# Patient Record
Sex: MALE | Race: BLACK OR AFRICAN AMERICAN | Employment: OTHER | URBAN - METROPOLITAN AREA
[De-identification: names, ages, dates, MRNs, and addresses within clinical notes are randomized per-mention and may not be internally consistent; named-entity substitution may affect disease eponyms.]

---

## 2017-12-08 ENCOUNTER — MEDICAL CORRESPONDENCE (OUTPATIENT)
Dept: HEALTH INFORMATION MANAGEMENT | Facility: CLINIC | Age: 77
End: 2017-12-08

## 2018-02-09 ENCOUNTER — MEDICAL CORRESPONDENCE (OUTPATIENT)
Dept: HEALTH INFORMATION MANAGEMENT | Facility: CLINIC | Age: 78
End: 2018-02-09

## 2018-06-01 ENCOUNTER — MEDICAL CORRESPONDENCE (OUTPATIENT)
Dept: HEALTH INFORMATION MANAGEMENT | Facility: CLINIC | Age: 78
End: 2018-06-01

## 2018-07-30 ENCOUNTER — RADIANT APPOINTMENT (OUTPATIENT)
Dept: CT IMAGING | Facility: CLINIC | Age: 78
End: 2018-07-30
Attending: INTERNAL MEDICINE
Payer: COMMERCIAL

## 2018-07-30 DIAGNOSIS — R05.9 COUGH: ICD-10-CM

## 2018-07-30 DIAGNOSIS — M54.9 BACKACHE: ICD-10-CM

## 2018-07-30 LAB
CREAT BLD-MCNC: 1.2 MG/DL (ref 0.66–1.25)
GFR SERPL CREATININE-BSD FRML MDRD: 59 ML/MIN/1.7M2
RADIOLOGIST FLAGS: ABNORMAL

## 2018-07-30 RX ORDER — IOPAMIDOL 755 MG/ML
93 INJECTION, SOLUTION INTRAVASCULAR ONCE
Status: COMPLETED | OUTPATIENT
Start: 2018-07-30 | End: 2018-07-30

## 2018-07-30 RX ADMIN — IOPAMIDOL 93 ML: 755 INJECTION, SOLUTION INTRAVASCULAR at 13:56

## 2018-07-30 NOTE — DISCHARGE INSTRUCTIONS

## 2018-07-30 NOTE — DISCHARGE INSTRUCTIONS

## 2018-08-02 ENCOUNTER — PRE VISIT (OUTPATIENT)
Dept: UROLOGY | Facility: CLINIC | Age: 78
End: 2018-08-02

## 2018-08-02 NOTE — TELEPHONE ENCOUNTER
MEDICAL RECORDS REQUEST   [facility]  Urology Clinic  [address] [place] [address]  PHONE: [phone]  Fax: [phone]        FUTURE VISIT INFORMATION                                                   Job Kaiser : 1940 scheduled for future visit at [facility]    APPOINTMENT INFORMATION:    Date: 08/10/2018    Provider:  Sarahy Silveira    Reason for Visit/Diagnosis: Kidney stones    REFERRAL INFORMATION:    Referring provider:  DR Malik Hudson    Specialty: MD    Referring providers clinic:  [facility]    Clinic contact number:  [phone]    RECORDS REQUESTED FOR VISIT                                                     NOTES  STATUS/DETAILS   OFFICE NOTE from referring provider  yes   OFFICE NOTE from other specialist  no   DISCHARGE SUMMARY from hospital  no   DISCHARGE REPORT from the ER  no   OPERATIVE REPORT  no   MEDICATION LIST  yes   KIDNEY STONE     CT STONE  yes   IMAGING (IMAGES & REPORT)  no   KUB  no       PRE-VISIT CHECKLIST      Record collection complete Yes   Appointment appropriately scheduled           (right time/right provider) Yes   MyChart activation Yes   Questionnaire complete If no, please explain in process     Completed by: Eunice Sifuentes

## 2018-12-14 ENCOUNTER — MEDICAL CORRESPONDENCE (OUTPATIENT)
Dept: HEALTH INFORMATION MANAGEMENT | Facility: CLINIC | Age: 78
End: 2018-12-14

## 2019-01-16 ENCOUNTER — TELEPHONE (OUTPATIENT)
Dept: OPHTHALMOLOGY | Facility: CLINIC | Age: 79
End: 2019-01-16

## 2019-01-18 ENCOUNTER — TELEPHONE (OUTPATIENT)
Dept: OPHTHALMOLOGY | Facility: CLINIC | Age: 79
End: 2019-01-18

## 2019-01-22 ENCOUNTER — PRE VISIT (OUTPATIENT)
Dept: UROLOGY | Facility: CLINIC | Age: 79
End: 2019-01-22

## 2019-01-22 ENCOUNTER — TELEPHONE (OUTPATIENT)
Dept: UROLOGY | Facility: CLINIC | Age: 79
End: 2019-01-22

## 2019-01-22 DIAGNOSIS — N20.0 KIDNEY STONE: Primary | ICD-10-CM

## 2019-01-22 NOTE — TELEPHONE ENCOUNTER
Patient is coming in to see  for kidney stones, message sent for patient to get CT before appointment.

## 2019-01-22 NOTE — TELEPHONE ENCOUNTER
Tried to call pt about CT scan prior to Dr. Silveira appt but phone gave busy signal and couldn't leave a VM.

## 2019-01-28 ENCOUNTER — DOCUMENTATION ONLY (OUTPATIENT)
Dept: CARE COORDINATION | Facility: CLINIC | Age: 79
End: 2019-01-28

## 2019-09-11 ENCOUNTER — TELEPHONE (OUTPATIENT)
Dept: OPHTHALMOLOGY | Facility: CLINIC | Age: 79
End: 2019-09-11

## 2020-02-06 ENCOUNTER — HOSPITAL ENCOUNTER (OUTPATIENT)
Facility: CLINIC | Age: 80
Setting detail: OBSERVATION
Discharge: LEFT BEFORE DISCHARGE | End: 2020-02-07
Attending: FAMILY MEDICINE | Admitting: PEDIATRICS
Payer: COMMERCIAL

## 2020-02-06 DIAGNOSIS — J10.1 INFLUENZA A: ICD-10-CM

## 2020-02-06 DIAGNOSIS — J11.1 INFLUENZA: Primary | ICD-10-CM

## 2020-02-06 DIAGNOSIS — R06.02 SHORTNESS OF BREATH: ICD-10-CM

## 2020-02-06 LAB
BASOPHILS # BLD AUTO: 0 10E9/L (ref 0–0.2)
BASOPHILS NFR BLD AUTO: 0.4 %
DIFFERENTIAL METHOD BLD: ABNORMAL
EOSINOPHIL # BLD AUTO: 0.1 10E9/L (ref 0–0.7)
EOSINOPHIL NFR BLD AUTO: 1.7 %
ERYTHROCYTE [DISTWIDTH] IN BLOOD BY AUTOMATED COUNT: 12.9 % (ref 10–15)
GLUCOSE BLDC GLUCOMTR-MCNC: 88 MG/DL (ref 70–99)
HCT VFR BLD AUTO: 36.4 % (ref 40–53)
HGB BLD-MCNC: 12.3 G/DL (ref 13.3–17.7)
IMM GRANULOCYTES # BLD: 0 10E9/L (ref 0–0.4)
IMM GRANULOCYTES NFR BLD: 0.3 %
LACTATE BLD-SCNC: 0.9 MMOL/L (ref 0.7–2)
LYMPHOCYTES # BLD AUTO: 0.7 10E9/L (ref 0.8–5.3)
LYMPHOCYTES NFR BLD AUTO: 9.7 %
MCH RBC QN AUTO: 31.2 PG (ref 26.5–33)
MCHC RBC AUTO-ENTMCNC: 33.8 G/DL (ref 31.5–36.5)
MCV RBC AUTO: 92 FL (ref 78–100)
MONOCYTES # BLD AUTO: 0.3 10E9/L (ref 0–1.3)
MONOCYTES NFR BLD AUTO: 3.8 %
NEUTROPHILS # BLD AUTO: 6 10E9/L (ref 1.6–8.3)
NEUTROPHILS NFR BLD AUTO: 84.1 %
NRBC # BLD AUTO: 0 10*3/UL
NRBC BLD AUTO-RTO: 0 /100
PLATELET # BLD AUTO: 251 10E9/L (ref 150–450)
RBC # BLD AUTO: 3.94 10E12/L (ref 4.4–5.9)
WBC # BLD AUTO: 7.2 10E9/L (ref 4–11)

## 2020-02-06 PROCEDURE — 85025 COMPLETE CBC W/AUTO DIFF WBC: CPT | Performed by: FAMILY MEDICINE

## 2020-02-06 PROCEDURE — 25000132 ZZH RX MED GY IP 250 OP 250 PS 637: Performed by: FAMILY MEDICINE

## 2020-02-06 PROCEDURE — 99285 EMERGENCY DEPT VISIT HI MDM: CPT | Mod: 25 | Performed by: FAMILY MEDICINE

## 2020-02-06 PROCEDURE — 93010 ELECTROCARDIOGRAM REPORT: CPT | Mod: Z6 | Performed by: FAMILY MEDICINE

## 2020-02-06 PROCEDURE — 96361 HYDRATE IV INFUSION ADD-ON: CPT | Performed by: FAMILY MEDICINE

## 2020-02-06 PROCEDURE — 00000146 ZZHCL STATISTIC GLUCOSE BY METER IP

## 2020-02-06 PROCEDURE — 87040 BLOOD CULTURE FOR BACTERIA: CPT | Performed by: FAMILY MEDICINE

## 2020-02-06 PROCEDURE — 84145 PROCALCITONIN (PCT): CPT | Performed by: FAMILY MEDICINE

## 2020-02-06 PROCEDURE — 83605 ASSAY OF LACTIC ACID: CPT | Performed by: FAMILY MEDICINE

## 2020-02-06 PROCEDURE — 96360 HYDRATION IV INFUSION INIT: CPT | Performed by: FAMILY MEDICINE

## 2020-02-06 PROCEDURE — 80053 COMPREHEN METABOLIC PANEL: CPT | Performed by: FAMILY MEDICINE

## 2020-02-06 PROCEDURE — 93005 ELECTROCARDIOGRAM TRACING: CPT | Performed by: FAMILY MEDICINE

## 2020-02-06 PROCEDURE — 84484 ASSAY OF TROPONIN QUANT: CPT | Performed by: FAMILY MEDICINE

## 2020-02-06 PROCEDURE — 94640 AIRWAY INHALATION TREATMENT: CPT | Performed by: FAMILY MEDICINE

## 2020-02-06 RX ORDER — SODIUM CHLORIDE 9 MG/ML
1000 INJECTION, SOLUTION INTRAVENOUS CONTINUOUS
Status: DISCONTINUED | OUTPATIENT
Start: 2020-02-07 | End: 2020-02-07 | Stop reason: HOSPADM

## 2020-02-06 RX ORDER — IPRATROPIUM BROMIDE AND ALBUTEROL SULFATE 2.5; .5 MG/3ML; MG/3ML
3 SOLUTION RESPIRATORY (INHALATION) ONCE
Status: COMPLETED | OUTPATIENT
Start: 2020-02-06 | End: 2020-02-07

## 2020-02-06 RX ORDER — ACETAMINOPHEN 325 MG/1
975 TABLET ORAL ONCE
Status: COMPLETED | OUTPATIENT
Start: 2020-02-06 | End: 2020-02-06

## 2020-02-06 RX ADMIN — ACETAMINOPHEN 975 MG: 325 TABLET, FILM COATED ORAL at 20:53

## 2020-02-07 ENCOUNTER — APPOINTMENT (OUTPATIENT)
Dept: GENERAL RADIOLOGY | Facility: CLINIC | Age: 80
End: 2020-02-07
Attending: FAMILY MEDICINE
Payer: COMMERCIAL

## 2020-02-07 VITALS
SYSTOLIC BLOOD PRESSURE: 117 MMHG | BODY MASS INDEX: 28.74 KG/M2 | RESPIRATION RATE: 18 BRPM | TEMPERATURE: 100.8 F | DIASTOLIC BLOOD PRESSURE: 41 MMHG | HEART RATE: 77 BPM | OXYGEN SATURATION: 100 % | WEIGHT: 189 LBS

## 2020-02-07 PROBLEM — J11.1 INFLUENZA: Status: ACTIVE | Noted: 2020-02-07

## 2020-02-07 LAB
ALBUMIN SERPL-MCNC: 3.4 G/DL (ref 3.4–5)
ALBUMIN UR-MCNC: 10 MG/DL
ALP SERPL-CCNC: 62 U/L (ref 40–150)
ALT SERPL W P-5'-P-CCNC: 18 U/L (ref 0–70)
ANION GAP SERPL CALCULATED.3IONS-SCNC: 5 MMOL/L (ref 3–14)
APPEARANCE UR: CLEAR
AST SERPL W P-5'-P-CCNC: 24 U/L (ref 0–45)
BILIRUB SERPL-MCNC: 0.3 MG/DL (ref 0.2–1.3)
BILIRUB UR QL STRIP: NEGATIVE
BUN SERPL-MCNC: 14 MG/DL (ref 7–30)
CALCIUM SERPL-MCNC: 8.5 MG/DL (ref 8.5–10.1)
CHLORIDE SERPL-SCNC: 105 MMOL/L (ref 94–109)
CO2 SERPL-SCNC: 27 MMOL/L (ref 20–32)
COLOR UR AUTO: YELLOW
CREAT SERPL-MCNC: 0.97 MG/DL (ref 0.66–1.25)
FLUAV+FLUBV AG SPEC QL: NEGATIVE
FLUAV+FLUBV AG SPEC QL: POSITIVE
GFR SERPL CREATININE-BSD FRML MDRD: 74 ML/MIN/{1.73_M2}
GLUCOSE SERPL-MCNC: 95 MG/DL (ref 70–99)
GLUCOSE UR STRIP-MCNC: NEGATIVE MG/DL
HGB UR QL STRIP: NEGATIVE
KETONES UR STRIP-MCNC: NEGATIVE MG/DL
LEUKOCYTE ESTERASE UR QL STRIP: NEGATIVE
MUCOUS THREADS #/AREA URNS LPF: PRESENT /LPF
NITRATE UR QL: NEGATIVE
PH UR STRIP: 6.5 PH (ref 5–7)
POTASSIUM SERPL-SCNC: 4.2 MMOL/L (ref 3.4–5.3)
PROCALCITONIN SERPL-MCNC: <0.05 NG/ML
PROT SERPL-MCNC: 6.8 G/DL (ref 6.8–8.8)
RBC #/AREA URNS AUTO: 5 /HPF (ref 0–2)
SODIUM SERPL-SCNC: 137 MMOL/L (ref 133–144)
SOURCE: ABNORMAL
SP GR UR STRIP: 1.02 (ref 1–1.03)
SPECIMEN SOURCE: ABNORMAL
SQUAMOUS #/AREA URNS AUTO: <1 /HPF (ref 0–1)
TRANS CELLS #/AREA URNS HPF: <1 /HPF (ref 0–1)
TROPONIN I SERPL-MCNC: <0.015 UG/L (ref 0–0.04)
UROBILINOGEN UR STRIP-MCNC: NORMAL MG/DL (ref 0–2)
WBC #/AREA URNS AUTO: 6 /HPF (ref 0–5)

## 2020-02-07 PROCEDURE — 25000132 ZZH RX MED GY IP 250 OP 250 PS 637: Performed by: PEDIATRICS

## 2020-02-07 PROCEDURE — 25800030 ZZH RX IP 258 OP 636: Performed by: FAMILY MEDICINE

## 2020-02-07 PROCEDURE — 87040 BLOOD CULTURE FOR BACTERIA: CPT | Performed by: FAMILY MEDICINE

## 2020-02-07 PROCEDURE — 99220 ZZC INITIAL OBSERVATION CARE,LEVL III: CPT | Performed by: PEDIATRICS

## 2020-02-07 PROCEDURE — 25800030 ZZH RX IP 258 OP 636: Performed by: PEDIATRICS

## 2020-02-07 PROCEDURE — 84484 ASSAY OF TROPONIN QUANT: CPT | Performed by: FAMILY MEDICINE

## 2020-02-07 PROCEDURE — 87804 INFLUENZA ASSAY W/OPTIC: CPT | Mod: 59 | Performed by: FAMILY MEDICINE

## 2020-02-07 PROCEDURE — 25000132 ZZH RX MED GY IP 250 OP 250 PS 637: Performed by: FAMILY MEDICINE

## 2020-02-07 PROCEDURE — 81001 URINALYSIS AUTO W/SCOPE: CPT | Performed by: FAMILY MEDICINE

## 2020-02-07 PROCEDURE — 71046 X-RAY EXAM CHEST 2 VIEWS: CPT

## 2020-02-07 PROCEDURE — 25000125 ZZHC RX 250: Performed by: FAMILY MEDICINE

## 2020-02-07 RX ORDER — POLYETHYLENE GLYCOL 3350 17 G/17G
17 POWDER, FOR SOLUTION ORAL DAILY PRN
Status: DISCONTINUED | OUTPATIENT
Start: 2020-02-07 | End: 2020-02-07 | Stop reason: HOSPADM

## 2020-02-07 RX ORDER — ONDANSETRON 4 MG/1
4 TABLET, ORALLY DISINTEGRATING ORAL EVERY 6 HOURS PRN
Status: DISCONTINUED | OUTPATIENT
Start: 2020-02-07 | End: 2020-02-07 | Stop reason: HOSPADM

## 2020-02-07 RX ORDER — ACETAMINOPHEN 325 MG/1
650 TABLET ORAL EVERY 4 HOURS PRN
Status: DISCONTINUED | OUTPATIENT
Start: 2020-02-07 | End: 2020-02-07 | Stop reason: HOSPADM

## 2020-02-07 RX ORDER — ACETAMINOPHEN 325 MG/1
325-650 TABLET ORAL EVERY 4 HOURS
Status: DISCONTINUED | OUTPATIENT
Start: 2020-02-07 | End: 2020-02-07 | Stop reason: HOSPADM

## 2020-02-07 RX ORDER — ONDANSETRON 2 MG/ML
4 INJECTION INTRAMUSCULAR; INTRAVENOUS EVERY 6 HOURS PRN
Status: DISCONTINUED | OUTPATIENT
Start: 2020-02-07 | End: 2020-02-07 | Stop reason: HOSPADM

## 2020-02-07 RX ORDER — SODIUM CHLORIDE 9 MG/ML
INJECTION, SOLUTION INTRAVENOUS CONTINUOUS
Status: DISCONTINUED | OUTPATIENT
Start: 2020-02-07 | End: 2020-02-07 | Stop reason: HOSPADM

## 2020-02-07 RX ORDER — OSELTAMIVIR PHOSPHATE 75 MG/1
75 CAPSULE ORAL 2 TIMES DAILY
Qty: 10 CAPSULE | Refills: 0 | Status: ON HOLD | OUTPATIENT
Start: 2020-02-07 | End: 2022-01-01

## 2020-02-07 RX ORDER — OSELTAMIVIR PHOSPHATE 75 MG/1
75 CAPSULE ORAL ONCE
Status: COMPLETED | OUTPATIENT
Start: 2020-02-07 | End: 2020-02-07

## 2020-02-07 RX ORDER — ALBUTEROL SULFATE 0.83 MG/ML
2.5 SOLUTION RESPIRATORY (INHALATION) EVERY 4 HOURS PRN
Status: DISCONTINUED | OUTPATIENT
Start: 2020-02-07 | End: 2020-02-07 | Stop reason: HOSPADM

## 2020-02-07 RX ORDER — OSELTAMIVIR PHOSPHATE 75 MG/1
75 CAPSULE ORAL 2 TIMES DAILY
Status: DISCONTINUED | OUTPATIENT
Start: 2020-02-07 | End: 2020-02-07 | Stop reason: HOSPADM

## 2020-02-07 RX ORDER — GUAIFENESIN AND DEXTROMETHORPHAN HYDROBROMIDE 600; 30 MG/1; MG/1
1 TABLET, EXTENDED RELEASE ORAL 2 TIMES DAILY PRN
Status: DISCONTINUED | OUTPATIENT
Start: 2020-02-07 | End: 2020-02-07 | Stop reason: HOSPADM

## 2020-02-07 RX ORDER — BUDESONIDE 1 MG/2ML
1 INHALANT ORAL 2 TIMES DAILY
Status: DISCONTINUED | OUTPATIENT
Start: 2020-02-07 | End: 2020-02-07 | Stop reason: HOSPADM

## 2020-02-07 RX ORDER — BENZONATATE 100 MG/1
100 CAPSULE ORAL 3 TIMES DAILY PRN
Status: DISCONTINUED | OUTPATIENT
Start: 2020-02-07 | End: 2020-02-07 | Stop reason: HOSPADM

## 2020-02-07 RX ORDER — ACETAMINOPHEN 325 MG/1
325 TABLET ORAL EVERY 4 HOURS
Status: DISCONTINUED | OUTPATIENT
Start: 2020-02-07 | End: 2020-02-07

## 2020-02-07 RX ORDER — NALOXONE HYDROCHLORIDE 0.4 MG/ML
.1-.4 INJECTION, SOLUTION INTRAMUSCULAR; INTRAVENOUS; SUBCUTANEOUS
Status: DISCONTINUED | OUTPATIENT
Start: 2020-02-07 | End: 2020-02-07 | Stop reason: HOSPADM

## 2020-02-07 RX ADMIN — SODIUM CHLORIDE: 9 INJECTION, SOLUTION INTRAVENOUS at 04:39

## 2020-02-07 RX ADMIN — OSELTAMIVIR PHOSPHATE 75 MG: 75 CAPSULE ORAL at 01:39

## 2020-02-07 RX ADMIN — SODIUM CHLORIDE 1000 ML: 9 INJECTION, SOLUTION INTRAVENOUS at 01:47

## 2020-02-07 RX ADMIN — IPRATROPIUM BROMIDE AND ALBUTEROL SULFATE 3 ML: .5; 3 SOLUTION RESPIRATORY (INHALATION) at 00:37

## 2020-02-07 RX ADMIN — ACETAMINOPHEN 325 MG: 325 TABLET, FILM COATED ORAL at 04:36

## 2020-02-07 RX ADMIN — ACETAMINOPHEN 325 MG: 325 TABLET, FILM COATED ORAL at 09:27

## 2020-02-07 RX ADMIN — SODIUM CHLORIDE 1000 ML: 9 INJECTION, SOLUTION INTRAVENOUS at 00:27

## 2020-02-07 ASSESSMENT — ENCOUNTER SYMPTOMS
FEVER: 0
SHORTNESS OF BREATH: 0
ABDOMINAL PAIN: 0

## 2020-02-07 NOTE — ED NOTES
[facility], [place]   ED Nurse to Floor Handoff     Job Kaiser is a 80 year old male who speaks Hong Konger and lives with family members,  in a home  They arrived in the ED by car from home    ED Chief Complaint: Blood Sugar Problem (family worried as patient cannot walk today, did not check blood sugar prior to coming to ED) and Cough (cough, has lots of pain and cannot walk. Used to walk good)    ED Dx;   Final diagnoses:   Influenza A         Needed?: Yes [place]er, pt intermittently refused    Allergies: No Known Allergies.  Past Medical Hx:   Past Medical History:   Diagnosis Date     Bronchiectasis (H)      COPD (chronic obstructive pulmonary disease) (H)      Nephrolithiasis      Tuberculosis, treated 1996      Baseline Mental status: WDL  Current Mental Status changes: at basesline    Infection present or suspected this encounter: yes respiratory influenza A  Sepsis suspected: No  Isolation type: Droplet     Activity level - Baseline/Home:  Stand with Assist  Activity Level - Current:   Stand with Assist    Bariatric equipment needed?: No    In the ED these meds were given:   Medications   0.9% sodium chloride BOLUS (0 mLs Intravenous Stopped 2/7/20 0147)     Followed by   sodium chloride 0.9% infusion (1,000 mLs Intravenous New Bag 2/7/20 0147)   acetaminophen (TYLENOL) tablet 975 mg (975 mg Oral Given 2/6/20 2053)   ipratropium - albuterol 0.5 mg/2.5 mg/3 mL (DUONEB) neb solution 3 mL (3 mLs Nebulization Given 2/7/20 0037)   oseltamivir (TAMIFLU) capsule 75 mg (75 mg Oral Given 2/7/20 0139)       Drips running?  No    Home pump  No    Current LDAs  Peripheral IV 02/06/20 Right Upper forearm (Active)   Site Assessment WDL 2/6/2020 11:47 PM   Line Status Saline locked 2/6/2020 11:47 PM   Phlebitis Scale 0-->no symptoms 2/6/2020 11:47 PM   Infiltration Scale 0 2/6/2020 11:47 PM   Number of days: 1       Labs results:   Labs Ordered and Resulted from Time of ED Arrival Up to the  Time of Departure from the ED   CBC WITH PLATELETS DIFFERENTIAL - Abnormal; Notable for the following components:       Result Value    RBC Count 3.94 (*)     Hemoglobin 12.3 (*)     Hematocrit 36.4 (*)     Absolute Lymphocytes 0.7 (*)     All other components within normal limits   ROUTINE UA WITH MICROSCOPIC - Abnormal; Notable for the following components:    Protein Albumin Urine 10 (*)     WBC Urine 6 (*)     RBC Urine 5 (*)     Mucous Urine Present (*)     All other components within normal limits   INFLUENZA A/B ANTIGEN - Abnormal; Notable for the following components:    Influenza A Positive (*)     All other components within normal limits   GLUCOSE BY METER   COMPREHENSIVE METABOLIC PANEL   LACTIC ACID WHOLE BLOOD   PROCALCITONIN   TROPONIN I   BLOOD CULTURE   BLOOD CULTURE       Imaging Studies:   Recent Results (from the past 24 hour(s))   XR Chest 2 Views    Narrative    EXAM: XR CHEST 2 VW  LOCATION: [facility]  DATE/TIME: 2/7/2020 12:19 AM    INDICATION: Shortness of breath and fever  COMPARISON: 6/24/2015 and 7/30/2018      Impression    IMPRESSION: Bilateral fibrotic changes greater in the apices again seen. No pleural effusion. No new area of consolidation. Atherosclerotic aorta.       Recent vital signs:   /55   Pulse 85   Temp 101.5  F (38.6  C) (Oral)   Resp 18   Wt 85.7 kg (189 lb)   SpO2 95%   BMI 28.74 kg/m              Cardiac Rhythm: Normal Sinus  Pt needs tele? No  Skin/wound Issues: None    Code Status: Full Code    Pain control: fair    Nausea control: pt had none    Abnormal labs/tests/findings requiring intervention: influenza A+    Family present during ED course? Yes   Family Comments/Social Situation comments: family at bedside    Tasks needing completion: None    Gabo Mullins, RN  [phone] [facility]

## 2020-02-07 NOTE — ED PROVIDER NOTES
[facility] [place]  20    History     Chief Complaint   Patient presents with     Blood Sugar Problem     family worried as patient cannot walk today, did not check blood sugar prior to coming to ED     Cough     cough, has lots of pain and cannot walk. Used to walk good     History was provided by the patient, the patient's family members and medical records.      Job Kaiser is a 80 year old male with a history of DM type II and bronchiectasis who presents for evaluation of a cough.  Patient has had a productive cough for the past several days with an associated fever.  He also complains of abdominal pain.Today, the patient has been unable to ambulate.  Patient states that his symptoms have been primarily present for 2 days and that he has had increased weakness and is been unable to take care of himself.  They have not documented fever however patient was febrile on arrival here in the emergency room.    PAST MEDICAL HISTORY  Past Medical History:   Diagnosis Date     Bronchiectasis (H)      COPD (chronic obstructive pulmonary disease) (H)      Nephrolithiasis      Tuberculosis, treated      PAST SURGICAL HISTORY  Past Surgical History:   Procedure Laterality Date     NO HISTORY OF SURGERY       FAMILY HISTORY  Family History   Problem Relation Age of Onset     Glaucoma No family hx of      Macular Degeneration No family hx of      SOCIAL HISTORY  Social History     Tobacco Use     Smoking status: Former Smoker     Last attempt to quit: 1995     Years since quittin.1   Substance Use Topics     Alcohol use: No     MEDICATIONS  Current Facility-Administered Medications   Medication     sodium chloride 0.9% infusion     Current Outpatient Medications   Medication     albuterol (2.5 MG/3ML) 0.083% nebulizer solution     albuterol (PROAIR HFA, PROVENTIL HFA, VENTOLIN HFA) 108 (90 BASE) MCG/ACT inhaler     budesonide (PULMICORT) 1 MG/2ML SUSP nebulizer solution      ALLERGIES  No Known Allergies      I have reviewed the Medications, Allergies, Past Medical and Surgical History, and Social History in the Epic system.    Review of Systems   Constitutional: Negative for fever.   Respiratory: Negative for shortness of breath.    Cardiovascular: Negative for chest pain.   Gastrointestinal: Negative for abdominal pain.   All other systems reviewed and are negative.      Physical Exam   BP: 112/55  Pulse: 85  Temp: 101.5  F (38.6  C)  Resp: 18  Weight: 85.7 kg (189 lb)  SpO2: 95 %      Physical Exam  Constitutional:       General: He is not in acute distress.     Appearance: He is ill-appearing. He is not diaphoretic.   HENT:      Head: Atraumatic.      Right Ear: Tympanic membrane normal.      Left Ear: Tympanic membrane normal.      Nose: Congestion present.      Mouth/Throat:      Pharynx: Posterior oropharyngeal erythema present.   Eyes:      General: No scleral icterus.     Pupils: Pupils are equal, round, and reactive to light.   Cardiovascular:      Heart sounds: Normal heart sounds.   Pulmonary:      Effort: No respiratory distress.      Breath sounds: Wheezing present.   Abdominal:      General: Bowel sounds are normal.      Palpations: Abdomen is soft.      Tenderness: There is no abdominal tenderness.   Musculoskeletal:         General: No tenderness.   Skin:     General: Skin is warm.      Findings: No rash.   Neurological:      General: No focal deficit present.      Motor: No weakness.      Coordination: Coordination normal.         ED Course   11:03 PM  The patient was seen and examined by Dr. Gerald Berman in Room ED 03.     Procedures         EKG Interpretation:      Interpreted by Gerald Berman MD  Time reviewed: 2319  Symptoms at time of EKG: sob   Rhythm: normal sinus   Rate: normal  Axis: normal  Ectopy: none  Conduction: RBBB  ST Segments/ T Waves: No ST-T wave changes  Q Waves: none  Comparison to prior: Unchanged     Clinical Impression:  normal EKG                   Results for orders placed or performed during the hospital encounter of 02/06/20 (from the past 48 hour(s))   Glucose by meter   Result Value Ref Range    Glucose 88 70 - 99 mg/dL   CBC with platelets differential   Result Value Ref Range    WBC 7.2 4.0 - 11.0 10e9/L    RBC Count 3.94 (L) 4.4 - 5.9 10e12/L    Hemoglobin 12.3 (L) 13.3 - 17.7 g/dL    Hematocrit 36.4 (L) 40.0 - 53.0 %    MCV 92 78 - 100 fl    MCH 31.2 26.5 - 33.0 pg    MCHC 33.8 31.5 - 36.5 g/dL    RDW 12.9 10.0 - 15.0 %    Platelet Count 251 150 - 450 10e9/L    Diff Method Automated Method     % Neutrophils 84.1 %    % Lymphocytes 9.7 %    % Monocytes 3.8 %    % Eosinophils 1.7 %    % Basophils 0.4 %    % Immature Granulocytes 0.3 %    Nucleated RBCs 0 0 /100    Absolute Neutrophil 6.0 1.6 - 8.3 10e9/L    Absolute Lymphocytes 0.7 (L) 0.8 - 5.3 10e9/L    Absolute Monocytes 0.3 0.0 - 1.3 10e9/L    Absolute Eosinophils 0.1 0.0 - 0.7 10e9/L    Absolute Basophils 0.0 0.0 - 0.2 10e9/L    Abs Immature Granulocytes 0.0 0 - 0.4 10e9/L    Absolute Nucleated RBC 0.0    Comprehensive metabolic panel   Result Value Ref Range    Sodium 137 133 - 144 mmol/L    Potassium 4.2 3.4 - 5.3 mmol/L    Chloride 105 94 - 109 mmol/L    Carbon Dioxide 27 20 - 32 mmol/L    Anion Gap 5 3 - 14 mmol/L    Glucose 95 70 - 99 mg/dL    Urea Nitrogen 14 7 - 30 mg/dL    Creatinine 0.97 0.66 - 1.25 mg/dL    GFR Estimate 74 >60 mL/min/[1.73_m2]    GFR Estimate If Black 85 >60 mL/min/[1.73_m2]    Calcium 8.5 8.5 - 10.1 mg/dL    Bilirubin Total 0.3 0.2 - 1.3 mg/dL    Albumin 3.4 3.4 - 5.0 g/dL    Protein Total 6.8 6.8 - 8.8 g/dL    Alkaline Phosphatase 62 40 - 150 U/L    ALT 18 0 - 70 U/L    AST 24 0 - 45 U/L   Lactic acid whole blood   Result Value Ref Range    Lactic Acid 0.9 0.7 - 2.0 mmol/L   Blood culture   Result Value Ref Range    Specimen Description Blood Right Arm     Culture Micro PENDING    Procalcitonin   Result Value Ref Range    Procalcitonin <0.05  ng/ml   Troponin I   Result Value Ref Range    Troponin I ES <0.015 0.000 - 0.045 ug/L   XR Chest 2 Views    Narrative    EXAM: XR CHEST 2 VW  LOCATION: [facility]  DATE/TIME: 2/7/2020 12:19 AM    INDICATION: Shortness of breath and fever  COMPARISON: 6/24/2015 and 7/30/2018      Impression    IMPRESSION: Bilateral fibrotic changes greater in the apices again seen. No pleural effusion. No new area of consolidation. Atherosclerotic aorta.   UA with Microscopic   Result Value Ref Range    Color Urine Yellow     Appearance Urine Clear     Glucose Urine Negative NEG^Negative mg/dL    Bilirubin Urine Negative NEG^Negative    Ketones Urine Negative NEG^Negative mg/dL    Specific Gravity Urine 1.016 1.003 - 1.035    Blood Urine Negative NEG^Negative    pH Urine 6.5 5.0 - 7.0 pH    Protein Albumin Urine 10 (A) NEG^Negative mg/dL    Urobilinogen mg/dL Normal 0.0 - 2.0 mg/dL    Nitrite Urine Negative NEG^Negative    Leukocyte Esterase Urine Negative NEG^Negative    Source Midstream Urine     WBC Urine 6 (H) 0 - 5 /HPF    RBC Urine 5 (H) 0 - 2 /HPF    Squamous Epithelial /HPF Urine <1 0 - 1 /HPF    Transitional Epi <1 0 - 1 /HPF    Mucous Urine Present (A) NEG^Negative /LPF   Influenza A/B antigen   Result Value Ref Range    Influenza A/B Agn Specimen Nasopharyngeal     Influenza A Positive (A) NEG^Negative    Influenza B Negative NEG^Negative       Assessments & Plan (with Medical Decision Making)       I have reviewed the nursing notes.    I have reviewed the findings, diagnosis, plan and need for follow up with the patient.  Patient with influenza A moderate dehydration at this time responded to albuterol neb with improvement patient has weakness in general and has been unable to care for himself I believe he needs hydration observation will be started on Tamiflu and observed on [facility] bed on the [facility] with observation status.      Final diagnoses:   Influenza A     I, Byron Connolly, am serving as a  trained medical scribe to document services personally performed by Gerald Berman MD, based on the provider's statements to me.      I, Gerald Berman MD, was physically present and have reviewed and verified the accuracy of this note documented by Byron Connolly    2/6/2020   [facility], [place], EMERGENCY DEPARTMENT     Gerald Berman MD  02/07/20 0158

## 2020-02-07 NOTE — H&P
[facility], [place]    History and Physical - Hospitalist Service       Date of Admission:  2/6/2020    Assessment & Plan   Job Kaiser is a 80 year old male with a history of asthma who presented with subjective fevers body aches and chills and was found to have influenza A and mild dehydration.    Influenza A: 3 days of chills, body aches, subjective fevers.  Influenza A +.  CXR without acute disease.  WBC and Procal wnl  -tamiflu x 5 day (first received 2/7, 1am)  -maintain fluids  -will scheduled tylenol for now    Asthma: Patient with historyof stable asthma for many years.  Per med rec he is on a controller med though son reports he only has one inhaler.  It does not seem to have been helping him acutely.  PFTs in 2008 notably showed mixed obstructive/restrictive physiology with reduced DLCO.  Does not appear to have an acute asthma exacerbation  -Continue home meds  -Consider education by respiratory therapist in the morning as appears that patient has not been received seething or administering his prescribed medications.  However it is notable the patient appears to generally have been doing quite well in spite of this.    Mild dehydration: Presented with mild-modreate dehydration.  Received 2 L of fluid in the emergency department.  Appears euvolemic  -Maintenance IV fluids  -monitor closely for evidence of fluid overload (particularly given age)  - Encourage good p.o.     Diet: Regular  DVT Prophylaxis: Low Risk/Ambulatory with no VTE prophylaxis indicated  Vu Catheter: not present  Code Status: full    Disposition Plan   Expected discharge: 1 to 2 days once has improvement in symptoms and patient and son believe he can be managed at home  Entered: Bryon Bravo MD 02/07/2020, 3:28 AM     The patient's care was discussed with the pt, his son    Byron Bravo MD  [facility],  "[place]    ______________________________________________________________________    Chief Complaint   Cough, fever, muscle aches    History of Present Illness   Job Kaiser is a 80 year old male with a history of stable obstructive lung disease, a history of tuberculosis, who presents with 3 days of \"cold symptoms\" his son notes that he has been having subjective fevers and is generally appeared quite uncomfortable.  He has a cough which is productive of mucus without any blood.  He does not have a cough at baseline he is overall been experiencing malaise and has been finding it hard to manage his ADLs.  Has not been taking any medications for pain.  Aches are in his muscles.  No chest pain.  Feels short of breath even with mild exertion.  No sick contacts.  His son feels that he is probably fairly well hydrated as he has been encouraging him to drink smart water at home.  He has been taking his inhaler (son only believes he has one type of inhaler) as per his baseline without any apparent improvement.  Found in the ED to be positive for influenza A, had an unremarkable chest x-ray, and a fever of 101.5.  The patient and his son wish to stay for further treatment as they feel be hard for him to manage at home in his current state.    Review of Systems    10 pt ROS otherwise negativ    Past Medical History    I have reviewed this patient's medical history and updated it with pertinent information if needed.   Past Medical History:   Diagnosis Date     Bronchiectasis (H)      COPD (chronic obstructive pulmonary disease) (H)      Nephrolithiasis      Tuberculosis, treated 1996       Past Surgical History   I have reviewed this patient's surgical history and updated it with pertinent information if needed.  Past Surgical History:   Procedure Laterality Date     NO HISTORY OF SURGERY         Social History   I have reviewed this patient's social history and updated it with pertinent information if needed.    Patient " lives with the son who is here today    Social History     Tobacco Use     Smoking status: Former Smoker     Last attempt to quit: 1995     Years since quittin.1   Substance Use Topics     Alcohol use: No     Drug use: No       Family History   I have reviewed this patient's family history and updated it with pertinent information if needed.   Family History   Problem Relation Age of Onset     Glaucoma No family hx of      Macular Degeneration No family hx of    No additional pertinent history    Prior to Admission Medications   Prior to Admission Medications   Prescriptions Last Dose Informant Patient Reported? Taking?   albuterol (2.5 MG/3ML) 0.083% nebulizer solution   No No   Sig: Take 1 vial (2.5 mg) by nebulization every 4 hours as needed for shortness of breath / dyspnea or wheezing   albuterol (PROAIR HFA, PROVENTIL HFA, VENTOLIN HFA) 108 (90 BASE) MCG/ACT inhaler   Yes No   Sig: Inhale 2 puffs into the lungs every 4 hours as needed for shortness of breath / dyspnea or wheezing   budesonide (PULMICORT) 1 MG/2ML SUSP nebulizer solution   No No   Sig: Take 2 mLs (1 mg) by nebulization 2 times daily      Facility-Administered Medications: None     Allergies   No Known Allergies    Physical Exam   Vital Signs: Temp: 101.5  F (38.6  C) Temp src: Oral BP: 112/55 Pulse: 85   Resp: 18 SpO2: 95 % O2 Device: None (Room air)    Weight: 189 lbs 0 oz    Constitutional: sleepy, arousable.  NAD.  Lying flat in bed.  NAD  Eyes: Lids and lashes normal, pupils equal,sclera clear, conjunctiva normal  ENT: Normocephalic, without obvious abnormality, atraumatic,oral pharynx with moist mucous membranes, tonsils without erythema or exudates, gums normal and good dentition.  Hematologic / Lymphatic: no cervical lymphadenopathy  Respiratory: No increased work of breathing, good air exchange, bilateral rhonchi, particularly in upper lobes, no crackles (including at bases)  Cardiovascular: Normal apical impulse, regular rate  and rhythm, normal S1 and S2, no S3 or S4, and no murmur noted.  No peripheral edema.  GI: No scars, normal bowel sounds, soft, non-distended  Skin: no bruising or bleeding and normal skin color, texture, turgor  Neurologic: Sleepy but arousable. .  Cranial nerves II-XII are grossly intact.   Neuropsychiatric: General: normal, calm      Data   Data reviewed today: I reviewed all medications, new labs and imaging results over the last 24 hours. I personally reviewed     Recent Labs   Lab 02/06/20  2339   WBC 7.2   HGB 12.3*   MCV 92         POTASSIUM 4.2   CHLORIDE 105   CO2 27   BUN 14   CR 0.97   ANIONGAP 5   MATT 8.5   GLC 95   ALBUMIN 3.4   PROTTOTAL 6.8   BILITOTAL 0.3   ALKPHOS 62   ALT 18   AST 24   TROPI <0.015     Recent Results (from the past 24 hour(s))   XR Chest 2 Views    Narrative    EXAM: XR CHEST 2 VW  LOCATION: [facility]  DATE/TIME: 2/7/2020 12:19 AM    INDICATION: Shortness of breath and fever  COMPARISON: 6/24/2015 and 7/30/2018      Impression    IMPRESSION: Bilateral fibrotic changes greater in the apices again seen. No pleural effusion. No new area of consolidation. Atherosclerotic aorta.

## 2020-02-07 NOTE — PLAN OF CARE
VS: VSS   O2: 97% on RA. Frequent productive cough. Some difficulty breathing   Output: Voiding spontaneously. Incontinent once   Last BM: Pt unable to recall LBM   Activity: Up w assist of two d/t generalized weakness   Skin: Intact   Pain: Denies   CMS: Intact   Dressing: None   Diet: Regular   LDA: PIV infusing, no drains   Equipment: IV pump and pole, personal belongings   Plan: Continue POC   Additional Info: Irish-speaking, son in room able to help communicate needs

## 2020-02-07 NOTE — PLAN OF CARE
"  VS: VSS, pt A&O, sleeping most of this morning.    O2: Room air sat. Low 90's.    Output: Incontinent of urine large amount x one this morning.   Last BM:  Pt unable to tell recall.   Activity: Pt in bed all morning.    Skin: Intact pt decline full skin assessment.    Pain: Mild discomfort tylenol x one given.    CMS: CMS intact.    Dressing: None.    Diet: Regular.    LDA: Piv dcd by the son.    Equipment: IV pole, and personal belongings.    Plan: TBD.    Additional Info:  was scheduled for 12:45 pm, pt's Son decided to take pt home.PIV dcd by pt's Son and pt walked out of the room and left with his Son, MD notified, writer try to talk to the Son but he refused and left. MD was able to call pt at home and able to talk to pt's Son with help of  and was able to order medication to  His home pharmacy.Son refused to bring pt back stated his father told him to take him home.   Writer called pt's Son and advised to come back to ED have iv site checked Son responded \"okay\".       "

## 2020-02-07 NOTE — PROGRESS NOTES
80 M with ho Asthma admitted with Influenza A and Mild to Mod dehydration. Got tamiflue and 2 L of IVF. Still looks sick. Needs . Was admitted this morning. No new note needed. He was seen by me. He needs . Ct current care. Keep IVF, Tamiflu. Add Mucinex and Tesslaon. Increase tylenol to 650 mg q 4 hr prn. Get PT to see. Dispo depends on how he does. He looks sick. CXR was negative for infiltrates showed chornic fibrotic changes in Apices.

## 2020-02-09 NOTE — DISCHARGE SUMMARY
[facility]  Discharge Summary    Job Kaiser MRN# [id number]   YOB: 1940 Age: 80 year old     Date of Admission:  2/6/2020  Date of Discharge:  2/7/2020  1:40 PM  Admitting Physician:  Byron Bravo MD  Discharge Physician:  Srikanth German MD  Discharging Service:  Internal Medicine     Primary Provider: [facility]              Discharge Diagnosis:     Primary Discharge Diagnosis:  1. Influenza A  2. Sepsis from above  3. Dehydration  4. Left AMA    Past Medical History:   Diagnosis Date     Bronchiectasis (H)      COPD (chronic obstructive pulmonary disease) (H)      Nephrolithiasis      Tuberculosis, treated 1996                    Discharge Medications:     Discharge Medication List as of 2/7/2020  1:42 PM      START taking these medications    Details   oseltamivir (TAMIFLU) 75 MG capsule Take 1 capsule (75 mg) by mouth 2 times daily, Disp-10 capsule, R-0, E-Prescribe         CONTINUE these medications which have NOT CHANGED    Details   albuterol (2.5 MG/3ML) 0.083% nebulizer solution Take 1 vial (2.5 mg) by nebulization every 4 hours as needed for shortness of breath / dyspnea or wheezing, Disp-60 vial, R-0, Local Print      albuterol (PROAIR HFA, PROVENTIL HFA, VENTOLIN HFA) 108 (90 BASE) MCG/ACT inhaler Inhale 2 puffs into the lungs every 4 hours as needed for shortness of breath / dyspnea or wheezing, Historical      budesonide (PULMICORT) 1 MG/2ML SUSP nebulizer solution Take 2 mLs (1 mg) by nebulization 2 times daily, Disp-120 mL, R-11, E-Prescribe                  Hospital Course:   Job Kaiser is a 80 year old male with a history of asthma who presented with subjective fevers body aches and chills and was found to have influenza A and mild dehydration. CXR was negative for infiltrate.      Influenza A: 3 days of chills, body aches, subjective fevers.  Influenza A +.  CXR without acute disease.  WBC and Procal wnl. He was started on  tamiflu x 5 day (first received 2/7, 1am). His son was accompanying him. Both patient and his son were sleeping most of the morning. I went to see the patient in the morning. Realized that he needs . We scheduled one later in the day. Patients son woke and up and took his dad ie the patient home. Before they could be stopped he had left with the patient. We got the  urgently but by that time the patient was gone home. We will him home and through the  he was advised to come back and explained the risk of pneumonia. He did not want to come back. I prescribed him Tamiflu for 5 days. He was advised to drink plenty of fluids. He did not have asthma exacerbation. Patient left AMA.                Left AMA        Final Day of Progress before Discharge:       Physical Exam:  Blood pressure 117/41, pulse 77, temperature 100.8  F (38.2  C), temperature source Oral, resp. rate 18, weight 85.7 kg (189 lb), SpO2 100 %.  GENERAL: Alert and oriented x 3. NAD.   HEENT: Anicteric sclera. PERRL. Mucous membranes moist and without lesions.   CV: RRR. S1, S2. No murmurs appreciated.   RESPIRATORY: Effort normal. Lungs CTAB with no wheezing, rales, rhonchi.   GI: Abdomen soft and non distended with normoactive bowel sounds present in all quadrants. No tenderness, rebound, guarding.    Data:  All laboratory data reviewed             Significant Results:     Results for orders placed or performed during the hospital encounter of 02/06/20   XR Chest 2 Views     Status: None    Narrative    EXAM: XR CHEST 2 VW  LOCATION: [facility]  DATE/TIME: 2/7/2020 12:19 AM    INDICATION: Shortness of breath and fever  COMPARISON: 6/24/2015 and 7/30/2018      Impression    IMPRESSION: Bilateral fibrotic changes greater in the apices again seen. No pleural effusion. No new area of consolidation. Atherosclerotic aorta.   Glucose by meter     Status: None   Result Value Ref Range     Glucose 88 70 - 99 mg/dL   CBC with platelets differential     Status: Abnormal   Result Value Ref Range    WBC 7.2 4.0 - 11.0 10e9/L    RBC Count 3.94 (L) 4.4 - 5.9 10e12/L    Hemoglobin 12.3 (L) 13.3 - 17.7 g/dL    Hematocrit 36.4 (L) 40.0 - 53.0 %    MCV 92 78 - 100 fl    MCH 31.2 26.5 - 33.0 pg    MCHC 33.8 31.5 - 36.5 g/dL    RDW 12.9 10.0 - 15.0 %    Platelet Count 251 150 - 450 10e9/L    Diff Method Automated Method     % Neutrophils 84.1 %    % Lymphocytes 9.7 %    % Monocytes 3.8 %    % Eosinophils 1.7 %    % Basophils 0.4 %    % Immature Granulocytes 0.3 %    Nucleated RBCs 0 0 /100    Absolute Neutrophil 6.0 1.6 - 8.3 10e9/L    Absolute Lymphocytes 0.7 (L) 0.8 - 5.3 10e9/L    Absolute Monocytes 0.3 0.0 - 1.3 10e9/L    Absolute Eosinophils 0.1 0.0 - 0.7 10e9/L    Absolute Basophils 0.0 0.0 - 0.2 10e9/L    Abs Immature Granulocytes 0.0 0 - 0.4 10e9/L    Absolute Nucleated RBC 0.0    Comprehensive metabolic panel     Status: None   Result Value Ref Range    Sodium 137 133 - 144 mmol/L    Potassium 4.2 3.4 - 5.3 mmol/L    Chloride 105 94 - 109 mmol/L    Carbon Dioxide 27 20 - 32 mmol/L    Anion Gap 5 3 - 14 mmol/L    Glucose 95 70 - 99 mg/dL    Urea Nitrogen 14 7 - 30 mg/dL    Creatinine 0.97 0.66 - 1.25 mg/dL    GFR Estimate 74 >60 mL/min/[1.73_m2]    GFR Estimate If Black 85 >60 mL/min/[1.73_m2]    Calcium 8.5 8.5 - 10.1 mg/dL    Bilirubin Total 0.3 0.2 - 1.3 mg/dL    Albumin 3.4 3.4 - 5.0 g/dL    Protein Total 6.8 6.8 - 8.8 g/dL    Alkaline Phosphatase 62 40 - 150 U/L    ALT 18 0 - 70 U/L    AST 24 0 - 45 U/L   Lactic acid whole blood     Status: None   Result Value Ref Range    Lactic Acid 0.9 0.7 - 2.0 mmol/L   UA with Microscopic     Status: Abnormal   Result Value Ref Range    Color Urine Yellow     Appearance Urine Clear     Glucose Urine Negative NEG^Negative mg/dL    Bilirubin Urine Negative NEG^Negative    Ketones Urine Negative NEG^Negative mg/dL    Specific Gravity Urine 1.016 1.003 - 1.035     Blood Urine Negative NEG^Negative    pH Urine 6.5 5.0 - 7.0 pH    Protein Albumin Urine 10 (A) NEG^Negative mg/dL    Urobilinogen mg/dL Normal 0.0 - 2.0 mg/dL    Nitrite Urine Negative NEG^Negative    Leukocyte Esterase Urine Negative NEG^Negative    Source Midstream Urine     WBC Urine 6 (H) 0 - 5 /HPF    RBC Urine 5 (H) 0 - 2 /HPF    Squamous Epithelial /HPF Urine <1 0 - 1 /HPF    Transitional Epi <1 0 - 1 /HPF    Mucous Urine Present (A) NEG^Negative /LPF   Procalcitonin     Status: None   Result Value Ref Range    Procalcitonin <0.05 ng/ml   Troponin I     Status: None   Result Value Ref Range    Troponin I ES <0.015 0.000 - 0.045 ug/L   EKG 12-lead, tracing only     Status: None (Preliminary result)   Result Value Ref Range    Interpretation ECG Click View Image link to view waveform and result    Blood culture     Status: None (Preliminary result)   Result Value Ref Range    Specimen Description Blood Right Arm     Culture Micro No growth after 1 day    Blood culture     Status: None (Preliminary result)   Result Value Ref Range    Specimen Description Blood Right Arm     Culture Micro No growth after 1 day    Influenza A/B antigen     Status: Abnormal   Result Value Ref Range    Influenza A/B Agn Specimen Nasopharyngeal     Influenza A Positive (A) NEG^Negative    Influenza B Negative NEG^Negative      Recent Results (from the past 48 hour(s))   XR Chest 2 Views    Narrative    EXAM: XR CHEST 2 VW  LOCATION: [facility]  DATE/TIME: 2/7/2020 12:19 AM    INDICATION: Shortness of breath and fever  COMPARISON: 6/24/2015 and 7/30/2018      Impression    IMPRESSION: Bilateral fibrotic changes greater in the apices again seen. No pleural effusion. No new area of consolidation. Atherosclerotic aorta.                Pending Results:   Unresulted Labs Ordered in the Past 30 Days of this Admission     Date and Time Order Name Status Description    2/6/2020 2308 Blood culture Preliminary     2/6/2020 2308  Blood culture Preliminary                   Discharge Instructions and Follow-Up:   No discharge procedures on file.         Srikanth German MD  Internal Medicine Staff Hospitalist  [phone]  February 8, 2020        He left AMA

## 2020-02-10 ENCOUNTER — PATIENT OUTREACH (OUTPATIENT)
Dept: CARE COORDINATION | Facility: CLINIC | Age: 80
End: 2020-02-10

## 2020-02-11 LAB — INTERPRETATION ECG - MUSE: NORMAL

## 2020-02-13 LAB
BACTERIA SPEC CULT: NO GROWTH
BACTERIA SPEC CULT: NO GROWTH
SPECIMEN SOURCE: NORMAL
SPECIMEN SOURCE: NORMAL

## 2020-03-06 ENCOUNTER — TRANSFERRED RECORDS (OUTPATIENT)
Dept: HEALTH INFORMATION MANAGEMENT | Facility: CLINIC | Age: 80
End: 2020-03-06

## 2021-09-10 ENCOUNTER — LAB REQUISITION (OUTPATIENT)
Dept: LAB | Facility: CLINIC | Age: 81
End: 2021-09-10
Payer: COMMERCIAL

## 2021-09-10 DIAGNOSIS — R60.9 EDEMA, UNSPECIFIED: ICD-10-CM

## 2021-09-10 LAB
ALBUMIN SERPL-MCNC: 3.4 G/DL (ref 3.4–5)
ALP SERPL-CCNC: 65 U/L (ref 40–150)
ALT SERPL W P-5'-P-CCNC: 17 U/L (ref 0–70)
ANION GAP SERPL CALCULATED.3IONS-SCNC: 8 MMOL/L (ref 3–14)
AST SERPL W P-5'-P-CCNC: 22 U/L (ref 0–45)
BILIRUB SERPL-MCNC: 0.3 MG/DL (ref 0.2–1.3)
BUN SERPL-MCNC: 12 MG/DL (ref 7–30)
CALCIUM SERPL-MCNC: 9 MG/DL (ref 8.5–10.1)
CHLORIDE BLD-SCNC: 107 MMOL/L (ref 94–109)
CO2 SERPL-SCNC: 25 MMOL/L (ref 20–32)
CREAT SERPL-MCNC: 0.93 MG/DL (ref 0.66–1.25)
GFR SERPL CREATININE-BSD FRML MDRD: 77 ML/MIN/1.73M2
GLUCOSE BLD-MCNC: 85 MG/DL (ref 70–99)
POTASSIUM BLD-SCNC: 4.4 MMOL/L (ref 3.4–5.3)
PROT SERPL-MCNC: 7.3 G/DL (ref 6.8–8.8)
SODIUM SERPL-SCNC: 140 MMOL/L (ref 133–144)

## 2021-09-10 PROCEDURE — 80053 COMPREHEN METABOLIC PANEL: CPT | Mod: ORL | Performed by: INTERNAL MEDICINE

## 2021-09-29 ENCOUNTER — ANCILLARY PROCEDURE (OUTPATIENT)
Dept: CARDIOLOGY | Facility: CLINIC | Age: 81
End: 2021-09-29
Attending: INTERNAL MEDICINE
Payer: COMMERCIAL

## 2021-09-29 DIAGNOSIS — R60.9 SWELLING: ICD-10-CM

## 2021-09-29 LAB — LVEF ECHO: NORMAL

## 2021-09-29 PROCEDURE — 93306 TTE W/DOPPLER COMPLETE: CPT | Performed by: STUDENT IN AN ORGANIZED HEALTH CARE EDUCATION/TRAINING PROGRAM

## 2022-01-01 ENCOUNTER — APPOINTMENT (OUTPATIENT)
Dept: GENERAL RADIOLOGY | Facility: CLINIC | Age: 82
DRG: 193 | End: 2022-01-01
Attending: EMERGENCY MEDICINE
Payer: COMMERCIAL

## 2022-01-01 ENCOUNTER — PRE VISIT (OUTPATIENT)
Dept: PULMONOLOGY | Facility: CLINIC | Age: 82
End: 2022-01-01
Payer: COMMERCIAL

## 2022-01-01 ENCOUNTER — PRE VISIT (OUTPATIENT)
Dept: PULMONOLOGY | Facility: CLINIC | Age: 82
End: 2022-01-01

## 2022-01-01 ENCOUNTER — TELEPHONE (OUTPATIENT)
Dept: PULMONOLOGY | Facility: CLINIC | Age: 82
End: 2022-01-01
Payer: COMMERCIAL

## 2022-01-01 ENCOUNTER — TELEPHONE (OUTPATIENT)
Dept: PULMONOLOGY | Facility: CLINIC | Age: 82
End: 2022-01-01

## 2022-01-01 ENCOUNTER — TRANSCRIBE ORDERS (OUTPATIENT)
Dept: OTHER | Age: 82
End: 2022-01-01
Payer: COMMERCIAL

## 2022-01-01 ENCOUNTER — APPOINTMENT (OUTPATIENT)
Dept: INTERPRETER SERVICES | Facility: CLINIC | Age: 82
DRG: 193 | End: 2022-01-01
Payer: COMMERCIAL

## 2022-01-01 ENCOUNTER — APPOINTMENT (OUTPATIENT)
Dept: GENERAL RADIOLOGY | Facility: CLINIC | Age: 82
End: 2022-01-01
Attending: EMERGENCY MEDICINE
Payer: COMMERCIAL

## 2022-01-01 ENCOUNTER — HOME INFUSION (PRE-WILLOW HOME INFUSION) (OUTPATIENT)
Dept: PHARMACY | Facility: CLINIC | Age: 82
End: 2022-01-01

## 2022-01-01 ENCOUNTER — HOSPITAL ENCOUNTER (OUTPATIENT)
Dept: GENERAL RADIOLOGY | Facility: CLINIC | Age: 82
Discharge: HOME | End: 2022-04-20
Attending: INTERNAL MEDICINE | Admitting: INTERNAL MEDICINE
Payer: COMMERCIAL

## 2022-01-01 ENCOUNTER — APPOINTMENT (OUTPATIENT)
Dept: INTERPRETER SERVICES | Facility: CLINIC | Age: 82
End: 2022-01-01
Payer: COMMERCIAL

## 2022-01-01 ENCOUNTER — DOCUMENTATION ONLY (OUTPATIENT)
Dept: ONCOLOGY | Facility: CLINIC | Age: 82
End: 2022-01-01
Payer: COMMERCIAL

## 2022-01-01 ENCOUNTER — HOSPITAL ENCOUNTER (OUTPATIENT)
Facility: CLINIC | Age: 82
Setting detail: OBSERVATION
Discharge: LEFT BEFORE DISCHARGE | End: 2022-06-28
Attending: EMERGENCY MEDICINE | Admitting: NURSE PRACTITIONER
Payer: COMMERCIAL

## 2022-01-01 ENCOUNTER — APPOINTMENT (OUTPATIENT)
Dept: CT IMAGING | Facility: CLINIC | Age: 82
End: 2022-01-01
Attending: EMERGENCY MEDICINE
Payer: COMMERCIAL

## 2022-01-01 ENCOUNTER — PATIENT OUTREACH (OUTPATIENT)
Dept: CARE COORDINATION | Facility: CLINIC | Age: 82
End: 2022-01-01

## 2022-01-01 ENCOUNTER — TELEPHONE (OUTPATIENT)
Dept: ONCOLOGY | Facility: CLINIC | Age: 82
End: 2022-01-01
Payer: COMMERCIAL

## 2022-01-01 ENCOUNTER — OFFICE VISIT (OUTPATIENT)
Dept: PULMONOLOGY | Facility: CLINIC | Age: 82
End: 2022-01-01
Attending: STUDENT IN AN ORGANIZED HEALTH CARE EDUCATION/TRAINING PROGRAM
Payer: COMMERCIAL

## 2022-01-01 ENCOUNTER — HOSPITAL ENCOUNTER (INPATIENT)
Facility: CLINIC | Age: 82
LOS: 7 days | Discharge: HOME | DRG: 193 | End: 2022-11-27
Attending: EMERGENCY MEDICINE | Admitting: INTERNAL MEDICINE
Payer: COMMERCIAL

## 2022-01-01 VITALS
TEMPERATURE: 98.4 F | SYSTOLIC BLOOD PRESSURE: 154 MMHG | RESPIRATION RATE: 36 BRPM | OXYGEN SATURATION: 96 % | HEART RATE: 64 BPM | DIASTOLIC BLOOD PRESSURE: 66 MMHG

## 2022-01-01 VITALS — OXYGEN SATURATION: 96 % | DIASTOLIC BLOOD PRESSURE: 69 MMHG | HEART RATE: 73 BPM | SYSTOLIC BLOOD PRESSURE: 147 MMHG

## 2022-01-01 VITALS
HEART RATE: 61 BPM | HEIGHT: 72 IN | RESPIRATION RATE: 17 BRPM | DIASTOLIC BLOOD PRESSURE: 52 MMHG | SYSTOLIC BLOOD PRESSURE: 133 MMHG | WEIGHT: 149.69 LBS | OXYGEN SATURATION: 99 % | BODY MASS INDEX: 20.28 KG/M2 | TEMPERATURE: 97.6 F

## 2022-01-01 DIAGNOSIS — J47.9 ADULT BRONCHIECTASIS (H): Primary | ICD-10-CM

## 2022-01-01 DIAGNOSIS — D64.9 ANEMIA, UNSPECIFIED TYPE: ICD-10-CM

## 2022-01-01 DIAGNOSIS — R06.00 DYSPNEA, UNSPECIFIED TYPE: ICD-10-CM

## 2022-01-01 DIAGNOSIS — D84.821 IMMUNOSUPPRESSION DUE TO CHRONIC STEROID USE (H): ICD-10-CM

## 2022-01-01 DIAGNOSIS — J10.1 INFLUENZA A: ICD-10-CM

## 2022-01-01 DIAGNOSIS — R06.02 SHORTNESS OF BREATH: Primary | ICD-10-CM

## 2022-01-01 DIAGNOSIS — J47.9 BRONCHIECTASIS (H): Primary | ICD-10-CM

## 2022-01-01 DIAGNOSIS — Z86.11 HISTORY OF TUBERCULOSIS: ICD-10-CM

## 2022-01-01 DIAGNOSIS — Z11.52 ENCOUNTER FOR SCREENING LABORATORY TESTING FOR SEVERE ACUTE RESPIRATORY SYNDROME CORONAVIRUS 2 (SARS-COV-2): ICD-10-CM

## 2022-01-01 DIAGNOSIS — T38.0X5A IMMUNOSUPPRESSION DUE TO CHRONIC STEROID USE (H): ICD-10-CM

## 2022-01-01 DIAGNOSIS — R09.02 HYPOXIA: ICD-10-CM

## 2022-01-01 DIAGNOSIS — J47.9 ADULT BRONCHIECTASIS (H): ICD-10-CM

## 2022-01-01 DIAGNOSIS — R91.8 PULMONARY INFILTRATES: ICD-10-CM

## 2022-01-01 DIAGNOSIS — D50.9 IRON DEFICIENCY ANEMIA, UNSPECIFIED IRON DEFICIENCY ANEMIA TYPE: ICD-10-CM

## 2022-01-01 DIAGNOSIS — Z79.52 IMMUNOSUPPRESSION DUE TO CHRONIC STEROID USE (H): ICD-10-CM

## 2022-01-01 DIAGNOSIS — N20.0 KIDNEY STONE: ICD-10-CM

## 2022-01-01 DIAGNOSIS — J47.9 BRONCHIECTASIS (H): ICD-10-CM

## 2022-01-01 DIAGNOSIS — R05.9 COUGH: ICD-10-CM

## 2022-01-01 DIAGNOSIS — J44.9 CHRONIC OBSTRUCTIVE PULMONARY DISEASE, UNSPECIFIED COPD TYPE (H): ICD-10-CM

## 2022-01-01 DIAGNOSIS — J47.1 BRONCHIECTASIS WITH ACUTE EXACERBATION (H): Primary | ICD-10-CM

## 2022-01-01 DIAGNOSIS — D50.0 IRON DEFICIENCY ANEMIA DUE TO CHRONIC BLOOD LOSS: ICD-10-CM

## 2022-01-01 LAB
1,3 BETA GLUCAN SER-MCNC: <31 PG/ML
ABO/RH(D): NORMAL
ALBUMIN SERPL-MCNC: 3.2 G/DL (ref 3.4–5)
ALBUMIN UR-MCNC: 10 MG/DL
ALP SERPL-CCNC: 66 U/L (ref 40–150)
ALT SERPL W P-5'-P-CCNC: 14 U/L (ref 0–70)
ANION GAP SERPL CALCULATED.3IONS-SCNC: 10 MMOL/L (ref 3–14)
ANION GAP SERPL CALCULATED.3IONS-SCNC: 4 MMOL/L (ref 3–14)
ANION GAP SERPL CALCULATED.3IONS-SCNC: 5 MMOL/L (ref 3–14)
ANION GAP SERPL CALCULATED.3IONS-SCNC: 7 MMOL/L (ref 3–14)
ANTIBODY SCREEN: NEGATIVE
APPEARANCE UR: CLEAR
AST SERPL W P-5'-P-CCNC: 18 U/L (ref 0–45)
ATRIAL RATE - MUSE: 58 BPM
BACTERIA BLD CULT: NO GROWTH
BACTERIA BLD CULT: NO GROWTH
BACTERIA SPT CULT: ABNORMAL
BACTERIA SPT CULT: ABNORMAL
BACTERIA UR CULT: NO GROWTH
BASOPHILS # BLD AUTO: 0 10E3/UL (ref 0–0.2)
BASOPHILS # BLD AUTO: 0 10E3/UL (ref 0–0.2)
BASOPHILS # BLD AUTO: 0.1 10E3/UL (ref 0–0.2)
BASOPHILS NFR BLD AUTO: 0 %
BASOPHILS NFR BLD AUTO: 0 %
BASOPHILS NFR BLD AUTO: 2 %
BILIRUB SERPL-MCNC: 0.2 MG/DL (ref 0.2–1.3)
BILIRUB UR QL STRIP: NEGATIVE
BLD PROD TYP BPU: NORMAL
BLD PROD TYP BPU: NORMAL
BLOOD COMPONENT TYPE: NORMAL
BLOOD COMPONENT TYPE: NORMAL
BUN SERPL-MCNC: 14 MG/DL (ref 7–30)
BUN SERPL-MCNC: 15 MG/DL (ref 7–30)
BUN SERPL-MCNC: 17 MG/DL (ref 7–30)
BUN SERPL-MCNC: 29 MG/DL (ref 7–30)
CALCIUM SERPL-MCNC: 8.7 MG/DL (ref 8.5–10.1)
CALCIUM SERPL-MCNC: 8.8 MG/DL (ref 8.5–10.1)
CALCIUM SERPL-MCNC: 8.9 MG/DL (ref 8.5–10.1)
CALCIUM SERPL-MCNC: 9.4 MG/DL (ref 8.5–10.1)
CHLORIDE BLD-SCNC: 100 MMOL/L (ref 94–109)
CHLORIDE BLD-SCNC: 102 MMOL/L (ref 94–109)
CHLORIDE BLD-SCNC: 109 MMOL/L (ref 94–109)
CHLORIDE BLD-SCNC: 99 MMOL/L (ref 94–109)
CO2 SERPL-SCNC: 25 MMOL/L (ref 20–32)
CO2 SERPL-SCNC: 26 MMOL/L (ref 20–32)
CO2 SERPL-SCNC: 36 MMOL/L (ref 20–32)
CO2 SERPL-SCNC: 37 MMOL/L (ref 20–32)
CODING SYSTEM: NORMAL
CODING SYSTEM: NORMAL
COLOR UR AUTO: ABNORMAL
CREAT SERPL-MCNC: 0.78 MG/DL (ref 0.66–1.25)
CREAT SERPL-MCNC: 0.8 MG/DL (ref 0.66–1.25)
CREAT SERPL-MCNC: 0.81 MG/DL (ref 0.66–1.25)
CREAT SERPL-MCNC: 0.81 MG/DL (ref 0.66–1.25)
CREAT SERPL-MCNC: 0.87 MG/DL (ref 0.66–1.25)
CREAT SERPL-MCNC: 0.98 MG/DL (ref 0.66–1.25)
CROSSMATCH: NORMAL
CROSSMATCH: NORMAL
CRP SERPL-MCNC: 80 MG/L (ref 0–8)
DIASTOLIC BLOOD PRESSURE - MUSE: NORMAL MMHG
EOSINOPHIL # BLD AUTO: 0 10E3/UL (ref 0–0.7)
EOSINOPHIL # BLD AUTO: 0 10E3/UL (ref 0–0.7)
EOSINOPHIL # BLD AUTO: 0.7 10E3/UL (ref 0–0.7)
EOSINOPHIL NFR BLD AUTO: 0 %
EOSINOPHIL NFR BLD AUTO: 0 %
EOSINOPHIL NFR BLD AUTO: 13 %
ERYTHROCYTE [DISTWIDTH] IN BLOOD BY AUTOMATED COUNT: 19.4 % (ref 10–15)
ERYTHROCYTE [DISTWIDTH] IN BLOOD BY AUTOMATED COUNT: 23.3 % (ref 10–15)
ERYTHROCYTE [DISTWIDTH] IN BLOOD BY AUTOMATED COUNT: 23.5 % (ref 10–15)
ERYTHROCYTE [DISTWIDTH] IN BLOOD BY AUTOMATED COUNT: 23.5 % (ref 10–15)
ERYTHROCYTE [DISTWIDTH] IN BLOOD BY AUTOMATED COUNT: 23.7 % (ref 10–15)
ERYTHROCYTE [DISTWIDTH] IN BLOOD BY AUTOMATED COUNT: 23.9 % (ref 10–15)
EXPTIME-PRE: 2.73 SEC
FEF2575-%PRED-PRE: 48 %
FEF2575-PRE: 0.8 L/SEC
FEF2575-PRED: 1.67 L/SEC
FEFMAX-%PRED-PRE: 27 %
FEFMAX-PRE: 2.03 L/SEC
FEFMAX-PRED: 7.27 L/SEC
FERRITIN SERPL-MCNC: 20 NG/ML (ref 26–388)
FERRITIN SERPL-MCNC: 22 NG/ML (ref 26–388)
FEV1-%PRED-PRE: 43 %
FEV1-PRE: 1.02 L
FEV1FEV6-PRE: 68 %
FEV1FEV6-PRED: 78 %
FEV1FVC-PRE: 68 %
FEV1FVC-PRED: 75 %
FIFMAX-PRE: 2.66 L/SEC
FLUAV RNA SPEC QL NAA+PROBE: NEGATIVE
FLUBV RNA RESP QL NAA+PROBE: NEGATIVE
FVC-%PRED-PRE: 47 %
FVC-PRE: 1.49 L
FVC-PRED: 3.12 L
GFR SERPL CREATININE-BSD FRML MDRD: 77 ML/MIN/1.73M2
GFR SERPL CREATININE-BSD FRML MDRD: 86 ML/MIN/1.73M2
GFR SERPL CREATININE-BSD FRML MDRD: 88 ML/MIN/1.73M2
GFR SERPL CREATININE-BSD FRML MDRD: 89 ML/MIN/1.73M2
GLUCOSE BLD-MCNC: 118 MG/DL (ref 70–99)
GLUCOSE BLD-MCNC: 129 MG/DL (ref 70–99)
GLUCOSE BLD-MCNC: 136 MG/DL (ref 70–99)
GLUCOSE BLD-MCNC: 163 MG/DL (ref 70–99)
GLUCOSE UR STRIP-MCNC: NEGATIVE MG/DL
GRAM STAIN RESULT: ABNORMAL
HCT VFR BLD AUTO: 22.9 % (ref 40–53)
HCT VFR BLD AUTO: 27.3 % (ref 40–53)
HCT VFR BLD AUTO: 28.4 % (ref 40–53)
HCT VFR BLD AUTO: 29.2 % (ref 40–53)
HCT VFR BLD AUTO: 31.1 % (ref 40–53)
HCT VFR BLD AUTO: 33 % (ref 40–53)
HGB BLD-MCNC: 6.1 G/DL (ref 13.3–17.7)
HGB BLD-MCNC: 7.7 G/DL (ref 13.3–17.7)
HGB BLD-MCNC: 7.8 G/DL (ref 13.3–17.7)
HGB BLD-MCNC: 8.5 G/DL (ref 13.3–17.7)
HGB BLD-MCNC: 8.6 G/DL (ref 13.3–17.7)
HGB BLD-MCNC: 9.3 G/DL (ref 13.3–17.7)
HGB UR QL STRIP: ABNORMAL
HOLD SPECIMEN: NORMAL
HOLD SPECIMEN: NORMAL
IMM GRANULOCYTES # BLD: 0 10E3/UL
IMM GRANULOCYTES # BLD: 0 10E3/UL
IMM GRANULOCYTES # BLD: 0.3 10E3/UL
IMM GRANULOCYTES NFR BLD: 0 %
IMM GRANULOCYTES NFR BLD: 1 %
IMM GRANULOCYTES NFR BLD: 2 %
INTERPRETATION ECG - MUSE: NORMAL
IRON SATN MFR SERPL: 7 % (ref 15–46)
IRON SATN MFR SERPL: 8 % (ref 15–46)
IRON SERPL-MCNC: 22 UG/DL (ref 35–180)
IRON SERPL-MCNC: 23 UG/DL (ref 35–180)
ISSUE DATE AND TIME: NORMAL
KETONES UR STRIP-MCNC: ABNORMAL MG/DL
L PNEUMO1 AG UR QL IA: NEGATIVE
LACTATE SERPL-SCNC: 1.9 MMOL/L (ref 0.7–2)
LDH SERPL L TO P-CCNC: 241 U/L (ref 85–227)
LEUKOCYTE ESTERASE UR QL STRIP: NEGATIVE
LIPASE SERPL-CCNC: 474 U/L (ref 73–393)
LYMPHOCYTES # BLD AUTO: 0.3 10E3/UL (ref 0.8–5.3)
LYMPHOCYTES # BLD AUTO: 0.5 10E3/UL (ref 0.8–5.3)
LYMPHOCYTES # BLD AUTO: 1.2 10E3/UL (ref 0.8–5.3)
LYMPHOCYTES NFR BLD AUTO: 2 %
LYMPHOCYTES NFR BLD AUTO: 24 %
LYMPHOCYTES NFR BLD AUTO: 8 %
MAGNESIUM SERPL-MCNC: 2 MG/DL (ref 1.6–2.3)
MAGNESIUM SERPL-MCNC: 2.2 MG/DL (ref 1.6–2.3)
MCH RBC QN AUTO: 18.8 PG (ref 26.5–33)
MCH RBC QN AUTO: 19.7 PG (ref 26.5–33)
MCH RBC QN AUTO: 19.9 PG (ref 26.5–33)
MCH RBC QN AUTO: 20.2 PG (ref 26.5–33)
MCH RBC QN AUTO: 20.3 PG (ref 26.5–33)
MCH RBC QN AUTO: 22.5 PG (ref 26.5–33)
MCHC RBC AUTO-ENTMCNC: 26.6 G/DL (ref 31.5–36.5)
MCHC RBC AUTO-ENTMCNC: 27.1 G/DL (ref 31.5–36.5)
MCHC RBC AUTO-ENTMCNC: 27.3 G/DL (ref 31.5–36.5)
MCHC RBC AUTO-ENTMCNC: 28.2 G/DL (ref 31.5–36.5)
MCHC RBC AUTO-ENTMCNC: 28.6 G/DL (ref 31.5–36.5)
MCHC RBC AUTO-ENTMCNC: 29.5 G/DL (ref 31.5–36.5)
MCV RBC AUTO: 71 FL (ref 78–100)
MCV RBC AUTO: 71 FL (ref 78–100)
MCV RBC AUTO: 72 FL (ref 78–100)
MCV RBC AUTO: 72 FL (ref 78–100)
MCV RBC AUTO: 74 FL (ref 78–100)
MCV RBC AUTO: 76 FL (ref 78–100)
MONOCYTES # BLD AUTO: 0.2 10E3/UL (ref 0–1.3)
MONOCYTES # BLD AUTO: 0.3 10E3/UL (ref 0–1.3)
MONOCYTES # BLD AUTO: 0.6 10E3/UL (ref 0–1.3)
MONOCYTES NFR BLD AUTO: 11 %
MONOCYTES NFR BLD AUTO: 2 %
MONOCYTES NFR BLD AUTO: 3 %
MRSA DNA SPEC QL NAA+PROBE: NEGATIVE
MUCOUS THREADS #/AREA URNS LPF: PRESENT /LPF
NEUTROPHILS # BLD AUTO: 12.1 10E3/UL (ref 1.6–8.3)
NEUTROPHILS # BLD AUTO: 2.6 10E3/UL (ref 1.6–8.3)
NEUTROPHILS # BLD AUTO: 5.5 10E3/UL (ref 1.6–8.3)
NEUTROPHILS NFR BLD AUTO: 50 %
NEUTROPHILS NFR BLD AUTO: 88 %
NEUTROPHILS NFR BLD AUTO: 94 %
NITRATE UR QL: NEGATIVE
NRBC # BLD AUTO: 0 10E3/UL
NRBC BLD AUTO-RTO: 0 /100
OBSERVATION IMP: NEGATIVE
P AXIS - MUSE: 49 DEGREES
PH UR STRIP: 5 [PH] (ref 5–7)
PHOSPHATE SERPL-MCNC: 3.2 MG/DL (ref 2.5–4.5)
PLATELET # BLD AUTO: 308 10E3/UL (ref 150–450)
PLATELET # BLD AUTO: 309 10E3/UL (ref 150–450)
PLATELET # BLD AUTO: 320 10E3/UL (ref 150–450)
PLATELET # BLD AUTO: 343 10E3/UL (ref 150–450)
PLATELET # BLD AUTO: 361 10E3/UL (ref 150–450)
PLATELET # BLD AUTO: 442 10E3/UL (ref 150–450)
POTASSIUM BLD-SCNC: 3.6 MMOL/L (ref 3.4–5.3)
POTASSIUM BLD-SCNC: 4 MMOL/L (ref 3.4–5.3)
POTASSIUM BLD-SCNC: 4.1 MMOL/L (ref 3.4–5.3)
POTASSIUM BLD-SCNC: 4.5 MMOL/L (ref 3.4–5.3)
PR INTERVAL - MUSE: 168 MS
PROCALCITONIN SERPL-MCNC: 0.07 NG/ML
PROCALCITONIN SERPL-MCNC: <0.05 NG/ML
PROCALCITONIN SERPL-MCNC: NORMAL NG/ML
PROT SERPL-MCNC: 7.3 G/DL (ref 6.8–8.8)
QRS DURATION - MUSE: 112 MS
QT - MUSE: 422 MS
QTC - MUSE: 414 MS
R AXIS - MUSE: -31 DEGREES
RBC # BLD AUTO: 3.24 10E6/UL (ref 4.4–5.9)
RBC # BLD AUTO: 3.82 10E6/UL (ref 4.4–5.9)
RBC # BLD AUTO: 3.85 10E6/UL (ref 4.4–5.9)
RBC # BLD AUTO: 3.86 10E6/UL (ref 4.4–5.9)
RBC # BLD AUTO: 4.31 10E6/UL (ref 4.4–5.9)
RBC # BLD AUTO: 4.61 10E6/UL (ref 4.4–5.9)
RBC URINE: 10 /HPF
S PNEUM AG SPEC QL: NEGATIVE
SA TARGET DNA: NEGATIVE
SARS-COV-2 RNA RESP QL NAA+PROBE: NEGATIVE
SARS-COV-2 RNA RESP QL NAA+PROBE: NEGATIVE
SODIUM SERPL-SCNC: 136 MMOL/L (ref 133–144)
SODIUM SERPL-SCNC: 140 MMOL/L (ref 133–144)
SODIUM SERPL-SCNC: 141 MMOL/L (ref 133–144)
SODIUM SERPL-SCNC: 143 MMOL/L (ref 133–144)
SP GR UR STRIP: 1.01 (ref 1–1.03)
SPECIMEN EXPIRATION DATE: NORMAL
SQUAMOUS EPITHELIAL: <1 /HPF
SYSTOLIC BLOOD PRESSURE - MUSE: NORMAL MMHG
T AXIS - MUSE: 76 DEGREES
TIBC SERPL-MCNC: 284 UG/DL (ref 240–430)
TIBC SERPL-MCNC: 308 UG/DL (ref 240–430)
TROPONIN I SERPL HS-MCNC: 8 NG/L
UNIT ABO/RH: NORMAL
UNIT ABO/RH: NORMAL
UNIT NUMBER: NORMAL
UNIT NUMBER: NORMAL
UNIT STATUS: NORMAL
UNIT STATUS: NORMAL
UNIT TYPE ISBT: 5100
UNIT TYPE ISBT: 5100
UROBILINOGEN UR STRIP-MCNC: NORMAL MG/DL
VENTRICULAR RATE- MUSE: 58 BPM
WBC # BLD AUTO: 10.2 10E3/UL (ref 4–11)
WBC # BLD AUTO: 12.3 10E3/UL (ref 4–11)
WBC # BLD AUTO: 12.9 10E3/UL (ref 4–11)
WBC # BLD AUTO: 5.1 10E3/UL (ref 4–11)
WBC # BLD AUTO: 6.3 10E3/UL (ref 4–11)
WBC # BLD AUTO: 8.8 10E3/UL (ref 4–11)
WBC URINE: 0 /HPF

## 2022-01-01 PROCEDURE — 250N000013 HC RX MED GY IP 250 OP 250 PS 637: Performed by: INTERNAL MEDICINE

## 2022-01-01 PROCEDURE — 250N000011 HC RX IP 250 OP 636: Performed by: INTERNAL MEDICINE

## 2022-01-01 PROCEDURE — 83550 IRON BINDING TEST: CPT | Performed by: INTERNAL MEDICINE

## 2022-01-01 PROCEDURE — 71045 X-RAY EXAM CHEST 1 VIEW: CPT

## 2022-01-01 PROCEDURE — 87116 MYCOBACTERIA CULTURE: CPT | Performed by: EMERGENCY MEDICINE

## 2022-01-01 PROCEDURE — 71046 X-RAY EXAM CHEST 2 VIEWS: CPT | Mod: 26 | Performed by: RADIOLOGY

## 2022-01-01 PROCEDURE — 258N000003 HC RX IP 258 OP 636

## 2022-01-01 PROCEDURE — 74177 CT ABD & PELVIS W/CONTRAST: CPT

## 2022-01-01 PROCEDURE — 99291 CRITICAL CARE FIRST HOUR: CPT | Mod: CS | Performed by: EMERGENCY MEDICINE

## 2022-01-01 PROCEDURE — 99239 HOSP IP/OBS DSCHRG MGMT >30: CPT | Performed by: INTERNAL MEDICINE

## 2022-01-01 PROCEDURE — 93005 ELECTROCARDIOGRAM TRACING: CPT

## 2022-01-01 PROCEDURE — 94640 AIRWAY INHALATION TREATMENT: CPT

## 2022-01-01 PROCEDURE — 82310 ASSAY OF CALCIUM: CPT | Performed by: INTERNAL MEDICINE

## 2022-01-01 PROCEDURE — 36416 COLLJ CAPILLARY BLOOD SPEC: CPT | Performed by: INTERNAL MEDICINE

## 2022-01-01 PROCEDURE — 36415 COLL VENOUS BLD VENIPUNCTURE: CPT | Performed by: INTERNAL MEDICINE

## 2022-01-01 PROCEDURE — 85027 COMPLETE CBC AUTOMATED: CPT | Performed by: INTERNAL MEDICINE

## 2022-01-01 PROCEDURE — 99285 EMERGENCY DEPT VISIT HI MDM: CPT | Mod: CS,25

## 2022-01-01 PROCEDURE — 99223 1ST HOSP IP/OBS HIGH 75: CPT | Performed by: INTERNAL MEDICINE

## 2022-01-01 PROCEDURE — 85025 COMPLETE CBC W/AUTO DIFF WBC: CPT | Performed by: EMERGENCY MEDICINE

## 2022-01-01 PROCEDURE — 94640 AIRWAY INHALATION TREATMENT: CPT | Mod: 76

## 2022-01-01 PROCEDURE — 84484 ASSAY OF TROPONIN QUANT: CPT | Performed by: EMERGENCY MEDICINE

## 2022-01-01 PROCEDURE — 99233 SBSQ HOSP IP/OBS HIGH 50: CPT | Performed by: INTERNAL MEDICINE

## 2022-01-01 PROCEDURE — 120N000002 HC R&B MED SURG/OB UMMC

## 2022-01-01 PROCEDURE — 87899 AGENT NOS ASSAY W/OPTIC: CPT | Performed by: INTERNAL MEDICINE

## 2022-01-01 PROCEDURE — 87040 BLOOD CULTURE FOR BACTERIA: CPT | Performed by: EMERGENCY MEDICINE

## 2022-01-01 PROCEDURE — 87206 SMEAR FLUORESCENT/ACID STAI: CPT | Performed by: EMERGENCY MEDICINE

## 2022-01-01 PROCEDURE — 83615 LACTATE (LD) (LDH) ENZYME: CPT | Performed by: INTERNAL MEDICINE

## 2022-01-01 PROCEDURE — 82728 ASSAY OF FERRITIN: CPT | Performed by: INTERNAL MEDICINE

## 2022-01-01 PROCEDURE — 83735 ASSAY OF MAGNESIUM: CPT | Performed by: INTERNAL MEDICINE

## 2022-01-01 PROCEDURE — 96374 THER/PROPH/DIAG INJ IV PUSH: CPT | Mod: 59

## 2022-01-01 PROCEDURE — 87106 FUNGI IDENTIFICATION YEAST: CPT | Performed by: INTERNAL MEDICINE

## 2022-01-01 PROCEDURE — 86140 C-REACTIVE PROTEIN: CPT | Performed by: INTERNAL MEDICINE

## 2022-01-01 PROCEDURE — 99223 1ST HOSP IP/OBS HIGH 75: CPT | Mod: AI | Performed by: INTERNAL MEDICINE

## 2022-01-01 PROCEDURE — 99285 EMERGENCY DEPT VISIT HI MDM: CPT | Mod: CS | Performed by: EMERGENCY MEDICINE

## 2022-01-01 PROCEDURE — 84145 PROCALCITONIN (PCT): CPT | Performed by: INTERNAL MEDICINE

## 2022-01-01 PROCEDURE — U0003 INFECTIOUS AGENT DETECTION BY NUCLEIC ACID (DNA OR RNA); SEVERE ACUTE RESPIRATORY SYNDROME CORONAVIRUS 2 (SARS-COV-2) (CORONAVIRUS DISEASE [COVID-19]), AMPLIFIED PROBE TECHNIQUE, MAKING USE OF HIGH THROUGHPUT TECHNOLOGIES AS DESCRIBED BY CMS-2020-01-R: HCPCS | Performed by: EMERGENCY MEDICINE

## 2022-01-01 PROCEDURE — 250N000013 HC RX MED GY IP 250 OP 250 PS 637: Performed by: EMERGENCY MEDICINE

## 2022-01-01 PROCEDURE — 250N000011 HC RX IP 250 OP 636: Performed by: EMERGENCY MEDICINE

## 2022-01-01 PROCEDURE — 250N000012 HC RX MED GY IP 250 OP 636 PS 637: Performed by: INTERNAL MEDICINE

## 2022-01-01 PROCEDURE — 81001 URINALYSIS AUTO W/SCOPE: CPT | Performed by: EMERGENCY MEDICINE

## 2022-01-01 PROCEDURE — 85014 HEMATOCRIT: CPT | Performed by: INTERNAL MEDICINE

## 2022-01-01 PROCEDURE — 36415 COLL VENOUS BLD VENIPUNCTURE: CPT | Performed by: EMERGENCY MEDICINE

## 2022-01-01 PROCEDURE — 83605 ASSAY OF LACTIC ACID: CPT | Performed by: INTERNAL MEDICINE

## 2022-01-01 PROCEDURE — 80048 BASIC METABOLIC PNL TOTAL CA: CPT | Performed by: EMERGENCY MEDICINE

## 2022-01-01 PROCEDURE — 84100 ASSAY OF PHOSPHORUS: CPT | Performed by: INTERNAL MEDICINE

## 2022-01-01 PROCEDURE — C9803 HOPD COVID-19 SPEC COLLECT: HCPCS

## 2022-01-01 PROCEDURE — 250N000009 HC RX 250: Performed by: EMERGENCY MEDICINE

## 2022-01-01 PROCEDURE — G0378 HOSPITAL OBSERVATION PER HR: HCPCS

## 2022-01-01 PROCEDURE — 250N000009 HC RX 250: Performed by: INTERNAL MEDICINE

## 2022-01-01 PROCEDURE — 258N000003 HC RX IP 258 OP 636: Performed by: INTERNAL MEDICINE

## 2022-01-01 PROCEDURE — 999N000157 HC STATISTIC RCP TIME EA 10 MIN

## 2022-01-01 PROCEDURE — G0463 HOSPITAL OUTPT CLINIC VISIT: HCPCS | Mod: 25

## 2022-01-01 PROCEDURE — 258N000003 HC RX IP 258 OP 636: Performed by: EMERGENCY MEDICINE

## 2022-01-01 PROCEDURE — 80048 BASIC METABOLIC PNL TOTAL CA: CPT | Performed by: INTERNAL MEDICINE

## 2022-01-01 PROCEDURE — 87636 SARSCOV2 & INF A&B AMP PRB: CPT | Performed by: EMERGENCY MEDICINE

## 2022-01-01 PROCEDURE — 87086 URINE CULTURE/COLONY COUNT: CPT | Performed by: EMERGENCY MEDICINE

## 2022-01-01 PROCEDURE — P9016 RBC LEUKOCYTES REDUCED: HCPCS | Performed by: EMERGENCY MEDICINE

## 2022-01-01 PROCEDURE — 86923 COMPATIBILITY TEST ELECTRIC: CPT | Performed by: EMERGENCY MEDICINE

## 2022-01-01 PROCEDURE — 83690 ASSAY OF LIPASE: CPT | Performed by: EMERGENCY MEDICINE

## 2022-01-01 PROCEDURE — 80053 COMPREHEN METABOLIC PANEL: CPT | Performed by: EMERGENCY MEDICINE

## 2022-01-01 PROCEDURE — 71046 X-RAY EXAM CHEST 2 VIEWS: CPT

## 2022-01-01 PROCEDURE — 93010 ELECTROCARDIOGRAM REPORT: CPT | Performed by: EMERGENCY MEDICINE

## 2022-01-01 PROCEDURE — 82565 ASSAY OF CREATININE: CPT | Performed by: EMERGENCY MEDICINE

## 2022-01-01 PROCEDURE — 86901 BLOOD TYPING SEROLOGIC RH(D): CPT | Performed by: EMERGENCY MEDICINE

## 2022-01-01 PROCEDURE — 87641 MR-STAPH DNA AMP PROBE: CPT | Performed by: INTERNAL MEDICINE

## 2022-01-01 PROCEDURE — 87449 NOS EACH ORGANISM AG IA: CPT | Performed by: INTERNAL MEDICINE

## 2022-01-01 PROCEDURE — 94375 RESPIRATORY FLOW VOLUME LOOP: CPT | Performed by: INTERNAL MEDICINE

## 2022-01-01 PROCEDURE — 86850 RBC ANTIBODY SCREEN: CPT | Performed by: EMERGENCY MEDICINE

## 2022-01-01 PROCEDURE — 99205 OFFICE O/P NEW HI 60 MIN: CPT | Mod: 25 | Performed by: STUDENT IN AN ORGANIZED HEALTH CARE EDUCATION/TRAINING PROGRAM

## 2022-01-01 RX ORDER — BUDESONIDE 0.5 MG/2ML
0.5 INHALANT ORAL DAILY
Qty: 180 ML | Refills: 3 | Status: SHIPPED | OUTPATIENT
Start: 2022-01-01 | End: 2023-08-03

## 2022-01-01 RX ORDER — NALOXONE HYDROCHLORIDE 0.4 MG/ML
0.4 INJECTION, SOLUTION INTRAMUSCULAR; INTRAVENOUS; SUBCUTANEOUS
Status: DISCONTINUED | OUTPATIENT
Start: 2022-01-01 | End: 2022-01-01 | Stop reason: HOSPADM

## 2022-01-01 RX ORDER — NICOTINE 21 MG/24HR
1 PATCH, TRANSDERMAL 24 HOURS TRANSDERMAL ONCE
Status: COMPLETED | OUTPATIENT
Start: 2022-01-01 | End: 2022-01-01

## 2022-01-01 RX ORDER — FERROUS SULFATE 325(65) MG
325 TABLET ORAL
Qty: 30 TABLET | Refills: 0 | Status: SHIPPED | OUTPATIENT
Start: 2022-01-01

## 2022-01-01 RX ORDER — METHYLPREDNISOLONE SODIUM SUCCINATE 125 MG/2ML
125 INJECTION, POWDER, LYOPHILIZED, FOR SOLUTION INTRAMUSCULAR; INTRAVENOUS ONCE
Status: COMPLETED | OUTPATIENT
Start: 2022-01-01 | End: 2022-01-01

## 2022-01-01 RX ORDER — PREDNISONE 20 MG/1
20 TABLET ORAL DAILY
Qty: 30 TABLET | Refills: 0 | Status: SHIPPED | OUTPATIENT
Start: 2022-01-01

## 2022-01-01 RX ORDER — ALBUTEROL SULFATE 90 UG/1
2 AEROSOL, METERED RESPIRATORY (INHALATION) EVERY 4 HOURS PRN
Qty: 18 G | Refills: 0 | Status: ON HOLD | OUTPATIENT
Start: 2022-01-01 | End: 2022-01-01

## 2022-01-01 RX ORDER — SULFAMETHOXAZOLE/TRIMETHOPRIM 800-160 MG
1 TABLET ORAL
Qty: 60 TABLET | Refills: 0 | Status: ON HOLD | OUTPATIENT
Start: 2022-01-01 | End: 2023-01-01

## 2022-01-01 RX ORDER — PIPERACILLIN SODIUM, TAZOBACTAM SODIUM 4; .5 G/20ML; G/20ML
4.5 INJECTION, POWDER, LYOPHILIZED, FOR SOLUTION INTRAVENOUS EVERY 6 HOURS
Status: DISCONTINUED | OUTPATIENT
Start: 2022-01-01 | End: 2022-01-01

## 2022-01-01 RX ORDER — NALOXONE HYDROCHLORIDE 0.4 MG/ML
0.2 INJECTION, SOLUTION INTRAMUSCULAR; INTRAVENOUS; SUBCUTANEOUS
Status: DISCONTINUED | OUTPATIENT
Start: 2022-01-01 | End: 2022-01-01 | Stop reason: HOSPADM

## 2022-01-01 RX ORDER — OLANZAPINE 5 MG/1
5 TABLET, ORALLY DISINTEGRATING ORAL DAILY PRN
Status: DISCONTINUED | OUTPATIENT
Start: 2022-01-01 | End: 2022-01-01

## 2022-01-01 RX ORDER — FUROSEMIDE 20 MG
20 TABLET ORAL DAILY
Status: DISCONTINUED | OUTPATIENT
Start: 2022-01-01 | End: 2022-01-01 | Stop reason: HOSPADM

## 2022-01-01 RX ORDER — ALBUTEROL SULFATE 0.83 MG/ML
2.5 SOLUTION RESPIRATORY (INHALATION) EVERY 4 HOURS PRN
Status: DISCONTINUED | OUTPATIENT
Start: 2022-01-01 | End: 2022-01-01

## 2022-01-01 RX ORDER — PANTOPRAZOLE SODIUM 40 MG/1
40 TABLET, DELAYED RELEASE ORAL
Status: DISCONTINUED | OUTPATIENT
Start: 2022-01-01 | End: 2022-01-01 | Stop reason: HOSPADM

## 2022-01-01 RX ORDER — AZITHROMYCIN 250 MG/1
250 TABLET, FILM COATED ORAL DAILY
Status: DISCONTINUED | OUTPATIENT
Start: 2022-01-01 | End: 2022-01-01 | Stop reason: HOSPADM

## 2022-01-01 RX ORDER — PIPERACILLIN SODIUM, TAZOBACTAM SODIUM 3; .375 G/15ML; G/15ML
3.38 INJECTION, POWDER, LYOPHILIZED, FOR SOLUTION INTRAVENOUS ONCE
Status: COMPLETED | OUTPATIENT
Start: 2022-01-01 | End: 2022-01-01

## 2022-01-01 RX ORDER — FERROUS SULFATE 325(65) MG
325 TABLET ORAL
Status: DISCONTINUED | OUTPATIENT
Start: 2022-01-01 | End: 2022-01-01 | Stop reason: HOSPADM

## 2022-01-01 RX ORDER — AZITHROMYCIN 500 MG/1
500 TABLET, FILM COATED ORAL ONCE
Status: COMPLETED | OUTPATIENT
Start: 2022-01-01 | End: 2022-01-01

## 2022-01-01 RX ORDER — PREDNISONE 5 MG/1
5 TABLET ORAL DAILY
Status: CANCELLED | OUTPATIENT
Start: 2022-01-01 | End: 2022-01-01

## 2022-01-01 RX ORDER — LORAZEPAM 2 MG/ML
1 INJECTION INTRAMUSCULAR EVERY 4 HOURS PRN
Status: DISCONTINUED | OUTPATIENT
Start: 2022-01-01 | End: 2022-01-01

## 2022-01-01 RX ORDER — OSELTAMIVIR PHOSPHATE 30 MG/1
30 CAPSULE ORAL 2 TIMES DAILY
Status: DISCONTINUED | OUTPATIENT
Start: 2022-01-01 | End: 2022-01-01 | Stop reason: HOSPADM

## 2022-01-01 RX ORDER — PREDNISONE 20 MG/1
40 TABLET ORAL DAILY
Status: CANCELLED | OUTPATIENT
Start: 2022-01-01 | End: 2022-01-01

## 2022-01-01 RX ORDER — FUROSEMIDE 20 MG
20 TABLET ORAL DAILY
Qty: 30 TABLET | Refills: 0 | Status: SHIPPED | OUTPATIENT
Start: 2022-01-01

## 2022-01-01 RX ORDER — PREDNISONE 10 MG/1
20 TABLET ORAL DAILY
Status: DISCONTINUED | OUTPATIENT
Start: 2022-01-01 | End: 2022-01-01 | Stop reason: HOSPADM

## 2022-01-01 RX ORDER — ALBUTEROL SULFATE 90 UG/1
2 AEROSOL, METERED RESPIRATORY (INHALATION) EVERY 4 HOURS PRN
Qty: 18 G | Refills: 0 | Status: SHIPPED | OUTPATIENT
Start: 2022-01-01

## 2022-01-01 RX ORDER — HYDROMORPHONE HCL IN WATER/PF 6 MG/30 ML
0.2 PATIENT CONTROLLED ANALGESIA SYRINGE INTRAVENOUS
Status: DISCONTINUED | OUTPATIENT
Start: 2022-01-01 | End: 2022-01-01 | Stop reason: HOSPADM

## 2022-01-01 RX ORDER — ALBUTEROL SULFATE 90 UG/1
2 AEROSOL, METERED RESPIRATORY (INHALATION) EVERY 4 HOURS PRN
Status: DISCONTINUED | OUTPATIENT
Start: 2022-01-01 | End: 2022-01-01 | Stop reason: HOSPADM

## 2022-01-01 RX ORDER — BENZTROPINE MESYLATE 1 MG/1
1 TABLET ORAL 3 TIMES DAILY PRN
Status: DISCONTINUED | OUTPATIENT
Start: 2022-01-01 | End: 2022-01-01 | Stop reason: HOSPADM

## 2022-01-01 RX ORDER — PREDNISONE 10 MG/1
10 TABLET ORAL DAILY
Status: CANCELLED | OUTPATIENT
Start: 2022-01-01 | End: 2022-01-01

## 2022-01-01 RX ORDER — AZITHROMYCIN 250 MG/1
250 TABLET, FILM COATED ORAL DAILY
Qty: 1 TABLET | Refills: 0 | Status: SHIPPED | OUTPATIENT
Start: 2022-01-01 | End: 2022-01-01

## 2022-01-01 RX ORDER — PREDNISONE 20 MG/1
TABLET ORAL
Qty: 10 TABLET | Refills: 0 | Status: ON HOLD | OUTPATIENT
Start: 2022-01-01 | End: 2022-01-01

## 2022-01-01 RX ORDER — IPRATROPIUM BROMIDE AND ALBUTEROL SULFATE 2.5; .5 MG/3ML; MG/3ML
3 SOLUTION RESPIRATORY (INHALATION) ONCE
Status: COMPLETED | OUTPATIENT
Start: 2022-01-01 | End: 2022-01-01

## 2022-01-01 RX ORDER — PREDNISONE 10 MG/1
TABLET ORAL
Qty: 63 TABLET | Refills: 0 | Status: SHIPPED | OUTPATIENT
Start: 2022-01-01 | End: 2022-01-01

## 2022-01-01 RX ORDER — LORAZEPAM 2 MG/ML
0.5 INJECTION INTRAMUSCULAR EVERY 4 HOURS PRN
Status: DISCONTINUED | OUTPATIENT
Start: 2022-01-01 | End: 2022-01-01

## 2022-01-01 RX ORDER — ASPIRIN 81 MG/1
81 TABLET ORAL DAILY
COMMUNITY

## 2022-01-01 RX ORDER — FUROSEMIDE 10 MG/ML
20 INJECTION INTRAMUSCULAR; INTRAVENOUS EVERY MORNING
Status: DISCONTINUED | OUTPATIENT
Start: 2022-01-01 | End: 2022-01-01

## 2022-01-01 RX ORDER — PREDNISONE 20 MG/1
20 TABLET ORAL DAILY
Status: CANCELLED | OUTPATIENT
Start: 2022-01-01 | End: 2022-01-01

## 2022-01-01 RX ORDER — OLANZAPINE 10 MG/2ML
2.5 INJECTION, POWDER, FOR SOLUTION INTRAMUSCULAR EVERY 6 HOURS PRN
Status: DISCONTINUED | OUTPATIENT
Start: 2022-01-01 | End: 2022-01-01 | Stop reason: HOSPADM

## 2022-01-01 RX ORDER — LIDOCAINE 40 MG/G
CREAM TOPICAL
Status: DISCONTINUED | OUTPATIENT
Start: 2022-01-01 | End: 2022-01-01 | Stop reason: HOSPADM

## 2022-01-01 RX ORDER — BUDESONIDE 1 MG/2ML
1 INHALANT ORAL 2 TIMES DAILY
Status: DISCONTINUED | OUTPATIENT
Start: 2022-01-01 | End: 2022-01-01

## 2022-01-01 RX ORDER — SODIUM CHLORIDE 9 MG/ML
INJECTION, SOLUTION INTRAVENOUS
Status: COMPLETED
Start: 2022-01-01 | End: 2022-01-01

## 2022-01-01 RX ORDER — IOPAMIDOL 755 MG/ML
100 INJECTION, SOLUTION INTRAVASCULAR ONCE
Status: COMPLETED | OUTPATIENT
Start: 2022-01-01 | End: 2022-01-01

## 2022-01-01 RX ORDER — SULFAMETHOXAZOLE/TRIMETHOPRIM 800-160 MG
1 TABLET ORAL DAILY
Qty: 60 TABLET | Refills: 0 | Status: SHIPPED | OUTPATIENT
Start: 2022-01-01 | End: 2022-01-01

## 2022-01-01 RX ORDER — OLANZAPINE 5 MG/1
5 TABLET, ORALLY DISINTEGRATING ORAL EVERY 6 HOURS PRN
Status: DISCONTINUED | OUTPATIENT
Start: 2022-01-01 | End: 2022-01-01 | Stop reason: HOSPADM

## 2022-01-01 RX ORDER — BUDESONIDE 0.5 MG/2ML
0.5 INHALANT ORAL DAILY
Status: DISCONTINUED | OUTPATIENT
Start: 2022-01-01 | End: 2022-01-01 | Stop reason: HOSPADM

## 2022-01-01 RX ORDER — METHYLPREDNISOLONE SODIUM SUCCINATE 40 MG/ML
16 INJECTION, POWDER, LYOPHILIZED, FOR SOLUTION INTRAMUSCULAR; INTRAVENOUS ONCE
Status: COMPLETED | OUTPATIENT
Start: 2022-01-01 | End: 2022-01-01

## 2022-01-01 RX ORDER — LORAZEPAM 2 MG/ML
1 INJECTION INTRAMUSCULAR ONCE
Status: COMPLETED | OUTPATIENT
Start: 2022-01-01 | End: 2022-01-01

## 2022-01-01 RX ORDER — OLANZAPINE 10 MG/2ML
10 INJECTION, POWDER, FOR SOLUTION INTRAMUSCULAR DAILY PRN
Status: DISCONTINUED | OUTPATIENT
Start: 2022-01-01 | End: 2022-01-01

## 2022-01-01 RX ORDER — ALBUTEROL SULFATE 0.83 MG/ML
2.5 SOLUTION RESPIRATORY (INHALATION)
Status: DISCONTINUED | OUTPATIENT
Start: 2022-01-01 | End: 2022-01-01

## 2022-01-01 RX ADMIN — FERROUS SULFATE TAB 325 MG (65 MG ELEMENTAL FE) 325 MG: 325 (65 FE) TAB at 08:27

## 2022-01-01 RX ADMIN — PIPERACILLIN SODIUM AND TAZOBACTAM SODIUM 4.5 G: 4; .5 INJECTION, POWDER, LYOPHILIZED, FOR SOLUTION INTRAVENOUS at 13:58

## 2022-01-01 RX ADMIN — PIPERACILLIN SODIUM AND TAZOBACTAM SODIUM 4.5 G: 4; .5 INJECTION, POWDER, LYOPHILIZED, FOR SOLUTION INTRAVENOUS at 08:52

## 2022-01-01 RX ADMIN — SODIUM CHLORIDE 63 ML: 9 INJECTION, SOLUTION INTRAVENOUS at 21:51

## 2022-01-01 RX ADMIN — BUDESONIDE 0.5 MG: 0.5 INHALANT RESPIRATORY (INHALATION) at 07:45

## 2022-01-01 RX ADMIN — BUDESONIDE 1 MG: 1 INHALANT ORAL at 00:44

## 2022-01-01 RX ADMIN — PIPERACILLIN SODIUM AND TAZOBACTAM SODIUM 4.5 G: 4; .5 INJECTION, POWDER, LYOPHILIZED, FOR SOLUTION INTRAVENOUS at 14:43

## 2022-01-01 RX ADMIN — FUROSEMIDE 20 MG: 10 INJECTION, SOLUTION INTRAMUSCULAR; INTRAVENOUS at 08:27

## 2022-01-01 RX ADMIN — PANTOPRAZOLE SODIUM 40 MG: 40 TABLET, DELAYED RELEASE ORAL at 08:27

## 2022-01-01 RX ADMIN — PIPERACILLIN SODIUM AND TAZOBACTAM SODIUM 4.5 G: 4; .5 INJECTION, POWDER, LYOPHILIZED, FOR SOLUTION INTRAVENOUS at 03:45

## 2022-01-01 RX ADMIN — PIPERACILLIN SODIUM AND TAZOBACTAM SODIUM 4.5 G: 4; .5 INJECTION, POWDER, LYOPHILIZED, FOR SOLUTION INTRAVENOUS at 20:50

## 2022-01-01 RX ADMIN — OSELTAMIVIR PHOSPHATE 30 MG: 30 CAPSULE ORAL at 08:11

## 2022-01-01 RX ADMIN — PIPERACILLIN SODIUM AND TAZOBACTAM SODIUM 4.5 G: 4; .5 INJECTION, POWDER, LYOPHILIZED, FOR SOLUTION INTRAVENOUS at 08:09

## 2022-01-01 RX ADMIN — PIPERACILLIN SODIUM AND TAZOBACTAM SODIUM 4.5 G: 4; .5 INJECTION, POWDER, LYOPHILIZED, FOR SOLUTION INTRAVENOUS at 20:21

## 2022-01-01 RX ADMIN — AZITHROMYCIN MONOHYDRATE 250 MG: 250 TABLET ORAL at 20:57

## 2022-01-01 RX ADMIN — BUDESONIDE 0.5 MG: 0.5 INHALANT RESPIRATORY (INHALATION) at 12:51

## 2022-01-01 RX ADMIN — OSELTAMIVIR PHOSPHATE 30 MG: 30 CAPSULE ORAL at 20:57

## 2022-01-01 RX ADMIN — LORAZEPAM 1 MG: 2 INJECTION INTRAMUSCULAR at 22:54

## 2022-01-01 RX ADMIN — AZITHROMYCIN MONOHYDRATE 250 MG: 250 TABLET ORAL at 21:25

## 2022-01-01 RX ADMIN — PIPERACILLIN SODIUM AND TAZOBACTAM SODIUM 4.5 G: 4; .5 INJECTION, POWDER, LYOPHILIZED, FOR SOLUTION INTRAVENOUS at 03:00

## 2022-01-01 RX ADMIN — PREDNISONE 20 MG: 10 TABLET ORAL at 09:28

## 2022-01-01 RX ADMIN — OLANZAPINE 10 MG: 10 INJECTION, POWDER, FOR SOLUTION INTRAMUSCULAR at 21:35

## 2022-01-01 RX ADMIN — LORAZEPAM 1 MG: 2 INJECTION INTRAMUSCULAR at 06:06

## 2022-01-01 RX ADMIN — OSELTAMIVIR PHOSPHATE 30 MG: 30 CAPSULE ORAL at 09:28

## 2022-01-01 RX ADMIN — SODIUM CHLORIDE: 9 INJECTION, SOLUTION INTRAVENOUS at 23:58

## 2022-01-01 RX ADMIN — PREDNISONE 20 MG: 10 TABLET ORAL at 08:27

## 2022-01-01 RX ADMIN — AZITHROMYCIN MONOHYDRATE 250 MG: 250 TABLET ORAL at 20:53

## 2022-01-01 RX ADMIN — OSELTAMIVIR PHOSPHATE 30 MG: 30 CAPSULE ORAL at 20:31

## 2022-01-01 RX ADMIN — IOPAMIDOL 93 ML: 755 INJECTION, SOLUTION INTRAVENOUS at 22:11

## 2022-01-01 RX ADMIN — IPRATROPIUM BROMIDE AND ALBUTEROL SULFATE 3 ML: 2.5; .5 SOLUTION RESPIRATORY (INHALATION) at 20:20

## 2022-01-01 RX ADMIN — BUDESONIDE 0.5 MG: 0.5 INHALANT RESPIRATORY (INHALATION) at 08:42

## 2022-01-01 RX ADMIN — ALBUTEROL SULFATE 2 PUFF: 90 AEROSOL, METERED RESPIRATORY (INHALATION) at 11:04

## 2022-01-01 RX ADMIN — PANTOPRAZOLE SODIUM 40 MG: 40 TABLET, DELAYED RELEASE ORAL at 00:15

## 2022-01-01 RX ADMIN — Medication 25 MG: at 19:21

## 2022-01-01 RX ADMIN — PREDNISONE 20 MG: 10 TABLET ORAL at 08:11

## 2022-01-01 RX ADMIN — PANTOPRAZOLE SODIUM 40 MG: 40 TABLET, DELAYED RELEASE ORAL at 11:19

## 2022-01-01 RX ADMIN — PIPERACILLIN SODIUM AND TAZOBACTAM SODIUM 4.5 G: 4; .5 INJECTION, POWDER, LYOPHILIZED, FOR SOLUTION INTRAVENOUS at 02:02

## 2022-01-01 RX ADMIN — BUDESONIDE 0.5 MG: 0.5 INHALANT RESPIRATORY (INHALATION) at 09:38

## 2022-01-01 RX ADMIN — OSELTAMIVIR PHOSPHATE 30 MG: 30 CAPSULE ORAL at 08:26

## 2022-01-01 RX ADMIN — LORAZEPAM 1 MG: 2 INJECTION INTRAMUSCULAR at 02:22

## 2022-01-01 RX ADMIN — PIPERACILLIN SODIUM AND TAZOBACTAM SODIUM 4.5 G: 4; .5 INJECTION, POWDER, LYOPHILIZED, FOR SOLUTION INTRAVENOUS at 03:33

## 2022-01-01 RX ADMIN — PREDNISONE 20 MG: 10 TABLET ORAL at 08:50

## 2022-01-01 RX ADMIN — OLANZAPINE 5 MG: 5 TABLET, ORALLY DISINTEGRATING ORAL at 20:53

## 2022-01-01 RX ADMIN — PIPERACILLIN SODIUM AND TAZOBACTAM SODIUM 4.5 G: 4; .5 INJECTION, POWDER, LYOPHILIZED, FOR SOLUTION INTRAVENOUS at 02:15

## 2022-01-01 RX ADMIN — Medication 25 MG: at 01:04

## 2022-01-01 RX ADMIN — OLANZAPINE 5 MG: 5 TABLET, ORALLY DISINTEGRATING ORAL at 07:04

## 2022-01-01 RX ADMIN — LORAZEPAM 1 MG: 2 INJECTION INTRAMUSCULAR at 16:02

## 2022-01-01 RX ADMIN — BUDESONIDE 1 MG: 1 INHALANT ORAL at 07:45

## 2022-01-01 RX ADMIN — PIPERACILLIN AND TAZOBACTAM 3.38 G: 3; .375 INJECTION, POWDER, LYOPHILIZED, FOR SOLUTION INTRAVENOUS at 21:20

## 2022-01-01 RX ADMIN — ALBUTEROL SULFATE 2.5 MG: 2.5 SOLUTION RESPIRATORY (INHALATION) at 18:59

## 2022-01-01 RX ADMIN — PANTOPRAZOLE SODIUM 40 MG: 40 TABLET, DELAYED RELEASE ORAL at 08:51

## 2022-01-01 RX ADMIN — BUDESONIDE 0.5 MG: 0.5 INHALANT RESPIRATORY (INHALATION) at 09:33

## 2022-01-01 RX ADMIN — OSELTAMIVIR PHOSPHATE 30 MG: 30 CAPSULE ORAL at 20:53

## 2022-01-01 RX ADMIN — FUROSEMIDE 20 MG: 10 INJECTION, SOLUTION INTRAMUSCULAR; INTRAVENOUS at 08:11

## 2022-01-01 RX ADMIN — OLANZAPINE 5 MG: 5 TABLET, ORALLY DISINTEGRATING ORAL at 23:14

## 2022-01-01 RX ADMIN — PANTOPRAZOLE SODIUM 40 MG: 40 TABLET, DELAYED RELEASE ORAL at 08:11

## 2022-01-01 RX ADMIN — PIPERACILLIN SODIUM AND TAZOBACTAM SODIUM 4.5 G: 4; .5 INJECTION, POWDER, LYOPHILIZED, FOR SOLUTION INTRAVENOUS at 14:09

## 2022-01-01 RX ADMIN — FUROSEMIDE 20 MG: 20 TABLET ORAL at 11:19

## 2022-01-01 RX ADMIN — AZITHROMYCIN 500 MG: 500 TABLET, FILM COATED ORAL at 23:21

## 2022-01-01 RX ADMIN — NICOTINE 1 PATCH: 21 PATCH, EXTENDED RELEASE TRANSDERMAL at 21:19

## 2022-01-01 RX ADMIN — IRON SUCROSE 300 MG: 20 INJECTION, SOLUTION INTRAVENOUS at 18:14

## 2022-01-01 RX ADMIN — VANCOMYCIN HYDROCHLORIDE 1500 MG: 10 INJECTION, POWDER, LYOPHILIZED, FOR SOLUTION INTRAVENOUS at 23:51

## 2022-01-01 RX ADMIN — METHYLPREDNISOLONE SODIUM SUCCINATE 125 MG: 125 INJECTION, POWDER, FOR SOLUTION INTRAMUSCULAR; INTRAVENOUS at 20:29

## 2022-01-01 RX ADMIN — PIPERACILLIN SODIUM AND TAZOBACTAM SODIUM 4.5 G: 4; .5 INJECTION, POWDER, LYOPHILIZED, FOR SOLUTION INTRAVENOUS at 22:39

## 2022-01-01 RX ADMIN — OSELTAMIVIR PHOSPHATE 30 MG: 30 CAPSULE ORAL at 21:25

## 2022-01-01 RX ADMIN — PIPERACILLIN SODIUM AND TAZOBACTAM SODIUM 4.5 G: 4; .5 INJECTION, POWDER, LYOPHILIZED, FOR SOLUTION INTRAVENOUS at 08:28

## 2022-01-01 RX ADMIN — VANCOMYCIN HYDROCHLORIDE 1500 MG: 10 INJECTION, POWDER, LYOPHILIZED, FOR SOLUTION INTRAVENOUS at 20:21

## 2022-01-01 RX ADMIN — PIPERACILLIN SODIUM AND TAZOBACTAM SODIUM 4.5 G: 4; .5 INJECTION, POWDER, LYOPHILIZED, FOR SOLUTION INTRAVENOUS at 08:16

## 2022-01-01 RX ADMIN — FUROSEMIDE 20 MG: 10 INJECTION, SOLUTION INTRAMUSCULAR; INTRAVENOUS at 08:09

## 2022-01-01 RX ADMIN — METHYLPREDNISOLONE SODIUM SUCCINATE 16 MG: 40 INJECTION, POWDER, FOR SOLUTION INTRAMUSCULAR; INTRAVENOUS at 15:00

## 2022-01-01 RX ADMIN — BUDESONIDE 0.5 MG: 0.5 INHALANT RESPIRATORY (INHALATION) at 08:41

## 2022-01-01 RX ADMIN — PIPERACILLIN SODIUM AND TAZOBACTAM SODIUM 4.5 G: 4; .5 INJECTION, POWDER, LYOPHILIZED, FOR SOLUTION INTRAVENOUS at 08:35

## 2022-01-01 RX ADMIN — FUROSEMIDE 20 MG: 10 INJECTION, SOLUTION INTRAMUSCULAR; INTRAVENOUS at 14:39

## 2022-01-01 RX ADMIN — PANTOPRAZOLE SODIUM 40 MG: 40 TABLET, DELAYED RELEASE ORAL at 09:28

## 2022-01-01 RX ADMIN — LORAZEPAM 1 MG: 2 INJECTION INTRAMUSCULAR at 20:02

## 2022-01-01 RX ADMIN — BUDESONIDE 0.5 MG: 0.5 INHALANT RESPIRATORY (INHALATION) at 09:45

## 2022-01-01 RX ADMIN — PIPERACILLIN SODIUM AND TAZOBACTAM SODIUM 4.5 G: 4; .5 INJECTION, POWDER, LYOPHILIZED, FOR SOLUTION INTRAVENOUS at 20:31

## 2022-01-01 RX ADMIN — FUROSEMIDE 20 MG: 20 TABLET ORAL at 09:28

## 2022-01-01 RX ADMIN — PREDNISONE 20 MG: 10 TABLET ORAL at 11:19

## 2022-01-01 RX ADMIN — OSELTAMIVIR PHOSPHATE 30 MG: 30 CAPSULE ORAL at 11:19

## 2022-01-01 RX ADMIN — PREDNISONE 20 MG: 10 TABLET ORAL at 08:13

## 2022-01-01 RX ADMIN — FUROSEMIDE 20 MG: 10 INJECTION, SOLUTION INTRAMUSCULAR; INTRAVENOUS at 07:34

## 2022-01-01 RX ADMIN — PIPERACILLIN SODIUM AND TAZOBACTAM SODIUM 4.5 G: 4; .5 INJECTION, POWDER, LYOPHILIZED, FOR SOLUTION INTRAVENOUS at 14:33

## 2022-01-01 RX ADMIN — PANTOPRAZOLE SODIUM 40 MG: 40 TABLET, DELAYED RELEASE ORAL at 05:59

## 2022-01-01 ASSESSMENT — ENCOUNTER SYMPTOMS
EYE PAIN: 0
NAUSEA: 0
FLANK PAIN: 1
HEADACHES: 0
MYALGIAS: 0
BACK PAIN: 0
FEVER: 0
CONFUSION: 0
WEAKNESS: 0
DIARRHEA: 0
CHILLS: 0
COUGH: 1
DIZZINESS: 0
FATIGUE: 0
DYSURIA: 0
SHORTNESS OF BREATH: 1
NECK PAIN: 0
DIFFICULTY URINATING: 0
COUGH: 0
COLOR CHANGE: 0
CHEST TIGHTNESS: 0
ARTHRALGIAS: 0
WHEEZING: 1
SORE THROAT: 0
ABDOMINAL PAIN: 1
VOMITING: 0
ABDOMINAL DISTENTION: 0
PALPITATIONS: 0
SHORTNESS OF BREATH: 1
CONSTIPATION: 0
FREQUENCY: 0
FATIGUE: 1

## 2022-01-01 ASSESSMENT — ACTIVITIES OF DAILY LIVING (ADL)
ADLS_ACUITY_SCORE: 53
ADLS_ACUITY_SCORE: 50
ADLS_ACUITY_SCORE: 50
TRANSFERRING: 2-->COMPLETELY DEPENDENT
ADLS_ACUITY_SCORE: 53
CHANGE_IN_FUNCTIONAL_STATUS_SINCE_ONSET_OF_CURRENT_ILLNESS/INJURY: YES
ADLS_ACUITY_SCORE: 53
CONCENTRATING,_REMEMBERING_OR_MAKING_DECISIONS_DIFFICULTY: OTHER (SEE COMMENTS)
CONCENTRATING,_REMEMBERING_OR_MAKING_DECISIONS_DIFFICULTY: OTHER (SEE COMMENTS)
BATHING: 2-->COMPLETELY DEPENDENT (NOT DEVELOPMENTALLY APPROPRIATE)
ADLS_ACUITY_SCORE: 53
ADLS_ACUITY_SCORE: 50
ADLS_ACUITY_SCORE: 56
DIFFICULTY_EATING/SWALLOWING: NO
ADLS_ACUITY_SCORE: 35
WALKING_OR_CLIMBING_STAIRS_DIFFICULTY: OTHER (SEE COMMENTS)
ADLS_ACUITY_SCORE: 51
ADLS_ACUITY_SCORE: 56
TOILETING_ASSISTANCE: TOILETING DIFFICULTY, ASSISTANCE 1 PERSON
ADLS_ACUITY_SCORE: 50
ADLS_ACUITY_SCORE: 47
WALKING_OR_CLIMBING_STAIRS: OTHER (SEE COMMENTS)
ADLS_ACUITY_SCORE: 55
DRESS: 2-->COMPLETELY DEPENDENT (NOT DEVELOPMENTALLY APPROPRIATE)
ADLS_ACUITY_SCORE: 50
DRESSING/BATHING_DIFFICULTY: OTHER (SEE COMMENTS)
ADLS_ACUITY_SCORE: 56
ADLS_ACUITY_SCORE: 47
TRANSFERRING: 2-->COMPLETELY DEPENDENT (NOT DEVELOPMENTALLY APPROPRIATE)
TOILETING_ISSUES: YES;OTHER (SEE COMMENTS)
ADLS_ACUITY_SCORE: 50
ADLS_ACUITY_SCORE: 50
ADLS_ACUITY_SCORE: 35
ADLS_ACUITY_SCORE: 56
ADLS_ACUITY_SCORE: 47
FALL_HISTORY_WITHIN_LAST_SIX_MONTHS: NO
TOILETING: 2-->COMPLETELY DEPENDENT
ADLS_ACUITY_SCORE: 53
WALKING_OR_CLIMBING_STAIRS_DIFFICULTY: OTHER (SEE COMMENTS)
ADLS_ACUITY_SCORE: 53
ADLS_ACUITY_SCORE: 47
DRESSING/BATHING_DIFFICULTY: OTHER (SEE COMMENTS)
ADLS_ACUITY_SCORE: 56
TOILETING_ASSISTANCE: TOILETING DIFFICULTY, ASSISTANCE 1 PERSON
ADLS_ACUITY_SCORE: 50
DRESSING/BATHING_MANAGEMENT: UNABLE TO STATE
ADLS_ACUITY_SCORE: 56
ADLS_ACUITY_SCORE: 47
ADLS_ACUITY_SCORE: 53
ADLS_ACUITY_SCORE: 47
TOILETING_MANAGEMENT: UNABLE TO STATE
ADLS_ACUITY_SCORE: 47
WEAR_GLASSES_OR_BLIND: NO
ADLS_ACUITY_SCORE: 50
DRESSING/BATHING_MANAGEMENT: UNABLE TO STATE
ADLS_ACUITY_SCORE: 53
DRESS: 2-->COMPLETELY DEPENDENT
DIFFICULTY_EATING/SWALLOWING: NO
ADLS_ACUITY_SCORE: 53
ADLS_ACUITY_SCORE: 50
ADLS_ACUITY_SCORE: 35
ADLS_ACUITY_SCORE: 55
ADLS_ACUITY_SCORE: 35
TOILETING_ISSUES: YES;OTHER (SEE COMMENTS)
ADLS_ACUITY_SCORE: 50
ADLS_ACUITY_SCORE: 50
ADLS_ACUITY_SCORE: 53
ADLS_ACUITY_SCORE: 50
TRANSFERRING: 2-->COMPLETELY DEPENDENT
ADLS_ACUITY_SCORE: 50
ADLS_ACUITY_SCORE: 50
ADLS_ACUITY_SCORE: 47
ADLS_ACUITY_SCORE: 53
BATHING: 2-->COMPLETELY DEPENDENT (NOT DEVELOPMENTALLY APPROPRIATE)
ADLS_ACUITY_SCORE: 50
ADLS_ACUITY_SCORE: 56
ADLS_ACUITY_SCORE: 50
TRANSFERRING: 2-->COMPLETELY DEPENDENT (NOT DEVELOPMENTALLY APPROPRIATE)
ADLS_ACUITY_SCORE: 50
ADLS_ACUITY_SCORE: 53
TOILETING: 2-->COMPLETELY DEPENDENT (NOT DEVELOPMENTALLY APPROPRIATE)
TOILETING_MANAGEMENT: UNABLE TO STATE
TOILETING: 2-->COMPLETELY DEPENDENT
DRESS: 2-->COMPLETELY DEPENDENT
ADLS_ACUITY_SCORE: 56
ADLS_ACUITY_SCORE: 50
ADLS_ACUITY_SCORE: 50
ADLS_ACUITY_SCORE: 53
ADLS_ACUITY_SCORE: 50
ADLS_ACUITY_SCORE: 50
DOING_ERRANDS_INDEPENDENTLY_DIFFICULTY: OTHER (SEE COMMENTS)
ADLS_ACUITY_SCORE: 53
ADLS_ACUITY_SCORE: 53
ADLS_ACUITY_SCORE: 50
ADLS_ACUITY_SCORE: 50
DRESS: 2-->COMPLETELY DEPENDENT (NOT DEVELOPMENTALLY APPROPRIATE)
TOILETING: 2-->COMPLETELY DEPENDENT (NOT DEVELOPMENTALLY APPROPRIATE)
ADLS_ACUITY_SCORE: 47
ADLS_ACUITY_SCORE: 55
ADLS_ACUITY_SCORE: 50
ADLS_ACUITY_SCORE: 47
HEARING_DIFFICULTY_OR_DEAF: NO
ADLS_ACUITY_SCORE: 53
ADLS_ACUITY_SCORE: 56
ADLS_ACUITY_SCORE: 53
ADLS_ACUITY_SCORE: 50
CHANGE_IN_FUNCTIONAL_STATUS_SINCE_ONSET_OF_CURRENT_ILLNESS/INJURY: YES
ADLS_ACUITY_SCORE: 35
ADLS_ACUITY_SCORE: 35
ADLS_ACUITY_SCORE: 56
ADLS_ACUITY_SCORE: 50
ADLS_ACUITY_SCORE: 53
ADLS_ACUITY_SCORE: 53
ADLS_ACUITY_SCORE: 47
ADLS_ACUITY_SCORE: 53
WEAR_GLASSES_OR_BLIND: NO
DOING_ERRANDS_INDEPENDENTLY_DIFFICULTY: OTHER (SEE COMMENTS)
ADLS_ACUITY_SCORE: 55
DIFFICULTY_COMMUNICATING: NO
ADLS_ACUITY_SCORE: 47
ADLS_ACUITY_SCORE: 47

## 2022-04-25 NOTE — PROGRESS NOTES
Action April 25, 2022 12:30 PM ABT   Action Taken Records updated in CE     Action April 26, 2022 9:22 AM ABT   Action Taken [place] [facility]

## 2022-05-09 NOTE — TELEPHONE ENCOUNTER
5/9/22- Called Pt with  to schedule CT to accompany 6/2/22 referral/appt.  LVM at preferred #, other # had no VM.

## 2022-05-12 NOTE — TELEPHONE ENCOUNTER
5/12/22- Called Pt with . Both  and I LVM notifying Pt that CT needs to be scheduled prior to 6/2/22 or else we will need to cancel 6/2/22 appts.    M home  busy at [phone]   [facility] [phone]

## 2022-05-16 NOTE — TELEPHONE ENCOUNTER
5/16/22 Called Pt with , SIMON at both numbers. Reinforced that CT scan needs to be scheduled or the 6/2/22 appt will be cancelled.

## 2022-06-13 NOTE — TELEPHONE ENCOUNTER
Action 6.13.22 sv    Action Taken Image request sent to   [facility] [place] [facility]- 12.24.21 -- received image--       RECORDS RECEIVED FROM: internal /ce    DATE RECEIVED: 6.13.22    NOTES STATUS DETAILS   OFFICE NOTE from referring provider Scottie Perales    [facility]    OFFICE NOTE from other specialist     DISCHARGE SUMMARY from hospital     DISCHARGE REPORT from the ER     MEDICATION LIST internal     IMAGING  (NEED IMAGES AND REPORTS)     CT SCAN     CHEST XRAY (CXR) internal /ce  internal - 4.20.22  [facility] [place] [facility]- 12.24.21    TESTS     PULMONARY FUNCTION TESTING (PFT) internal  Scheduled 6.21.22

## 2022-06-24 NOTE — TELEPHONE ENCOUNTER
Action 6.13.22 sv    Action Taken [place]   [facility] -- received image--     6.24.22 sv- message sent to CC pool to help schedule CXR order        RECORDS RECEIVED FROM: internal /ce     DATE RECEIVED: 8.8.22     NOTES STATUS DETAILS   OFFICE NOTE from referring provider Scottie Perales    [facility]    OFFICE NOTE from other specialist       DISCHARGE SUMMARY from hospital       DISCHARGE REPORT from the ER       MEDICATION LIST internal      IMAGING  (NEED IMAGES AND REPORTS)       CT SCAN       CHEST XRAY (CXR) internal /ce  internal - 4.20.22  [facility]    TESTS       PULMONARY FUNCTION TESTING (PFT) internal  Scheduled 8.8.22

## 2022-06-28 PROBLEM — N20.0 KIDNEY STONE: Status: ACTIVE | Noted: 2022-01-01

## 2022-06-28 NOTE — ED PROVIDER NOTES
"ED Provider Note  [facility]      History     Chief Complaint   Patient presents with     Flank Pain     L flank pain for past 3 months, pain is worse today     Asthma Exacerbation     SOB, cough for \"many months\", using inhaler with no relief. Reports hx asthma.     HPI  Job Kaiser is a 82 year old male with history of COPD, previously treated tuberculosis, presents to the ED for evaluation of shortness of breath and left flank pain.  States he has had progressive worsening of shortness of breath for the past 3 months.  He has been using albuterol inhaler without relief.  He states he saw a doctor at some point to give him medications but did not fully evaluate him.  No associated symptoms.  Also complains of left-sided flank/abdominal pain that has been worsening for the past 2 months.  Does have a history of kidney stones status post stone removal and wonders if this has reoccurred.  Feels like a sharp pain.  Is constant.  No particular aggravating or alleviating factors.  Denies any associated symptoms.  Specifically, denies hematuria, dysuria, diarrhea, hematochezia, fever/chills, chest pain, nausea/vomiting        Past Medical History  Past Medical History:   Diagnosis Date     Bronchiectasis (H)      COPD (chronic obstructive pulmonary disease) (H)      Nephrolithiasis      Tuberculosis, treated 1996     Past Surgical History:   Procedure Laterality Date     NO HISTORY OF SURGERY       albuterol (PROAIR HFA/PROVENTIL HFA/VENTOLIN HFA) 108 (90 Base) MCG/ACT inhaler  predniSONE (DELTASONE) 20 MG tablet  albuterol (2.5 MG/3ML) 0.083% nebulizer solution  budesonide (PULMICORT) 1 MG/2ML SUSP nebulizer solution  oseltamivir (TAMIFLU) 75 MG capsule      No Known Allergies  Family History  Family History   Problem Relation Age of Onset     Glaucoma No family hx of      Macular Degeneration No family hx of      Social History   Social History     Tobacco Use     Smoking status: Former " Smoker     Quit date: 1995     Years since quittin.5   Substance Use Topics     Alcohol use: No     Drug use: No      Past medical history, past surgical history, medications, allergies, family history, and social history were reviewed with the patient. No additional pertinent items.       Review of Systems   Constitutional: Negative for chills, fatigue and fever.   HENT: Negative for congestion and sore throat.    Eyes: Negative for pain and visual disturbance.   Respiratory: Positive for shortness of breath and wheezing. Negative for cough and chest tightness.    Cardiovascular: Negative for chest pain and palpitations.   Gastrointestinal: Positive for abdominal pain. Negative for abdominal distention, constipation, diarrhea, nausea and vomiting.   Genitourinary: Positive for flank pain. Negative for difficulty urinating, dysuria, frequency and urgency.   Musculoskeletal: Negative for arthralgias, back pain, myalgias and neck pain.   Skin: Negative for color change and rash.   Neurological: Negative for dizziness, weakness and headaches.   Psychiatric/Behavioral: Negative for confusion.     A complete review of systems was performed with pertinent positives and negatives noted in the HPI, and all other systems negative.    Physical Exam   BP: (!) 154/66  Pulse: 64  Temp: 98.4  F (36.9  C)  Resp: (!) 36  SpO2: 96 %  Physical Exam  Vitals and nursing note reviewed.   Constitutional:       General: He is not in acute distress.     Appearance: Normal appearance. He is not ill-appearing or toxic-appearing.   HENT:      Head: Normocephalic and atraumatic.      Nose: Nose normal.      Mouth/Throat:      Mouth: Mucous membranes are moist.   Eyes:      Pupils: Pupils are equal, round, and reactive to light.   Cardiovascular:      Rate and Rhythm: Normal rate.      Pulses: Normal pulses.      Heart sounds: Normal heart sounds.   Pulmonary:      Effort: Pulmonary effort is normal. No respiratory distress.       Breath sounds: Wheezing present.      Comments: Diffuse end expiratory wheezing.  Tachypnea, mild accessory muscle use.  Consistent with mild respiratory distress.  Abdominal:      General: Abdomen is flat. There is no distension.       Musculoskeletal:         General: No swelling or deformity. Normal range of motion.      Cervical back: Normal range of motion. No rigidity.   Skin:     General: Skin is warm.      Capillary Refill: Capillary refill takes less than 2 seconds.   Neurological:      Mental Status: He is alert and oriented to person, place, and time.   Psychiatric:         Mood and Affect: Mood normal.         ED Course     ED Course as of 06/28/22 0043   Mon Jun 27, 2022 2050      EKG Interpretation:     Interpreted by Pranav Chino DO  Time reviewed:2050   Symptoms at time of EKG: dyspnea   Rhythm: sinus bradycardia  Rate: normal  Axis: LEFT  Ectopy: none  Conduction: incomplete RBBB  ST Segments/ T Waves: No acute ST-T wave changes  Q Waves: none  Comparison to prior: Unchanged    Clinical Impression: sinus slade, unchanged morphology, no acute ischemia, o/w normal EKG           Results for orders placed or performed during the hospital encounter of 06/27/22   CT Abdomen Pelvis w Contrast     Status: None    Narrative    EXAM: CT ABDOMEN PELVIS W CONTRAST  LOCATION: [facility]  DATE/TIME: 6/27/2022 9:44 PM    INDICATION: Left flank pain, diffuse tenderness with guarding on exam.  C F diverticulitis, stone, pancreatitis.  COMPARISON: 7/30/2018  TECHNIQUE: CT scan of the abdomen and pelvis was performed following injection of IV contrast. Multiplanar reformats were obtained. Dose reduction techniques were used.  CONTRAST: 93 mL isovue 370    FINDINGS:   LOWER CHEST: Fibrotic changes lung bases.    HEPATOBILIARY: Normal.    PANCREAS: Normal.    SPLEEN: Normal.    ADRENAL GLANDS: Normal.    KIDNEYS/BLADDER: 2.5 x 1.5 x 1.0 cm stone within the left renal  pelvis with mild left-sided hydronephrosis. Stone is increased in size since prior exam and the degree of intrarenal dilatation has progressed. There are a few scattered nonobstructing   stones in the right kidney increased in size with the largest stone measuring 4 mm, previously 2 mm.    BOWEL: Normal.    LYMPH NODES: Normal.    VASCULATURE: Unremarkable.    PELVIC ORGANS: Prostatic enlargement. Large fat-containing right inguinal hernia.    MUSCULOSKELETAL: Normal.      Impression    IMPRESSION:   1.  Large 2.5 x 1.5 x 1.1 cm stone left renal pelvis increased in size since prior examination with very mild dilatation left intrarenal collecting system also increased slightly.    2.  Prostatic enlargement.    3.  Several nonobstructing stones in the right kidney increased in size.    4.  Large fat-containing right inguinal hernia.   XR Chest 1 View     Status: None    Narrative    EXAM: XR CHEST 1 VIEW  LOCATION: [facility]  DATE/TIME: 6/27/2022 10:11 PM    INDICATION: Dyspnea.  COMPARISON: 4/20/2022    FINDINGS: The heart size is normal. There is been interval increase in bilateral interstitial and alveolar infiltrates. Pleural thickening and scarring at the lung apices. No pneumothorax.      Impression    IMPRESSION: Progression of bilateral interstitial and alveolar infiltrates.   Comprehensive metabolic panel     Status: Abnormal   Result Value Ref Range    Sodium 141 133 - 144 mmol/L    Potassium 4.0 3.4 - 5.3 mmol/L    Chloride 109 94 - 109 mmol/L    Carbon Dioxide (CO2) 25 20 - 32 mmol/L    Anion Gap 7 3 - 14 mmol/L    Urea Nitrogen 14 7 - 30 mg/dL    Creatinine 0.81 0.66 - 1.25 mg/dL    Calcium 8.7 8.5 - 10.1 mg/dL    Glucose 129 (H) 70 - 99 mg/dL    Alkaline Phosphatase 66 40 - 150 U/L    AST 18 0 - 45 U/L    ALT 14 0 - 70 U/L    Protein Total 7.3 6.8 - 8.8 g/dL    Albumin 3.2 (L) 3.4 - 5.0 g/dL    Bilirubin Total 0.2 0.2 - 1.3 mg/dL    GFR Estimate 88 >60  mL/min/1.73m2   Lipase     Status: Abnormal   Result Value Ref Range    Lipase 474 (H) 73 - 393 U/L   Troponin I     Status: Normal   Result Value Ref Range    Troponin I High Sensitivity 8 <79 ng/L   Symptomatic; Unknown Influenza A/B & SARS-CoV2 (COVID-19) Virus PCR Multiplex Nasopharyngeal     Status: Normal    Specimen: Nasopharyngeal; Swab   Result Value Ref Range    Influenza A PCR Negative Negative    Influenza B PCR Negative Negative    SARS CoV2 PCR Negative Negative    Narrative    Testing was performed using the camryn SARS-CoV-2 & Influenza A/B Assay on the camryn Nga System. This test should be ordered for the detection of SARS-CoV-2 and influenza viruses in individuals who meet clinical and/or epidemiological criteria. Test performance is unknown in asymptomatic patients. This test is for in vitro diagnostic use under the FDA EUA for laboratories certified under CLIA to perform moderate and/or high complexity testing. This test has not been FDA cleared or approved. A negative result does not rule out the presence of PCR inhibitors in the specimen or target RNA in concentration below the limit of detection for the assay. If only one viral target is positive but coinfection with multiple targets is suspected, the sample should be re-tested with another FDA cleared, approved or authorized test, if coinfection would change clinical management. [facility] are certified under the Clinical Laboratory Improvement Amendments of 1988 (CLIA-88) as  qualified to perform moderate and/or high complexity laboratory testing.   CBC with platelets and differential     Status: Abnormal   Result Value Ref Range    WBC Count 5.1 4.0 - 11.0 10e3/uL    RBC Count 3.82 (L) 4.40 - 5.90 10e6/uL    Hemoglobin 8.6 (L) 13.3 - 17.7 g/dL    Hematocrit 29.2 (L) 40.0 - 53.0 %    MCV 76 (L) 78 - 100 fL    MCH 22.5 (L) 26.5 - 33.0 pg    MCHC 29.5 (L) 31.5 - 36.5 g/dL    RDW 19.4 (H) 10.0 - 15.0 %    Platelet Count 442  150 - 450 10e3/uL    % Neutrophils 50 %    % Lymphocytes 24 %    % Monocytes 11 %    % Eosinophils 13 %    % Basophils 2 %    % Immature Granulocytes 0 %    NRBCs per 100 WBC 0 <1 /100    Absolute Neutrophils 2.6 1.6 - 8.3 10e3/uL    Absolute Lymphocytes 1.2 0.8 - 5.3 10e3/uL    Absolute Monocytes 0.6 0.0 - 1.3 10e3/uL    Absolute Eosinophils 0.7 0.0 - 0.7 10e3/uL    Absolute Basophils 0.1 0.0 - 0.2 10e3/uL    Absolute Immature Granulocytes 0.0 <=0.4 10e3/uL    Absolute NRBCs 0.0 10e3/uL   CBC with platelets differential     Status: Abnormal    Narrative    The following orders were created for panel order CBC with platelets differential.  Procedure                               Abnormality         Status                     ---------                               -----------         ------                     CBC with platelets and d...[[id number]]  Abnormal            Final result                 Please view results for these tests on the individual orders.     Medications   ipratropium - albuterol 0.5 mg/2.5 mg/3 mL (DUONEB) neb solution 3 mL (3 mLs Nebulization Given 6/27/22 2020)   methylPREDNISolone sodium succinate (solu-MEDROL) injection 125 mg (125 mg Intravenous Given 6/27/22 2029)   sodium chloride 100mL for CT scan flush use (63 mLs Intravenous Given 6/27/22 2151)   iopamidol (ISOVUE-370) solution 100 mL (93 mLs Intravenous Given 6/27/22 2211)        Assessments & Plan (with Medical Decision Making)   Labs notable for mild elevation of lipase.  However, does not meet the 3 times upper limit of normal criteria to suggest acute pancreatitis.  CT scan without evidence of acute pancreatitis.  No bilirubin elevation or transaminitis to suggest hepatobiliary pathology.  No leukocytosis.  No electrolyte abnormality.    EKG shows normal sinus rhythm without signs acute ischemia.  Troponin negative x1.    CT is notable for hemorrhage stone in the renal pelvis which is increased size from prior examination.  There  is mild dilatation of the collecting system.  No elevation in creatinine.  Urinalysis is pending.  Discussed with urology who recommends admission to observation.  If urinalysis is positive for infection, patient would likely need emergent removal.  Regardless, they would like patient n.p.o. at midnight and they will see patient in consult.    Chest x-ray shows bilateral infiltrates, significantly worsened from previous.  Low suspicion for acute pneumonia as patient symptoms have been worsening for the past 3 months.  Additionally, no productive cough, fever, leukocytosis.  On arrival, patient was tachypneic with end expiratory wheezing.  He feels that his symptoms have completely resolved after Solu-Medrol and DuoNeb inhaler.  His wheezing is significantly improved as well.  He is not requiring supplemental O2.  His respiratory rate has returned to baseline.  Plan for 5-day course of steroids, refill of albuterol inhaler, and follow-up with pulmonology.    Initially, patient refused to stay in hospital and plan to leave AMA.  Family ultimately convince him to stay.  Case discussed with the ED obs provider.  Plan for urology and pulmonology consult in the morning.      Update: Patient has now changed his mind about staying in the hospital.  He has gone back and forth about admission several times.  I have spent over an hour in the room explaining his findings and plan of care.  He is unwilling to stay to provide a urine sample even after I discussed how critical this would be, especially in setting of large obstructive stone.  We also discussed the risks of discharge.  Everything was explained via Ghanaian .  His family is trying to convince him to stay as well.  Ultimately, patient is refusing to stay in the ED.  He will be signed out AGAINST MEDICAL ADVICE.  I have set up emergent outpatient referrals for urology and pulmonology.  Patient be discharged on prednisone and refills of albuterol inhaler.   Strongly recommend return to the ED for admission or for reassessment should his symptoms worsen.      I have reviewed the nursing notes. I have reviewed the findings, diagnosis, plan and need for follow up with the patient.    New Prescriptions    PREDNISONE (DELTASONE) 20 MG TABLET    Take two tablets (= 40mg) each day for 5 (five) days       Final diagnoses:   Kidney stone   Pulmonary infiltrates       --  Pranav Chino DO  [facility] EMERGENCY DEPARTMENT  6/27/2022     Pranav Chino DO  06/28/22 1711

## 2022-06-28 NOTE — ED NOTES
Patient agreeable to staying but requesting to be able to eat food. Patient and family updated that he would be able to eat his one meal and then after that he would be NPO. Patient and family agreed to plan.

## 2022-06-28 NOTE — DISCHARGE INSTRUCTIONS
You have a large stone in your left kidney.  I recommend admission to the hospital.  We also needed a urine sample.  You are refusing to stay to have these things done.  As we discussed, this has the possibility of infection and could potentially be life-threatening.  We spoke with the urology department who recommended admission to the hospital.  You are refusing this care at this time.  I have placed an urgent referral to the urology department who should contact you in the next 1 to 2 days to schedule outpatient follow-up.    Additionally, you have what appears to be worsening inflammation in both of your lungs.  I strongly recommend admission for this as well and to discuss with the pulmonology (lung doctor) team.  I have started you on a course of steroids which should help with your shortness of breath but I strongly feel that your symptoms will worsen without admission to the hospital.    It is imperative that you follow-up with your primary care physician as soon as possible.  Have also placed a referral to the pulmonology department.  Return to the emergency department immediately if you change your mind about admission or with any new or worsening symptoms.    Please make an appointment to follow up with Urology Clinic (phone: [phone]) as soon as possible.    Please make an appointment to follow up with Pulmonology Clinic (phone: [phone]) as soon as possible.

## 2022-06-28 NOTE — ED TRIAGE NOTES
BIB son for worsening cough and SOB over the past few months not relieved by albuterol inhaler. Pt also reports a few months of L flank pain which necessitates him sleeping on his R side.     Triage Assessment     Row Name 06/27/22 1922       Triage Assessment (Adult)    Airway WDL WDL       Respiratory WDL    Respiratory WDL rhythm/pattern;cough    Rhythm/Pattern, Respiratory tachypneic    Cough Frequency frequent    Cough Type loose       Skin Circulation/Temperature WDL    Skin Circulation/Temperature WDL WDL       Cardiac WDL    Cardiac WDL WDL       Peripheral/Neurovascular WDL    Peripheral Neurovascular WDL WDL       Cognitive/Neuro/Behavioral WDL    Cognitive/Neuro/Behavioral WDL WDL

## 2022-06-28 NOTE — ED NOTES
Observation Brochure and Video    Patient and family informed of observation status based on provider's order.  Observation brochure was given and the video watched. Patient/Family stated understanding. Questions answered.  Yasmin Presley RN

## 2022-06-28 NOTE — ED NOTES
Patient requesting to leave AMA because patient was updated that he had to stay in the ER room until a room was available for admission. Patient reported that he cannot sleep in room 8 and was informed that there was no other beds available at this time. Patient reports that then he will be leaving and will come back in the morning for the things that need to be completed. MD notified

## 2022-08-08 NOTE — NURSING NOTE
Chief Complaint   Patient presents with     New Patient     bronchiectasis     Vitals were taken and medications were reconciled.     GLENNA Kasper

## 2022-08-08 NOTE — PATIENT INSTRUCTIONS
Prednisone Taper  40mg x7 days  20mg x7 days  15mg x7 days  10mg x7 days  5mg x7 days    If you have any questions regarding scheduling your appointment or if we discussed scheduling something during the visit and you have not been called within 1 week. Please call the main scheduling number ([phone])     For any other pulmonary related questions, please call our clinic at [phone]

## 2022-08-08 NOTE — PROGRESS NOTES
Pulmonary Clinic Note   2022     PCP: [place]    Reason for visit: wheezing    Pulmonary HPI:   Job Kaiser is a 82 year old male w/ h/o intermittent asthma/COPD?, restrictive lung disease, previously treated TB, who presents for evaluation of dyspnea .     Previously saw Dr. Cardoso back in  for bronchiolitis.    Patient was seen in the ER for flank pain but also was noted to have wheezing and interstitial markings on CXR and is here to follow up.    Patient only recently using nebulizer and says he thinks it helps.  He does use albuterol intermittently which does help as well.  While patient was on the 5 days of prednisone he did say that his breathing significantly improved.  Currently patient does have decreased ability to do his daily activities because of his breathing especially when days are bad.  We discussed that we can do a prolonged steroid taper at this point.    Patient reports no cough or sputum production.  Patient denies fever chills and night sweats.  Denies hemoptysis.    The patient is from [place] originally.  He moved to the [place] in .  He previously worked as a [employer].  He is a former smoker and quit in .  He is not currently working.      Patient's outside records were reviewed via Care Everywhere &/or available paper records (sent for scanning).     Review of systems: a complete 12-point ROS conducted, & found to be negative w/ exceptions as noted in the HPI.    Past medical history:  Past Medical History:   Diagnosis Date     Bronchiectasis (H)      COPD (chronic obstructive pulmonary disease) (H)      Nephrolithiasis      Tuberculosis, treated        Past Surgical History:   Procedure Laterality Date     NO HISTORY OF SURGERY         Social history:  Social History     Tobacco Use     Smoking status: Former Smoker     Quit date: 1995     Years since quittin.6   Substance Use Topics     Alcohol use: No     Drug use: No        Family history:  Family History   Problem Relation Age of Onset     Glaucoma No family hx of      Macular Degeneration No family hx of        Medications:  Current Outpatient Medications   Medication     budesonide (PULMICORT) 0.5 MG/2ML neb solution     predniSONE (DELTASONE) 10 MG tablet     albuterol (2.5 MG/3ML) 0.083% nebulizer solution     albuterol (PROAIR HFA/PROVENTIL HFA/VENTOLIN HFA) 108 (90 Base) MCG/ACT inhaler     budesonide (PULMICORT) 1 MG/2ML SUSP nebulizer solution     oseltamivir (TAMIFLU) 75 MG capsule     predniSONE (DELTASONE) 20 MG tablet     No current facility-administered medications for this visit.       Allergies:  No Known Allergies    Physical examination:  BP (!) 147/69   Pulse 73   SpO2 96%     General: NAD  Eyes: Anicteric sclera, EOMI  CV: RRR, no m/c/r;   Lungs: expiratory rhonchi in right lung base  Ext: WWP, 1+ BLE edema. no clubbing  Skin: No rashes, cyanosis, or jaundice  Psych: Euthymic, normal affect, good eye contact    Labs: reviewed in [employer] & personally interpreted.   Absolute eosinophils 0.7    Imaging: reviewed in [employer] & personally interpreted. Below are the interpretations from the formal Radiology review.  CXR:  IMPRESSION: Progression of bilateral interstitial and alveolar infiltrates.    PFT:  2014 2022              Impression & recommendations:     Bronchiectasis + acute exacerbation  Restrictive Lung Physiology  History of tuberculosis, previously treated at [facility] in 2014.  PFTs consistent with bronchiolitis.  We will see patient back in 3 months can consider chronic azithromycin if patient still not doing well.  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  If patient develops sputum production will need focus on better airway clearance.   He is marginally adherent to medications as prescribed and seems to come in looking for medication such as prednisone to help with symptomatic treatment.  Overall, I think that  without greater attention by him to his pulmonary issues, he will continue to have ongoing problems with acute exacerbations of bronchiectasis in the future.  I discussed his underlying diagnosis of bronchiectasis likely related to prior tuberculosis infection as the underlying etiology for his productive cough and shortness of breath.    -not currently formally working on airway clearance.   -prednisone taper 40x7 days then 20, 15, 10, 5mg   -pulmicort nebs BID   -follow up in 3 months (will consider azithro if not improving)   -1+ pitting edema, consider diuresis in the future      History of tuberculosis.    The patient has very recent negative sputum samples done at [facility] in 04/2014. Previous providers have also indicated prior treatment of tuberculosis, although the patient is not able to corroborate this.   He has repeatedly declined to give us additional sputum samples to either look for airway colonization or ongoing AFB infection.  Despite the look of his chest x-ray, it seems unlikely that he has active tuberculosis at this time.        Job was seen today for new patient.    Diagnoses and all orders for this visit:    Adult bronchiectasis (H)  -     budesonide (PULMICORT) 0.5 MG/2ML neb solution; Take 2 mLs (0.5 mg) by nebulization daily for 360 days  -     predniSONE (DELTASONE) 10 MG tablet; Take 4 tablets (40 mg) by mouth daily for 7 days, THEN 2 tablets (20 mg) daily for 7 days, THEN 1.5 tablets (15 mg) daily for 7 days, THEN 1 tablet (10 mg) daily for 7 days, THEN 0.5 tablets (5 mg) daily for 7 days.    Dyspnea, unspecified type    History of tuberculosis          Patient was seen with a Khoi      RT in 3 months    I personally spent a total of 60  minutes including chart review, patient care, and documentation on 08/08/2022    Oc Whiting MD  Pulmonary & Critical Care Instructor  Department of Medicine  Division of Pulmonary, Allergy, Critical Care and Sleep Medicine   [place]  [facility], [facility]  [phone] (Text Page)

## 2022-08-08 NOTE — LETTER
8/8/2022         RE: Job Kaiser  [address]  [facility] [address]        Dear Colleague,    Thank you for referring your patient, Job Kaiser, to the [facility] [place]. Please see a copy of my visit note below.    Pulmonary Clinic Note   8/8/2022     PCP: [place], [facility]    Reason for visit: wheezing    Pulmonary HPI:   Job Kaiser is a 82 year old male w/ h/o intermittent asthma/COPD?, restrictive lung disease, previously treated TB, who presents for evaluation of dyspnea .     Previously saw Dr. Cardoso back in 2015 for bronchiolitis.    Patient was seen in the ER for flank pain but also was noted to have wheezing and interstitial markings on CXR and is here to follow up.    Patient only recently using nebulizer and says he thinks it helps.  He does use albuterol intermittently which does help as well.  While patient was on the 5 days of prednisone he did say that his breathing significantly improved.  Currently patient does have decreased ability to do his daily activities because of his breathing especially when days are bad.  We discussed that we can do a prolonged steroid taper at this point.    Patient reports no cough or sputum production.  Patient denies fever chills and night sweats.  Denies hemoptysis.    The patient is from [place] originally.  He moved to the [place] in [place].  He previously worked as a merchant.  He is a former smoker and quit in 1995.  He is not currently working.      Patient's outside records were reviewed via Care Everywhere &/or available paper records (sent for scanning).     Review of systems: a complete 12-point ROS conducted, & found to be negative w/ exceptions as noted in the HPI.    Past medical history:  Past Medical History:   Diagnosis Date     Bronchiectasis (H)      COPD (chronic obstructive pulmonary disease) (H)      Nephrolithiasis      Tuberculosis, treated 1996        Past Surgical History:   Procedure Laterality Date     NO HISTORY OF SURGERY         Social history:  Social History     Tobacco Use     Smoking status: Former Smoker     Quit date: 1995     Years since quittin.6   Substance Use Topics     Alcohol use: No     Drug use: No       Family history:  Family History   Problem Relation Age of Onset     Glaucoma No family hx of      Macular Degeneration No family hx of        Medications:  Current Outpatient Medications   Medication     budesonide (PULMICORT) 0.5 MG/2ML neb solution     predniSONE (DELTASONE) 10 MG tablet     albuterol (2.5 MG/3ML) 0.083% nebulizer solution     albuterol (PROAIR HFA/PROVENTIL HFA/VENTOLIN HFA) 108 (90 Base) MCG/ACT inhaler     budesonide (PULMICORT) 1 MG/2ML SUSP nebulizer solution     oseltamivir (TAMIFLU) 75 MG capsule     predniSONE (DELTASONE) 20 MG tablet     No current facility-administered medications for this visit.       Allergies:  No Known Allergies    Physical examination:  BP (!) 147/69   Pulse 73   SpO2 96%     General: NAD  Eyes: Anicteric sclera, EOMI  CV: RRR, no m/c/r;   Lungs: expiratory rhonchi in right lung base  Ext: WWP, 1+ BLE edema. no clubbing  Skin: No rashes, cyanosis, or jaundice  Psych: Euthymic, normal affect, good eye contact    Labs: reviewed in [employer] & personally interpreted.   Absolute eosinophils 0.7    Imaging: reviewed in [employer] & personally interpreted. Below are the interpretations from the formal Radiology review.  CXR:  IMPRESSION: Progression of bilateral interstitial and alveolar infiltrates.    PFT:  2014              Impression & recommendations:     Bronchiectasis + acute exacerbation  Restrictive Lung Physiology  History of tuberculosis, previously treated at [facility] in .  PFTs consistent with bronchiolitis.  We will see patient back in 3 months can consider chronic azithromycin if patient still not doing well.  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  If patient develops sputum production will need focus on better airway clearance.   He is marginally adherent to medications as prescribed and seems to come in looking for medication such as prednisone to help with symptomatic treatment.  Overall, I think that without greater attention by him to his pulmonary issues, he will continue to have ongoing problems with acute exacerbations of bronchiectasis in the future.  I discussed his underlying diagnosis of bronchiectasis likely related to prior tuberculosis infection as the underlying etiology for his productive cough and shortness of breath.    -not currently formally working on airway clearance.   -prednisone taper 40x7 days then 20, 15, 10, 5mg   -pulmicort nebs BID   -follow up in 3 months (will consider azithro if not improving)   -1+ pitting edema, consider diuresis in the future      History of tuberculosis.    The patient has very recent negative sputum samples done at [facility] in 04/2014. Previous providers have also indicated prior treatment of tuberculosis, although the patient is not able to corroborate this.   He has repeatedly declined to give us additional sputum samples to either look for airway colonization or ongoing AFB infection.  Despite the look of his chest x-ray, it seems unlikely that he has active tuberculosis at this time.        Job was seen today for new patient.    Diagnoses and all orders for this visit:    Adult bronchiectasis (H)  -     budesonide (PULMICORT) 0.5 MG/2ML neb solution; Take 2 mLs (0.5 mg) by nebulization daily for 360 days  -     predniSONE (DELTASONE) 10 MG tablet; Take 4 tablets (40 mg) by mouth daily for 7 days, THEN 2 tablets (20 mg) daily for 7 days, THEN 1.5 tablets (15 mg) daily for 7 days, THEN 1 tablet (10 mg) daily for 7 days, THEN 0.5 tablets (5 mg) daily for 7 days.    Dyspnea, unspecified type    History of tuberculosis          Patient was seen with a Lebanese      RTC in 3  months    I personally spent a total of 60  minutes including chart review, patient care, and documentation on 08/08/2022    Oc Whiting MD  Pulmonary & Critical Care Instructor  Department of Medicine  Division of Pulmonary, Allergy, Critical Care and Sleep Medicine   [facility], [facility]  [phone] (Text Page)

## 2022-08-18 NOTE — TELEPHONE ENCOUNTER
called via  line, flaca barber lvm to contact call center to schedule  month follow up [phone]

## 2022-08-30 NOTE — TELEPHONE ENCOUNTER
called and tried to schedule with pt nephew pt was not ready to schedule,  shared call center phone number pt informed he will contact them and schedule when is ready. I confirmed that Dr. Whiting is only available on Mondays from 7-10:30 am  nephew confirmed he understands    No

## 2022-11-20 PROBLEM — J44.9 CHRONIC OBSTRUCTIVE PULMONARY DISEASE, UNSPECIFIED COPD TYPE (H): Status: ACTIVE | Noted: 2022-01-01

## 2022-11-20 PROBLEM — J10.1 INFLUENZA A: Status: ACTIVE | Noted: 2022-01-01

## 2022-11-20 PROBLEM — R09.02 HYPOXIA: Status: ACTIVE | Noted: 2022-01-01

## 2022-11-21 NOTE — PHARMACY-VANCOMYCIN DOSING SERVICE
"Pharmacy Vancomycin Initial Note  Date of Service 2022  Patient's  1940  82 year old, male    Indication: Community Acquired Pneumonia    Current estimated CrCl = ~67ml/min using wt 80.6kg/182.9cm    Creatinine for last 3 days  2022:  6:28 PM Creatinine 0.78 mg/dL    Recent Vancomycin Level(s) for last 3 days  No results found for requested labs within last 72 hours.      Vancomycin IV Administrations (past 72 hours)      No vancomycin orders with administrations in past 72 hours.                Nephrotoxins and other renal medications (From now, onward)    Start     Dose/Rate Route Frequency Ordered Stop    22  vancomycin (VANCOCIN) 1,500 mg in 0.9% NaCl 250 mL intermittent infusion         1,500 mg  over 90 Minutes Intravenous EVERY 24 HOURS 22  piperacillin-tazobactam (ZOSYN) 3.375 g vial to attach to  mL bag        Note to Pharmacy: For [facility], [facility] and [facility]: For Zosyn-naive patients, use the \"Zosyn initial dose + extended infusion\" order panel.    3.375 g  over 30 Minutes Intravenous ONCE 22            Contrast Orders - past 72 hours (72h ago, onward)    None          [employer] Prediction of Planned Initial Vancomycin Regimen  Loading dose: N/A  Regimen: 1500 mg IV every 24 hours.  Start time: 20:52 on 2022  Exposure target: AUC24 (range)400-600 mg/L.hr   AUC24,ss: 427 mg/L.hr  Probability of AUC24 > 400: 58 %  Ctrough,ss: 11.5 mg/L  Probability of Ctrough,ss > 20: 9 %  Probability of nephrotoxicity (Lodise LONNIE ): 7 %          Plan:  1. Start vancomycin  1500 mg IV q24h.   2. Vancomycin monitoring method: AUC  3. Vancomycin therapeutic monitoring goal: 400-600 mg*h/L  4. Pharmacy will check vancomycin levels as appropriate in 1-3 Days.    5. Serum creatinine levels will be ordered daily for the first week of therapy and at least twice weekly for subsequent weeks.      Gabriela Tilley [facility]  "

## 2022-11-21 NOTE — PROGRESS NOTES
"6MS ADMISSION    D: Patient admitted/transferred from ED brought by LINDA Demarco for influenza, COPD/asthma exacerbation.     I: Upon arrival to the unit patient and family member was oriented to room, unit, and call light through interpretive services and family ([place]ankan speaking). Patient s height, weight, and vital signs were obtained. Allergies reviewed and no known allergies per family member. Provider notified of patient s arrival on the unit. Adult AVS completed. Head to toe assessment completed. Education assessment completed. Care plan initiated.    A: Vital signs stable upon admission. Patient unable to rate pain due to incomprehensible speech but interpretive services and family attempted to assist in pain assessment. Reports \"kidney pain\" but points to right upper abdomen and later denied right upper abdomen pain per . Two RN skin assessment completed. Second RN was Anne Humphries. NO significant skin findings and no indication for C Nurse Consult. Bed Algorithm can be found in PCS flow sheets standard hospital bed (pillows for bony prominences and to offload heels).    P: Continue to monitor patient status and intervene as needed/indicated per care plan. Continue with plan of care. Notify provider with any concerns or changes in patient status. Notify provider with morning hemoglobin result.   " Discussed lid hygiene, warm compress and eyelid wash.

## 2022-11-21 NOTE — PLAN OF CARE
VS: BP (!) 132/92 (BP Location: Left leg)   Pulse 112   Temp 98.3  F (36.8  C) (Axillary)   Resp 22   Ht 1.829 m (6')   Wt 87.9 kg (193 lb 12.6 oz)   SpO2 99%   BMI 26.28 kg/m     O2: 99% nasal cannula 3 LPM, respirations 22 labored, even, regular   Output: Urinary incontinence brief on and incontinence care provided    Last BM: Unknown pt and family unable to recall/communicate (interpretive services for [place] utilized)   Activity: Not OOB. Restless and agitated. Bed alarm on, activity supervised with attendant at bedside   Up for meals? No meals this shift    Skin: Intact. No skin issues noted. Assessment completed on assessment x2 RN   Pain: Pt unable to communicate pain rating due to incomprehensible speech. Grimacing, withdrawing, groaning, and inconsolability noted upon admission but nonverbal indicators improved with repositioning    CMS: Appears confused but unable to assess due to incomprehensible speech. Judgment appears inappropriate to situation; continually attempts getting OOB, refusing cares, and removing nasal cannula. Difficult to redirect/distract. Mobility generalized weakness observed but pt unable to report. Strength in all extremities 5. Trouble with turning self for incontinence care. General purposeful movement    Dressing: No except 2 PIV dressing for right arm and right hand   Diet: Not determined. Requests and swallows water without difficulty with straw    LDA: 2 PIV right arm. SL    Plan: Continue POC   Additional Info:   Discussed 2nd of 2 RBC transfusion with provider and provider elected to obtain morning hgb before administration of 2nd bag    Explain cares to pt with  and through visuals (show IV bag for example) to avoid combative behavior and increased agitation. Explain length of time for cares ( 1 more minute, for example) to minimize refusal/agitation

## 2022-11-21 NOTE — PROGRESS NOTES
Care provided [id number]:     Pt refused nasal cannula at the beginning of the shift, removed and pulled on cord. Reattempted without success.  service utilized without success.  unable to understand patient due to garbled speech. Patient was vocalizing loudly in sync with oximetry alarm. Ativan given by LINDA Chin.Pt continued refusing o2 NC and suctioning for 30 minutes, slowly became calmer during this time. Allowed NC 45 min after Ativan given, allowed suctioning and repositioning. Sleeping in bed end of shift with son at bedside. >95% 3L O2 Observed bulging on R side of pubic area size of tennis ball when pt coughing. Bladder scan showed <10ml PVR, 2 wet diapers this shift Paged cross cover to notify at 1930

## 2022-11-21 NOTE — ED TRIAGE NOTES
Patient presents with a productive cough. Patient is coughing up brown sputum. Patient was discharged earlier today from [facility]. Patient was only discharged for a couple hours before presenting here. Patient reports trouble breathing, coughing and history of asthma.     Triage Assessment     Row Name 11/20/22 1822       Triage Assessment (Adult)    Airway WDL WDL       Respiratory WDL    Respiratory WDL X;cough;rhythm/pattern    Rhythm/Pattern, Respiratory shortness of breath    Cough Frequency frequent    Cough Type productive       Cognitive/Neuro/Behavioral WDL    Cognitive/Neuro/Behavioral WDL X    Level of Consciousness lethargic    Arousal Level opens eyes spontaneously    Orientation disoriented to;place;time    Speech hoarse    Mood/Behavior calm;cooperative       Neela Coma Scale    Best Eye Response 4-->(E4) spontaneous    Best Motor Response 6-->(M6) obeys commands    Best Verbal Response 5-->(V5) oriented    [place] Coma Scale Score 15

## 2022-11-21 NOTE — PHARMACY-ADMISSION MEDICATION HISTORY
Admission Medication History Completed by Pharmacy    See [facility] Admission Navigator for allergy information, preferred outpatient pharmacy, prior to admission medications and immunization status.     Patient not interviewed as part of this medication history. Please discuss any OTC/non-prescription medication use and last doses with the patient that may not be reflected in the list below.    Changes made to PTA medication list (reason):    Added: (per pharmacy dispense fill history) aspirin, diclofenac gel, Dulera inhaler, metformin    Deleted: Tamiflu (from 2020), albuterol nebs, budesonide 1mg nebs (duplicate)    Changed: None    Additional Information:    Levaquin and prednisone were both dispensed for a 7 day supply on 11/3/2022; unclear if patient completed course of therapy     Dulera inhaler was last dispensed on 10/17/2022 for a 30 day supply; metformin and ASA last dispensed on 8/19/22 for a 90 day supply     Prior to Admission medications    Medication Sig Last Dose Taking? Auth Provider Long Term End Date   diclofenac (VOLTAREN) 1 % topical gel Apply topically 4 times daily  Yes Unknown, Entered By History     metFORMIN (GLUCOPHAGE) 1000 MG tablet Take 1,000 mg by mouth 2 times daily (with meals)  Yes Unknown, Entered By History Yes    mometasone-formoterol (DULERA) 200-5 MCG/ACT inhaler Inhale 2 puffs into the lungs 2 times daily  Yes Unknown, Entered By History Yes    albuterol (PROAIR HFA/PROVENTIL HFA/VENTOLIN HFA) 108 (90 Base) MCG/ACT inhaler Inhale 2 puffs into the lungs every 4 hours as needed for shortness of breath / dyspnea or wheezing   Pranav Chino, DO Yes    aspirin 81 MG EC tablet Take 81 mg by mouth daily   Unknown, Entered By History     budesonide (PULMICORT) 0.5 MG/2ML neb solution Take 2 mLs (0.5 mg) by nebulization daily for 360 days   Oc Whiting MD Yes 8/3/23   predniSONE (DELTASONE) 20 MG tablet Take two tablets (= 40mg) each day for 5 (five) days   Pranav Chino  Albert, DO         Date completed: 11/21/22    Medication history completed by: LIA BERMUDEZ [facility]

## 2022-11-21 NOTE — PLAN OF CARE
Patient is in room with a sitter, Agitated and anxious at start of shift. Patient given Ativan for anxiety. Patient has a language barrier. Family translates when here.  services has a difficult time translating. Patient in bed all shift. Continue with the plan of care    BP (!) 147/73 (BP Location: Left arm)   Pulse 64   Temp 98.4  F (36.9  C) (Axillary)   Resp 24   Ht 1.829 m (6')   Wt 87.9 kg (193 lb 12.6 oz)   SpO2 99%   BMI 26.28 kg/m

## 2022-11-21 NOTE — ED PROVIDER NOTES
[facility] EMERGENCY DEPARTMENT ([facility])    11/20/22            History     Chief Complaint   Patient presents with     Cough     Patient presents with a productive cough. Patient is coughing up brown sputum. Patient was discharged earlier today from [facility]. Patient was only discharged for a couple hours before presenting here. Patient reports trouble breathing, coughing and history of asthma.     MARCUS Kaiser is a 82 year old male who has a past medical history of bronchiectasis, asthma, COPD, and chronic airway obstruction. Per chart review, patient was admitted to [facility] on 11/18 for treatment of influenza and hypoxia.  Patient left [facility] AM this afternoon after him and his family said that they no longer wanted to stay there.  I do have been with Ori and patient was being ruled out for TB infection.  Patient ended up having a CT chest on 11/19 that showed a honeycomb pattern on the upper aspects of the lungs.  Due to his history of having latent TB he was placed on TB isolation and his QuantiFERON TB was pending.  Patient remained hypoxic when he was at [place] in a restaurant was kept on 4 L of oxygen.  Patient was also found to be anemic with a hemoglobin in the 6 range.  Patient here says that he has had continued coughing and shortness of breath.  He is unable to tell me what happened in the 1 hour that he was gone from the hospital but does agree that he wants to be admitted to the hospital today.  He says that he does not want to be admitted to [facility] but will be admitted to the [facility].  Report was obtained via a  on iPad.  Patient's medical records from [facility] were reviewed in full detail.    Past Medical History  Past Medical History:   Diagnosis Date     Bronchiectasis (H)      COPD (chronic obstructive pulmonary disease) (H)      Nephrolithiasis      Tuberculosis, treated 1996     Past Surgical  History:   Procedure Laterality Date     NO HISTORY OF SURGERY       albuterol (2.5 MG/3ML) 0.083% nebulizer solution  albuterol (PROAIR HFA/PROVENTIL HFA/VENTOLIN HFA) 108 (90 Base) MCG/ACT inhaler  budesonide (PULMICORT) 0.5 MG/2ML neb solution  budesonide (PULMICORT) 1 MG/2ML SUSP nebulizer solution  oseltamivir (TAMIFLU) 75 MG capsule  predniSONE (DELTASONE) 20 MG tablet      No Known Allergies  Family History  Family History   Problem Relation Age of Onset     Glaucoma No family hx of      Macular Degeneration No family hx of      Social History   Social History     Tobacco Use     Smoking status: Former     Types: Cigarettes     Quit date: 1995     Years since quittin.9   Substance Use Topics     Alcohol use: No     Drug use: No      Past medical history, past surgical history, medications, allergies, family history, and social history were reviewed with the patient. No additional pertinent items.       Review of Systems   Unable to perform ROS: Acuity of condition   Constitutional: Positive for fatigue.   Respiratory: Positive for cough and shortness of breath.    All other systems reviewed and are negative.    A complete review of systems was performed with pertinent positives and negatives noted in the HPI, and all other systems negative.    Physical Exam   BP: 135/63  Pulse: 70  Temp: 98.6  F (37  C)  Resp: 22  SpO2: 98 %  Physical Exam  Constitutional:       General: He is not in acute distress.     Appearance: He is well-developed and well-nourished. He is ill-appearing. He is not toxic-appearing.   HENT:      Head: Normocephalic and atraumatic.   Cardiovascular:      Rate and Rhythm: Normal rate and regular rhythm.      Heart sounds: Normal heart sounds.   Pulmonary:      Effort: Pulmonary effort is normal. No respiratory distress.      Breath sounds: Wheezing (mild diffuse) present.      Comments: Room air sats are in the mid 70s.  Patient's oxygenation improved to the mid to high 90s on 4 L  nasal cannula.  Patient has a dry hacking cough.  Abdominal:      General: There is no distension.      Palpations: Abdomen is soft.      Tenderness: There is no abdominal tenderness. There is no rebound.   Musculoskeletal:      Cervical back: Normal range of motion and neck supple.      Right lower leg: No edema.      Left lower leg: No edema.   Skin:     General: Skin is warm.   Neurological:      General: No focal deficit present.      Mental Status: He is alert and oriented to person, place, and time. Mental status is at baseline.   Psychiatric:         Mood and Affect: Mood and affect and mood normal.         Behavior: Behavior normal.         Thought Content: Thought content normal.         ED Course      Procedures       Critical Care Addendum    My initial assessment, based on my review of prehospital provider report and focused history, established that Job Kaiser has respiratory insufficiency and severe agitation, which requires immediate intervention, and therefore he is critically ill.     After the initial assessment, the care team initiated multiple lab tests and consulted with infectious disease to provide stabilization care. Due to the critical nature of this patient, I reassessed nursing observations, vital signs and physical exam multiple times prior to his disposition.     Time also spent performing documentation, reviewing test results and discussion with consultants.     Critical care time (excluding teaching time and procedures): 30 minutes.   The medical record was reviewed and interpreted.  Current labs reviewed and interpreted.  Previous labs reviewed and interpreted.       CT Chest 11/19 -  IMPRESSION:  Peripheral honeycombing but with predominantly an upper lobe predominance. The findings can be seen in the setting of hypersensitivity pneumonitis, drug reaction or collagen vascular disease. TB could be a consideration although this pattern of disease would be somewhat atypical.         No  results found for any visits on 11/20/22.  Medications - No data to display     Assessments & Plan (with Medical Decision Making)   Patient is an 82-year-old male who presented to the ER after leaving AMA from [facility].  Patient here is hypoxic on room air but his sats improved on 4 L of oxygen.  Plan is to admit him to the hospital for treatment of influenza as well as COPD exacerbation and his honeycomb pattern that were seen on his CT lungs.  Case was discussed with infectious disease who agrees that patient should be on TB isolation.  Patient's TB interferon did come back but is indeterminant so he needs to have a repeat sputum culture done.  Patient also has worsening anemia with a hemoglobin of 6.1.  Patient is written to have 2 units of PRBC transfusion.  Patient came very agitated while in the room and took off his oxygen and tried to leave AGAINST MEDICAL ADVICE.  I was able to calm the patient down by using an  and telling him that he needed to put the oxygen back on.  We were able to get him calm down to relax.  Patient will be given 1 dose of IV Ativan as he can help with his agitation.  Plan will be to admit him to internal medicine for further care.    I have reviewed the nursing notes. I have reviewed the findings, diagnosis, plan and need for follow up with the patient.    New Prescriptions    No medications on file       Final diagnoses:   Influenza A   Hypoxia   Chronic obstructive pulmonary disease, unspecified COPD type (H)   Anemia, unspecified type       --  Josefina Sparks  [facility] EMERGENCY DEPARTMENT  11/20/2022     Josefina Sparks MD  11/20/22 1954

## 2022-11-21 NOTE — PROGRESS NOTES
MEDICINE CROSS COVER:   Contacted by bedside RN regarding blood transfusion orders. Per RN and chart review, patient had hgb of 6.1 on 11/20 at 1828. RN states she was told by ED RN that he received 1 unit pRBC in ED, but that 2nd unit would need to be transfused on the floor. There has been no hgb recheck after 1st unit of pRBC.     After discussion with RN, will obtain AM labs now to recheck hgb and will determine need for 2nd unit of pRBC.     Cindy Taveras MD  Internal Medicine/Pediatrics Hospitalist   of Internal Medicine and Pediatrics  [facility]  Pager: [phone]

## 2022-11-21 NOTE — H&P
[facility]    History and Physical - Hospitalist Service, GOLD TEAM        Date of Admission:  11/20/2022    Assessment & Plan      Job Kaiser is a 82 year old male admitted on 11/20/2022. He presented to the ED after he left [facility] this afternoon AGAINST MEDICAL ADVICE.  Patient reportedly was treated for influenza infection and hypoxia.  .  History is obtained the patient's family and chart.      Dyspnea and respiratory abnormalities in the setting of history of COPD, bronchiectasis and asthma.  Patient is now respiratory distress.  Saturating okay on 2 L of oxygen via nasal cannula.  He is afebrile, does have mild leukocytosis.  He is on prednisone.  Patient does not have any known history of bilateral upper lobe fibrosis noted on several imaging studies done in the past.  CT of the chest done on 11/19 showing honeycombing changes in the lung which is suspect is also chronic.  Review of the labs does not indicate that patient has chronic hypercapnia.  I will continue supplemental oxygen to keep oxygen saturations greater than 94%.  DuoNeb therapy every 6 and as needed.  He is not wheezing at this moment and therefore I would not give him IV steroids.  We will continue PTA prednisone 20 mg.  Abnormal CT findings.  I do not believe this is due to an active tuberculosis.  However given the indeterminate QuantiFERON gold, infectious disease was consulted by the ED provider and patient is kept in isolation.  Might need sputum culture.  Patient reportedly was treated for tuberculosis in the past.    Suspected healthcare associate pneumonia.  Received vancomycin and Zosyn.  Will obtain a procalcitonin.  If procalcitonin is nonelevated, I would de-escalate antibiotic therapy quickly.  Consult ID and follow-up recommendations.  Anemia, suspect acute on chronic.  Hemoglobin today 6.1.  Reportedly is not using ibuprofen, Advil or NSAIDs.  Per family no melena  or hematochezia.  We will obtain stool for Hemoccult.  ED provider ordered 2 units of packed red blood cells for transfusion.  Family is agreeable with the patient's been transfused.  I would obtain iron studies, I will start patient currently on PPI, pending fecal occult blood testing.  Severely anxious, agitated and restless.  Etiology unclear.  Differential diagnosis include infectious versus toxic.  Ativan as needed.  Trial of Zyprexa 10 mg.  History of left sided nephrolithiasis with mild hydronephrosis.  Serum creatinine level normal and UA does not show evidence of UTI.  History of bronchiectasis  History of asthma, stable without exacerbation  History of COPD  Influenza A respiratory infection.  Symptoms resolved for greater than 2 weeks.  We will continue supportive care.  I would not treat with Tamiflu.         Diet:  Regular  DVT Prophylaxis: Pneumatic Compression Devices  Vu Catheter: Not present  Central Lines: None  Cardiac Monitoring: ACTIVE order. Indication: QTc prolonging medication (48 hours)  Code Status: Full Code      Clinically Significant Risk Factors Present on Admission                              Disposition Plan      Expected Discharge Date: 11/22/2022                The patient's care was discussed with the Patient and Patient's Family.    Dena Hong MD  Hospitalist Service, [facility]  Securely message with the Vocera Web Console (learn more here)  Text page via [facility] Paging/Directory   Please see signed in provider for up to date coverage information      ______________________________________________________________________    Chief Complaint   Difficulty breathing    History is obtained from the patient , family and paper chart    History of Present Illness   Job MASOUD Kaiser is a 82 year old male who has past medical history of COPD, asthma, bronchiectasis, left nephrolithiasis, pulmonary tuberculosis treated years ago,  chronic anemia presents to the ED for evaluation of dyspnea respiratory complaints.  History is obtained from the patient's family members and through translation.  Patient is Burmese speaking.  Patient was very restless and unable to lie down or pay attention to the questions that I was asking.  He was able to answer few questions with yes or no.  Reportedly left AGAINST MEDICAL ADVICE from another hospital this afternoon.  He was admitted on 11/18/2022 with respiratory complaints.  He tested positive for influenza A.  Was hypoxemic and on 4 L of oxygen.  CT of the chest done on 11/19 showed fibrotic changes on the upper lobes.  Review of the patient's chart shows that patient has chronic fibrotic changes on the lungs.  He was tested for pulmonary tuberculosis.  Quantified on gold came back indeterminate.  The ED provider discussed with infectious disease consult was recommended to be placed in isolation.  He is afebrile at this moment.  Has a mild leukocytosis.  He has been on steroids.  Does use nebulizer treatment at home regularly.  Per family he does not have any melena or hematochezia.  Also there is no report of any use of NSAIDs.  He does not have a chronic anemia with hemoglobin recently 8s.  Hemoglobin today when checked in the ED 6.1.  2 units of packed red blood cell has been ordered for transfusion by ED provider.  He will also receive vancomycin 1.5 g and Zosyn for suspected healthcare associate pneumonia.  Patient's respiratory symptoms have been worse progressively over the last 2 weeks.  Of note, patient does not have a history of respiratory failure.  Echocardiogram done in 2021 showed an EF of 60 to 65% with no diastolic dysfunction  Review of Systems    The 10 point Review of Systems is negative other than noted in the HPI or here.  Denies any pain.  Difficulty breathing.  No chest pain.  No vomiting or diarrhea.  Complete review of systems cannot be obtained as patient is anxious and  restless.    Past Medical History    I have reviewed this patient's medical history and updated it with pertinent information if needed.   Past Medical History:   Diagnosis Date     Bronchiectasis (H)      COPD (chronic obstructive pulmonary disease) (H)      Nephrolithiasis      Tuberculosis, treated        Past Surgical History   I have reviewed this patient's surgical history and updated it with pertinent information if needed.  Past Surgical History:   Procedure Laterality Date     NO HISTORY OF SURGERY         Social History   I have reviewed this patient's social history and updated it with pertinent information if needed.  Social History     Tobacco Use     Smoking status: Former     Types: Cigarettes     Quit date: 1995     Years since quittin.9   Substance Use Topics     Alcohol use: No     Drug use: No       Family History     No relevant family history    Prior to Admission Medications   Prior to Admission Medications   Prescriptions Last Dose Informant Patient Reported? Taking?   albuterol (2.5 MG/3ML) 0.083% nebulizer solution   No No   Sig: Take 1 vial (2.5 mg) by nebulization every 4 hours as needed for shortness of breath / dyspnea or wheezing   albuterol (PROAIR HFA/PROVENTIL HFA/VENTOLIN HFA) 108 (90 Base) MCG/ACT inhaler   No No   Sig: Inhale 2 puffs into the lungs every 4 hours as needed for shortness of breath / dyspnea or wheezing   budesonide (PULMICORT) 0.5 MG/2ML neb solution   No No   Sig: Take 2 mLs (0.5 mg) by nebulization daily for 360 days   budesonide (PULMICORT) 1 MG/2ML SUSP nebulizer solution   No No   Sig: Take 2 mLs (1 mg) by nebulization 2 times daily   oseltamivir (TAMIFLU) 75 MG capsule   No No   Sig: Take 1 capsule (75 mg) by mouth 2 times daily   predniSONE (DELTASONE) 20 MG tablet   No No   Sig: Take two tablets (= 40mg) each day for 5 (five) days      Facility-Administered Medications: None     Allergies   No Known Allergies    Physical Exam   Vital Signs:  Temp: 98.6  F (37  C) Temp src: Oral BP: (!) 143/68 Pulse: 96   Resp: 22 SpO2: 91 % O2 Device: Nasal cannula Oxygen Delivery: 3 LPM  Weight: 0 lbs 0 oz    General Appearance: Restless, uncomfortable.  Awake and alert.  Eyes: No abnormalities detected  HEENT: Pale conjunctiva, anicteric sclera  Respiratory: Not using accessory muscles of respiration.  Forms of oxygen.  Bilateral coarse breath sounds.  No wheezes.   Cardiovascular: No JVD, regular rate and rhythm no murmur or gallop rhythm  GI: Nontender soft, bowel sounds are active  Lymph/Hematologic: No abnormalities detected  Genitourinary: Voids independently  Skin: No rashes or jaundice  Musculoskeletal: No swollen or erythematous joints  Neurologic: Awake alert, brief neuro exam is nonfocal.  Psychiatric: Anxious, restless    Data   Data reviewed today: I reviewed all medications, new labs and imaging results over the last 24 hours. I personally reviewed no images or EKG's today.    Recent Labs   Lab 11/20/22  1828   WBC 12.9*   HGB 6.1*   MCV 71*         POTASSIUM 4.5   CHLORIDE 100   CO2 26   BUN 15   CR 0.78   ANIONGAP 10   MATT 8.8   *     Most Recent 3 CBC's:Recent Labs   Lab Test 11/20/22 1828 06/27/22 2027 02/06/20  2339   WBC 12.9* 5.1 7.2   HGB 6.1* 8.6* 12.3*   MCV 71* 76* 92    442 251     Most Recent 3 BMP's:Recent Labs   Lab Test 11/20/22  1828 06/27/22  2027 09/10/21  1210    141 140   POTASSIUM 4.5 4.0 4.4   CHLORIDE 100 109 107   CO2 26 25 25   BUN 15 14 12   CR 0.78 0.81 0.93   ANIONGAP 10 7 8   MATT 8.8 8.7 9.0   * 129* 85     Most Recent 2 LFT's:Recent Labs   Lab Test 06/27/22  2027 09/10/21  1210   AST 18 22   ALT 14 17   ALKPHOS 66 65   BILITOTAL 0.2 0.3     Most Recent 3 INR's:No lab results found.  12.9 (H)    \    6.1 (LL)    /    343   N 94    L N/A    136    100    15 /   ------------------------------------ 136 (H)   ALT N/A   AST N/A   AP N/A   ALB N/A   Ca 8.8  4.5    26    0.78 \    %  RETIC N/A    LDH N/A  Troponin N/A    BNP N/A    CK N/A  INR N/A   PTT N/A    D-dimer N/A    Fibrinogen N/A    Antithrombin N/A  Ferritin N/A  CRP 80.0 (H)    IL-6 N/A  Recent Results (from the past 24 hour(s))   XR Chest Port 1 View    Narrative    EXAM: XR CHEST PORT 1 VIEW  LOCATION: [facility]  DATE/TIME: 11/20/2022 6:37 PM    INDICATION: Shortness of breath and cough.  COMPARISON: 06/27/2022      Impression    IMPRESSION:     Diffuse prominence of the pulmonary vascularity with diffuse patchy opacities, likely moderate pulmonary edema. Component of multifocal pneumonia is not excluded in the appropriate clinical context.    No pleural effusion or pneumothorax.    The cardiomediastinal silhouette is unremarkable.

## 2022-11-21 NOTE — PROGRESS NOTES
[facility]    Medicine Progress Note - Hospitalist Service, GOLD TEAM 16    Date of Admission:  11/20/2022    Assessment & Plan   Job Kaiser is an 81 yo [place] gentleman w/ h/o prior tuberculosis s/p treatment with 3 drug therapy for 6 weeks in [place], asthma COPD, bronchiectasis, ?  Pulmonary fibrosis and ?  T2DM.  He initially presented to [facility] ED on 11/19/2022 with complaints of SOB, SOTOMAYOR and cough productive of yellowish sputum.  He had chills and subjective fever.  CT chest with IV contrast revealed peripheral honeycombing but predominantly upper lobe predominance, findings can be seen in the setting of hypersensitivity pneumonitis, drug reaction, collagen vascular disease.  TB could be a consideration although this pattern of disease would be somewhat atypical, echo showed EF 69%.  Influenza A PCR positive on 11/19.  Patient was treated with IV ceftriaxone, IV azithromycin and Tamiflu.  He left [facility]W AMA which he apparently has done many times in the past.  He presented to [facility] ED for evaluation.  His vital signs were stable.  CXR discovered moderate pulm edema vs multifocal pneumonia.  Given history of TB infection, patient was placed and negative pressure room and  QuantiFERON gold TB test obtained.  Patient was subsequently admitted to    Acute hypoxic respiratory failure due to influenza A virus infection complicated by multifocal bacterial pneumonia   History of asthma/COPD  History of bronchiectasis  Questionable history of pulmonary fibrosis  History of active TB, treat  ---   Continue negative pressure isolation  ---   QuantiFERON gold TB test result  ---   ID service consult  ---   Patient is currently on 3 L NC O2 supplement, wean off O2 supplement   ---   Procalcitonin level 0.07 suggesting low risk for systemic bacterial infection  ---   Continue empiric Zosyn and IV Vanco for now pending ID consult  eval  ---   Start Tamiflu 75 mg po bid  ---   Continue PTA prednisone 20 mg a  ---   Continue PTA Pulmicort     HFpEF  ---   CXR with probable moderate pulmonary edema  ---   However BNP level within normal  ---   No lower extremity edema on exam  ---   TTE at [facility] on 11/19 revealed EF 69%  ---   Compensated  ---   Start Lasix 20 mg iv qam    Acute metabolic encephalopathy  ---   I see no history of dementia from reviewing his chart  ---   He is confused and disoriented  ---   Suspect encephalopathy due to influenza A infection  ---   Continue bedside sitter  ---   Zyprexa as needed for agitation    Severe symptomatic ACD  ---   Patient's baseline hemoglobin is 8-9 g  ---   His hemoglobin was 6.1 on arrival to the ED  ---   Status post 1 unit PRBC transfusion on 11/20  ---   Hemoglobin is up to 7.8 g today  ---   Iron studies, folate and thiamine level pending  ---   Stool guaiac result pending  ---   Will consult with GI service for endoscopy when his mental status clears    Leukocytosis  ---   Likely due to steroid +/- infection  ---   WBC was 12.9 ---> 12.3 this a.m.  ---   Check CBC in a.m.    GERD  ---   PTA Protonix    Universal COVID-19 screening  ---   Fully vaccinated and boosted x1  ---   SARS-CoV-2 PCR negative on 11/19 and 11/20          Diet: Regular Diet Adult    DVT Prophylaxis: Pneumatic Compression Devices  Vu Catheter: Not present  Central Lines: None  Cardiac Monitoring: None  Code Status: Full Code    Disposition Plan   TBD.  Anticipate 2-3 more days of hospitalization for treatment of acute hypoxic respiratory failure        Ally Orta MD  Hospitalist Service, GOLD TEAM [address] [place] [address]  Securely message with the Vocera Web Console (learn more here)  Text page via [employer] Paging/Directory   Please see signed in provider for up to date coverage  information    _____________________________________________________________    Interval History   Confused and disoriented  On 3 L NC O2 supplement  Does not follow command appropriately    Data reviewed today: I reviewed all medications, new labs and imaging results over the last 24 hours. I personally reviewed no images or EKG's today.    Physical Exam   Vital Signs: Temp: 99.3  F (37.4  C) Temp src: Axillary BP: (!) 165/61 Pulse: 82   Resp: 24 SpO2: 96 % O2 Device: Nasal cannula Oxygen Delivery: 3 LPM  Weight: 193 lbs 12.55 oz  General: Confused and disoriented, NAD.  HEENT:  NC/AT, neck supple  CVS:  NL s 1 and s2, no m/r/g.  Lungs:  Coarse BS anteriorly   Abd:  Soft, + bs, NT, no rebound or gaurding, no fluid shift.  Ext:  No c/c.  Lymph:  No edema.  Neuro:  Nonfocal.  Musculoskeletal: No calf tenderness to palpation.    Skin:  No rash.      Data   Recent Labs   Lab 11/21/22  1121 11/20/22  1828   WBC 12.3* 12.9*   HGB 7.8* 6.1*   MCV 71* 71*    343   NA  --  136   POTASSIUM  --  4.5   CHLORIDE  --  100   CO2  --  26   BUN  --  15   CR 0.81 0.80  0.78   ANIONGAP  --  10   MATT  --  8.8   GLC  --  136*     Recent Results (from the past 24 hour(s))   XR Chest Port 1 View    Narrative    EXAM: XR CHEST PORT 1 VIEW  LOCATION: [facility]  DATE/TIME: 11/20/2022 6:37 PM    INDICATION: Shortness of breath and cough.  COMPARISON: 06/27/2022      Impression    IMPRESSION:     Diffuse prominence of the pulmonary vascularity with diffuse patchy opacities, likely moderate pulmonary edema. Component of multifocal pneumonia is not excluded in the appropriate clinical context.    No pleural effusion or pneumothorax.    The cardiomediastinal silhouette is unremarkable.     Medications       budesonide  0.5 mg Nebulization Daily     budesonide  1 mg Nebulization BID     nicotine  1 patch Transdermal Once     pantoprazole  40 mg Oral QAM AC     piperacillin-tazobactam  4.5 g  Intravenous Q6H     predniSONE  20 mg Oral Daily     sodium chloride (PF)  3 mL Intracatheter Q8H     vancomycin  1,500 mg Intravenous Q24H

## 2022-11-22 NOTE — PROGRESS NOTES
[facility]    Medicine Progress Note - Hospitalist Service, GOLD TEAM 16    Date of Admission:  11/20/2022    Assessment & Plan   Job Kaiser is an 83 yo [place] gentleman w/ h/o prior tuberculosis s/p treatment with 3 drug therapy for 6 weeks in [place], asthma COPD, bronchiectasis, ?  Pulmonary fibrosis and ?  T2DM.  He initially presented to [facility] ED on 11/19/2022 with complaints of SOB, SOTOMAYOR and cough productive of yellowish sputum.  He had chills and subjective fever.  CT chest with IV contrast revealed peripheral honeycombing but predominantly upper lobe predominance, findings can be seen in the setting of hypersensitivity pneumonitis, drug reaction, collagen vascular disease.  TB could be a consideration although this pattern of disease would be somewhat atypical, echo showed EF 69%.  Influenza A PCR positive on 11/19.  Patient was treated with IV ceftriaxone, IV azithromycin and Tamiflu.  He left ANW AMA which he apparently has done many times in the past.  He presented to [facility] ED for evaluation.  His vital signs were stable.  CXR discovered moderate pulm edema vs multifocal pneumonia.  Given history of TB infection, patient was placed and negative pressure room and  QuantiFERON gold TB test obtained.  Patient was subsequently admitted to on medicine for ongoing management.    #Acute hypoxic respiratory failure due to influenza A virus infection complicated by multifocal bacterial pneumonia   #History of asthma/COPD  #History of bronchiectasis  #Questionable history of pulmonary fibrosis  #History of active TB, treated    Continue negative pressure isolation  -QuantiFERON gold TB test result    ID service consult    Follow-up AFB cultures sent.  -Patient is currently on 3 L NC O2 supplement, wean off O2 supplement   -Procalcitonin level 0.07 suggesting low risk for systemic bacterial infection    Attempts to obtain  sputum culture    Continue empiric Zosyn    Discontinue IV vancomycin given negative MRSA nares.      Continue PTA prednisone 20 mg    Solu-Medrol 60 mg IV x1 in place of p.o. prednisone given patient's mental status.    Continue PTA Pulmicort     Will consider getting pulm involved in patient's care    #History of HFpEF  -CXR with probable moderate pulmonary edema  -However BNP level within normal  -No lower extremity edema on exam  -TTE at [facility] on 11/19 revealed EF 69%  -Compensated    Agree with continuing Lasix 20 mg iv qam    #Acute metabolic encephalopathy  -No documented history of dementia from reviewing his chart  -Patient has been mostly confused and disoriented  -Suspect encephalopathy due to influenza A infection  -Continue bedside sitter    Zyprexa as needed for agitation    Discontinue as needed Ativan    #Severe symptomatic ACD  -Patient's baseline hemoglobin is 8-9 g  -His hemoglobin was 6.1 on arrival to the ED  -Status post 1 unit PRBC transfusion on 11/20  -Hemoglobin has remained stable.    Follow-up iron studies.    Continue to monitor, will consider consult with GI service for endoscopy when his mental status clears    Leukocytosis - resolved  -Likely due to steroid +/- infection  -Now resolved.    GERD    PTA Protonix    Universal COVID-19 screening    Fully vaccinated and boosted x1    SARS-CoV-2 PCR negative on 11/19 and 11/20          Diet: Regular Diet Adult    DVT Prophylaxis: Pneumatic Compression Devices  Vu Catheter: Not present  Central Lines: None  Cardiac Monitoring: None  Code Status: Full Code    Disposition Plan   TBD.  Anticipate 2-3 more days of hospitalization for treatment of acute hypoxic respiratory failure        MEI LEAHY MD  Hospitalist Service, GOLD TEAM [address] [place] [address]  Securely message with the Vocera Web Console (learn more here)  Text page via [employer] Paging/Directory   Please see signed in  provider for up to date coverage information    _____________________________________________________________    Interval History   Patient remains confused and disoriented  Patient also remains on 2 L NC O2 supplement  Does not follow command appropriately    Data reviewed today: I reviewed all medications, new labs and imaging results over the last 24 hours. I personally reviewed no images or EKG's today.    Physical Exam   Vital Signs: Temp: 98.4  F (36.9  C) Temp src: Axillary BP: (!) 159/85 Pulse: 62   Resp: 20 SpO2: 100 % O2 Device: Nasal cannula Oxygen Delivery: 2 LPM  Weight: 193 lbs 12.55 oz  General: Confused and disoriented, NAD.  HEENT:  NC/AT, neck supple  CVS:  NL s 1 and s2, no m/r/g.  Lungs:  Coarse BS anteriorly   Abd:  Soft, + bs, NT, no rebound or gaurding, no fluid shift.  Ext:  No c/c.  Lymph:  No edema.  Neuro:  Nonfocal.  Musculoskeletal: No calf tenderness to palpation.    Skin:  No rash.      Data   Recent Labs   Lab 11/22/22  0550 11/21/22  1121 11/20/22  1828   WBC 10.2 12.3* 12.9*   HGB 9.3* 7.8* 6.1*   MCV 72* 71* 71*    309 343     --  136   POTASSIUM 3.6  --  4.5   CHLORIDE 99  --  100   CO2  --   --  26   BUN  --   --  15   CR  --  0.81 0.80  0.78   ANIONGAP  --   --  10   MATT  --   --  8.8   GLC  --   --  136*     No results found for this or any previous visit (from the past 24 hour(s)).  Medications       budesonide  0.5 mg Nebulization Daily     furosemide  20 mg Intravenous QAM     pantoprazole  40 mg Oral QAM AC     piperacillin-tazobactam  4.5 g Intravenous Q6H     predniSONE  20 mg Oral Daily     sodium chloride (PF)  3 mL Intracatheter Q8H     vancomycin  1,500 mg Intravenous Q24H

## 2022-11-22 NOTE — PROGRESS NOTES
Therapy Covered Y/N: Y  Plan: [facility]    100% coverage for IV abx, however may have a copay per dispense on the drug through pharmacy plan.    In reference to admission on 11/20/22 to check coverage for iv abx    Please contact Intake with any questions, [phone] or In Basket pool, [facility] ([phone]).

## 2022-11-22 NOTE — PROGRESS NOTES
SPIRITUAL HEALTH SERVICES  SPIRITUAL ASSESSMENT Progress Note  [facility] ([facility] UR 6 MED SURG     REFERRAL SOURCE: admission request    I visited the pt in his room asleep. I prayed for the pt and left prayer mat and Islamic prayer booklet on his table.    PLAN: I will visit the pt , when he is available for visit. [facility] will continue supporting the pt.    Liza Cloud  Chaplain Resident  Phone: [phone]

## 2022-11-22 NOTE — PLAN OF CARE
VS: BP (!) 157/67 (BP Location: Left arm)   Pulse 78   Temp 98.6  F (37  C) (Oral)   Resp 18   Ht 1.829 m (6')   Wt 87.9 kg (193 lb 12.6 oz)   SpO2 100%   BMI 26.28 kg/m     O2: 100% nasal cannula 3 LPM, respirations 22 shallow, even, regular.    Suctioning performed highly productive cough   Output: Urinary incontinence brief on and incontinence care provided    Last BM: Unknown pt and family unable to recall/communicate (interpretive services for Trinidadian utilized)   Activity: Not OOB. Restless and anxious. Bed alarm on, activity supervised with attendant at bedside   Up for meals? In bed   Skin: Intact   Pain: Pt unable to communicate pain rating due to incomprehensible speech. Nonverbal indicators decreased with repositioning and use of pillows    CMS: Confused and restless. Redirectable with comforting touch and speech. Mobility generalized weakness. Trouble with turning self for incontinence care   Dressing: No except 2 PIV dressing for right arm and right hand   Diet: Regular adult ordered    Pt not tolerating meals since admission (provider made aware, potential need for adjustment in plan for nutrition communicated by RN). Ate 2 apple sauce cups this shift with encouragement. Refused offered meal   LDA: 2 PIV right arm. Patent. Upper arm right infusing    Plan: Continue POC   Additional Info: Agitated and combative r/t confusion @ 0600. Attempting to exit bed. Managed with PRN lorazepam, repositioning. Pt appears asleep on reassessment

## 2022-11-22 NOTE — PROGRESS NOTES
"Pt's family (son) expressed concern that pt is not eating and reports pt has not eaten anything during his stay. Family asked that nurse contact provider about this concern. Provider Elicia Taveras MD stated she will pass along to day team to determine any need for change in nutritional plan.     \"FYI pt family expressing concern about pt not eating. Currently on regular diet and has reportedly not eaten during his stay. Do you want to adjust his plan for nutrition? Mary Jane Ramos unit #[id number]\"       UPDATE: pt ate 2 apple sauce cups and drinking ice water with encouragement   "

## 2022-11-22 NOTE — PLAN OF CARE
VS: /73   O2: 99% on 3L O2   Output: Incontinent, use of brief   Last BM: Unable to obtain answer from pt even with use of  services.   Activity: Adjusted per tolerance.   Skin: Intact   Pain: C/o flank pain, leg pain.   CMS: Unable to obtain answer from pt even with use of  services.   Dressing: N/A   Diet: Reg   LDA: R PIV x2   Equipment: IV pump, pole   Plan: Administer abx per order   Additional Info: Pt continuously tries to get out of bed, due to confused LOC and pt's incomprehensible  unable to effectively communicate with pt even through use of  services to educate on need to use call light for assistance.

## 2022-11-22 NOTE — PLAN OF CARE
Goal Outcome Evaluation:      Plan of Care Reviewed With: other (see comments)    Overall Patient Progress: no changeOverall Patient Progress: no change    Outcome Evaluation: RD assessed pt per screen policy, called and spoke with bedside RN as pt remains confused, will start supplements and send non-select meal trays for now to enocurage po intakes

## 2022-11-22 NOTE — PROGRESS NOTES
"CLINICAL NUTRITION SERVICES - ASSESSMENT NOTE     Nutrition Prescription    RECOMMENDATIONS FOR MDs/PROVIDERS TO ORDER:  None today     Malnutrition Status:    Unable to determine due to no NFPA completed today     Recommendations already ordered by Registered Dietitian (RD):  Room service - not appropriate -  to send non-select trays for now until pt's confusion improved    Vanilla Ensure TID at all meals (auto send)    Future/Additional Recommendations:  Monitor meal intakes and supplement acceptance  Monitor weight/lab trends  Monitor for improved LOC and re-assess need for non-select trays      REASON FOR ASSESSMENT  Job MASOUD Job is a/an 82 year old male assessed by the dietitian for Positive Admission Nutrition Risk Screen (unknown weight loss, decreased appetite)     NUTRITION/MEDICAL HISTORY  Per chart review: Pt admits to hospital in the setting of acute hypoxic respiratory failure due to influenza A virus infection complicated by multifocal bacterial pneumonia, history of asthma/COPD, history of bronchiectasis, questionable history of pulmonary fibrosis, history of active TB, treat, HFpEF, Acute metabolic encephalopathy, severe symptomatic ACD, and leukocytosis, and GERD. Other past medical history noted for bronchiectasis, asthma, COPD, and chronic airway obstruction.    Pt is confused and agitated per notes, RD called unit and spoke with bedside RN, reviewed options for interventions as above, nursing agreeing with above and sending non-select meal trays for now due to pt's confusion.     CURRENT NUTRITION ORDERS  Diet: Regular  Intake/Tolerance: 0% per flow sheets     LABS  Labs reviewed    MEDICATIONS  Prednisone, Lasix, IV Vanco, IV Zosyn, Protonix    ANTHROPOMETRICS  Height: 182.9 cm (6' 0\")  Most Recent Weight: 87.9 kg (193 lb 12.6 oz)    IBW: 80.9 kg  BMI: Overweight BMI 25-29.9  Weight History:   80.6 kg (177 lb 12.8 oz) 11/18/2022 - per CE     72.1 kg (159 lb) 10/11/2022 - per CE "     02/06/20 85.7 kg (189 lb)     Weight assessment: Significantly varying weights noted above, so difficult to fully assess trends at this time.     Dosing Weight: 87.9 kg    ASSESSED NUTRITION NEEDS  Estimated Energy Needs: 3373-9637 kcals/day (20 - 25 kcals/kg)  Justification: Overweight  Estimated Protein Needs: 70-90 grams protein/day (0.8 - 1 grams of pro/kg)  Justification: Maintenance  Estimated Fluid Needs: 1 mL/kcal vs other   Justification: Maintenance    MALNUTRITION  % Intake: Decreased intake does not meet criteria  % Weight Loss: Unable to fully assess  Subcutaneous Fat Loss: Unable to assess today   Muscle Loss: Unable to assess today   Fluid Accumulation/Edema: None noted  Malnutrition Diagnosis: Unable to determine due to no NFPA completed today     NUTRITION DIAGNOSIS  Inadequate oral intake related to acute illness with confusion/agitation/altered LOC as evidenced by nursing documentation, pt taking 0% of meals thus far    INTERVENTIONS  Implementation  Medical food supplement therapy - ordered as above     Goals  Patient to consume % of nutritionally adequate meal trays TID, or the equivalent with supplements/snacks.     Monitoring/Evaluation  Progress toward goals will be monitored and evaluated per protocol.    Radha Samuels RD, CNSC, LD  [facility] [address] pager: [phone]

## 2022-11-22 NOTE — PLAN OF CARE
Goal Outcome Evaluation: ongoing      Plan of Care Reviewed With: patient    Overall Patient Progress: no changeOverall Patient Progress: no change  08:00 Sleeping this morning. Likely oversedated from ativan.  Not fed due to aspiration risk. Will help him eat when he wakes up. He is currently afebrile, % on 2 liters nasal cannula.     09:00 Attempted to wean of oxygen this morning. Dipped down into the high 80's after 3 minutes. Placed back on oxygen. Bradycardic at times.  10:30 MRSA swab sent.  11:00 Transferred into chair with max assist of 2. Frequent coughing after transferring. Still has not taking his morning medications due to sleepiness. Will ask MD if he would like to change them to iv for now.   12:00 Sleeping in chair vitals are stable. Sajan's down into the high 40's at times. Occasional extra beat noted.   12:30 Dr. Laury Perez made rounds. He plans to decrease the amount of ativan Job is getting to lessen his sedation. May add SL zypexa to help with agitation. Feels that the bradycardia is likely due to sedation.   13:00 Sleeping all day. Will try saying a few words and then falls back to sleep. Liko lift back to bed. Son is here.   14:00 Incontinent of urine. Turned and repositioned.   15:00 Sleeping. Appears comfortable. Solumedrol given.

## 2022-11-23 NOTE — PLAN OF CARE
VS: /60 (BP Location: Left arm)   Pulse 62   Temp 98.6  F (37  C) (Axillary)   Resp 20   Ht 1.829 m (6')   Wt 87.9 kg (193 lb 12.6 oz)   SpO2 100%   BMI 26.28 kg/m     O2: 100% on 2 LPM   Output: Incontinent of urine and bowel    Last BM:    Activity: Assist of 2 with ceiling lift   Skin: WDL   Pain: PAINAD scale of 0   CMS: Sleeping most of shift   Dressing: NA   Diet: Did not eat this shift   LDA: Right upper-arm PIV is patent and saline-locked. Right hand PIV is infusing with 15 mL normal saline to keep open.    Equipment: IV pole, sequential compression device on.    Plan: TBD   Additional Info: Patient was sleeping most of shift. Unable to collect sputum or stool sample.        Problem: Plan of Care - These are the overarching goals to be used throughout the patient stay.    Goal: Optimal Comfort and Wellbeing  Outcome: Progressing  Goal: Readiness for Transition of Care  Outcome: Progressing   Goal Outcome Evaluation:

## 2022-11-23 NOTE — PROGRESS NOTES
[facility]    Medicine Progress Note - Hospitalist Service, GOLD TEAM 16    Date of Admission:  11/20/2022    Assessment & Plan   Job Kaiser is an 83 yo [place] gentleman w/ h/o prior tuberculosis s/p treatment with 3 drug therapy for 6 weeks in [place], asthma COPD, bronchiectasis, ?  Pulmonary fibrosis and ?  T2DM.  He initially presented to [facility] ED on 11/19/2022 with complaints of SOB, SOTOMAYOR and cough productive of yellowish sputum.  He had chills and subjective fever.  CT chest with IV contrast revealed peripheral honeycombing but predominantly upper lobe predominance, findings can be seen in the setting of hypersensitivity pneumonitis, drug reaction, collagen vascular disease.  TB could be a consideration although this pattern of disease would be somewhat atypical, echo showed EF 69%.  Influenza A PCR positive on 11/19.  Patient was treated with IV ceftriaxone, IV azithromycin and Tamiflu.  He left ANW AMA which he apparently has done many times in the past.  He presented to [facility] ED for evaluation.  His vital signs were stable.  CXR discovered moderate pulm edema vs multifocal pneumonia.  Given history of TB infection, patient was placed and negative pressure room and  QuantiFERON gold TB test obtained.  Patient was subsequently admitted to on medicine for ongoing management.    #Acute hypoxic respiratory failure due to influenza A virus infection complicated by multifocal bacterial pneumonia   #History of asthma/COPD  #History of bronchiectasis  #Questionable history of pulmonary fibrosis  #History of active TB, treated    Continue negative pressure isolation  -QuantiFERON gold TB test result    ID service consult    Follow-up AFB cultures sent.  -Patient is currently on 3 L NC O2 supplement, wean off O2 supplement   -Procalcitonin level 0.07 suggesting low risk for systemic bacterial infection    Attempts to obtain  sputum culture    Continue empiric Zosyn    Discontinue IV vancomycin given negative MRSA nares.      Continue PTA prednisone 20 mg    Solu-Medrol 60 mg IV x1 in place of p.o. prednisone given patient's mental status.    Continue PTA Pulmicort     Will consider getting pulm involved in patient's care    F/up Legionella, sputum cx, AFB cx, Strep antigen    Tamiflu x5 days    #History of HFpEF  -CXR with probable moderate pulmonary edema  -However BNP level within normal  -No lower extremity edema on exam  -TTE at [facility] on 11/19 revealed EF 69%  -Compensated    Agree with continuing Lasix 20 mg iv qam for the time-being     #Acute metabolic encephalopathy  -No documented history of dementia from reviewing his chart  -Patient has been mostly confused and disoriented  -Suspect encephalopathy due to influenza A infection  -Continue bedside sitter    Zyprexa as needed for agitation    Discontinue as needed Ativan    #Severe symptomatic ACD  -Patient's baseline hemoglobin is 8-9 g  -His hemoglobin was 6.1 on arrival to the ED  -Status post 1 unit PRBC transfusion on 11/20  -Hemoglobin has remained stable.    Follow-up iron studies.    Continue to monitor, will consider consult with GI service for endoscopy when his mental status clears    Leukocytosis - resolved  -Likely due to steroid +/- infection  -Now resolved.    GERD    PTA Protonix    Universal COVID-19 screening    Fully vaccinated and boosted x1    SARS-CoV-2 PCR negative on 11/19 and 11/20          Diet: Regular Diet Adult  Snacks/Supplements Adult: Ensure Enlive; With Meals  Room Service    DVT Prophylaxis: Pneumatic Compression Devices  Vu Catheter: Not present  Central Lines: None  Cardiac Monitoring: None  Code Status: Full Code    Disposition Plan   TBD.  Anticipate 2-3 more days of hospitalization for treatment of acute hypoxic respiratory failure        MEI LEAHY MD  Hospitalist Service, GOLD TEAM [address] [place] [address]  [facility]  Securely message with the [employer] Web Console (learn more here)  Text page via [facility] Paging/Directory   Please see signed in provider for up to date coverage information    _____________________________________________________________    Interval History   Still requiring supplemental O2 at ~2L NC    Data reviewed today: I reviewed all medications, new labs and imaging results over the last 24 hours. I personally reviewed no images or EKG's today.    Physical Exam   Vital Signs: Temp: 97.4  F (36.3  C) Temp src: Axillary BP: 134/56 Pulse: 77   Resp: 18 SpO2: 99 % O2 Device: Nasal cannula Oxygen Delivery: 2 LPM  Weight: 149 lbs 11.08 oz  General: Confused and disoriented, NAD.  HEENT:  NC/AT, neck supple  CVS:  NL s 1 and s2, no m/r/g.  Lungs:  Coarse BS anteriorly   Abd:  Soft, + bs, NT, no rebound or gaurding, no fluid shift.  Ext:  No c/c.  Lymph:  No edema.  Neuro:  Nonfocal.  Musculoskeletal: No calf tenderness to palpation.    Skin:  No rash.      Data   Recent Labs   Lab 11/22/22  0550 11/21/22  1121 11/20/22  1828   WBC 10.2 12.3* 12.9*   HGB 9.3* 7.8* 6.1*   MCV 72* 71* 71*    309 343     --  136   POTASSIUM 3.6  --  4.5   CHLORIDE 99  --  100   CO2 36*  --  26   BUN 17  --  15   CR 0.87 0.81 0.80  0.78   ANIONGAP 5  --  10   MATT 9.4  --  8.8   *  --  136*     No results found for this or any previous visit (from the past 24 hour(s)).  Medications       budesonide  0.5 mg Nebulization Daily     furosemide  20 mg Intravenous QAM     pantoprazole  40 mg Oral QAM AC     piperacillin-tazobactam  4.5 g Intravenous Q6H     predniSONE  20 mg Oral Daily     sodium chloride (PF)  3 mL Intracatheter Q8H

## 2022-11-23 NOTE — PLAN OF CARE
Patient is Northern Irish speaking, confused, unable to make needs and wants known,  Mumbles, incoherent, grunts occasionally, family at bedside and can help with translation assist    No s/s of pain, does not withdraw with touch     Patient has PIV to RAC and R wrist both saline locked    Patient is a feeder, family helps with feeding, 50% consumed for breakfast and none at lunch, continue to encourage hydration, able to eat some dinner    Skin is dry however intact    Patient remains on 1:1 due to confusion    On oxygen continuous currently at 3 LPM, titrated to keep oxygenation at 92%.    Lung sounds to R and L lobes with rhonchi - coughs weakly. No shortness of breath at rest however he desaturates with movements    On Delirium Prevention and Treatment - encouraged family to engage with patient to keep him up. Patient rouses easily but is unable to stay awake long enough. Will resume sleep after a while      1737 - Patient noted to be more awake, family was assisting in spoon feeding dinner and encouraging liquids     Obtained sputum and urine per lab order    Report given to incoming shift    No further issues during shift

## 2022-11-23 NOTE — PLAN OF CARE
JACLYN orientation d/t language and communication barrier.  Pt understands Welsh but speech is incoherent. Pt seems to respond well to calming intonation and repeating of his name.    Pt woke at 23:00, restless and wanting to get out of bed.  Writer and aide walked with pt to bathroom and back to bed.  Pt voided in brief.  Pt was combative and refusing nasal cannula despite SpO2 in mid  70s.  Writer administered Zyprexa per Mar.  After several minutes, pt was calm enough for a change of brief, and then settled and rested comfortably overnight.    Writer paged provider to establish plan in case pt became agitated again.  Provider initiated delirium order set.    Aide attempted bladder scan at 03:00 but pt rolled side-lying and was uncooperative.    R upper PIV infusing NS 15mL/hr to maintain IV patency for abx going forward.    Pt family member arrived on unit approx 05:45 and to feed pt.  Writer educated visitor that when the oxygen dips below 88%, the alarm will sound, and pt needs to deep breathe.    No other acute events overnight.  Continue POC and increased communication via  with family.

## 2022-11-23 NOTE — PROGRESS NOTES
Cross Cover    11/23/22  12:00 AM    Notified by LINDA Singh that patient was recently agitated but did respond to oral olanzapine 5 mg. Given that his oxygen dropped so fast when he wasn't cooperating she was wondering if additional back up meds need to be available. He does have IM olanzapine PRN ordered as well.    Metabolic encephalopathy   Persistent delirum  -Delirium orderset ordered-- needs good day/wake cycles as able  -increased PRN frequency of ODT olanzapine to q6h PRN   -could consider scheduled seroquel in future as well  -would avoid benzos as they can dis-inhibit at this age/can make things worse  -decreased dose of IM olanzapine given his age- but not sure if he may need higher dose in future. Has responded to 5 mg doses    RN is comfortable with plan.    Syeda Omalley MD  Internal Medicine/Pediatrics Hospitalist

## 2022-11-23 NOTE — CONSULTS
GENERAL INFECTIOUS DISEASES CONSULTATION - [facility]      Patient:  Job Kaiser   Date of birth 1940, Medical record number [id number]  Date of Visit:  11/23/2022  Date of Admission: 11/20/2022  Consult Requested by:Dena Hong MD  Reason for Consult:  Rule Out TB          Assessment and Recommendations:   ASSESSMENT:  1. Influenza A ( Dx 11/19/22)   2. Asthma/ COPD   3. Bronchiectasis   4. History of TB ( s/p treatment - unclear if he had active TB vs latent Tb at that time)   5. Productive cough   6. Limited activity due to dyspnea on exertion for at least 1 year   7. SARsCoV2 neg 11/19 and 11/20/22    RECOMMENDATION:  - sputum culture, Urine legionella and Strep pneumoniae antigen testing , AFB smear and culture x 3 , fungal culture   - start Tamiflu 75 mg po BID x 5 days ( to be dosed per kidney function)   - aspiration precaution   - continue Zosyn for now and empiric Azithromycin 500 mg 1x then 250 mg po daily x 4   - Discharge is not advisable at this time, still requiring oxygen supplement.     Plan discussed with Hospitalist and patient's son Ally.     ID will continue to follow. Dr Duncan will be on call on  Thursday-Sunday and Dr Chen ,will be on call next Monday     Thank you for allowing us to participate in the care of this patient    Long Mae MD, M.Med.Sc  Staff, Infectious Diseases   Pager : [phone]         History of Present Illness:     Job Kaiser is a 82 year old male with history of COPD, bronchiectasis and asthma/ COPD, former smoker, nephrolithiasis , restrictive lung disease, previously treated TB in [place] ( but unclear if this was active TB vs latent TB) about 40 years ago ( per son)  , DM2,  chronic pulmonary fibrosis,  bronchiectasis, limited activities in the past 1 year, was feeling ill with worsening cough, shortness of breath, poor appetite, unsteadiness over the past 3 weeks, admitted to [facility] on 11/18/22 . He was found to  be  restless and confused. He was diagnosed with Influenza A but left against medical advice on 11/20/22 and was later brought to [facility] ED and subsequently admitted. He has not been treated for influenza. He is currently on Zosyn.     History is obtained from chart and from his son with [facility] 's help.     SonAlly says his father lives with another son. He might have lost some weight over the past 1 year but he is not sure. increased coughing with sputum production. He has been using his inhalers 3/day and nebulizer PRN.  He has poor appetite recently. He says his father is looking better today. patient denies headaches, back pain, nausea, vomiting diarhea. Per RN, he ate 50% of his meal earlier today.     His father wants to leave the hospital now, but still depends on oxygen supplement. He was not on oxygen supplement at home. He sees his  PCP  and saw [place]  pulmonology in Aug 2022     Imaging:  CXR 11/20/22  IMPRESSION:   Diffuse prominence of the pulmonary vascularity with diffuse patchy opacities, likely moderate pulmonary edema. Component of multifocal pneumonia is not excluded in the appropriate clinical context. No pleural effusion or pneumothorax. The cardiomediastinal silhouette is unremarkable.    CT chest wo contrast 11/19/22  Peripheral honeycombing but with predominantly an upper lobe predominance. The findings can be seen in the setting of hypersensitivity pneumonitis, drug reaction or collagen vascular disease. TB could be a consideration although this pattern of disease would be somewhat atypical.       CXR 6/27/22  INDICATION: Dyspnea.  COMPARISON: 4/20/2022  FINDINGS: The heart size is normal. There is been interval increase in bilateral interstitial and alveolar infiltrates. Pleural thickening and scarring at the lung apices. No pneumothorax.                                                               IMPRESSION: Progression of bilateral interstitial and alveolar  infiltrates.    TTE 11/19/22  Final Impressions:  1. Normal left ventricular size, normal wall thickness, normal global systolic function, calculated EF of 69 %.  2. Right ventricular cavity size is mildly enlarged, global systolic function is normal.  3. The aortic valve has nodular sclerosis and is trileaflet, no stenosis and no regurgitation.  4. The ascending aorta is dilated with a maximal diameter of 3.7 cm.    TTE 9/29/21  Interpretation Summary  Global and regional left ventricular function is normal with an EF of 60-65%.   The right ventricle is normal size. Global right ventricular function is normal.  No significant valvular abnormalities were noted.  IVC diameter <2.1 cm collapsing >50% with sniff suggests a normal RA pressure of 3 mmHg.    CT chest, abdomen, pevis 12/20/13  Impression:   1. Bullae, bronchiectasis, and fibrotic changes in the upper lobes and superior segments of the lower lobes are consistent with the patient's history of tuberculosis. Areas of new consolidation within these regions, left greater than right, are concerning for superimposed infection/reactivation.   2. Slight increase in size of mediastinal lymph nodes which could be reactive.   3. Scattered nonenlarged lymph nodes throughout the abdomen; a single node adjacent to the right schulyer of the hemidiaphragm has slightly increased in size while the remainder do not appear significantly changed.     Recent culture results include:  Culture Micro   Date Value Ref Range Status   02/07/2020 No growth  Final   02/06/2020 No growth  Final   03/01/2015 Normal charles  Final   03/01/2015   Final    Culture negative for acid fast bacilli  Assayed at [facility] [address]     03/01/2015 No growth  Final   02/12/2013 Normal charles  Final   02/12/2013   Final    Culture negative for acid fast bacilli  Assayed at [facility] [address]   02/12/2013 No growth  Final   02/12/2013 No growth  Final    2013   Final    >10 Squamous epithelial cells/low power field indicates oral contamination.   Please recollect.  Charge credited  Called to Brad on 13 @ 1220, WILVER          Review of Systems:   CONSTITUTIONAL:  No fevers or chills  EYES: negative for icterus  ENT:  negative for hearing loss, tinnitus and sore throat  RESPIRATORY:  see HPI   CARDIOVASCULAR:  negative for chest pain, dyspnea  GASTROINTESTINAL:  negative for nausea, vomiting, diarrhea and constipation  GENITOURINARY:  negative for dysuria  HEME:  No easy bruising  INTEGUMENT:  negative for rash and pruritus  NEURO:  see HPI          Past Medical History:     Past Medical History:   Diagnosis Date     Bronchiectasis (H)      COPD (chronic obstructive pulmonary disease) (H)      Nephrolithiasis      Tuberculosis, treated           Past Surgical History:     Past Surgical History:   Procedure Laterality Date     NO HISTORY OF SURGERY            Family History:     reviewed. not significant          Social History:     Social History     Tobacco Use     Smoking status: Former     Types: Cigarettes     Quit date: 1995     Years since quittin.9     Smokeless tobacco: Not on file   Substance Use Topics     Alcohol use: No     History   Sexual Activity     Sexual activity: Not Currently            Current Medications (antimicrobials listed in bold):       budesonide  0.5 mg Nebulization Daily     furosemide  20 mg Intravenous QAM     pantoprazole  40 mg Oral QAM AC     piperacillin-tazobactam  4.5 g Intravenous Q6H     predniSONE  20 mg Oral Daily     sodium chloride (PF)  3 mL Intracatheter Q8H          Allergies:   No Known Allergies         Physical Exam:   Vitals were reviewed  Patient Vitals for the past 24 hrs:   BP Temp Temp src Pulse Resp SpO2 Weight   22 1442 -- 96.8  F (36  C) Axillary -- -- 97 % --   22 0650 134/56 97.4  F (36.3  C) Axillary 77 18 99 % --   22 0500 -- -- -- 58 18 100 % --   22 0042 --  -- -- 63 -- 100 % 67.9 kg (149 lb 11.1 oz)   11/22/22 1756 130/60 98.6  F (37  C) Axillary 62 20 100 % --   11/22/22 1748 -- -- -- -- -- 100 % --     Ranges for his vital signs:  Temp:  [96.8  F (36  C)-98.6  F (37  C)] 96.8  F (36  C)  Pulse:  [58-77] 77  Resp:  [18-20] 18  BP: (130-134)/(56-60) 134/56  SpO2:  [97 %-100 %] 97 %    Physical Examination:  GENERAL:  awake, calm, cooperative, sitting up in bed in no acute distress.NC+    HEENT:  Head is normocephalic, atraumatic   EYES:  Eyes have anicteric sclerae without conjunctival injection    ENT:  Oropharynx is moist without exudates or ulcers. Tongue is midline  NECK:  Supple. No  Cervical lymphadenopathy  LUNGS:  Clear , few crackles in the bases  CARDIOVASCULAR:  Regular rate and rhythm with no murmurs, gallops or rubs.  ABDOMEN:  Normal bowel sounds, soft, nontender. No appreciable hepatosplenomegaly  SKIN:  No acute rashes.    extremities : no edema  Line(s) are in place without any surrounding erythema or exudate. No stigmata of endocarditis.  NEUROLOGIC:  Grossly nonfocal. Active x4 extremities         Laboratory Data:     Inflammatory Markers    Recent Labs   Lab Test 11/20/22  1828 03/01/15  0349   CRP 80.0* 20.3*     Hematology Studies    Recent Labs   Lab Test 11/22/22  0550 11/21/22  1121 11/20/22 1828 06/27/22 2027 02/06/20  2339 03/01/15  0349   WBC 10.2 12.3* 12.9* 5.1 7.2 7.7   ANEU  --   --   --   --  6.0 5.9   AEOS  --   --   --   --  0.1 0.4   HGB 9.3* 7.8* 6.1* 8.6* 12.3* 13.8   MCV 72* 71* 71* 76* 92 95    309 343 442 251 238     Metabolic Studies     Recent Labs   Lab Test 11/22/22  0550 11/21/22  1121 11/20/22  1828 06/27/22  2027 09/10/21  1210 02/06/20  2339     --  136 141 140 137   POTASSIUM 3.6  --  4.5 4.0 4.4 4.2   CHLORIDE 99  --  100 109 107 105   CO2 36*  --  26 25 25 27   BUN 17  --  15 14 12 14   CR 0.87 0.81 0.80  0.78 0.81 0.93 0.97   GFRESTIMATED 86 88 88  89 88 77 74     Hepatic Studies    Recent Labs   Lab  Test 06/27/22  2027 09/10/21  1210 02/06/20  2339   BILITOTAL 0.2 0.3 0.3   ALKPHOS 66 65 62   ALBUMIN 3.2* 3.4 3.4   AST 18 22 24   ALT 14 17 18     Microbiology:  Culture Micro   Date Value Ref Range Status   02/07/2020 No growth  Final   02/06/2020 No growth  Final   03/01/2015 Normal charles  Final   03/01/2015   Final    Culture negative for acid fast bacilli  Assayed at [facility] [address]     03/01/2015 No growth  Final   02/12/2013 Normal charles  Final   02/12/2013   Final    Culture negative for acid fast bacilli  Assayed at [facility] [address]   02/12/2013 No growth  Final   02/12/2013 No growth  Final   02/11/2013   Final    >10 Squamous epithelial cells/low power field indicates oral contamination.   Please recollect.  Charge credited  Called to Brad on 2/12/13 @ 1220, WILVER   02/11/2013 No growth  Final   01/18/2013 No growth  Final   01/18/2013 No growth  Final   07/08/2008 Culture negative for acid fast bacilli  Final     Comment:     Assayed at [facility] [address]   07/08/2008 No growth  Final   07/13/2007 No growth  Final     Urine Studies    Recent Labs   Lab Test 11/20/22 2117 02/07/20  0025   LEUKEST Negative Negative   WBCU 0 6*

## 2022-11-24 NOTE — PROGRESS NOTES
General ID Service [facility]: Follow Up Note      Patient:  Job Kaiser, Date of birth 1940, Medical record number [id number]  Date of Visit:  November 24, 2022         Assessment and Recommendations:   ID Problem List:    1. Influenza A ( Dx 11/19/22)   2. Asthma/ COPD   3. Bronchiectasis   4. History of TB ( s/p treatment - unclear if he had active TB vs latent Tb at that time)   5. Productive cough   6. Limited activity due to dyspnea on exertion for at least 1 year   7. SARsCoV2 neg 11/19 and 11/20/22    Recommendations:    AFB smear and culture x 3, 1 is collected and negative. 2 could be on the same day  Tamiflu 75 mg po BID x 5 days ( to be dosed per kidney function)   - aspiration precaution   Can stop antibiotics given procalcitonin is negative  Discharge is not advisable at this time, still requiring oxygen supplement.    Discussion:    Patient with dypnea who presented after leaving [facility]. Apparently he had Influenza and hypoxia there. He looks well. His CXR is abnormal but hx of bronchiectasis and influenza could account for it. I would complete workup for TB with AFB x3. Continue tamiflu.    However with negative procalcitonin I would stop antibiotics at this time.     If he is able to get off O2 I am ok with him discharging.     We will follow along.. Don't hesitate to call with questions.     Attestation:  I have reviewed today's vital signs, medications, labs and imaging.  Ritu Duncan MD  Pager [phone]          Interval History:       Patient is improved. Feels well. No fevers or chills.          Review of Systems:   Full 9 pt ROS obtained, pertinent positives and negatives as above.          Current Antimicrobials   Current:    Azithromycin, zosyn         Physical Exam:   Ranges for vital signs:  Temp:  [98.1  F (36.7  C)-99.3  F (37.4  C)] 98.9  F (37.2  C)  Pulse:  [58-72] 61  Resp:  [17-18] 18  BP: (129-165)/(56-73) 148/57  SpO2:  [99 %-100 %] 100 %    Intake/Output Summary  (Last 24 hours) at 11/24/2022 1639  Last data filed at 11/24/2022 1100  Gross per 24 hour   Intake 1300 ml   Output 925 ml   Net 375 ml     Exam:  GENERAL:  well-developed, well-nourished, sitting in bed in no acute distress. Thin but in no acute distress  ENT:  Head is normocephalic, atraumatic. Oropharynx is moist without exudates or ulcers.  EYES:  Eyes have anicteric sclerae.    NECK:  Supple.  LUNGS:  Clear to auscultation.  CARDIOVASCULAR:  Regular rate and rhythm with no murmurs, gallops or rubs.  ABDOMEN:  Normal bowel sounds, soft, nontender.  EXT: Extremities warm and without edema.  SKIN:  No acute rashes.  Line is in place without any surrounding erythema.  NEUROLOGIC:  Grossly nonfocal.         Laboratory Data:   Reviewed.  Pertinent for:  Procalcitonin <0.05  WBC 8.8    Culture data:    11/23/2022 1847 11/23/2022 2109 Legionella pneumophila antigen urine [[id number]]   Urine, NOS    Final result Component Value   Legionella pneumophila serogroup 1 urinary antigen Negative   Suggests no recent or current infection. Infection due to Legionella cannot be ruled out, since other serogroups and species may cause disease, antigen may not be present in urine in early infection, and the level of antigen present in the urine may be below detectable limits of the test.          11/23/2022 1847 11/23/2022 2109 Streptococcus pneumoniae antigen [[id number]]   Urine, NOS    Final result Component Value   Streptococcus pneumoniae antigen Negative   A negative Streptococcus pneumoniae antigen result does not rule out infection with Streptococcus pneumoniae.          11/23/2022 1845 11/24/2022 1417 Respiratory Aerobic Bacterial Culture with Gram Stain [[id number]]   Sputum from Expectorate    Preliminary result Component Value   Culture No growth, less than 1 day P   Gram Stain Result <10 Squamous epithelial cells/low power field P    >25 PMNs/low power field P    No organisms seen P             11/22/2022 1354  11/22/2022 1444 Acid-Fast Bacilli Culture and Stain [[id number]]    Sputum from Other    In process Component Value   No component results          11/22/2022 1354 11/24/2022 1146 Acid-Fast Bacilli Culture and Stain [[id number]]    Sputum from Other    Preliminary result Component Value   Acid Fast Stain No acid fast bacilli seen P   Performed by [email or web address] [place] [address] [phone].[facility], Gilberto Amador MD, PHD, Lab. Director   Acid Fast Stain No acid fast bacilli seen P   Performed by [email or web address] [place] [address] [phone].[facility], Gilberto Amador MD, PHD, Lab. Director   This is an appended report. These results have been appended to a previously preliminary verified report.          11/22/2022 1026 11/22/2022 1432 MRSA MSSA PCR, Nasal Swab [[id number]]    Swab from Nares, Bilateral    Final result Component Value   MRSA Target DNA Negative   SA Target DNA Negative          11/20/2022 2117 11/22/2022 0609 Urine Culture Aerobic Bacterial [[id number]]   Urine, Midstream    Final result Component Value   Culture No Growth             11/20/2022 1850 11/20/2022 1935 Asymptomatic COVID-19 Virus (Coronavirus) by PCR Nose [[id number]]    Swab from Nose    Final result Component Value   SARS CoV2 PCR Negative   NEGATIVE: SARS-CoV-2 (COVID-19) RNA not detected, presumed negative.          11/20/2022 1828 11/23/2022 2131 Blood Culture Peripheral Blood [[id number]]   Peripheral Blood    Preliminary result Component Value   Culture No growth after 3 days P             11/20/2022 1828 11/23/2022 2131 Blood Culture Peripheral Blood [[id number]]   Peripheral Blood    Preliminary result Component Value   Culture No growth after 3 days P             11/19/2022 0019 11/20/2022 1743 COVID-19 VIRUS (CORONAVIRUS) BY PCR (EXTERNAL RESULT) [[id number]]    Specimen   Specimen type and source: Oth...    Final result Component Value   COVID-19 Virus by  PCR (External Result) Negative                     Imaging:     EXAM: XR CHEST PORT 1 VIEW   LOCATION: [facility]   DATE/TIME: 11/20/2022 6:37 PM     INDICATION: Shortness of breath and cough.   COMPARISON: 06/27/2022    Impression:     IMPRESSION:     Diffuse prominence of the pulmonary vascularity with diffuse patchy opacities, likely moderate pulmonary edema. Component of multifocal pneumonia is not excluded in the appropriate clinical context.     No pleural effusion or pneumothorax.     The cardiomediastinal silhouette is unremarkable.

## 2022-11-24 NOTE — PROGRESS NOTES
[facility]    Medicine Progress Note - Hospitalist Service, GOLD TEAM 16    Date of Admission:  11/20/2022    Assessment & Plan   Job Kaiser is an 83 yo [place] gentleman w/ h/o prior tuberculosis s/p treatment with 3 drug therapy for 6 weeks in [place], asthma COPD, bronchiectasis, ?  Pulmonary fibrosis and ?  T2DM.  He initially presented to [facility] ED on 11/19/2022 with complaints of SOB, SOTOMAYOR and cough productive of yellowish sputum.  He had chills and subjective fever.  CT chest with IV contrast revealed peripheral honeycombing but predominantly upper lobe predominance, findings can be seen in the setting of hypersensitivity pneumonitis, drug reaction, collagen vascular disease.  TB could be a consideration although this pattern of disease would be somewhat atypical, echo showed EF 69%.  Influenza A PCR positive on 11/19.  Patient was treated with IV ceftriaxone, IV azithromycin and Tamiflu.  He left [facility] AMA which he apparently has done many times in the past.  He presented to [facility] ED for evaluation.  His vital signs were stable.  CXR discovered moderate pulm edema vs multifocal pneumonia.  Given history of TB infection, patient was placed and negative pressure room and  QuantiFERON gold TB test obtained.  Patient was subsequently admitted to on medicine for ongoing management.    #Acute hypoxic respiratory failure due to influenza A virus infection complicated by multifocal bacterial pneumonia   #History of asthma/COPD  #History of bronchiectasis  #Questionable history of pulmonary fibrosis  #History of active TB, treated    Continue negative pressure isolation  -QuantiFERON gold TB test result    ID service consult    Follow-up AFB cultures sent.  -Patient is currently on 3 L NC O2 supplement, wean off O2 supplement   -Procalcitonin level 0.07 suggesting low risk for systemic bacterial infection    Attempts to obtain  sputum culture    Continue empiric Zosyn    Discontinue IV vancomycin given negative MRSA nares.      Continue PTA prednisone 20 mg    Continue PTA Pulmicort     Will consider getting pulm involved in patient's care  -Legionella, sputum cx, AFB cx, Strep antigen for remain negative to date.     Tamiflu x5 days    #History of HFpEF  -CXR with probable moderate pulmonary edema  -However BNP level within normal  -No lower extremity edema on exam  -TTE at [facility] on 11/19 revealed EF 69%  -Compensated    Agree with continuing Lasix 20 mg iv qam for the time-being     #Acute metabolic encephalopathy  -No documented history of dementia from reviewing his chart  -Patient has been mostly confused and disoriented  -Suspect encephalopathy due to influenza A infection  -Continue bedside sitter    Zyprexa as needed for agitation    Discontinue as needed Ativan    #Severe symptomatic ACD  -Patient's baseline hemoglobin is 8-9 g  -His hemoglobin was 6.1 on arrival to the ED  -Status post 1 unit PRBC transfusion on 11/20  -Hemoglobin has remained stable.  -Iron studies significant for iron deficiency.     Give iron infusion    Start ferrous sulfate supplement      Leukocytosis - resolved  -Likely due to steroid +/- infection  -Now resolved.    GERD    PTA Protonix    Universal COVID-19 screening    Fully vaccinated and boosted x1    SARS-CoV-2 PCR negative on 11/19 and 11/20          Diet: Regular Diet Adult  Snacks/Supplements Adult: Ensure Enlive; With Meals  Room Service    DVT Prophylaxis: Pneumatic Compression Devices  Vu Catheter: Not present  Central Lines: None  Cardiac Monitoring: None  Code Status: Full Code    Disposition Plan   TBD.         MEI LEAHY MD  Hospitalist Service, GOLD TEAM [address] [place] [address]  Securely message with the Vocera Web Console (learn more here)  Text page via [facility] Paging/Directory   Please see signed in provider for up to  date coverage information    _____________________________________________________________    Interval History   -Patient's O2 requirements continue to decrease, was able to be weaned to 2 L supplemental cannula today.  -Patient was interviewed with a Wallisian , reports feeling generally better today.    Data reviewed today: I reviewed all medications, new labs and imaging results over the last 24 hours. I personally reviewed no images or EKG's today.    Physical Exam   Vital Signs: Temp: 98.9  F (37.2  C) Temp src: Oral BP: (!) 148/57 Pulse: 61   Resp: 18 SpO2: 100 % O2 Device: Nasal cannula Oxygen Delivery: 3 LPM  Weight: 149 lbs 11.08 oz  General: Confused and disoriented, NAD.  HEENT:  NC/AT, neck supple  CVS:  NL s 1 and s2, no m/r/g.  Lungs:  Coarse BS on auscultation.   Abd:  Soft, + bs, NT, no rebound or gaurding, no fluid shift.  Ext:  No c/c.  Lymph:  No edema.  Neuro:  Nonfocal.  Musculoskeletal: No calf tenderness to palpation.    Skin:  No rash.      Data   Recent Labs   Lab 11/24/22  0806 11/23/22  1513 11/22/22  0550 11/21/22  1121 11/20/22  1828   WBC 8.8 6.3 10.2 12.3* 12.9*   HGB 7.7* 8.5* 9.3* 7.8* 6.1*   MCV 74* 72* 72* 71* 71*    320 361 309 343   NA  --  143 140  --  136   POTASSIUM  --  4.1 3.6  --  4.5   CHLORIDE  --  102 99  --  100   CO2  --  37* 36*  --  26   BUN  --  29 17  --  15   CR  --  0.98 0.87 0.81 0.80  0.78   ANIONGAP  --  4 5  --  10   MATT  --  8.9 9.4  --  8.8   GLC  --  163* 118*  --  136*     No results found for this or any previous visit (from the past 24 hour(s)).  Medications       azithromycin  250 mg Oral Daily     budesonide  0.5 mg Nebulization Daily     furosemide  20 mg Intravenous QAM     oseltamivir  30 mg Oral BID     pantoprazole  40 mg Oral QAM AC     piperacillin-tazobactam  4.5 g Intravenous Q6H     predniSONE  20 mg Oral Daily     sodium chloride (PF)  3 mL Intracatheter Q8H

## 2022-11-24 NOTE — PLAN OF CARE
Alert to self. A2 lift for transfers. Bedside attendant present for confusion. Family also present in room.  Pt denied pain at night.  Intermittently incontinent of bladder. LBM before admission.  3L O2 via NC.  Quiet night, continue with POC.

## 2022-11-24 NOTE — PROGRESS NOTES
[id number]    Plan of Care Reviewed With: Patient    Overall Patient Progress: No Change    Outcome Evaluation: Pt is confused - A & O x1  (not to place, time & situation) on 2L of O2 via NC. Denies pain, nausea, chest pain & SOB. Pt has 2 R PIVs - 1 is SL & the other is intermittently infusing antibiotics otherwise running TKO. Pt is Cambodian speaking & has garbled speech - family members present in room to interpret. Pt is continent of bladder utilizing urinal. Pt had large BM during shift - utilizing bedside commode, assist x2 & pivoting. Pt is assist x2 w/ lift, voids spontaneously & uses call light appropriately. Droplet precautions & bedside attendant maintained throughout shift.     Vitals: BP (!) 148/57   Pulse 61   Temp 98.9  F (37.2  C) (Oral)   Resp 18   Ht 1.829 m (6')   Wt 67.9 kg (149 lb 11.1 oz)   SpO2 100%   BMI 20.30 kg/m      Plan: TBD, anticipate 2-3 more days for treatment of rr failure. Continue with plan of care.

## 2022-11-25 NOTE — PLAN OF CARE
VS: BP (!) 152/65 (BP Location: Left arm, Patient Position: Chair, Cuff Size: Adult Regular)   Pulse 60   Temp 97.8  F (36.6  C) (Oral)   Resp 18   Ht 1.829 m (6')   Wt 67.9 kg (149 lb 11.1 oz)   SpO2 100%   BMI 20.30 kg/m     O2: 100% nasal cannula 2 LPM, frequent productive cough, dyspnea on exertion    Output: Uses urinal, urinary urgency   Last BM: 11/24/22   Activity: Ambulated between chair and bed   Up for meals? Up in bed for meal   Skin: Intact   Pain: Denies pain   CMS: AOx4, calm and cooperative, generalized weakness observed, sensation intact    Dressing: PIV right AC dressing CDI   Diet: Regular adult. Ate rice and meat meal brought by family and drank ensure supplement   LDA: Right hand PIV no longer in place. Right AC IV SL and patent   Plan: Discharge home with son   Additional Info: Combative agitated when he first wakes up ANG

## 2022-11-25 NOTE — PROGRESS NOTES
Job Kaiser is a 82 year old male patient.  1. Influenza A    2. Hypoxia    3. Chronic obstructive pulmonary disease, unspecified COPD type (H)    4. Anemia, unspecified type    5. Encounter for screening laboratory testing for severe acute respiratory syndrome coronavirus 2 (SARS-CoV-2)      Past Medical History:   Diagnosis Date     Bronchiectasis (H)      COPD (chronic obstructive pulmonary disease) (H)      Nephrolithiasis      Tuberculosis, treated 1996     No current outpatient medications on file.     No Known Allergies  Principal Problem:    Chronic obstructive pulmonary disease, unspecified COPD type (H)  Active Problems:    Influenza A    Hypoxia    Blood pressure (!) 154/63, pulse 56, temperature 98.3  F (36.8  C), temperature source Oral, resp. rate 17, height 1.829 m (6'), weight 67.9 kg (149 lb 11.1 oz), SpO2 100 %.    Alert and oriented to self  Denies pain  Persistent and productive cough, bilateral rhonchi            Roger Fritz RN  11/24/2022

## 2022-11-25 NOTE — PLAN OF CARE
"Goal Outcome Evaluation:         VS: BP (!) 146/54 (BP Location: Left arm)   Pulse 56   Temp 98.7  F (37.1  C) (Oral)   Resp 16   Ht 1.829 m (6')   Wt 67.9 kg (149 lb 11.1 oz)   SpO2 98%   BMI 20.30 kg/m       O2: 2L NC    Output: Voids with bedside urinal    Last BM: Unknown . Pt unable to recall    Activity: Assist x1 . Sitter at bedside    Up for meals? Yes   Skin: Refuses skin assessment    Pain: Denies pain   CMS: Unable to assess. Pt was agitated and stated \"Why did I wake him up ?\" per    Dressing: N/a   Diet: Regular   LDA: R. AC SL   Equipment: IV pole , personal items , call light with in reach    Plan: Continue POC    Additional Info:                       "

## 2022-11-25 NOTE — PROGRESS NOTES
[facility]    Medicine Progress Note - Hospitalist Service, GOLD TEAM 16    Date of Admission:  11/20/2022    Assessment & Plan   Job Kaiser is an 83 yo [place] gentleman w/ h/o prior tuberculosis s/p treatment with 3 drug therapy for 6 weeks in [place], asthma COPD, bronchiectasis, ?  Pulmonary fibrosis and ?  T2DM.  He initially presented to [facility] ED on 11/19/2022 with complaints of SOB, SOTOMAYOR and cough productive of yellowish sputum.  He had chills and subjective fever.  CT chest with IV contrast revealed peripheral honeycombing but predominantly upper lobe predominance, findings can be seen in the setting of hypersensitivity pneumonitis, drug reaction, collagen vascular disease.  TB could be a consideration although this pattern of disease would be somewhat atypical, echo showed EF 69%.  Influenza A PCR positive on 11/19.  Patient was treated with IV ceftriaxone, IV azithromycin and Tamiflu.  He left ANW AMA which he apparently has done many times in the past.  He presented to [facility] ED for evaluation.  His vital signs were stable.  CXR discovered moderate pulm edema vs multifocal pneumonia.  Given history of TB infection, patient was placed and negative pressure room and  QuantiFERON gold TB test obtained.  Patient was subsequently admitted to on medicine for ongoing management.    #Acute on chronic hypoxic respiratory failure  #History of asthma/COPD  #History of bronchiectasis  #Questionable history of pulmonary fibrosis  #History of active TB, treated  Patient presenting in from OSH with hypoxia likely multifactorial given baseline ?history of pulmonary fibrosis.  Possibility of superimposed bacterial process though, influenza A positive as well.  QuantiFERON gold obtained indeterminate.    Continue negative pressure isolation  -QuantiFERON gold TB test result    ID service consult    Follow-up AFB cultures  sent.  -Patient is currently on 3 L NC O2 supplement, wean off O2 supplement for goal SpO2 < 92%  -Procalcitonin level 0.07 suggesting low risk for systemic bacterial infection    Attempts to obtain sputum culture    Abx    Discontinue IV vancomycin given negative MRSA nares.  Now discontinued Zosyn on 11/24 given negative Pro-Bruno.    Continue PTA prednisone 20 mg    Continue PTA Pulmicort     Complete course of Azithromycin for antiinflammatory effect for presumed ?COPD    Will consider getting pulm involved in patient's care  -Legionella, sputum cx, AFB cx, Strep antigen for remain negative to date.     Tamiflu x5 days    Pulm consulted, appreciate recs.    #Chronic immunosuppression  Further discussion with pharmacy, patient has been getting refills for prednisone 20 mg daily since December 2021.  Per chart review and review of dispense report, patient has not been receiving PJP prophylaxis    Follow-up LDH, Fungi-tell    Follow-up PJP PCR      #History of HFpEF - not in exacerbation.  -CXR with probable moderate pulmonary edema  -However BNP level within normal  -No lower extremity edema on exam  -TTE at [facility] on 11/19 revealed EF 69%  -Compensated    Has received x5 days of IV furosemide 20 mg daily    Start furosemide 20 mg p.o. daily tomorrow      #Acute metabolic encephalopathy -resolved  -No documented history of dementia from reviewing his chart  -Patient has been mostly confused and disoriented  -Suspect encephalopathy due to influenza A infection  -Continue bedside sitter    Zyprexa as needed for agitation    Discontinue as needed Ativan    #Severe symptomatic ACD  -Patient's baseline hemoglobin is 8-9 g  -His hemoglobin was 6.1 on arrival to the ED  -Status post 1 unit PRBC transfusion on 11/20  -Hemoglobin has remained stable.  -Iron studies significant for iron deficiency.     Give iron infusion    Start ferrous sulfate supplement      Leukocytosis - resolved  -Likely due to  steroid +/- infection  -Now resolved.    GERD    PTA Protonix    Universal COVID-19 screening    Fully vaccinated and boosted x1    SARS-CoV-2 PCR negative on 11/19 and 11/20          Diet: Regular Diet Adult  Snacks/Supplements Adult: Ensure Enlive; With Meals  Room Service    DVT Prophylaxis: Pneumatic Compression Devices  Vu Catheter: Not present  Central Lines: None  Cardiac Monitoring: None  Code Status: Full Code    Disposition Plan   TBD.         MEI LEAHY MD  Hospitalist Service, GOLD TEAM 16  [facility]  Securely message with the Vocera Web Console (learn more here)  Text page via [facility] Paging/Directory   Please see signed in provider for up to date coverage information    _____________________________________________________________    Interval History   - No acute events overnight  - Patient very frustrated about being in the hospital, asking to be discharged.  Patient's son and brother were in conversation earlier today recommending patient should remain in the hospital to complete therapy.  -Patient reluctantly accepting to stay in the hospital until tomorrow.  - Overall, patient's O2 requirement continues to decrease.    Data reviewed today: I reviewed all medications, new labs and imaging results over the last 24 hours. I personally reviewed no images or EKG's today.    Physical Exam   Vital Signs: Temp: 98.7  F (37.1  C) Temp src: Oral BP: (!) 146/54 Pulse: 56   Resp: 16 SpO2: 98 % O2 Device: Nasal cannula Oxygen Delivery: 2 LPM  Weight: 149 lbs 11.08 oz  General: Confused and disoriented, NAD.  HEENT:  NC/AT, neck supple  CVS:  NL s 1 and s2, no m/r/g.  Lungs: Bilateral fine inspiratory and expiratory crackles throughout lung fields.   Abd:  Soft, + bs, NT, no rebound or gaurding, no fluid shift.  Ext:  No c/c.  Lymph:  No edema.  Neuro:  Nonfocal.  Musculoskeletal: No calf tenderness to palpation.    Skin:  No rash.      Data   Recent Labs    Lab 11/24/22  0806 11/23/22  1513 11/22/22  0550 11/21/22  1121 11/20/22  1828   WBC 8.8 6.3 10.2 12.3* 12.9*   HGB 7.7* 8.5* 9.3* 7.8* 6.1*   MCV 74* 72* 72* 71* 71*    320 361 309 343   NA  --  143 140  --  136   POTASSIUM  --  4.1 3.6  --  4.5   CHLORIDE  --  102 99  --  100   CO2  --  37* 36*  --  26   BUN  --  29 17  --  15   CR  --  0.98 0.87 0.81 0.80  0.78   ANIONGAP  --  4 5  --  10   MATT  --  8.9 9.4  --  8.8   GLC  --  163* 118*  --  136*     No results found for this or any previous visit (from the past 24 hour(s)).  Medications       azithromycin  250 mg Oral Daily     budesonide  0.5 mg Nebulization Daily     ferrous sulfate  325 mg Oral Q Mon Wed Fri AM     furosemide  20 mg Intravenous QAM     oseltamivir  30 mg Oral BID     pantoprazole  40 mg Oral QAM AC     predniSONE  20 mg Oral Daily     sodium chloride (PF)  3 mL Intracatheter Q8H

## 2022-11-26 NOTE — PLAN OF CARE
Goal Outcome Evaluation:      VS: BP (!) 151/53   Pulse 71   Temp 98  F (36.7  C) (Axillary)   Resp 18   Ht 1.829 m (6')   Wt 67.9 kg (149 lb 11.1 oz)   SpO2 100%   BMI 20.30 kg/m       O2: 2L via nasal cannula . Has SOB when ambulating    Output: Ambulating  to restroom and uses bedside urinal . Noted urgency when having to urinate   Last BM: Unknown   Activity: Assist x2 with walker and gait belt    Up for meals? Yes    Skin: Intact   Pain: Denies pain     CMS: Intermittently confused, garbled speech    Dressing: N/a   Diet: Regular    LDA: R. PIV SL    Equipment: Walker , personal items , IV pole , Call light with reach    Plan: Awaiting oxygen therapy results, continue POC    Additional Info: 1:1 sitter at bedside

## 2022-11-26 NOTE — PROGRESS NOTES
Oxygen Assessment:  Patient's oxygen saturation while on room air was as low as 86%.    RN to complete walking O2 assessment.       MEI LEAHY MD  Hospitalist Service  [facility]

## 2022-11-26 NOTE — CONSULTS
PULMONOLOGY       Job Kaiser   MRN# [id number]   Age: 82 year old YOB: 1940     Date of Admission:  11/20/2022  Reason for Consultation:     Pneumonia   Requesting Physician:         Dr Dimitry Perez        Assessment and Plan:    1. Dyspnea in setting of PNA/HCAP and influenza in a patient with latent TB, fibrosis/bronchiactasis and asthma/COPD overlap. He appears to be improving with abx, tamiflu bronchodilators and supportive care. He is on 2L/m with SpO2 100%; therefore, would encourage more aggressive O2 weaning today.   - Cont tamiflu course   - Azithromycin for 5d total   - Cont pulmicort and duonebs both inpatient and on discharge   - Cont prednisone 20mg/d until he is seen in the pulmonary clinic   - Please arrange close pulmonary follow-up      The patient was seen and examined by myself with/without a resident/fellow physician.  We have discussed the patient in detail and I agree with the findings, assessment, and plan as documented when this note was cosigned on November 26, 2022. I have evaluated all laboratory values and imaging studies for the past 24 hours. I have reviewed all the consults that have been ordered and are active for this patient.      Billing: The patient was seen and examined by me and the findings, assessment, and plan as documented was explained to the patient/family who expressed understand.     Jean-Claude Cazares MD  Associate Professor of Medicine  Section of Interventional Pulmonology   Division of Pulmonary, Allergy, Critical Care and Sleep Medicine   [facility]  Pager: [phone]   Office: [phone]  Email: [email or web address]    Raisa PIÑA RN  Interventional Pulmonary Care Coordinator  Office: [phone]  Email: [email or web address]    Kristine Butcher  Interventional Pulmonology Surgery Scheduler  Office: [phone]  Email: [email or web address]                    HPI/Interval history     Job Kaiser is a 82 year old male  with sig h/o for TB, bronchiectasis, asthma/COPD who we are consulted for pneumonia.    -Challenging patient to understand with son, nurse and  at bedside; therefore, history is very limited.   - Admitted on  for dyspnea after leaving AMA from [facility] the day before. CT demonstrated bilateral ggo in setting of emphysema and fibrosis. The presumed diagnosis was asthma/COPD exacerbation +/- HCAP with positive influenza. He was treated with bronchodilators, steroids and broad spectrum abx.   - He has been on prednisone chronically per pharmacy notes and we are consulted to help with steroid management in addition to respiratory issues in this current admission  - Based on his son's report, he has been short of breath for some time now ~6mo. He has seen pulmonary (Dr Whiting) in August where they discussed a slow prednisone taper. Apparently, he has been on prednisone since then.  - He currently denies fever, chills, chest pain, but does have minor productive cough. He does feel better since admission.     Other active medical problems include:   - Has asthma/COPD overlap. Had 28pkyr smoking history and quit . CT chest with evidence of bronchiectasis, bronchiolitis and fibrosis. Bronchodilator regimen includes pulmicort nebs BID and albuterol. Has been on 40mg prednisone prior to August. Previous PFT's have not met ATS criteria.   - Has TB. Previously treated.           Past Medical History:      Past Medical History:   Diagnosis Date     Bronchiectasis (H)      COPD (chronic obstructive pulmonary disease) (H)      Nephrolithiasis      Tuberculosis, treated            Past Surgical History:      Past Surgical History:   Procedure Laterality Date     NO HISTORY OF SURGERY            Social History:     Social History     Tobacco Use     Smoking status: Former     Types: Cigarettes     Quit date: 1995     Years since quittin.9     Smokeless tobacco: Not on file   Substance Use Topics      Alcohol use: No          Family History:     Family History   Problem Relation Age of Onset     Glaucoma No family hx of      Macular Degeneration No family hx of            Allergies:    No Known Allergies       Medications:     Current Facility-Administered Medications   Medication     albuterol (PROVENTIL HFA/VENTOLIN HFA) inhaler     azithromycin (ZITHROMAX) tablet 250 mg     benztropine (COGENTIN) tablet 1 mg     budesonide (PULMICORT) neb solution 0.5 mg     ferrous sulfate (FEROSUL) tablet 325 mg     furosemide (LASIX) tablet 20 mg     HYDROmorphone (DILAUDID) injection 0.2 mg     lidocaine (LMX4) cream     lidocaine 1 % 0.1-1 mL     melatonin tablet 1 mg     naloxone (NARCAN) injection 0.2 mg    Or     naloxone (NARCAN) injection 0.4 mg    Or     naloxone (NARCAN) injection 0.2 mg    Or     naloxone (NARCAN) injection 0.4 mg     OLANZapine (zyPREXA) injection 2.5 mg     OLANZapine zydis (zyPREXA) ODT tab 5 mg     oseltamivir (TAMIFLU) capsule 30 mg     pantoprazole (PROTONIX) EC tablet 40 mg     predniSONE (DELTASONE) tablet 20 mg     sodium chloride (PF) 0.9% PF flush 3 mL     sodium chloride (PF) 0.9% PF flush 3 mL     traMADol (ULTRAM) half-tab 25 mg          Review of Systems:     CONSTITUTIONAL: negative for fever, chills, change in weight  INTEGUMENTARY/SKIN: no rash or obvious new lesions  ENT/MOUTH: no sore throat, new sinus pain or nasal drainage  RESP: see interval history  CV: negative for chest pain, palpitations or peripheral edema  GI: no nausea, vomiting, change in stools  : no dysuria  MUSCULOSKELETAL: no myalgias, arthralgias  ENDOCRINE: blood sugars with adequate control  PSYCHIATRIC: mood stable  LYMPHATIC: no new lymphadenopathy  HEME: no bleeding or easy bruisability  NEURO: no numbness, weakness, headaches         Physical Exam:     Temp:  [97.5  F (36.4  C)-99.3  F (37.4  C)] 97.5  F (36.4  C)  Pulse:  [59-69] 69  Resp:  [18-20] 18  BP: (138-141)/(59-76) 141/76  SpO2:  [99 %-100 %]  100 %  Wt Readings from Last 4 Encounters:   11/23/22 67.9 kg (149 lb 11.1 oz)   02/06/20 85.7 kg (189 lb)   03/01/15 85.9 kg (189 lb 6.4 oz)   02/02/15 87.3 kg (192 lb 6.4 oz)     Constitutional:   Awake, alert and in no apparent distress     Eyes:   Nonicteric, TK     ENT:    Trachea is midline. No gross neck abnormalities      Neck:   Supple without supraclavicular or cervical lymphadenopathy     Lungs:   Good air flow.  No crackles. No rhonchi.  No wheezes.     Cardiovascular:   Normal S1 and S2.  RRR.  No murmur, gallop or rub.  Radial, DP and PT pulses normal and symmetric     Abdomen:   NABS, soft, nontender, nondistended.  No HSM.     Musculoskeletal:   No edema.      Neurologic:   Alert and conversant. Cranial nerves  intact.       Skin:   Warm, dry.  No rash on limited exam.           Current Laboratory Data:   All laboratory and imaging data reviewed.    Results for orders placed or performed during the hospital encounter of 11/20/22 (from the past 24 hour(s))   Lactate Dehydrogenase   Result Value Ref Range    Lactate Dehydrogenase 241 (H) 85 - 227 U/L            Previous Pulmonary Function Testing     FVC-Pred   Date Value Ref Range Status   08/08/2022 3.12 L    10/27/2014 3.32 L      FVC-Pre   Date Value Ref Range Status   08/08/2022 1.49 L    10/27/2014 1.87 L      FVC-%Pred-Pre   Date Value Ref Range Status   08/08/2022 47 %    10/27/2014 56 %      FEV1-Pre   Date Value Ref Range Status   08/08/2022 1.02 L    10/27/2014 1.37 L      FEV1-%Pred-Pre   Date Value Ref Range Status   08/08/2022 43 %    10/27/2014 54 %      FEV1FVC-Pred   Date Value Ref Range Status   08/08/2022 75 %    10/27/2014 76 %      FEV1FVC-Pre   Date Value Ref Range Status   08/08/2022 68 %    10/27/2014 73 %      RDE8UOU-%Pred-Pre   Date Value Ref Range Status   10/27/2014 96 %      FEFMax-Pred   Date Value Ref Range Status   08/08/2022 7.27 L/sec    10/27/2014 7.57 L/sec      FEFMax-Pre   Date Value Ref Range Status    08/08/2022 2.03 L/sec    10/27/2014 3.94 L/sec      FEFMax-%Pred-Pre   Date Value Ref Range Status   08/08/2022 27 %    10/27/2014 52 %      ExpTime-Pre   Date Value Ref Range Status   08/08/2022 2.73 sec    10/27/2014 2.58 sec      FIFMax-Pre   Date Value Ref Range Status   08/08/2022 2.66 L/sec    10/27/2014 2.99 L/sec      FEV1FEV6-Pred   Date Value Ref Range Status   08/08/2022 78 %    10/27/2014 79 %      FEV1FEV6-Pre   Date Value Ref Range Status   08/08/2022 68 %    10/27/2014 72 %      SDL3LFB2-%Pred-Pre   Date Value Ref Range Status   10/27/2014 90 %             Previous Chest Imaging                  [unfilled]         Previous Cardiology Imaging   [unfilled]

## 2022-11-26 NOTE — PROGRESS NOTES
[facility]    Medicine Progress Note - Hospitalist Service, GOLD TEAM 16    Date of Admission:  11/20/2022    Assessment & Plan   Job Kaiser is an 83 yo [place] gentleman w/ h/o prior tuberculosis s/p treatment with 3 drug therapy for 6 weeks in [place], asthma COPD, bronchiectasis, ?Pulmonary fibrosis and ?T2DM.    Patient initially presented to [facility] ED on 11/19/2022 with complaints of SOB, SOTOMAYOR and cough productive of yellowish sputum where he was managed for CAP in the setting of influenza A infection. Patient left ANW AMA and he presented to [facility] ED for evaluation.  CXR discovered moderate pulm edema vs multifocal pneumonia. Given history of TB infection, patient was placed and negative pressure room and  QuantiFERON gold TB test obtained. Patient was subsequently admitted to on medicine for ongoing management.    #Acute on chronic hypoxic respiratory failure  #History of asthma/COPD  #History of bronchiectasis  #Questionable history of pulmonary fibrosis  #History of active TB, treated  CT chest with IV contrast revealed peripheral honeycombing but predominantly upper lobe predominance, findings can be seen in the setting of hypersensitivity pneumonitis, drug reaction, collagen vascular disease.    TB could be a consideration although this pattern of disease would be somewhat atypical, echo showed EF 69%.    Influenza A PCR positive on 11/19.    Patient was treated with IV ceftriaxone, IV azithromycin and Tamiflu.    He left ANW AMA which he apparently has done many times in the past.  Patient presenting in from OSH with hypoxia likely multifactorial given baseline ?history of pulmonary fibrosis.  Possibility of superimposed bacterial process though, influenza A positive as well.  QuantiFERON gold obtained indeterminate.    Continue negative pressure isolation  -QuantiFERON gold TB test result    ID service  consult    Follow-up AFB cultures sent.  -Patient is currently on 3 L NC O2 supplement, wean off O2 supplement for goal SpO2 < 92%  -Procalcitonin level 0.07 suggesting low risk for systemic bacterial infection    Attempts to obtain sputum culture    Abx    Discontinue IV vancomycin given negative MRSA nares.  Now discontinued Zosyn on 11/24 given negative Pro-Bruno.    Continue PTA prednisone 20 mg    Continue PTA Pulmicort     Complete course of Azithromycin for antiinflammatory effect for presumed ?COPD    Will consider getting pulm involved in patient's care  -Legionella, sputum cx, AFB cx, Strep antigen for remain negative to date.     Tamiflu x5 days    Pulm consulted, appreciate recs.    Home O2 assessment today    Patient was 86% today on room air.     Will require home O2.     #Chronic immunosuppression  Further discussion with pharmacy, patient has been getting refills for prednisone 20 mg daily since December 2021.  Per chart review and review of dispense report, patient has not been receiving PJP prophylaxis    Follow-up LDH, Fungi-tell    Follow-up PJP PCR      #History of HFpEF - not in exacerbation.  -CXR with probable moderate pulmonary edema  -However BNP level within normal  -No lower extremity edema on exam  -TTE at [facility] on 11/19 revealed EF 69%  -Compensated    Has received x5 days of IV furosemide 20 mg daily    Start furosemide 20 mg p.o. daily tomorrow      #Acute metabolic encephalopathy -resolved  #Agitation   -No documented history of dementia from reviewing his chart  -Patient has been mostly confused and disoriented  -Suspect encephalopathy due to influenza A infection  -Continue bedside sitter    Zyprexa as needed for agitation    Discontinue as needed Ativan    #Severe symptomatic ACD  -Patient's baseline hemoglobin is 8-9 g  -His hemoglobin was 6.1 on arrival to the ED  -Status post 1 unit PRBC transfusion on 11/20  -Hemoglobin has remained stable.  -Iron studies  significant for iron deficiency.     Give iron infusion    Start ferrous sulfate supplement      Leukocytosis - resolved  -Likely due to steroid +/- infection  -Now resolved.    GERD    PTA Protonix    Universal COVID-19 screening    Fully vaccinated and boosted x1    SARS-CoV-2 PCR negative on 11/19 and 11/20          Diet: Regular Diet Adult  Snacks/Supplements Adult: Ensure Enlive; With Meals  Room Service    DVT Prophylaxis: Pneumatic Compression Devices  Vu Catheter: Not present  Central Lines: None  Cardiac Monitoring: None  Code Status: Full Code    Disposition Plan   TBD.         MEI LEAHY MD  Hospitalist Service, GOLD TEAM 16  [facility]  Securely message with the Vocera Web Console (learn more here)  Text page via [facility] Paging/Directory   Please see signed in provider for up to date coverage information    _____________________________________________________________    Interval History   - CODE 21 called this morning given patient's agitation and received zyprexa  - Discussed with patient today with Khoi  and we re-iterated the fact that he's requiring oxygen and care team currently working getting home O2.       Data reviewed today: I reviewed all medications, new labs and imaging results over the last 24 hours. I personally reviewed no images or EKG's today.    Physical Exam   Vital Signs: Temp: 97.5  F (36.4  C) Temp src: Oral BP: (!) 141/76 Pulse: 69   Resp: 18 SpO2: 99 % O2 Device: Nasal cannula Oxygen Delivery: 2 LPM  Weight: 149 lbs 11.08 oz  General: Confused and disoriented, NAD.  HEENT:  NC/AT, neck supple  CVS:  NL s 1 and s2, no m/r/g.  Lungs: Bilateral fine inspiratory and expiratory crackles throughout lung fields.   Abd:  Soft, + bs, NT, no rebound or gaurding, no fluid shift.  Ext:  No c/c.  Lymph:  No edema.  Neuro:  Nonfocal.  Musculoskeletal: No calf tenderness to palpation.    Skin:  No rash.      Data   Recent Labs    Lab 11/24/22  0806 11/23/22  1513 11/22/22  0550 11/21/22  1121 11/20/22  1828   WBC 8.8 6.3 10.2 12.3* 12.9*   HGB 7.7* 8.5* 9.3* 7.8* 6.1*   MCV 74* 72* 72* 71* 71*    320 361 309 343   NA  --  143 140  --  136   POTASSIUM  --  4.1 3.6  --  4.5   CHLORIDE  --  102 99  --  100   CO2  --  37* 36*  --  26   BUN  --  29 17  --  15   CR  --  0.98 0.87 0.81 0.80  0.78   ANIONGAP  --  4 5  --  10   MATT  --  8.9 9.4  --  8.8   GLC  --  163* 118*  --  136*     No results found for this or any previous visit (from the past 24 hour(s)).  Medications       azithromycin  250 mg Oral Daily     budesonide  0.5 mg Nebulization Daily     ferrous sulfate  325 mg Oral Q Mon Wed Fri AM     furosemide  20 mg Oral Daily     oseltamivir  30 mg Oral BID     pantoprazole  40 mg Oral QAM AC     predniSONE  20 mg Oral Daily     sodium chloride (PF)  3 mL Intracatheter Q8H
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
## 2022-11-27 NOTE — DISCHARGE SUMMARY
[facility]  Hospitalist Discharge Summary      Date of Admission:  11/20/2022  Date of Discharge:  11/27/2022  Discharging Provider: MEI LEAHY MD  Discharge Service: Hospitalist Service, GOLD TEAM 16    Discharge Diagnoses   #Influenza A infection  #Acute hypoxic respiratory failure  #History of bronchiectasis  # Asthma/ COPD   #History of TB ( s/p treatment - unclear if he had active TB vs latent Tb at that time)  #Chronic immunosuppression  #Leukocytosis-resolved  #Microcytic anemia, with iron deficiency      Follow-ups Needed After Discharge   Follow-up Appointments     Adult [facility] Follow-up and recommended labs and tests      Follow up with primary care provider, [facility],   within 7 days for hospital follow- up.  No follow up labs or test are   needed.    Referral sent for Pulmonology appt; family and patient advised for   follow-up.       Appointments on [place] and/or [facility] (with [facility] or [facility]   provider or service). Call [phone] if you haven't heard regarding   these appointments within 7 days of discharge.             Unresulted Labs Ordered in the Past 30 Days of this Admission     Date and Time Order Name Status Description    11/25/2022  2:35 PM 1,3 Beta D glucan fungitell In process     11/20/2022  7:26 PM Acid-Fast Bacilli Culture and Stain In process       These results will be followed up by care team in hospital    Discharge Disposition   Discharged to home  Condition at discharge: Fair      Hospital Course   Job Kaiser is an 83 yo [place] gentleman w/ h/o prior tuberculosis s/p treatment with 3 drug therapy for 6 weeks in [facility], asthma COPD, bronchiectasis, ?Pulmonary fibrosis and ?T2DM.    Patient initially presented to [facility] ED on 11/19/2022 with complaints of SOB, SOTOMAYOR and cough productive of yellowish sputum where he was managed for CAP in the setting of influenza A infection.  Patient left ANW AMA and he presented to [facility] ED for evaluation.  CXR discovered moderate pulm edema vs multifocal pneumonia. Given history of TB infection, patient was placed and negative pressure room and  QuantiFERON gold TB test obtained. Patient was subsequently admitted to on medicine for ongoing management.     #Acute on chronic hypoxic respiratory failure  #History of asthma/COPD  #History of bronchiectasis  #Questionable history of pulmonary fibrosis  #History of active TB, treated  CT chest with IV contrast revealed peripheral honeycombing but predominantly upper lobe predominance, findings can be seen in the setting of hypersensitivity pneumonitis, drug reaction, collagen vascular disease.    TB was also a consideration, but patient already completed treatment for TB.     Influenza A PCR positive on 11/19.    Patient was treated with IV ceftriaxone, IV azithromycin and Tamiflu.    He left ANW AMA which he apparently has done many times in the past.  Patient presenting in from OSH with hypoxia likely multifactorial given baseline ?history of pulmonary fibrosis.    Possibility of superimposed bacterial process though, influenza A positive as well.  QuantiFERON gold obtained indeterminate.  AFB culture obtained on admission were negative prior to discharge.    -Patient was persistently hypoxic and had to be discharged on home O2.  -Patient was seen by pulmonology as well as infectious disease.    Treatment completed included     IV vancomycin 11/20 - 11/21    IV Zosyn 11/20- 11/25    Completed 5-day course of azithromycin    Completed 5-day course of Tamiflu    Patient will be discharged home on supplemental oxygen     Recommending follow-up with pulmonology.  Referral already sent.      Continue PTA prednisone 20 mg    Started on Bactrim double strength 3 times weekly.    Continue PTA Pulmicort     PTA albuterol as needed     #Chronic immunosuppression  Further discussion with  pharmacy, patient has been getting refills for prednisone 20 mg daily since December 2021.  Per chart review and review of dispense report, patient has not been receiving PJP prophylaxis    Follow-up Fungi-tell        #History of HFpEF - not in exacerbation.  -CXR with probable moderate pulmonary edema  -However BNP level within normal  -No lower extremity edema on exam  -TTE at [facility] on 11/19 revealed EF 69%  -Compensated    Has received x5 days of IV furosemide 20 mg daily    Discharged home on Lasix 20 mg p.o. daily.     #Acute metabolic encephalopathy -resolved  #Agitation   -Encephalopathy likely secondary to acute infection.  -Required Zyprexa for agitation.    Zyprexa as needed for agitation     #Severe symptomatic ACD +/- iron deficiency anemia  -Patient's baseline hemoglobin is 8-9 g  -His hemoglobin was 6.1 on arrival to the ED  -Status post 1 unit PRBC transfusion on 11/20  -Hemoglobin has remained stable.  -Iron studies significant for iron deficiency.     Given iron infusion    Start ferrous sulfate supplement in the outpatient setting    Consultations This Hospital Stay   PHARMACY TO DOSE VANCO  CARE MANAGEMENT / SOCIAL WORK IP CONSULT  PHARMACY TO DOSE VANCO  INFECTIOUS DISEASE [facility] ADULT IP CONSULT  PULMONARY GENERAL ADULT IP CONSULT    Code Status   Full Code    Time Spent on this Encounter   I, MEI LEAHY MD, personally saw the patient today and spent greater than 30 minutes discharging this patient.       MEI LEAHY MD  [facility]  [address] [place] [id number]  Phone: [phone]  Fax: [phone]  ______________________________________________________________________    Physical Exam   Vital Signs: Temp: 97.6  F (36.4  C) Temp src: Oral BP: 133/52 Pulse: 61   Resp: 17 SpO2: 99 % O2 Device: Nasal cannula Oxygen Delivery: 1 LPM  Weight: 149 lbs 11.08 oz      General appearance: Alert, in no acute distress and Ox3.   Cachectic  HEENT: Normocephalic, atraumatic; Pupils are equal, round and react to light; Extraocular movements intact; Conjuctivae are Normal; Mucous membranes moist and without lesions   Pulm: Bilateral rhonchi   CVS: RRR, no m/r/g   GI: Abdomen soft, non-tender throughout, normoactive bowel sounds and No rebound or guarding   Extremities: No cyanosis, clubbing or edema   Neurologic:   - Mental Status: Alert, relaxed, and cooperative. Thought process coherent. Oriented to person, place, and situation.          Primary Care Physician   [facility]    Discharge Orders      Adult Pulmonary Medicine Referral      Reason for your hospital stay    Acute hypoxic respiratory failure  Influenza A infection  Iron deficiency anemia     Activity    Your activity upon discharge: activity as tolerated     Adult [facility] Follow-up and recommended labs and tests    Follow up with primary care provider, [facility], within 7 days for hospital follow- up.  No follow up labs or test are needed.    Referral sent for Pulmonology appt; family and patient advised for follow-up.       Appointments on [place] and/or [facility] (with [facility] or [facility] provider or service). Call [phone] if you haven't heard regarding these appointments within 7 days of discharge.     Home Oxygen Order for DME - ONLY FOR DME    I, the undersigned, certify that the above prescribed supplies are medically necessary for this patient and is both reasonable and necessary in reference to accepted standards of medical and necessary in reference to accepted standards of medical practice in the treatment of this patient's condition and is not prescribed as a convenience.      Diet    Follow this diet upon discharge: Orders Placed This Encounter      Snacks/Supplements Adult: Ensure Enlive; With Meals      Room Service      Regular Diet Adult       Significant Results and Procedures   Most Recent 3 CBC's:  Recent Labs   Lab  Test 11/24/22  0806 11/23/22  1513 11/22/22  0550   WBC 8.8 6.3 10.2   HGB 7.7* 8.5* 9.3*   MCV 74* 72* 72*    320 361     Most Recent 3 BMP's:  Recent Labs   Lab Test 11/23/22  1513 11/22/22  0550 11/21/22  1121 11/20/22  1828    140  --  136   POTASSIUM 4.1 3.6  --  4.5   CHLORIDE 102 99  --  100   CO2 37* 36*  --  26   BUN 29 17  --  15   CR 0.98 0.87 0.81 0.80  0.78   ANIONGAP 4 5  --  10   MATT 8.9 9.4  --  8.8   * 118*  --  136*     Most Recent 2 LFT's:  Recent Labs   Lab Test 06/27/22  2027 09/10/21  1210   AST 18 22   ALT 14 17   ALKPHOS 66 65   BILITOTAL 0.2 0.3     Most Recent 3 INR's:No lab results found.,   Results for orders placed or performed during the hospital encounter of 11/20/22   XR Chest Port 1 View    Narrative    EXAM: XR CHEST PORT 1 VIEW  LOCATION: [facility]  DATE/TIME: 11/20/2022 6:37 PM    INDICATION: Shortness of breath and cough.  COMPARISON: 06/27/2022      Impression    IMPRESSION:     Diffuse prominence of the pulmonary vascularity with diffuse patchy opacities, likely moderate pulmonary edema. Component of multifocal pneumonia is not excluded in the appropriate clinical context.    No pleural effusion or pneumothorax.    The cardiomediastinal silhouette is unremarkable.       Discharge Medications   Current Discharge Medication List      START taking these medications    Details   azithromycin (ZITHROMAX) 250 MG tablet Take 1 tablet (250 mg) by mouth daily for 1 day  Qty: 1 tablet, Refills: 0    Associated Diagnoses: Bronchiectasis with acute exacerbation (H); Pulmonary infiltrates      ferrous sulfate (FEROSUL) 325 (65 Fe) MG tablet Take 1 tablet (325 mg) by mouth Every Mon, Wed, Fri Morning  Qty: 30 tablet, Refills: 0    Associated Diagnoses: Iron deficiency anemia, unspecified iron deficiency anemia type      furosemide (LASIX) 20 MG tablet Take 1 tablet (20 mg) by mouth daily  Qty: 30 tablet, Refills: 0     Associated Diagnoses: Bronchiectasis with acute exacerbation (H); Pulmonary infiltrates      sulfamethoxazole-trimethoprim (BACTRIM DS) 800-160 MG tablet Take 1 tablet by mouth Every Mon, Wed, Fri Morning  Qty: 60 tablet, Refills: 0    Associated Diagnoses: Immunosuppression due to chronic steroid use (H)         CONTINUE these medications which have CHANGED    Details   albuterol (PROAIR HFA/PROVENTIL HFA/VENTOLIN HFA) 108 (90 Base) MCG/ACT inhaler Inhale 2 puffs into the lungs every 4 hours as needed for shortness of breath / dyspnea or wheezing  Qty: 18 g, Refills: 0    Comments: Pharmacy may dispense brand covered by insurance (Proair, or proventil or ventolin or generic albuterol inhaler)  Associated Diagnoses: Chronic obstructive pulmonary disease, unspecified COPD type (H); Bronchiectasis with acute exacerbation (H)      mometasone-formoterol (DULERA) 200-5 MCG/ACT inhaler Inhale 2 puffs into the lungs 2 times daily  Qty: 13 g, Refills: 0    Associated Diagnoses: Chronic obstructive pulmonary disease, unspecified COPD type (H); Bronchiectasis with acute exacerbation (H)      predniSONE (DELTASONE) 20 MG tablet Take 1 tablet (20 mg) by mouth daily  Qty: 30 tablet, Refills: 0    Associated Diagnoses: Bronchiectasis with acute exacerbation (H); Pulmonary infiltrates         CONTINUE these medications which have NOT CHANGED    Details   diclofenac (VOLTAREN) 1 % topical gel Apply topically 4 times daily      metFORMIN (GLUCOPHAGE) 1000 MG tablet Take 1,000 mg by mouth 2 times daily (with meals)      aspirin 81 MG EC tablet Take 81 mg by mouth daily      budesonide (PULMICORT) 0.5 MG/2ML neb solution Take 2 mLs (0.5 mg) by nebulization daily for 360 days  Qty: 180 mL, Refills: 3    Associated Diagnoses: Adult bronchiectasis (H)           Allergies   No Known Allergies

## 2022-11-27 NOTE — PROGRESS NOTES
Home O2 Face to Face       Home Oxygen Order for DME - ONLY FOR DME  As directed         DME Provider: [place]         Start Date: 11/26/2022         Type: New/Recertification         Oxygen Consult Reqt: Call [facility] before patient leaves at [phone] (to ensure SATS testing is completed).         Did the patient have SpO2 (sat) testing (only needed for new oxgyen or liter flow changes)?: Yes         Length of Need: 12 Months         Frequency of Use:  With Activity   Continuous            Mode of Delivery - Continuous: Nasal Cannula         Liter Flow - Continuous (LPM): 1         Mode of Delivery - With Activity: Nasal Cannula         Liter Flow - With Activity (LPM): 2         Need for Portability: Yes         Evaluate for Conserving Device: Yes         Maintain Sats >=: 90%         The face to face evaluation was performed on: 11/26/2022         I, the undersigned, certify that the above prescribed supplies are medically necessary for this patient and is both reasonable and necessary in reference to accepted standards of medical and necessary in reference to accepted standards of medical practice in the treatment of this patient's condition and is not prescribed as a convenience.          Pulmonary infiltrates

## 2022-11-27 NOTE — PROGRESS NOTES
[facility] Follow Up Note      Patient:  Job Kaiser, Date of birth 1940, Medical record number [id number]  Date of Visit:  November 24, 2022         Assessment and Recommendations:   ID Problem List:    1. Influenza A ( Dx 11/19/22)   2. Asthma/ COPD   3. Bronchiectasis   4. History of TB ( s/p treatment - unclear if he had active TB vs latent Tb at that time)   5. Productive cough   6. Limited activity due to dyspnea on exertion for at least 1 year   7. SARsCoV2 neg 11/19 and 11/20/22    Recommendations:    -complete tamiflu today.  -no follow up with us needed.      Discussion:    Patient with dypnea who presented after leaving [facility]. Apparently he had Influenza and hypoxia there. He looks well. His CXR is abnormal but hx of bronchiectasis and influenza could account for it.He has a workup for TB here although only 2 smears sent. He has a hx of being treated with medication in the past although no clear documentation.     There was a discussion of PJP since he has been on 20 mg of prednisone for a long time. It is non-specific so hard to know what might be influenza versus other. However, his improvement without anti PJP therapy suggests that is not the cause.     PJP prophylaxis is recommended for patients receiving a glucocorticoid dose equivalent to ?20 mg of prednisone daily for one month or longer who also have another cause of immunocompromise (eg, certain hematologic malignancies or a second immunosuppressive drug). I don't see that he is on another immunosuppressant but perhaps it is held. If rheumatology or other has him on something I would start bactrim 1 ds tablet daily.    His Beta D glucan is pending. Please contact me if that is positive.     I am fine with him discharging on O2. He has completed Tamiflu and got 5 days of azithromycin as well. He does not need anything at discharge.     I will sign off. Don't hesitate to call with questions.     Attestation:  I have  reviewed today's vital signs, medications, labs and imaging.  Ritu Duncan MD  Pager [phone]          Interval History:       Patient is improved. Feels well. No fevers or chills. Eager to go home.         Review of Systems:   Full 9 pt ROS obtained, pertinent positives and negatives as above.          Current Antimicrobials   Current:    Azithromycin, Tamiflu         Physical Exam:   Ranges for vital signs:  Temp:  [97.6  F (36.4  C)-98  F (36.7  C)] 97.6  F (36.4  C)  Pulse:  [61-72] 61  Resp:  [17-18] 17  BP: (133-160)/(52-97) 133/52  SpO2:  [89 %-100 %] 99 %    Intake/Output Summary (Last 24 hours) at 11/24/2022 1639  Last data filed at 11/24/2022 1100  Gross per 24 hour   Intake 1300 ml   Output 925 ml   Net 375 ml     Exam:  GENERAL:  well-developed, well-nourished, sitting in bed in no acute distress. Thin but in no acute distress  ENT:  Head is normocephalic, atraumatic. Oropharynx is moist without exudates or ulcers.  EYES:  Eyes have anicteric sclerae.    NECK:  Supple.  LUNGS:  Clear to auscultation.  CARDIOVASCULAR:  Regular rate and rhythm with no murmurs, gallops or rubs.  ABDOMEN:  Normal bowel sounds, soft, nontender.  EXT: Extremities warm and without edema.  SKIN:  No acute rashes.  Line is in place without any surrounding erythema.  NEUROLOGIC:  Grossly nonfocal.         Laboratory Data:   Reviewed.  Pertinent for:  Procalcitonin <0.05  WBC 8.8    Culture data:    11/23/2022 1847 11/23/2022 2109 Legionella pneumophila antigen urine [[id number]]   Urine, NOS    Final result Component Value   Legionella pneumophila serogroup 1 urinary antigen Negative   Suggests no recent or current infection. Infection due to Legionella cannot be ruled out, since other serogroups and species may cause disease, antigen may not be present in urine in early infection, and the level of antigen present in the urine may be below detectable limits of the test.          11/23/2022 1847 11/23/2022 2109  Streptococcus pneumoniae antigen [[id number]]   Urine, NOS    Final result Component Value   Streptococcus pneumoniae antigen Negative   A negative Streptococcus pneumoniae antigen result does not rule out infection with Streptococcus pneumoniae.          11/23/2022 1845 11/24/2022 1417 Respiratory Aerobic Bacterial Culture with Gram Stain [[id number]]   Sputum from Expectorate    Preliminary result Component Value   Culture No growth, less than 1 day P   Gram Stain Result <10 Squamous epithelial cells/low power field P    >25 PMNs/low power field P    No organisms seen P             11/22/2022 1354 11/22/2022 1444 Acid-Fast Bacilli Culture and Stain [[id number]]    Sputum from Other    In process Component Value   No component results          11/22/2022 1354 11/24/2022 1146 Acid-Fast Bacilli Culture and Stain [[id number]]    Sputum from Other    Preliminary result Component Value   Acid Fast Stain No acid fast bacilli seen P   Performed by [employer] [facility] [place] [address] [phone], Gilberto Amador MD, PHD, Lab. Director   Acid Fast Stain No acid fast bacilli seen P   Performed by [employer] [facility] [place] [address] [phone], Gilberto Amador MD, PHD, Lab. Director   This is an appended report. These results have been appended to a previously preliminary verified report.          11/22/2022 1026 11/22/2022 1432 MRSA MSSA PCR, Nasal Swab [[id number]]    Swab from Nares, Bilateral    Final result Component Value   MRSA Target DNA Negative   SA Target DNA Negative          11/20/2022 2117 11/22/2022 0609 Urine Culture Aerobic Bacterial [[id number]]   Urine, Midstream    Final result Component Value   Culture No Growth             11/20/2022 1850 11/20/2022 1935 Asymptomatic COVID-19 Virus (Coronavirus) by PCR Nose [[id number]]    Swab from Nose    Final result Component Value   SARS CoV2 PCR Negative   NEGATIVE: SARS-CoV-2 (COVID-19)  RNA not detected, presumed negative.          11/20/2022 1828 11/23/2022 2131 Blood Culture Peripheral Blood [[id number]]   Peripheral Blood    Preliminary result Component Value   Culture No growth after 3 days P             11/20/2022 1828 11/23/2022 2131 Blood Culture Peripheral Blood [[id number]]   Peripheral Blood    Preliminary result Component Value   Culture No growth after 3 days P             11/19/2022 0019 11/20/2022 1743 COVID-19 VIRUS (CORONAVIRUS) BY PCR (EXTERNAL RESULT) [[id number]]    Specimen   Specimen type and source: Oth...    Final result Component Value   COVID-19 Virus by PCR (External Result) Negative                     Imaging:     EXAM: XR CHEST PORT 1 VIEW   LOCATION: [facility]   DATE/TIME: 11/20/2022 6:37 PM     INDICATION: Shortness of breath and cough.   COMPARISON: 06/27/2022    Impression:     IMPRESSION:     Diffuse prominence of the pulmonary vascularity with diffuse patchy opacities, likely moderate pulmonary edema. Component of multifocal pneumonia is not excluded in the appropriate clinical context.     No pleural effusion or pneumothorax.     The cardiomediastinal silhouette is unremarkable.

## 2022-11-27 NOTE — PLAN OF CARE
Goal Outcome Evaluation:         VS: /52 (BP Location: Left arm)   Pulse 61   Temp 97.6  F (36.4  C) (Oral)   Resp 17   Ht 1.829 m (6')   Wt 67.9 kg (149 lb 11.1 oz)   SpO2 99%   BMI 20.30 kg/m       O2: 1L via nasal canula    Output: Voids spontanously and adquetely    Last BM: Unknown - Pt unable to verbalize   Activity: Assist x1 with walker and gait belt    Up for meals? Yes   Skin: Intact   Pain: Denies pain    CMS: Unable to assess. Mildly confused , but more oriented to staff during shift   Dressing: N/a   Diet: Regular   LDA: R. PIV SL .Removed at discharge    Equipment: IV Pole , call light within reach   Plan: Discharged home with oxygen    Additional Info:            6MS DISCHARGE    D: Patient discharged to home at 2:45 pm. Patient accompanied by son and nephew.    I: Discharge prescriptions given to patient. All discharge medications and instructions reviewed with son and nephew. Patient instructed to seek care if experiencing worsening symptoms, such as decreased oxygen . Other phone numbers to call with questions or concerns after discharge reviewed. R. PIV removed. Education completed.    A: Son and nephew verbalized understanding of discharge medications and instructions. Prescribed home medications given to patient.  Belongings returned to patient.

## 2022-11-27 NOTE — PROGRESS NOTES
VS: BP (!) 141/97   Pulse 72   Temp 98  F (36.7  C) (Axillary)   Resp 18   Ht 1.829 m (6')   Wt 67.9 kg (149 lb 11.1 oz)   SpO2 90%   BMI 20.30 kg/m       O2: Sating >90% on RA, Lung sounds clear, Denies chest pain/SOB   Output: Voids spontaneously in urinal   Last BM: Unknown   Activity: Up with assistant of one, sitter in room   Up for meals? N/A   Skin: Visible skin intact, patient refuses full skin assessment    Pain: Patient denies pain   CMS: AO x4, Denies N/T   Dressing: N/A   Diet: Regular diet   LDA: R PIV SL   Equipment: Walker, GB, IV pole, personal items, call light w/in reach   Plan: Continue POC   Additional Info: 1:1 sitter at bedside     Deborah Huynh RN on 11/27/2022 at 7:55 AM

## 2022-11-27 NOTE — DISCHARGE INSTRUCTIONS
Referral sent for outpatient follow-up with pulmonology, please follow-up accordingly  Please follow-up with your primary care doctor as soon as possible  You have been started on an antibiotic called Bactrim which you should take daily to prevent infection.  You should take this antibiotic on Mondays, Wednesdays and Fridays.

## 2022-11-27 NOTE — PROGRESS NOTES
Pt. Awoke at 415 am. Patient needed to urinate and had a bowl movement. Staff assisted with mobility. Patient was yelling a bit, seemed irritated trying to communicate with language barrier. Staff assisted patient back to bed. Patient drank an ensure, put a dip of copenhagen in his mouth and drank some juice. He is currently resting back in bed listening to his audio on his phone. Patient needs a [place] sitter to help with language barrier

## 2022-11-29 NOTE — PROGRESS NOTES
[facility] Contact  [facility]/Voicemail     Clinical Data: Transitional Care Management Outreach     Outreach attempted x 2.  Left message on patient's voicemail, providing [facility]'s 24/7 scheduling and nurse triage phone number [phone] ([phone]) for questions/concerns and/or to schedule an appt with an [facility] provider, if they do not have a PCP.      Plan:  [facility] will do no further outreaches at this time.       Rabia Frazier  Community Health Worker  [facility], [facility]  [phone]      *Connected Care Resource Team does NOT follow patient ongoing. Referrals are identified based on internal discharge reports and the outreach is to ensure patient has an understanding of their discharge instructions.

## 2022-12-29 NOTE — TELEPHONE ENCOUNTER
RECORDS RECEIVED FROM: internal/ce    DATE RECEIVED: 2.24.23   NOTES STATUS DETAILS   OFFICE NOTE from referring provider internal  Dimitry Perez MD   OFFICE NOTE from other specialist  AZUCENAIN- 5.6.22 Binu  4.20.22 Sushila   12.24.21 Aroldo  More in epic    DISCHARGE SUMMARY from hospital     DISCHARGE REPORT from the ER internal  internal -11.20.22 Gely Beattyina- 11.18.22 Roland   10.10.22- Jean-Claude   9.19.22-Ronald   More in [facility]    MEDICATION LIST internal     IMAGING  (NEED IMAGES AND REPORTS)     CT SCAN internal /ce internal - 6.27.22  [facility] -11.19.22,    CHEST XRAY (CXR) internal /ce internal 11.20.22, 6.27.22, 4.20.22    [facility]- 11.18.22, 10.10.22, 9.19.22, 12.24.21   TESTS     PULMONARY FUNCTION TESTING (PFT) internal  8/8/22   Scheduled 2.24.23           Action 12.29.22 sv    Action Taken Image request sent to [facility] for   CXR- 11.18.22, 10.10.22, 9.19.22, 12.24.21  CT- 11.19.22

## 2023-01-01 ENCOUNTER — APPOINTMENT (OUTPATIENT)
Dept: GENERAL RADIOLOGY | Facility: CLINIC | Age: 83
DRG: 177 | End: 2023-01-01
Payer: COMMERCIAL

## 2023-01-01 ENCOUNTER — APPOINTMENT (OUTPATIENT)
Dept: SPEECH THERAPY | Facility: CLINIC | Age: 83
DRG: 177 | End: 2023-01-01
Payer: COMMERCIAL

## 2023-01-01 ENCOUNTER — APPOINTMENT (OUTPATIENT)
Dept: CT IMAGING | Facility: CLINIC | Age: 83
DRG: 177 | End: 2023-01-01
Attending: STUDENT IN AN ORGANIZED HEALTH CARE EDUCATION/TRAINING PROGRAM
Payer: COMMERCIAL

## 2023-01-01 ENCOUNTER — APPOINTMENT (OUTPATIENT)
Dept: CT IMAGING | Facility: CLINIC | Age: 83
DRG: 177 | End: 2023-01-01
Payer: COMMERCIAL

## 2023-01-01 ENCOUNTER — TELEPHONE (OUTPATIENT)
Dept: PULMONOLOGY | Facility: CLINIC | Age: 83
End: 2023-01-01
Payer: COMMERCIAL

## 2023-01-01 ENCOUNTER — APPOINTMENT (OUTPATIENT)
Dept: INTERVENTIONAL RADIOLOGY/VASCULAR | Facility: CLINIC | Age: 83
DRG: 177 | End: 2023-01-01
Attending: PHYSICIAN ASSISTANT
Payer: COMMERCIAL

## 2023-01-01 ENCOUNTER — APPOINTMENT (OUTPATIENT)
Dept: INTERPRETER SERVICES | Facility: CLINIC | Age: 83
DRG: 177 | End: 2023-01-01
Payer: COMMERCIAL

## 2023-01-01 ENCOUNTER — APPOINTMENT (OUTPATIENT)
Dept: GENERAL RADIOLOGY | Facility: CLINIC | Age: 83
DRG: 177 | End: 2023-01-01
Attending: SURGERY
Payer: COMMERCIAL

## 2023-01-01 ENCOUNTER — HOSPITAL ENCOUNTER (INPATIENT)
Facility: CLINIC | Age: 83
LOS: 16 days | DRG: 177 | End: 2023-03-25
Attending: EMERGENCY MEDICINE | Admitting: FAMILY MEDICINE
Payer: COMMERCIAL

## 2023-01-01 ENCOUNTER — APPOINTMENT (OUTPATIENT)
Dept: GENERAL RADIOLOGY | Facility: CLINIC | Age: 83
DRG: 177 | End: 2023-01-01
Attending: NURSE PRACTITIONER
Payer: COMMERCIAL

## 2023-01-01 ENCOUNTER — APPOINTMENT (OUTPATIENT)
Dept: GENERAL RADIOLOGY | Facility: CLINIC | Age: 83
DRG: 177 | End: 2023-01-01
Attending: INTERNAL MEDICINE
Payer: COMMERCIAL

## 2023-01-01 ENCOUNTER — APPOINTMENT (OUTPATIENT)
Dept: ULTRASOUND IMAGING | Facility: CLINIC | Age: 83
DRG: 177 | End: 2023-01-01
Attending: NURSE PRACTITIONER
Payer: COMMERCIAL

## 2023-01-01 ENCOUNTER — APPOINTMENT (OUTPATIENT)
Dept: ULTRASOUND IMAGING | Facility: CLINIC | Age: 83
DRG: 177 | End: 2023-01-01
Payer: COMMERCIAL

## 2023-01-01 ENCOUNTER — APPOINTMENT (OUTPATIENT)
Dept: CT IMAGING | Facility: CLINIC | Age: 83
DRG: 177 | End: 2023-01-01
Attending: NURSE PRACTITIONER
Payer: COMMERCIAL

## 2023-01-01 ENCOUNTER — APPOINTMENT (OUTPATIENT)
Dept: GENERAL RADIOLOGY | Facility: CLINIC | Age: 83
DRG: 177 | End: 2023-01-01
Attending: EMERGENCY MEDICINE
Payer: COMMERCIAL

## 2023-01-01 ENCOUNTER — DOCUMENTATION ONLY (OUTPATIENT)
Dept: OTHER | Facility: CLINIC | Age: 83
End: 2023-01-01

## 2023-01-01 ENCOUNTER — PRE VISIT (OUTPATIENT)
Dept: PULMONOLOGY | Facility: CLINIC | Age: 83
End: 2023-01-01

## 2023-01-01 VITALS
SYSTOLIC BLOOD PRESSURE: 97 MMHG | WEIGHT: 139.33 LBS | TEMPERATURE: 99.9 F | DIASTOLIC BLOOD PRESSURE: 53 MMHG | BODY MASS INDEX: 18.9 KG/M2

## 2023-01-01 DIAGNOSIS — R59.1 LYMPHADENOPATHY: ICD-10-CM

## 2023-01-01 DIAGNOSIS — J47.9 BRONCHIECTASIS WITHOUT COMPLICATION (H): ICD-10-CM

## 2023-01-01 DIAGNOSIS — J90 PLEURAL EFFUSION: ICD-10-CM

## 2023-01-01 DIAGNOSIS — D84.821 IMMUNOSUPPRESSION DUE TO CHRONIC STEROID USE (H): ICD-10-CM

## 2023-01-01 DIAGNOSIS — B37.0 THRUSH: ICD-10-CM

## 2023-01-01 DIAGNOSIS — Z11.52 ENCOUNTER FOR SCREENING LABORATORY TESTING FOR SEVERE ACUTE RESPIRATORY SYNDROME CORONAVIRUS 2 (SARS-COV-2): ICD-10-CM

## 2023-01-01 DIAGNOSIS — Z86.11 HISTORY OF TUBERCULOSIS: ICD-10-CM

## 2023-01-01 DIAGNOSIS — K21.00 GASTROESOPHAGEAL REFLUX DISEASE WITH ESOPHAGITIS, UNSPECIFIED WHETHER HEMORRHAGE: ICD-10-CM

## 2023-01-01 DIAGNOSIS — J47.9 ADULT BRONCHIECTASIS (H): Primary | ICD-10-CM

## 2023-01-01 DIAGNOSIS — J45.20 INTERMITTENT ASTHMA WITHOUT COMPLICATION, UNSPECIFIED ASTHMA SEVERITY: ICD-10-CM

## 2023-01-01 DIAGNOSIS — J18.9 COMMUNITY ACQUIRED PNEUMONIA, UNSPECIFIED LATERALITY: Primary | ICD-10-CM

## 2023-01-01 DIAGNOSIS — T38.0X5A IMMUNOSUPPRESSION DUE TO CHRONIC STEROID USE (H): ICD-10-CM

## 2023-01-01 DIAGNOSIS — R91.8 PULMONARY INFILTRATES: ICD-10-CM

## 2023-01-01 DIAGNOSIS — Z79.52 IMMUNOSUPPRESSION DUE TO CHRONIC STEROID USE (H): ICD-10-CM

## 2023-01-01 LAB
% LINING CELLS, BODY FLUID: 3 %
1,3 BETA GLUCAN SER-MCNC: <31 PG/ML
1,3 BETA GLUCAN SER-MCNC: <31 PG/ML
ABO/RH(D): NORMAL
ABO/RH(D): NORMAL
ACID FAST STAIN (ARUP): NORMAL
ADENOSINE DEAMINASE PLR-CCNC: 30 U/L
ALBUMIN SERPL BCG-MCNC: 2.5 G/DL (ref 3.5–5.2)
ALBUMIN SERPL BCG-MCNC: 2.7 G/DL (ref 3.5–5.2)
ALBUMIN SERPL BCG-MCNC: 2.8 G/DL (ref 3.5–5.2)
ALBUMIN SERPL BCG-MCNC: 2.9 G/DL (ref 3.5–5.2)
ALBUMIN SERPL BCG-MCNC: 3.1 G/DL (ref 3.5–5.2)
ALBUMIN UR-MCNC: 20 MG/DL
ALBUMIN UR-MCNC: 30 MG/DL
ALBUMIN UR-MCNC: 30 MG/DL
ALBUMIN UR-MCNC: 50 MG/DL
ALLEN'S TEST: ABNORMAL
ALLEN'S TEST: YES
ALP SERPL-CCNC: 39 U/L (ref 40–129)
ALP SERPL-CCNC: 44 U/L (ref 40–129)
ALP SERPL-CCNC: 49 U/L (ref 40–129)
ALP SERPL-CCNC: 51 U/L (ref 40–129)
ALP SERPL-CCNC: 58 U/L (ref 40–129)
ALP SERPL-CCNC: 66 U/L (ref 40–129)
ALP SERPL-CCNC: 85 U/L (ref 40–129)
ALP SERPL-CCNC: 97 U/L (ref 40–129)
ALT SERPL W P-5'-P-CCNC: 12 U/L (ref 10–50)
ALT SERPL W P-5'-P-CCNC: 13 U/L (ref 10–50)
ALT SERPL W P-5'-P-CCNC: 151 U/L (ref 10–50)
ALT SERPL W P-5'-P-CCNC: 155 U/L (ref 10–50)
ALT SERPL W P-5'-P-CCNC: 160 U/L (ref 10–50)
ALT SERPL W P-5'-P-CCNC: 23 U/L (ref 10–50)
ALT SERPL W P-5'-P-CCNC: 48 U/L (ref 10–50)
ALT SERPL W P-5'-P-CCNC: 92 U/L (ref 10–50)
AMMONIA PLAS-SCNC: 62 UMOL/L (ref 16–60)
ANION GAP SERPL CALCULATED.3IONS-SCNC: 10 MMOL/L (ref 7–15)
ANION GAP SERPL CALCULATED.3IONS-SCNC: 11 MMOL/L (ref 7–15)
ANION GAP SERPL CALCULATED.3IONS-SCNC: 12 MMOL/L (ref 7–15)
ANION GAP SERPL CALCULATED.3IONS-SCNC: 13 MMOL/L (ref 7–15)
ANION GAP SERPL CALCULATED.3IONS-SCNC: 14 MMOL/L (ref 7–15)
ANION GAP SERPL CALCULATED.3IONS-SCNC: 15 MMOL/L (ref 7–15)
ANION GAP SERPL CALCULATED.3IONS-SCNC: 16 MMOL/L (ref 7–15)
ANION GAP SERPL CALCULATED.3IONS-SCNC: 18 MMOL/L (ref 7–15)
ANION GAP SERPL CALCULATED.3IONS-SCNC: 19 MMOL/L (ref 7–15)
ANION GAP SERPL CALCULATED.3IONS-SCNC: 19 MMOL/L (ref 7–15)
ANION GAP SERPL CALCULATED.3IONS-SCNC: 20 MMOL/L (ref 7–15)
ANION GAP SERPL CALCULATED.3IONS-SCNC: 6 MMOL/L (ref 7–15)
ANION GAP SERPL CALCULATED.3IONS-SCNC: 9 MMOL/L (ref 7–15)
ANNOTATION COMMENT IMP: NOT DETECTED
ANTIBODY SCREEN: NEGATIVE
ANTIBODY SCREEN: NEGATIVE
APPEARANCE FLD: ABNORMAL
APPEARANCE UR: CLEAR
AST SERPL W P-5'-P-CCNC: 124 U/L (ref 10–50)
AST SERPL W P-5'-P-CCNC: 141 U/L (ref 10–50)
AST SERPL W P-5'-P-CCNC: 154 U/L (ref 10–50)
AST SERPL W P-5'-P-CCNC: 175 U/L (ref 10–50)
AST SERPL W P-5'-P-CCNC: 177 U/L (ref 10–50)
AST SERPL W P-5'-P-CCNC: 53 U/L (ref 10–50)
AST SERPL W P-5'-P-CCNC: 54 U/L (ref 10–50)
AST SERPL W P-5'-P-CCNC: 80 U/L (ref 10–50)
ATRIAL RATE - MUSE: 126 BPM
ATRIAL RATE - MUSE: 267 BPM
ATRIAL RATE - MUSE: 73 BPM
ATRIAL RATE - MUSE: 87 BPM
BACTERIA BLD CULT: NO GROWTH
BACTERIA PLR CULT: NO GROWTH
BACTERIA PLR CULT: NORMAL
BACTERIA SPT CULT: ABNORMAL
BACTERIA SPT CULT: ABNORMAL
BACTERIA SPT CULT: NORMAL
BACTERIA SPT CULT: NORMAL
BACTERIA UR CULT: NORMAL
BASE EXCESS BLDA CALC-SCNC: -0.8 MMOL/L (ref -9–1.8)
BASE EXCESS BLDA CALC-SCNC: 14.8 MMOL/L (ref -9–1.8)
BASE EXCESS BLDA CALC-SCNC: 15.7 MMOL/L (ref -9–1.8)
BASE EXCESS BLDA CALC-SCNC: 16.2 MMOL/L (ref -9–1.8)
BASE EXCESS BLDA CALC-SCNC: 20.3 MMOL/L (ref -9–1.8)
BASE EXCESS BLDA CALC-SCNC: 7.5 MMOL/L (ref -9–1.8)
BASE EXCESS BLDA CALC-SCNC: ABNORMAL MMOL/L
BASE EXCESS BLDV CALC-SCNC: -0.5 MMOL/L (ref -7.7–1.9)
BASE EXCESS BLDV CALC-SCNC: -1.9 MMOL/L (ref -7.7–1.9)
BASE EXCESS BLDV CALC-SCNC: 0.5 MMOL/L (ref -7.7–1.9)
BASE EXCESS BLDV CALC-SCNC: 10.9 MMOL/L (ref -7.7–1.9)
BASE EXCESS BLDV CALC-SCNC: 11.6 MMOL/L (ref -7.7–1.9)
BASE EXCESS BLDV CALC-SCNC: 13.6 MMOL/L (ref -7.7–1.9)
BASE EXCESS BLDV CALC-SCNC: 15.5 MMOL/L (ref -7.7–1.9)
BASE EXCESS BLDV CALC-SCNC: 17.2 MMOL/L (ref -7.7–1.9)
BASE EXCESS BLDV CALC-SCNC: 18.6 MMOL/L (ref -7.7–1.9)
BASE EXCESS BLDV CALC-SCNC: 18.7 MMOL/L (ref -7.7–1.9)
BASE EXCESS BLDV CALC-SCNC: 18.7 MMOL/L (ref -7.7–1.9)
BASE EXCESS BLDV CALC-SCNC: 2.1 MMOL/L (ref -7.7–1.9)
BASE EXCESS BLDV CALC-SCNC: 20.8 MMOL/L (ref -7.7–1.9)
BASE EXCESS BLDV CALC-SCNC: 21 MMOL/L (ref -7.7–1.9)
BASE EXCESS BLDV CALC-SCNC: 3.1 MMOL/L (ref -7.7–1.9)
BASE EXCESS BLDV CALC-SCNC: 4.8 MMOL/L (ref -7.7–1.9)
BASE EXCESS BLDV CALC-SCNC: 4.9 MMOL/L (ref -7.7–1.9)
BASE EXCESS BLDV CALC-SCNC: 7.6 MMOL/L (ref -7.7–1.9)
BASE EXCESS BLDV CALC-SCNC: 9.5 MMOL/L (ref -7.7–1.9)
BASOPHILS # BLD AUTO: 0 10E3/UL (ref 0–0.2)
BASOPHILS # BLD MANUAL: 0 10E3/UL (ref 0–0.2)
BASOPHILS # BLD MANUAL: 0.1 10E3/UL (ref 0–0.2)
BASOPHILS # BLD MANUAL: 0.1 10E3/UL (ref 0–0.2)
BASOPHILS NFR BLD AUTO: 0 %
BASOPHILS NFR BLD MANUAL: 0 %
BASOPHILS NFR BLD MANUAL: 1 %
BASOPHILS NFR BLD MANUAL: 1 %
BILIRUB DIRECT SERPL-MCNC: <0.2 MG/DL (ref 0–0.3)
BILIRUB SERPL-MCNC: 0.4 MG/DL
BILIRUB SERPL-MCNC: 0.5 MG/DL
BILIRUB SERPL-MCNC: 0.5 MG/DL
BILIRUB SERPL-MCNC: <0.2 MG/DL
BILIRUB SERPL-MCNC: <0.2 MG/DL
BILIRUB UR QL STRIP: NEGATIVE
BLD PROD TYP BPU: NORMAL
BLD PROD TYP BPU: NORMAL
BLOOD COMPONENT TYPE: NORMAL
BLOOD COMPONENT TYPE: NORMAL
BUN SERPL-MCNC: 18.9 MG/DL (ref 8–23)
BUN SERPL-MCNC: 20.1 MG/DL (ref 8–23)
BUN SERPL-MCNC: 20.3 MG/DL (ref 8–23)
BUN SERPL-MCNC: 21.4 MG/DL (ref 8–23)
BUN SERPL-MCNC: 23.4 MG/DL (ref 8–23)
BUN SERPL-MCNC: 23.8 MG/DL (ref 8–23)
BUN SERPL-MCNC: 24.1 MG/DL (ref 8–23)
BUN SERPL-MCNC: 24.1 MG/DL (ref 8–23)
BUN SERPL-MCNC: 24.2 MG/DL (ref 8–23)
BUN SERPL-MCNC: 24.7 MG/DL (ref 8–23)
BUN SERPL-MCNC: 25.1 MG/DL (ref 8–23)
BUN SERPL-MCNC: 25.3 MG/DL (ref 8–23)
BUN SERPL-MCNC: 25.4 MG/DL (ref 8–23)
BUN SERPL-MCNC: 28.7 MG/DL (ref 8–23)
BUN SERPL-MCNC: 32.5 MG/DL (ref 8–23)
BUN SERPL-MCNC: 32.7 MG/DL (ref 8–23)
BUN SERPL-MCNC: 36.6 MG/DL (ref 8–23)
BUN SERPL-MCNC: 41.7 MG/DL (ref 8–23)
BUN SERPL-MCNC: 45.3 MG/DL (ref 8–23)
BUN SERPL-MCNC: 50.5 MG/DL (ref 8–23)
BUN SERPL-MCNC: 55.1 MG/DL (ref 8–23)
BUN SERPL-MCNC: 55.4 MG/DL (ref 8–23)
BUN SERPL-MCNC: 59.6 MG/DL (ref 8–23)
BUN SERPL-MCNC: 60.8 MG/DL (ref 8–23)
BUN SERPL-MCNC: 81.3 MG/DL (ref 8–23)
BUN SERPL-MCNC: 83 MG/DL (ref 8–23)
BUN SERPL-MCNC: 88.4 MG/DL (ref 8–23)
C PNEUM DNA SPEC QL NAA+PROBE: NOT DETECTED
CA-I BLD-MCNC: 5.2 MG/DL (ref 4.4–5.2)
CALCIUM SERPL-MCNC: 10.2 MG/DL (ref 8.8–10.2)
CALCIUM SERPL-MCNC: 10.6 MG/DL (ref 8.8–10.2)
CALCIUM SERPL-MCNC: 7.6 MG/DL (ref 8.8–10.2)
CALCIUM SERPL-MCNC: 8.1 MG/DL (ref 8.8–10.2)
CALCIUM SERPL-MCNC: 8.4 MG/DL (ref 8.8–10.2)
CALCIUM SERPL-MCNC: 8.6 MG/DL (ref 8.8–10.2)
CALCIUM SERPL-MCNC: 8.6 MG/DL (ref 8.8–10.2)
CALCIUM SERPL-MCNC: 8.7 MG/DL (ref 8.8–10.2)
CALCIUM SERPL-MCNC: 8.8 MG/DL (ref 8.8–10.2)
CALCIUM SERPL-MCNC: 8.9 MG/DL (ref 8.8–10.2)
CALCIUM SERPL-MCNC: 9 MG/DL (ref 8.8–10.2)
CALCIUM SERPL-MCNC: 9 MG/DL (ref 8.8–10.2)
CALCIUM SERPL-MCNC: 9.2 MG/DL (ref 8.8–10.2)
CALCIUM SERPL-MCNC: 9.2 MG/DL (ref 8.8–10.2)
CALCIUM SERPL-MCNC: 9.4 MG/DL (ref 8.8–10.2)
CALCIUM SERPL-MCNC: 9.6 MG/DL (ref 8.8–10.2)
CALCIUM SERPL-MCNC: 9.7 MG/DL (ref 8.8–10.2)
CELL COUNT BODY FLUID SOURCE: ABNORMAL
CHLORIDE SERPL-SCNC: 100 MMOL/L (ref 98–107)
CHLORIDE SERPL-SCNC: 101 MMOL/L (ref 98–107)
CHLORIDE SERPL-SCNC: 101 MMOL/L (ref 98–107)
CHLORIDE SERPL-SCNC: 102 MMOL/L (ref 98–107)
CHLORIDE SERPL-SCNC: 104 MMOL/L (ref 98–107)
CHLORIDE SERPL-SCNC: 88 MMOL/L (ref 98–107)
CHLORIDE SERPL-SCNC: 89 MMOL/L (ref 98–107)
CHLORIDE SERPL-SCNC: 90 MMOL/L (ref 98–107)
CHLORIDE SERPL-SCNC: 91 MMOL/L (ref 98–107)
CHLORIDE SERPL-SCNC: 92 MMOL/L (ref 98–107)
CHLORIDE SERPL-SCNC: 93 MMOL/L (ref 98–107)
CHLORIDE SERPL-SCNC: 93 MMOL/L (ref 98–107)
CHLORIDE SERPL-SCNC: 95 MMOL/L (ref 98–107)
CHLORIDE SERPL-SCNC: 95 MMOL/L (ref 98–107)
CHLORIDE SERPL-SCNC: 97 MMOL/L (ref 98–107)
CHLORIDE SERPL-SCNC: 98 MMOL/L (ref 98–107)
CHLORIDE SERPL-SCNC: 98 MMOL/L (ref 98–107)
CHLORIDE SERPL-SCNC: 99 MMOL/L (ref 98–107)
CHLORIDE SERPL-SCNC: 99 MMOL/L (ref 98–107)
CO2 SERPL-SCNC: 51 MMOL/L (ref 20–32)
CODING SYSTEM: NORMAL
CODING SYSTEM: NORMAL
COLOR FLD: ABNORMAL
COLOR UR AUTO: ABNORMAL
COLOR UR AUTO: YELLOW
CPB POCT: YES
CREAT SERPL-MCNC: 0.5 MG/DL (ref 0.67–1.17)
CREAT SERPL-MCNC: 0.52 MG/DL (ref 0.67–1.17)
CREAT SERPL-MCNC: 0.53 MG/DL (ref 0.67–1.17)
CREAT SERPL-MCNC: 0.54 MG/DL (ref 0.67–1.17)
CREAT SERPL-MCNC: 0.56 MG/DL (ref 0.67–1.17)
CREAT SERPL-MCNC: 0.58 MG/DL (ref 0.67–1.17)
CREAT SERPL-MCNC: 0.59 MG/DL (ref 0.67–1.17)
CREAT SERPL-MCNC: 0.67 MG/DL (ref 0.67–1.17)
CREAT SERPL-MCNC: 0.71 MG/DL (ref 0.67–1.17)
CREAT SERPL-MCNC: 0.71 MG/DL (ref 0.67–1.17)
CREAT SERPL-MCNC: 0.79 MG/DL (ref 0.67–1.17)
CREAT SERPL-MCNC: 0.84 MG/DL (ref 0.67–1.17)
CREAT SERPL-MCNC: 0.91 MG/DL (ref 0.67–1.17)
CREAT SERPL-MCNC: 0.91 MG/DL (ref 0.67–1.17)
CREAT SERPL-MCNC: 0.93 MG/DL (ref 0.67–1.17)
CREAT SERPL-MCNC: 0.95 MG/DL (ref 0.67–1.17)
CREAT SERPL-MCNC: 0.95 MG/DL (ref 0.67–1.17)
CREAT SERPL-MCNC: 0.96 MG/DL (ref 0.67–1.17)
CREAT SERPL-MCNC: 0.97 MG/DL (ref 0.67–1.17)
CREAT SERPL-MCNC: 1.06 MG/DL (ref 0.67–1.17)
CREAT SERPL-MCNC: 1.08 MG/DL (ref 0.67–1.17)
CREAT SERPL-MCNC: 1.09 MG/DL (ref 0.67–1.17)
CREAT SERPL-MCNC: 1.13 MG/DL (ref 0.67–1.17)
CREAT SERPL-MCNC: 1.24 MG/DL (ref 0.67–1.17)
CREAT SERPL-MCNC: 1.76 MG/DL (ref 0.67–1.17)
CREAT SERPL-MCNC: 1.79 MG/DL (ref 0.67–1.17)
CROSSMATCH: NORMAL
CROSSMATCH: NORMAL
CRP SERPL-MCNC: 11.15 MG/L
CRP SERPL-MCNC: 27.76 MG/L
CRP SERPL-MCNC: 4.48 MG/L
CRP SERPL-MCNC: 50.14 MG/L
CRYPTOC AG SPEC QL: NEGATIVE
CYSTATIN C (ROCHE): 2.3 MG/L (ref 0.6–1)
CYSTATIN C (ROCHE): 2.4 MG/L (ref 0.6–1)
CYSTATIN C (ROCHE): 2.4 MG/L (ref 0.6–1)
CYSTATIN C (ROCHE): 4.1 MG/L (ref 0.6–1)
D DIMER PPP FEU-MCNC: 0.43 UG/ML FEU (ref 0–0.5)
D DIMER PPP FEU-MCNC: 0.64 UG/ML FEU (ref 0–0.5)
DEPRECATED HCO3 PLAS-SCNC: 24 MMOL/L (ref 22–29)
DEPRECATED HCO3 PLAS-SCNC: 27 MMOL/L (ref 22–29)
DEPRECATED HCO3 PLAS-SCNC: 27 MMOL/L (ref 22–29)
DEPRECATED HCO3 PLAS-SCNC: 28 MMOL/L (ref 22–29)
DEPRECATED HCO3 PLAS-SCNC: 31 MMOL/L (ref 22–29)
DEPRECATED HCO3 PLAS-SCNC: 31 MMOL/L (ref 22–29)
DEPRECATED HCO3 PLAS-SCNC: 32 MMOL/L (ref 22–29)
DEPRECATED HCO3 PLAS-SCNC: 35 MMOL/L (ref 22–29)
DEPRECATED HCO3 PLAS-SCNC: 36 MMOL/L (ref 22–29)
DEPRECATED HCO3 PLAS-SCNC: 37 MMOL/L (ref 22–29)
DEPRECATED HCO3 PLAS-SCNC: 37 MMOL/L (ref 22–29)
DEPRECATED HCO3 PLAS-SCNC: 39 MMOL/L (ref 22–29)
DEPRECATED HCO3 PLAS-SCNC: 40 MMOL/L (ref 22–29)
DEPRECATED HCO3 PLAS-SCNC: 41 MMOL/L (ref 22–29)
DEPRECATED HCO3 PLAS-SCNC: 43 MMOL/L (ref 22–29)
DEPRECATED HCO3 PLAS-SCNC: 43 MMOL/L (ref 22–29)
DEPRECATED HCO3 PLAS-SCNC: 44 MMOL/L (ref 22–29)
DEPRECATED HCO3 PLAS-SCNC: 45 MMOL/L (ref 22–29)
DEPRECATED HCO3 PLAS-SCNC: 46 MMOL/L (ref 22–29)
DEPRECATED HCO3 PLAS-SCNC: 47 MMOL/L (ref 22–29)
DEPRECATED M TB RPOB XXX QL NAA+PROBE: NORMAL
DIASTOLIC BLOOD PRESSURE - MUSE: NORMAL MMHG
EOSINOPHIL # BLD AUTO: 0 10E3/UL (ref 0–0.7)
EOSINOPHIL # BLD MANUAL: 0 10E3/UL (ref 0–0.7)
EOSINOPHIL NFR BLD AUTO: 0 %
EOSINOPHIL NFR BLD MANUAL: 0 %
ERYTHROCYTE [DISTWIDTH] IN BLOOD BY AUTOMATED COUNT: 17.3 % (ref 10–15)
ERYTHROCYTE [DISTWIDTH] IN BLOOD BY AUTOMATED COUNT: 17.8 % (ref 10–15)
ERYTHROCYTE [DISTWIDTH] IN BLOOD BY AUTOMATED COUNT: 17.9 % (ref 10–15)
ERYTHROCYTE [DISTWIDTH] IN BLOOD BY AUTOMATED COUNT: 18.5 % (ref 10–15)
ERYTHROCYTE [DISTWIDTH] IN BLOOD BY AUTOMATED COUNT: 18.6 % (ref 10–15)
ERYTHROCYTE [DISTWIDTH] IN BLOOD BY AUTOMATED COUNT: 19.3 % (ref 10–15)
ERYTHROCYTE [DISTWIDTH] IN BLOOD BY AUTOMATED COUNT: 19.3 % (ref 10–15)
ERYTHROCYTE [DISTWIDTH] IN BLOOD BY AUTOMATED COUNT: 19.5 % (ref 10–15)
ERYTHROCYTE [DISTWIDTH] IN BLOOD BY AUTOMATED COUNT: 19.6 % (ref 10–15)
ERYTHROCYTE [DISTWIDTH] IN BLOOD BY AUTOMATED COUNT: 19.7 % (ref 10–15)
ERYTHROCYTE [DISTWIDTH] IN BLOOD BY AUTOMATED COUNT: 19.8 % (ref 10–15)
ERYTHROCYTE [DISTWIDTH] IN BLOOD BY AUTOMATED COUNT: 19.9 % (ref 10–15)
ERYTHROCYTE [DISTWIDTH] IN BLOOD BY AUTOMATED COUNT: 20.5 % (ref 10–15)
ERYTHROCYTE [DISTWIDTH] IN BLOOD BY AUTOMATED COUNT: 21.2 % (ref 10–15)
ERYTHROCYTE [DISTWIDTH] IN BLOOD BY AUTOMATED COUNT: 21.9 % (ref 10–15)
ERYTHROCYTE [DISTWIDTH] IN BLOOD BY AUTOMATED COUNT: 22.1 % (ref 10–15)
ERYTHROCYTE [DISTWIDTH] IN BLOOD BY AUTOMATED COUNT: 22.1 % (ref 10–15)
ERYTHROCYTE [DISTWIDTH] IN BLOOD BY AUTOMATED COUNT: 22.2 % (ref 10–15)
ERYTHROCYTE [DISTWIDTH] IN BLOOD BY AUTOMATED COUNT: 22.5 % (ref 10–15)
ETHANOL UR QL SCN: NORMAL
FLUAV H1 2009 PAND RNA SPEC QL NAA+PROBE: NOT DETECTED
FLUAV H1 RNA SPEC QL NAA+PROBE: NOT DETECTED
FLUAV H3 RNA SPEC QL NAA+PROBE: NOT DETECTED
FLUAV RNA SPEC QL NAA+PROBE: NEGATIVE
FLUAV RNA SPEC QL NAA+PROBE: NOT DETECTED
FLUBV RNA RESP QL NAA+PROBE: NEGATIVE
FLUBV RNA SPEC QL NAA+PROBE: NOT DETECTED
GALACTOMANNAN AG SERPL QL IA: NEGATIVE
GALACTOMANNAN AG SERPL QL IA: NEGATIVE
GALACTOMANNAN AG SPEC IA-ACNC: 0.06
GALACTOMANNAN AG SPEC IA-ACNC: 0.08
GFR SERPL CREATININE-BSD FRML MDRD: 11 ML/MIN/1.73M2
GFR SERPL CREATININE-BSD FRML MDRD: 22 ML/MIN/1.73M2
GFR SERPL CREATININE-BSD FRML MDRD: 22 ML/MIN/1.73M2
GFR SERPL CREATININE-BSD FRML MDRD: 23 ML/MIN/1.73M2
GFR SERPL CREATININE-BSD FRML MDRD: 37 ML/MIN/1.73M2
GFR SERPL CREATININE-BSD FRML MDRD: 38 ML/MIN/1.73M2
GFR SERPL CREATININE-BSD FRML MDRD: 58 ML/MIN/1.73M2
GFR SERPL CREATININE-BSD FRML MDRD: 64 ML/MIN/1.73M2
GFR SERPL CREATININE-BSD FRML MDRD: 67 ML/MIN/1.73M2
GFR SERPL CREATININE-BSD FRML MDRD: 68 ML/MIN/1.73M2
GFR SERPL CREATININE-BSD FRML MDRD: 70 ML/MIN/1.73M2
GFR SERPL CREATININE-BSD FRML MDRD: 77 ML/MIN/1.73M2
GFR SERPL CREATININE-BSD FRML MDRD: 78 ML/MIN/1.73M2
GFR SERPL CREATININE-BSD FRML MDRD: 79 ML/MIN/1.73M2
GFR SERPL CREATININE-BSD FRML MDRD: 79 ML/MIN/1.73M2
GFR SERPL CREATININE-BSD FRML MDRD: 81 ML/MIN/1.73M2
GFR SERPL CREATININE-BSD FRML MDRD: 84 ML/MIN/1.73M2
GFR SERPL CREATININE-BSD FRML MDRD: 84 ML/MIN/1.73M2
GFR SERPL CREATININE-BSD FRML MDRD: 87 ML/MIN/1.73M2
GFR SERPL CREATININE-BSD FRML MDRD: 88 ML/MIN/1.73M2
GFR SERPL CREATININE-BSD FRML MDRD: >90 ML/MIN/1.73M2
GLUCOSE BLD-MCNC: 119 MG/DL (ref 70–99)
GLUCOSE BLDC GLUCOMTR-MCNC: 101 MG/DL (ref 70–99)
GLUCOSE BLDC GLUCOMTR-MCNC: 101 MG/DL (ref 70–99)
GLUCOSE BLDC GLUCOMTR-MCNC: 102 MG/DL (ref 70–99)
GLUCOSE BLDC GLUCOMTR-MCNC: 102 MG/DL (ref 70–99)
GLUCOSE BLDC GLUCOMTR-MCNC: 103 MG/DL (ref 70–99)
GLUCOSE BLDC GLUCOMTR-MCNC: 105 MG/DL (ref 70–99)
GLUCOSE BLDC GLUCOMTR-MCNC: 106 MG/DL (ref 70–99)
GLUCOSE BLDC GLUCOMTR-MCNC: 108 MG/DL (ref 70–99)
GLUCOSE BLDC GLUCOMTR-MCNC: 108 MG/DL (ref 70–99)
GLUCOSE BLDC GLUCOMTR-MCNC: 110 MG/DL (ref 70–99)
GLUCOSE BLDC GLUCOMTR-MCNC: 111 MG/DL (ref 70–99)
GLUCOSE BLDC GLUCOMTR-MCNC: 112 MG/DL (ref 70–99)
GLUCOSE BLDC GLUCOMTR-MCNC: 113 MG/DL (ref 70–99)
GLUCOSE BLDC GLUCOMTR-MCNC: 115 MG/DL (ref 70–99)
GLUCOSE BLDC GLUCOMTR-MCNC: 116 MG/DL (ref 70–99)
GLUCOSE BLDC GLUCOMTR-MCNC: 116 MG/DL (ref 70–99)
GLUCOSE BLDC GLUCOMTR-MCNC: 117 MG/DL (ref 70–99)
GLUCOSE BLDC GLUCOMTR-MCNC: 117 MG/DL (ref 70–99)
GLUCOSE BLDC GLUCOMTR-MCNC: 118 MG/DL (ref 70–99)
GLUCOSE BLDC GLUCOMTR-MCNC: 119 MG/DL (ref 70–99)
GLUCOSE BLDC GLUCOMTR-MCNC: 120 MG/DL (ref 70–99)
GLUCOSE BLDC GLUCOMTR-MCNC: 121 MG/DL (ref 70–99)
GLUCOSE BLDC GLUCOMTR-MCNC: 121 MG/DL (ref 70–99)
GLUCOSE BLDC GLUCOMTR-MCNC: 122 MG/DL (ref 70–99)
GLUCOSE BLDC GLUCOMTR-MCNC: 123 MG/DL (ref 70–99)
GLUCOSE BLDC GLUCOMTR-MCNC: 124 MG/DL (ref 70–99)
GLUCOSE BLDC GLUCOMTR-MCNC: 124 MG/DL (ref 70–99)
GLUCOSE BLDC GLUCOMTR-MCNC: 125 MG/DL (ref 70–99)
GLUCOSE BLDC GLUCOMTR-MCNC: 126 MG/DL (ref 70–99)
GLUCOSE BLDC GLUCOMTR-MCNC: 129 MG/DL (ref 70–99)
GLUCOSE BLDC GLUCOMTR-MCNC: 129 MG/DL (ref 70–99)
GLUCOSE BLDC GLUCOMTR-MCNC: 130 MG/DL (ref 70–99)
GLUCOSE BLDC GLUCOMTR-MCNC: 130 MG/DL (ref 70–99)
GLUCOSE BLDC GLUCOMTR-MCNC: 131 MG/DL (ref 70–99)
GLUCOSE BLDC GLUCOMTR-MCNC: 132 MG/DL (ref 70–99)
GLUCOSE BLDC GLUCOMTR-MCNC: 133 MG/DL (ref 70–99)
GLUCOSE BLDC GLUCOMTR-MCNC: 134 MG/DL (ref 70–99)
GLUCOSE BLDC GLUCOMTR-MCNC: 135 MG/DL (ref 70–99)
GLUCOSE BLDC GLUCOMTR-MCNC: 136 MG/DL (ref 70–99)
GLUCOSE BLDC GLUCOMTR-MCNC: 137 MG/DL (ref 70–99)
GLUCOSE BLDC GLUCOMTR-MCNC: 138 MG/DL (ref 70–99)
GLUCOSE BLDC GLUCOMTR-MCNC: 139 MG/DL (ref 70–99)
GLUCOSE BLDC GLUCOMTR-MCNC: 140 MG/DL (ref 70–99)
GLUCOSE BLDC GLUCOMTR-MCNC: 141 MG/DL (ref 70–99)
GLUCOSE BLDC GLUCOMTR-MCNC: 142 MG/DL (ref 70–99)
GLUCOSE BLDC GLUCOMTR-MCNC: 143 MG/DL (ref 70–99)
GLUCOSE BLDC GLUCOMTR-MCNC: 144 MG/DL (ref 70–99)
GLUCOSE BLDC GLUCOMTR-MCNC: 145 MG/DL (ref 70–99)
GLUCOSE BLDC GLUCOMTR-MCNC: 146 MG/DL (ref 70–99)
GLUCOSE BLDC GLUCOMTR-MCNC: 146 MG/DL (ref 70–99)
GLUCOSE BLDC GLUCOMTR-MCNC: 149 MG/DL (ref 70–99)
GLUCOSE BLDC GLUCOMTR-MCNC: 149 MG/DL (ref 70–99)
GLUCOSE BLDC GLUCOMTR-MCNC: 150 MG/DL (ref 70–99)
GLUCOSE BLDC GLUCOMTR-MCNC: 151 MG/DL (ref 70–99)
GLUCOSE BLDC GLUCOMTR-MCNC: 152 MG/DL (ref 70–99)
GLUCOSE BLDC GLUCOMTR-MCNC: 153 MG/DL (ref 70–99)
GLUCOSE BLDC GLUCOMTR-MCNC: 156 MG/DL (ref 70–99)
GLUCOSE BLDC GLUCOMTR-MCNC: 158 MG/DL (ref 70–99)
GLUCOSE BLDC GLUCOMTR-MCNC: 160 MG/DL (ref 70–99)
GLUCOSE BLDC GLUCOMTR-MCNC: 161 MG/DL (ref 70–99)
GLUCOSE BLDC GLUCOMTR-MCNC: 161 MG/DL (ref 70–99)
GLUCOSE BLDC GLUCOMTR-MCNC: 162 MG/DL (ref 70–99)
GLUCOSE BLDC GLUCOMTR-MCNC: 163 MG/DL (ref 70–99)
GLUCOSE BLDC GLUCOMTR-MCNC: 164 MG/DL (ref 70–99)
GLUCOSE BLDC GLUCOMTR-MCNC: 165 MG/DL (ref 70–99)
GLUCOSE BLDC GLUCOMTR-MCNC: 166 MG/DL (ref 70–99)
GLUCOSE BLDC GLUCOMTR-MCNC: 167 MG/DL (ref 70–99)
GLUCOSE BLDC GLUCOMTR-MCNC: 168 MG/DL (ref 70–99)
GLUCOSE BLDC GLUCOMTR-MCNC: 168 MG/DL (ref 70–99)
GLUCOSE BLDC GLUCOMTR-MCNC: 169 MG/DL (ref 70–99)
GLUCOSE BLDC GLUCOMTR-MCNC: 171 MG/DL (ref 70–99)
GLUCOSE BLDC GLUCOMTR-MCNC: 171 MG/DL (ref 70–99)
GLUCOSE BLDC GLUCOMTR-MCNC: 172 MG/DL (ref 70–99)
GLUCOSE BLDC GLUCOMTR-MCNC: 173 MG/DL (ref 70–99)
GLUCOSE BLDC GLUCOMTR-MCNC: 174 MG/DL (ref 70–99)
GLUCOSE BLDC GLUCOMTR-MCNC: 175 MG/DL (ref 70–99)
GLUCOSE BLDC GLUCOMTR-MCNC: 177 MG/DL (ref 70–99)
GLUCOSE BLDC GLUCOMTR-MCNC: 177 MG/DL (ref 70–99)
GLUCOSE BLDC GLUCOMTR-MCNC: 179 MG/DL (ref 70–99)
GLUCOSE BLDC GLUCOMTR-MCNC: 180 MG/DL (ref 70–99)
GLUCOSE BLDC GLUCOMTR-MCNC: 180 MG/DL (ref 70–99)
GLUCOSE BLDC GLUCOMTR-MCNC: 182 MG/DL (ref 70–99)
GLUCOSE BLDC GLUCOMTR-MCNC: 184 MG/DL (ref 70–99)
GLUCOSE BLDC GLUCOMTR-MCNC: 186 MG/DL (ref 70–99)
GLUCOSE BLDC GLUCOMTR-MCNC: 189 MG/DL (ref 70–99)
GLUCOSE BLDC GLUCOMTR-MCNC: 189 MG/DL (ref 70–99)
GLUCOSE BLDC GLUCOMTR-MCNC: 190 MG/DL (ref 70–99)
GLUCOSE BLDC GLUCOMTR-MCNC: 191 MG/DL (ref 70–99)
GLUCOSE BLDC GLUCOMTR-MCNC: 192 MG/DL (ref 70–99)
GLUCOSE BLDC GLUCOMTR-MCNC: 194 MG/DL (ref 70–99)
GLUCOSE BLDC GLUCOMTR-MCNC: 194 MG/DL (ref 70–99)
GLUCOSE BLDC GLUCOMTR-MCNC: 195 MG/DL (ref 70–99)
GLUCOSE BLDC GLUCOMTR-MCNC: 197 MG/DL (ref 70–99)
GLUCOSE BLDC GLUCOMTR-MCNC: 198 MG/DL (ref 70–99)
GLUCOSE BLDC GLUCOMTR-MCNC: 199 MG/DL (ref 70–99)
GLUCOSE BLDC GLUCOMTR-MCNC: 200 MG/DL (ref 70–99)
GLUCOSE BLDC GLUCOMTR-MCNC: 201 MG/DL (ref 70–99)
GLUCOSE BLDC GLUCOMTR-MCNC: 202 MG/DL (ref 70–99)
GLUCOSE BLDC GLUCOMTR-MCNC: 203 MG/DL (ref 70–99)
GLUCOSE BLDC GLUCOMTR-MCNC: 203 MG/DL (ref 70–99)
GLUCOSE BLDC GLUCOMTR-MCNC: 204 MG/DL (ref 70–99)
GLUCOSE BLDC GLUCOMTR-MCNC: 204 MG/DL (ref 70–99)
GLUCOSE BLDC GLUCOMTR-MCNC: 209 MG/DL (ref 70–99)
GLUCOSE BLDC GLUCOMTR-MCNC: 209 MG/DL (ref 70–99)
GLUCOSE BLDC GLUCOMTR-MCNC: 210 MG/DL (ref 70–99)
GLUCOSE BLDC GLUCOMTR-MCNC: 210 MG/DL (ref 70–99)
GLUCOSE BLDC GLUCOMTR-MCNC: 211 MG/DL (ref 70–99)
GLUCOSE BLDC GLUCOMTR-MCNC: 212 MG/DL (ref 70–99)
GLUCOSE BLDC GLUCOMTR-MCNC: 213 MG/DL (ref 70–99)
GLUCOSE BLDC GLUCOMTR-MCNC: 215 MG/DL (ref 70–99)
GLUCOSE BLDC GLUCOMTR-MCNC: 215 MG/DL (ref 70–99)
GLUCOSE BLDC GLUCOMTR-MCNC: 216 MG/DL (ref 70–99)
GLUCOSE BLDC GLUCOMTR-MCNC: 218 MG/DL (ref 70–99)
GLUCOSE BLDC GLUCOMTR-MCNC: 220 MG/DL (ref 70–99)
GLUCOSE BLDC GLUCOMTR-MCNC: 224 MG/DL (ref 70–99)
GLUCOSE BLDC GLUCOMTR-MCNC: 225 MG/DL (ref 70–99)
GLUCOSE BLDC GLUCOMTR-MCNC: 230 MG/DL (ref 70–99)
GLUCOSE BLDC GLUCOMTR-MCNC: 231 MG/DL (ref 70–99)
GLUCOSE BLDC GLUCOMTR-MCNC: 234 MG/DL (ref 70–99)
GLUCOSE BLDC GLUCOMTR-MCNC: 235 MG/DL (ref 70–99)
GLUCOSE BLDC GLUCOMTR-MCNC: 235 MG/DL (ref 70–99)
GLUCOSE BLDC GLUCOMTR-MCNC: 241 MG/DL (ref 70–99)
GLUCOSE BLDC GLUCOMTR-MCNC: 242 MG/DL (ref 70–99)
GLUCOSE BLDC GLUCOMTR-MCNC: 242 MG/DL (ref 70–99)
GLUCOSE BLDC GLUCOMTR-MCNC: 243 MG/DL (ref 70–99)
GLUCOSE BLDC GLUCOMTR-MCNC: 245 MG/DL (ref 70–99)
GLUCOSE BLDC GLUCOMTR-MCNC: 247 MG/DL (ref 70–99)
GLUCOSE BLDC GLUCOMTR-MCNC: 248 MG/DL (ref 70–99)
GLUCOSE BLDC GLUCOMTR-MCNC: 251 MG/DL (ref 70–99)
GLUCOSE BLDC GLUCOMTR-MCNC: 252 MG/DL (ref 70–99)
GLUCOSE BLDC GLUCOMTR-MCNC: 255 MG/DL (ref 70–99)
GLUCOSE BLDC GLUCOMTR-MCNC: 259 MG/DL (ref 70–99)
GLUCOSE BLDC GLUCOMTR-MCNC: 259 MG/DL (ref 70–99)
GLUCOSE BLDC GLUCOMTR-MCNC: 260 MG/DL (ref 70–99)
GLUCOSE BLDC GLUCOMTR-MCNC: 260 MG/DL (ref 70–99)
GLUCOSE BLDC GLUCOMTR-MCNC: 266 MG/DL (ref 70–99)
GLUCOSE BLDC GLUCOMTR-MCNC: 284 MG/DL (ref 70–99)
GLUCOSE BLDC GLUCOMTR-MCNC: 291 MG/DL (ref 70–99)
GLUCOSE BLDC GLUCOMTR-MCNC: 298 MG/DL (ref 70–99)
GLUCOSE BLDC GLUCOMTR-MCNC: 300 MG/DL (ref 70–99)
GLUCOSE BLDC GLUCOMTR-MCNC: 303 MG/DL (ref 70–99)
GLUCOSE BLDC GLUCOMTR-MCNC: 309 MG/DL (ref 70–99)
GLUCOSE BLDC GLUCOMTR-MCNC: 323 MG/DL (ref 70–99)
GLUCOSE BLDC GLUCOMTR-MCNC: 327 MG/DL (ref 70–99)
GLUCOSE BLDC GLUCOMTR-MCNC: 331 MG/DL (ref 70–99)
GLUCOSE BLDC GLUCOMTR-MCNC: 343 MG/DL (ref 70–99)
GLUCOSE BLDC GLUCOMTR-MCNC: 344 MG/DL (ref 70–99)
GLUCOSE BLDC GLUCOMTR-MCNC: 349 MG/DL (ref 70–99)
GLUCOSE BLDC GLUCOMTR-MCNC: 368 MG/DL (ref 70–99)
GLUCOSE BLDC GLUCOMTR-MCNC: 406 MG/DL (ref 70–99)
GLUCOSE BLDC GLUCOMTR-MCNC: 42 MG/DL (ref 70–99)
GLUCOSE BLDC GLUCOMTR-MCNC: 425 MG/DL (ref 70–99)
GLUCOSE BLDC GLUCOMTR-MCNC: 45 MG/DL (ref 70–99)
GLUCOSE BLDC GLUCOMTR-MCNC: 49 MG/DL (ref 70–99)
GLUCOSE BLDC GLUCOMTR-MCNC: 54 MG/DL (ref 70–99)
GLUCOSE BLDC GLUCOMTR-MCNC: 70 MG/DL (ref 70–99)
GLUCOSE BLDC GLUCOMTR-MCNC: 76 MG/DL (ref 70–99)
GLUCOSE BLDC GLUCOMTR-MCNC: 84 MG/DL (ref 70–99)
GLUCOSE BLDC GLUCOMTR-MCNC: 89 MG/DL (ref 70–99)
GLUCOSE BLDC GLUCOMTR-MCNC: 89 MG/DL (ref 70–99)
GLUCOSE BLDC GLUCOMTR-MCNC: 91 MG/DL (ref 70–99)
GLUCOSE BLDC GLUCOMTR-MCNC: 91 MG/DL (ref 70–99)
GLUCOSE BLDC GLUCOMTR-MCNC: 93 MG/DL (ref 70–99)
GLUCOSE BLDC GLUCOMTR-MCNC: 93 MG/DL (ref 70–99)
GLUCOSE BLDC GLUCOMTR-MCNC: 96 MG/DL (ref 70–99)
GLUCOSE BLDC GLUCOMTR-MCNC: 99 MG/DL (ref 70–99)
GLUCOSE BODY FLUID SOURCE: NORMAL
GLUCOSE FLD-MCNC: 234 MG/DL
GLUCOSE SERPL-MCNC: 107 MG/DL (ref 70–99)
GLUCOSE SERPL-MCNC: 112 MG/DL (ref 70–99)
GLUCOSE SERPL-MCNC: 118 MG/DL (ref 70–99)
GLUCOSE SERPL-MCNC: 120 MG/DL (ref 70–99)
GLUCOSE SERPL-MCNC: 121 MG/DL (ref 70–99)
GLUCOSE SERPL-MCNC: 124 MG/DL (ref 70–99)
GLUCOSE SERPL-MCNC: 136 MG/DL (ref 70–99)
GLUCOSE SERPL-MCNC: 140 MG/DL (ref 70–99)
GLUCOSE SERPL-MCNC: 148 MG/DL (ref 70–99)
GLUCOSE SERPL-MCNC: 156 MG/DL (ref 70–99)
GLUCOSE SERPL-MCNC: 171 MG/DL (ref 70–99)
GLUCOSE SERPL-MCNC: 176 MG/DL (ref 70–99)
GLUCOSE SERPL-MCNC: 181 MG/DL (ref 70–99)
GLUCOSE SERPL-MCNC: 183 MG/DL (ref 70–99)
GLUCOSE SERPL-MCNC: 187 MG/DL (ref 70–99)
GLUCOSE SERPL-MCNC: 191 MG/DL (ref 70–99)
GLUCOSE SERPL-MCNC: 193 MG/DL (ref 70–99)
GLUCOSE SERPL-MCNC: 199 MG/DL (ref 70–99)
GLUCOSE SERPL-MCNC: 203 MG/DL (ref 70–99)
GLUCOSE SERPL-MCNC: 219 MG/DL (ref 70–99)
GLUCOSE SERPL-MCNC: 221 MG/DL (ref 70–99)
GLUCOSE SERPL-MCNC: 239 MG/DL (ref 70–99)
GLUCOSE SERPL-MCNC: 241 MG/DL (ref 70–99)
GLUCOSE SERPL-MCNC: 281 MG/DL (ref 70–99)
GLUCOSE SERPL-MCNC: 288 MG/DL (ref 70–99)
GLUCOSE SERPL-MCNC: 324 MG/DL (ref 70–99)
GLUCOSE SERPL-MCNC: 364 MG/DL (ref 70–99)
GLUCOSE UR STRIP-MCNC: NEGATIVE MG/DL
GRAM STAIN RESULT: ABNORMAL
GRAM STAIN RESULT: ABNORMAL
GRAM STAIN RESULT: NORMAL
H CAPSUL AG UR QL IA: NOT DETECTED
H CAPSUL AG UR-MCNC: NOT DETECTED NG/ML
HADV DNA SPEC QL NAA+PROBE: NOT DETECTED
HBA1C MFR BLD: 6.6 %
HCO3 BLD-SCNC: 27 MMOL/L (ref 21–28)
HCO3 BLD-SCNC: 35 MMOL/L (ref 21–28)
HCO3 BLD-SCNC: 41 MMOL/L (ref 21–28)
HCO3 BLD-SCNC: 47 MMOL/L (ref 21–28)
HCO3 BLD-SCNC: ABNORMAL MMOL/L
HCO3 BLDV-SCNC: 28 MMOL/L (ref 21–28)
HCO3 BLDV-SCNC: 28 MMOL/L (ref 21–28)
HCO3 BLDV-SCNC: 29 MMOL/L (ref 21–28)
HCO3 BLDV-SCNC: 31 MMOL/L (ref 21–28)
HCO3 BLDV-SCNC: 32 MMOL/L (ref 21–28)
HCO3 BLDV-SCNC: 33 MMOL/L (ref 21–28)
HCO3 BLDV-SCNC: 36 MMOL/L (ref 21–28)
HCO3 BLDV-SCNC: 37 MMOL/L (ref 21–28)
HCO3 BLDV-SCNC: 39 MMOL/L (ref 21–28)
HCO3 BLDV-SCNC: 39 MMOL/L (ref 21–28)
HCO3 BLDV-SCNC: 41 MMOL/L (ref 21–28)
HCO3 BLDV-SCNC: 43 MMOL/L (ref 21–28)
HCO3 BLDV-SCNC: 45 MMOL/L (ref 21–28)
HCO3 BLDV-SCNC: 46 MMOL/L (ref 21–28)
HCO3 BLDV-SCNC: 46 MMOL/L (ref 21–28)
HCO3 BLDV-SCNC: 47 MMOL/L (ref 21–28)
HCO3 BLDV-SCNC: 47 MMOL/L (ref 21–28)
HCO3 BLDV-SCNC: 49 MMOL/L (ref 21–28)
HCOV PNL SPEC NAA+PROBE: NOT DETECTED
HCT VFR BLD AUTO: 22.4 % (ref 40–53)
HCT VFR BLD AUTO: 23.2 % (ref 40–53)
HCT VFR BLD AUTO: 23.6 % (ref 40–53)
HCT VFR BLD AUTO: 24.5 % (ref 40–53)
HCT VFR BLD AUTO: 24.5 % (ref 40–53)
HCT VFR BLD AUTO: 25.7 % (ref 40–53)
HCT VFR BLD AUTO: 25.7 % (ref 40–53)
HCT VFR BLD AUTO: 26 % (ref 40–53)
HCT VFR BLD AUTO: 26.9 % (ref 40–53)
HCT VFR BLD AUTO: 27.1 % (ref 40–53)
HCT VFR BLD AUTO: 27.3 % (ref 40–53)
HCT VFR BLD AUTO: 27.5 % (ref 40–53)
HCT VFR BLD AUTO: 28.5 % (ref 40–53)
HCT VFR BLD AUTO: 29.8 % (ref 40–53)
HCT VFR BLD AUTO: 31 % (ref 40–53)
HCT VFR BLD AUTO: 32.7 % (ref 40–53)
HCT VFR BLD AUTO: 33.1 % (ref 40–53)
HCT VFR BLD AUTO: 34.1 % (ref 40–53)
HCT VFR BLD AUTO: 36.3 % (ref 40–53)
HCT VFR BLD CALC: 37 % (ref 40–53)
HGB BLD-MCNC: 10 G/DL (ref 13.3–17.7)
HGB BLD-MCNC: 10.4 G/DL (ref 13.3–17.7)
HGB BLD-MCNC: 11 G/DL (ref 13.3–17.7)
HGB BLD-MCNC: 12.6 G/DL (ref 13.3–17.7)
HGB BLD-MCNC: 5.8 G/DL (ref 13.3–17.7)
HGB BLD-MCNC: 6.8 G/DL (ref 13.3–17.7)
HGB BLD-MCNC: 7.1 G/DL (ref 13.3–17.7)
HGB BLD-MCNC: 7.2 G/DL (ref 13.3–17.7)
HGB BLD-MCNC: 7.3 G/DL (ref 13.3–17.7)
HGB BLD-MCNC: 7.6 G/DL (ref 13.3–17.7)
HGB BLD-MCNC: 7.8 G/DL (ref 13.3–17.7)
HGB BLD-MCNC: 8 G/DL (ref 13.3–17.7)
HGB BLD-MCNC: 8.1 G/DL (ref 13.3–17.7)
HGB BLD-MCNC: 8.2 G/DL (ref 13.3–17.7)
HGB BLD-MCNC: 8.3 G/DL (ref 13.3–17.7)
HGB BLD-MCNC: 8.8 G/DL (ref 13.3–17.7)
HGB BLD-MCNC: 8.8 G/DL (ref 13.3–17.7)
HGB BLD-MCNC: 9.2 G/DL (ref 13.3–17.7)
HGB BLD-MCNC: 9.2 G/DL (ref 13.3–17.7)
HGB BLD-MCNC: 9.9 G/DL (ref 13.3–17.7)
HGB UR QL STRIP: ABNORMAL
HIV 1+2 AB+HIV1 P24 AG SERPL QL IA: NONREACTIVE
HMPV RNA SPEC QL NAA+PROBE: NOT DETECTED
HOLD SPECIMEN: NORMAL
HPIV1 RNA SPEC QL NAA+PROBE: NOT DETECTED
HPIV2 RNA SPEC QL NAA+PROBE: NOT DETECTED
HPIV3 RNA SPEC QL NAA+PROBE: NOT DETECTED
HPIV4 RNA SPEC QL NAA+PROBE: NOT DETECTED
HYALINE CASTS: 3 /LPF
HYALINE CASTS: 4 /LPF
HYALINE CASTS: 6 /LPF
IMM GRANULOCYTES # BLD: 0.1 10E3/UL
IMM GRANULOCYTES NFR BLD: 1 %
INR PPP: 1.38 (ref 0.85–1.15)
INTERPRETATION ECG - MUSE: NORMAL
ISSUE DATE AND TIME: NORMAL
ISSUE DATE AND TIME: NORMAL
KETONES UR STRIP-MCNC: ABNORMAL MG/DL
KETONES UR STRIP-MCNC: ABNORMAL MG/DL
KETONES UR STRIP-MCNC: NEGATIVE MG/DL
KETONES UR STRIP-MCNC: NEGATIVE MG/DL
L PNEUMO1 AG UR QL IA: NEGATIVE
LACTATE SERPL-SCNC: 0.5 MMOL/L (ref 0.7–2)
LACTATE SERPL-SCNC: 0.9 MMOL/L (ref 0.7–2)
LACTATE SERPL-SCNC: 1.1 MMOL/L (ref 0.7–2)
LACTATE SERPL-SCNC: 1.2 MMOL/L (ref 0.7–2)
LACTATE SERPL-SCNC: 1.2 MMOL/L (ref 0.7–2)
LACTATE SERPL-SCNC: 1.6 MMOL/L (ref 0.7–2)
LACTATE SERPL-SCNC: 1.7 MMOL/L (ref 0.7–2)
LACTATE SERPL-SCNC: 2.2 MMOL/L (ref 0.7–2)
LACTATE SERPL-SCNC: 2.4 MMOL/L (ref 0.7–2)
LACTATE SERPL-SCNC: 2.6 MMOL/L (ref 0.7–2)
LACTATE SERPL-SCNC: 2.9 MMOL/L (ref 0.7–2)
LD BODY BODY FLUID SOURCE: NORMAL
LDH FLD L TO P-CCNC: 205 U/L
LDH SERPL L TO P-CCNC: 401 U/L (ref 0–250)
LDH SERPL L TO P-CCNC: 427 U/L (ref 0–250)
LEUKOCYTE ESTERASE UR QL STRIP: ABNORMAL
LEUKOCYTE ESTERASE UR QL STRIP: NEGATIVE
LIPASE SERPL-CCNC: 509 U/L (ref 13–60)
LYMPHOCYTES # BLD AUTO: 0.4 10E3/UL (ref 0.8–5.3)
LYMPHOCYTES # BLD MANUAL: 0.2 10E3/UL (ref 0.8–5.3)
LYMPHOCYTES # BLD MANUAL: 0.6 10E3/UL (ref 0.8–5.3)
LYMPHOCYTES # BLD MANUAL: 1.1 10E3/UL (ref 0.8–5.3)
LYMPHOCYTES NFR BLD AUTO: 5 %
LYMPHOCYTES NFR BLD MANUAL: 1 %
LYMPHOCYTES NFR BLD MANUAL: 6 %
LYMPHOCYTES NFR BLD MANUAL: 9 %
LYMPHOCYTES NFR FLD MANUAL: 30 %
M PNEUMO DNA SPEC QL NAA+PROBE: NOT DETECTED
M TB DNA SPEC QL NAA+PROBE: NOT DETECTED
MAGNESIUM SERPL-MCNC: 1.8 MG/DL (ref 1.7–2.3)
MAGNESIUM SERPL-MCNC: 1.9 MG/DL (ref 1.7–2.3)
MAGNESIUM SERPL-MCNC: 2.1 MG/DL (ref 1.7–2.3)
MAGNESIUM SERPL-MCNC: 2.1 MG/DL (ref 1.7–2.3)
MAGNESIUM SERPL-MCNC: 2.2 MG/DL (ref 1.7–2.3)
MAGNESIUM SERPL-MCNC: 2.3 MG/DL (ref 1.7–2.3)
MAGNESIUM SERPL-MCNC: 2.3 MG/DL (ref 1.7–2.3)
MAGNESIUM SERPL-MCNC: 2.5 MG/DL (ref 1.7–2.3)
MAGNESIUM SERPL-MCNC: 2.5 MG/DL (ref 1.7–2.3)
MAGNESIUM SERPL-MCNC: 2.6 MG/DL (ref 1.7–2.3)
MAGNESIUM SERPL-MCNC: 2.7 MG/DL (ref 1.7–2.3)
MAGNESIUM SERPL-MCNC: 2.8 MG/DL (ref 1.7–2.3)
MAGNESIUM SERPL-MCNC: 2.9 MG/DL (ref 1.7–2.3)
MAGNESIUM SERPL-MCNC: 3.9 MG/DL (ref 1.7–2.3)
MCH RBC QN AUTO: 26.3 PG (ref 26.5–33)
MCH RBC QN AUTO: 26.6 PG (ref 26.5–33)
MCH RBC QN AUTO: 26.7 PG (ref 26.5–33)
MCH RBC QN AUTO: 27 PG (ref 26.5–33)
MCH RBC QN AUTO: 27.2 PG (ref 26.5–33)
MCH RBC QN AUTO: 27.2 PG (ref 26.5–33)
MCH RBC QN AUTO: 27.4 PG (ref 26.5–33)
MCH RBC QN AUTO: 27.4 PG (ref 26.5–33)
MCH RBC QN AUTO: 27.5 PG (ref 26.5–33)
MCH RBC QN AUTO: 27.6 PG (ref 26.5–33)
MCH RBC QN AUTO: 28 PG (ref 26.5–33)
MCH RBC QN AUTO: 28 PG (ref 26.5–33)
MCH RBC QN AUTO: 28.1 PG (ref 26.5–33)
MCH RBC QN AUTO: 28.2 PG (ref 26.5–33)
MCH RBC QN AUTO: 28.2 PG (ref 26.5–33)
MCH RBC QN AUTO: 28.6 PG (ref 26.5–33)
MCH RBC QN AUTO: 28.7 PG (ref 26.5–33)
MCH RBC QN AUTO: 28.9 PG (ref 26.5–33)
MCH RBC QN AUTO: 29.5 PG (ref 26.5–33)
MCHC RBC AUTO-ENTMCNC: 29.5 G/DL (ref 31.5–36.5)
MCHC RBC AUTO-ENTMCNC: 29.7 G/DL (ref 31.5–36.5)
MCHC RBC AUTO-ENTMCNC: 29.7 G/DL (ref 31.5–36.5)
MCHC RBC AUTO-ENTMCNC: 29.8 G/DL (ref 31.5–36.5)
MCHC RBC AUTO-ENTMCNC: 29.9 G/DL (ref 31.5–36.5)
MCHC RBC AUTO-ENTMCNC: 30 G/DL (ref 31.5–36.5)
MCHC RBC AUTO-ENTMCNC: 30.1 G/DL (ref 31.5–36.5)
MCHC RBC AUTO-ENTMCNC: 30.2 G/DL (ref 31.5–36.5)
MCHC RBC AUTO-ENTMCNC: 30.3 G/DL (ref 31.5–36.5)
MCHC RBC AUTO-ENTMCNC: 30.3 G/DL (ref 31.5–36.5)
MCHC RBC AUTO-ENTMCNC: 30.4 G/DL (ref 31.5–36.5)
MCHC RBC AUTO-ENTMCNC: 30.5 G/DL (ref 31.5–36.5)
MCHC RBC AUTO-ENTMCNC: 30.9 G/DL (ref 31.5–36.5)
MCHC RBC AUTO-ENTMCNC: 30.9 G/DL (ref 31.5–36.5)
MCHC RBC AUTO-ENTMCNC: 31 G/DL (ref 31.5–36.5)
MCHC RBC AUTO-ENTMCNC: 31 G/DL (ref 31.5–36.5)
MCHC RBC AUTO-ENTMCNC: 31.5 G/DL (ref 31.5–36.5)
MCHC RBC AUTO-ENTMCNC: 32.2 G/DL (ref 31.5–36.5)
MCHC RBC AUTO-ENTMCNC: 32.3 G/DL (ref 31.5–36.5)
MCV RBC AUTO: 87 FL (ref 78–100)
MCV RBC AUTO: 87 FL (ref 78–100)
MCV RBC AUTO: 89 FL (ref 78–100)
MCV RBC AUTO: 90 FL (ref 78–100)
MCV RBC AUTO: 91 FL (ref 78–100)
MCV RBC AUTO: 92 FL (ref 78–100)
MCV RBC AUTO: 92 FL (ref 78–100)
MCV RBC AUTO: 93 FL (ref 78–100)
MCV RBC AUTO: 93 FL (ref 78–100)
MCV RBC AUTO: 94 FL (ref 78–100)
MCV RBC AUTO: 95 FL (ref 78–100)
MCV RBC AUTO: 95 FL (ref 78–100)
MONOCYTES # BLD AUTO: 1 10E3/UL (ref 0–1.3)
MONOCYTES # BLD MANUAL: 0.4 10E3/UL (ref 0–1.3)
MONOCYTES # BLD MANUAL: 0.6 10E3/UL (ref 0–1.3)
MONOCYTES # BLD MANUAL: 0.9 10E3/UL (ref 0–1.3)
MONOCYTES NFR BLD AUTO: 12 %
MONOCYTES NFR BLD MANUAL: 2 %
MONOCYTES NFR BLD MANUAL: 6 %
MONOCYTES NFR BLD MANUAL: 7 %
MONOS+MACROS NFR FLD MANUAL: 32 %
MRSA DNA SPEC QL NAA+PROBE: NEGATIVE
MUCOUS THREADS #/AREA URNS LPF: PRESENT /LPF
NEUTROPHILS # BLD AUTO: 6.5 10E3/UL (ref 1.6–8.3)
NEUTROPHILS # BLD MANUAL: 19.6 10E3/UL (ref 1.6–8.3)
NEUTROPHILS # BLD MANUAL: 7.5 10E3/UL (ref 1.6–8.3)
NEUTROPHILS # BLD MANUAL: 9.9 10E3/UL (ref 1.6–8.3)
NEUTROPHILS NFR BLD AUTO: 82 %
NEUTROPHILS NFR BLD MANUAL: 77 %
NEUTROPHILS NFR BLD MANUAL: 80 %
NEUTROPHILS NFR BLD MANUAL: 97 %
NEUTS BAND NFR FLD MANUAL: 4 %
NITRATE UR QL: NEGATIVE
NRBC # BLD AUTO: 0 10E3/UL
NRBC BLD AUTO-RTO: 0 /100
NT-PROBNP SERPL-MCNC: 220 PG/ML (ref 0–1800)
O2/TOTAL GAS SETTING VFR VENT: 100 %
O2/TOTAL GAS SETTING VFR VENT: 25 %
O2/TOTAL GAS SETTING VFR VENT: 30 %
O2/TOTAL GAS SETTING VFR VENT: 35 %
O2/TOTAL GAS SETTING VFR VENT: 35 %
O2/TOTAL GAS SETTING VFR VENT: 40 %
O2/TOTAL GAS SETTING VFR VENT: 45 %
O2/TOTAL GAS SETTING VFR VENT: 45 %
O2/TOTAL GAS SETTING VFR VENT: 50 %
O2/TOTAL GAS SETTING VFR VENT: 60 %
O2/TOTAL GAS SETTING VFR VENT: 96 %
OBSERVATION IMP: NEGATIVE
OBSERVATION IMP: NEGATIVE
OTHER CELLS FLD MANUAL: 31 %
OXYHGB MFR BLD: 95 % (ref 92–100)
OXYHGB MFR BLDV: 40 % (ref 70–75)
OXYHGB MFR BLDV: 45 % (ref 70–75)
OXYHGB MFR BLDV: 50 % (ref 70–75)
OXYHGB MFR BLDV: 50 % (ref 70–75)
OXYHGB MFR BLDV: 54 % (ref 70–75)
OXYHGB MFR BLDV: 61 % (ref 70–75)
OXYHGB MFR BLDV: 73 % (ref 70–75)
OXYHGB MFR BLDV: 83 % (ref 70–75)
P AXIS - MUSE: 34 DEGREES
P AXIS - MUSE: 55 DEGREES
P AXIS - MUSE: 59 DEGREES
P AXIS - MUSE: NORMAL DEGREES
P JIROVECII AG SPEC QL IF: NEGATIVE
P JIROVECII DNA SPEC QL NAA+PROBE: NOT DETECTED
PATH REPORT.COMMENTS IMP SPEC: NORMAL
PATH REPORT.COMMENTS IMP SPEC: NORMAL
PATH REPORT.FINAL DX SPEC: NORMAL
PATH REPORT.GROSS SPEC: NORMAL
PATH REPORT.MICROSCOPIC SPEC OTHER STN: NORMAL
PATH REPORT.RELEVANT HX SPEC: NORMAL
PCO2 BLD: 55 MM HG (ref 35–45)
PCO2 BLD: 55 MM HG (ref 35–45)
PCO2 BLD: 58 MM HG (ref 35–45)
PCO2 BLD: 63 MM HG (ref 35–45)
PCO2 BLD: 63 MM HG (ref 35–45)
PCO2 BLD: 71 MM HG (ref 35–45)
PCO2 BLD: >130 MM HG (ref 35–45)
PCO2 BLDV: 59 MM HG (ref 40–50)
PCO2 BLDV: 60 MM HG (ref 40–50)
PCO2 BLDV: 64 MM HG (ref 40–50)
PCO2 BLDV: 66 MM HG (ref 40–50)
PCO2 BLDV: 67 MM HG (ref 40–50)
PCO2 BLDV: 68 MM HG (ref 40–50)
PCO2 BLDV: 68 MM HG (ref 40–50)
PCO2 BLDV: 69 MM HG (ref 40–50)
PCO2 BLDV: 70 MM HG (ref 40–50)
PCO2 BLDV: 72 MM HG (ref 40–50)
PCO2 BLDV: 72 MM HG (ref 40–50)
PCO2 BLDV: 73 MM HG (ref 40–50)
PCO2 BLDV: 74 MM HG (ref 40–50)
PCO2 BLDV: 77 MM HG (ref 40–50)
PCO2 BLDV: 78 MM HG (ref 40–50)
PCO2 BLDV: 80 MM HG (ref 40–50)
PCO2 BLDV: 85 MM HG (ref 40–50)
PCO2 BLDV: 88 MM HG (ref 40–50)
PF4 HEPARIN CMPLX AB SER QL: NEGATIVE
PH BLD: 7.02 [PH] (ref 7.35–7.45)
PH BLD: 7.29 [PH] (ref 7.35–7.45)
PH BLD: 7.35 [PH] (ref 7.35–7.45)
PH BLD: 7.43 [PH] (ref 7.35–7.45)
PH BLD: 7.43 [PH] (ref 7.35–7.45)
PH BLD: 7.46 [PH] (ref 7.35–7.45)
PH BLD: 7.49 [PH] (ref 7.35–7.45)
PH BLDV: 7.2 [PH] (ref 7.32–7.43)
PH BLDV: 7.23 [PH] (ref 7.32–7.43)
PH BLDV: 7.24 [PH] (ref 7.32–7.43)
PH BLDV: 7.25 [PH] (ref 7.32–7.43)
PH BLDV: 7.25 [PH] (ref 7.32–7.43)
PH BLDV: 7.27 [PH] (ref 7.32–7.43)
PH BLDV: 7.3 [PH] (ref 7.32–7.43)
PH BLDV: 7.33 [PH] (ref 7.32–7.43)
PH BLDV: 7.35 [PH] (ref 7.32–7.43)
PH BLDV: 7.37 [PH] (ref 7.32–7.43)
PH BLDV: 7.37 [PH] (ref 7.32–7.43)
PH BLDV: 7.38 [PH] (ref 7.32–7.43)
PH BLDV: 7.41 [PH] (ref 7.32–7.43)
PH BLDV: 7.42 [PH] (ref 7.32–7.43)
PH BLDV: 7.44 [PH] (ref 7.32–7.43)
PH UR STRIP: 5.5 [PH] (ref 5–7)
PH UR STRIP: 5.5 [PH] (ref 5–7)
PH UR STRIP: 6.5 [PH] (ref 5–7)
PH UR STRIP: 8.5 [PH] (ref 5–7)
PHOSPHATE SERPL-MCNC: 1.2 MG/DL (ref 2.5–4.5)
PHOSPHATE SERPL-MCNC: 1.4 MG/DL (ref 2.5–4.5)
PHOSPHATE SERPL-MCNC: 1.4 MG/DL (ref 2.5–4.5)
PHOSPHATE SERPL-MCNC: 1.5 MG/DL (ref 2.5–4.5)
PHOSPHATE SERPL-MCNC: 1.7 MG/DL (ref 2.5–4.5)
PHOSPHATE SERPL-MCNC: 2 MG/DL (ref 2.5–4.5)
PHOSPHATE SERPL-MCNC: 2.3 MG/DL (ref 2.5–4.5)
PHOSPHATE SERPL-MCNC: 2.5 MG/DL (ref 2.5–4.5)
PHOSPHATE SERPL-MCNC: 2.6 MG/DL (ref 2.5–4.5)
PHOSPHATE SERPL-MCNC: 2.8 MG/DL (ref 2.5–4.5)
PHOSPHATE SERPL-MCNC: 3.1 MG/DL (ref 2.5–4.5)
PHOSPHATE SERPL-MCNC: 3.1 MG/DL (ref 2.5–4.5)
PHOSPHATE SERPL-MCNC: 3.3 MG/DL (ref 2.5–4.5)
PHOSPHATE SERPL-MCNC: 3.9 MG/DL (ref 2.5–4.5)
PHOSPHATE SERPL-MCNC: 4.1 MG/DL (ref 2.5–4.5)
PHOSPHATE SERPL-MCNC: 4.4 MG/DL (ref 2.5–4.5)
PHOSPHATE SERPL-MCNC: 4.9 MG/DL (ref 2.5–4.5)
PLAT MORPH BLD: ABNORMAL
PLAT MORPH BLD: NORMAL
PLATELET # BLD AUTO: 110 10E3/UL (ref 150–450)
PLATELET # BLD AUTO: 110 10E3/UL (ref 150–450)
PLATELET # BLD AUTO: 112 10E3/UL (ref 150–450)
PLATELET # BLD AUTO: 113 10E3/UL (ref 150–450)
PLATELET # BLD AUTO: 124 10E3/UL (ref 150–450)
PLATELET # BLD AUTO: 135 10E3/UL (ref 150–450)
PLATELET # BLD AUTO: 140 10E3/UL (ref 150–450)
PLATELET # BLD AUTO: 144 10E3/UL (ref 150–450)
PLATELET # BLD AUTO: 164 10E3/UL (ref 150–450)
PLATELET # BLD AUTO: 176 10E3/UL (ref 150–450)
PLATELET # BLD AUTO: 179 10E3/UL (ref 150–450)
PLATELET # BLD AUTO: 180 10E3/UL (ref 150–450)
PLATELET # BLD AUTO: 182 10E3/UL (ref 150–450)
PLATELET # BLD AUTO: 206 10E3/UL (ref 150–450)
PLATELET # BLD AUTO: 232 10E3/UL (ref 150–450)
PLATELET # BLD AUTO: 236 10E3/UL (ref 150–450)
PLATELET # BLD AUTO: 263 10E3/UL (ref 150–450)
PLATELET # BLD AUTO: 93 10E3/UL (ref 150–450)
PLATELET # BLD AUTO: 96 10E3/UL (ref 150–450)
PO2 BLD: 280 MM HG (ref 80–105)
PO2 BLD: 52 MM HG (ref 80–105)
PO2 BLD: 70 MM HG (ref 80–105)
PO2 BLD: 72 MM HG (ref 80–105)
PO2 BLD: 75 MM HG (ref 80–105)
PO2 BLD: 86 MM HG (ref 80–105)
PO2 BLD: 98 MM HG (ref 80–105)
PO2 BLDV: 22 MM HG (ref 25–47)
PO2 BLDV: 26 MM HG (ref 25–47)
PO2 BLDV: 28 MM HG (ref 25–47)
PO2 BLDV: 30 MM HG (ref 25–47)
PO2 BLDV: 30 MM HG (ref 25–47)
PO2 BLDV: 31 MM HG (ref 25–47)
PO2 BLDV: 32 MM HG (ref 25–47)
PO2 BLDV: 33 MM HG (ref 25–47)
PO2 BLDV: 33 MM HG (ref 25–47)
PO2 BLDV: 34 MM HG (ref 25–47)
PO2 BLDV: 35 MM HG (ref 25–47)
PO2 BLDV: 37 MM HG (ref 25–47)
PO2 BLDV: 37 MM HG (ref 25–47)
PO2 BLDV: 39 MM HG (ref 25–47)
PO2 BLDV: 44 MM HG (ref 25–47)
PO2 BLDV: 48 MM HG (ref 25–47)
PO2 BLDV: 50 MM HG (ref 25–47)
PO2 BLDV: 51 MM HG (ref 25–47)
PO2 BLDV: 54 MM HG (ref 25–47)
PO2 BLDV: 69 MM HG (ref 25–47)
POTASSIUM BLD-SCNC: 4.7 MMOL/L (ref 3.4–5.3)
POTASSIUM SERPL-SCNC: 3.1 MMOL/L (ref 3.4–5.3)
POTASSIUM SERPL-SCNC: 3.1 MMOL/L (ref 3.4–5.3)
POTASSIUM SERPL-SCNC: 3.2 MMOL/L (ref 3.4–5.3)
POTASSIUM SERPL-SCNC: 3.4 MMOL/L (ref 3.4–5.3)
POTASSIUM SERPL-SCNC: 3.5 MMOL/L (ref 3.4–5.3)
POTASSIUM SERPL-SCNC: 3.7 MMOL/L (ref 3.4–5.3)
POTASSIUM SERPL-SCNC: 3.7 MMOL/L (ref 3.4–5.3)
POTASSIUM SERPL-SCNC: 3.8 MMOL/L (ref 3.4–5.3)
POTASSIUM SERPL-SCNC: 3.9 MMOL/L (ref 3.4–5.3)
POTASSIUM SERPL-SCNC: 4 MMOL/L (ref 3.4–5.3)
POTASSIUM SERPL-SCNC: 4.1 MMOL/L (ref 3.4–5.3)
POTASSIUM SERPL-SCNC: 4.2 MMOL/L (ref 3.4–5.3)
POTASSIUM SERPL-SCNC: 4.3 MMOL/L (ref 3.4–5.3)
POTASSIUM SERPL-SCNC: 4.3 MMOL/L (ref 3.4–5.3)
POTASSIUM SERPL-SCNC: 4.4 MMOL/L (ref 3.4–5.3)
POTASSIUM SERPL-SCNC: 4.5 MMOL/L (ref 3.4–5.3)
POTASSIUM SERPL-SCNC: 4.6 MMOL/L (ref 3.4–5.3)
POTASSIUM SERPL-SCNC: 4.6 MMOL/L (ref 3.4–5.3)
POTASSIUM SERPL-SCNC: 4.7 MMOL/L (ref 3.4–5.3)
POTASSIUM SERPL-SCNC: 4.8 MMOL/L (ref 3.4–5.3)
POTASSIUM SERPL-SCNC: 4.9 MMOL/L (ref 3.4–5.3)
POTASSIUM SERPL-SCNC: 5.1 MMOL/L (ref 3.4–5.3)
PR INTERVAL - MUSE: 138 MS
PR INTERVAL - MUSE: 142 MS
PR INTERVAL - MUSE: 142 MS
PR INTERVAL - MUSE: NORMAL MS
PROCALCITONIN SERPL IA-MCNC: 0.32 NG/ML
PROCALCITONIN SERPL IA-MCNC: 2.31 NG/ML
PROCALCITONIN SERPL IA-MCNC: 7.82 NG/ML
PROT FLD-MCNC: 4 G/DL
PROT SERPL-MCNC: 5 G/DL (ref 6.4–8.3)
PROT SERPL-MCNC: 5.5 G/DL (ref 6.4–8.3)
PROT SERPL-MCNC: 5.5 G/DL (ref 6.4–8.3)
PROT SERPL-MCNC: 5.7 G/DL (ref 6.4–8.3)
PROT SERPL-MCNC: 6.1 G/DL (ref 6.4–8.3)
PROT SERPL-MCNC: 6.2 G/DL (ref 6.4–8.3)
PROT SERPL-MCNC: 6.8 G/DL (ref 6.4–8.3)
PROT SERPL-MCNC: 7 G/DL (ref 6.4–8.3)
PROT SERPL-MCNC: 7.9 G/DL (ref 6.4–8.3)
PROTEIN BODY FLUID SOURCE: NORMAL
QRS DURATION - MUSE: 106 MS
QRS DURATION - MUSE: 126 MS
QRS DURATION - MUSE: 96 MS
QRS DURATION - MUSE: 96 MS
QT - MUSE: 308 MS
QT - MUSE: 314 MS
QT - MUSE: 362 MS
QT - MUSE: 428 MS
QTC - MUSE: 435 MS
QTC - MUSE: 454 MS
QTC - MUSE: 471 MS
QTC - MUSE: 478 MS
R AXIS - MUSE: 114 DEGREES
R AXIS - MUSE: 73 DEGREES
R AXIS - MUSE: 90 DEGREES
R AXIS - MUSE: 92 DEGREES
RBC # BLD AUTO: 2.44 10E6/UL (ref 4.4–5.9)
RBC # BLD AUTO: 2.48 10E6/UL (ref 4.4–5.9)
RBC # BLD AUTO: 2.6 10E6/UL (ref 4.4–5.9)
RBC # BLD AUTO: 2.61 10E6/UL (ref 4.4–5.9)
RBC # BLD AUTO: 2.76 10E6/UL (ref 4.4–5.9)
RBC # BLD AUTO: 2.83 10E6/UL (ref 4.4–5.9)
RBC # BLD AUTO: 2.84 10E6/UL (ref 4.4–5.9)
RBC # BLD AUTO: 2.85 10E6/UL (ref 4.4–5.9)
RBC # BLD AUTO: 2.89 10E6/UL (ref 4.4–5.9)
RBC # BLD AUTO: 2.93 10E6/UL (ref 4.4–5.9)
RBC # BLD AUTO: 3 10E6/UL (ref 4.4–5.9)
RBC # BLD AUTO: 3.05 10E6/UL (ref 4.4–5.9)
RBC # BLD AUTO: 3.14 10E6/UL (ref 4.4–5.9)
RBC # BLD AUTO: 3.26 10E6/UL (ref 4.4–5.9)
RBC # BLD AUTO: 3.38 10E6/UL (ref 4.4–5.9)
RBC # BLD AUTO: 3.53 10E6/UL (ref 4.4–5.9)
RBC # BLD AUTO: 3.74 10E6/UL (ref 4.4–5.9)
RBC # BLD AUTO: 3.91 10E6/UL (ref 4.4–5.9)
RBC # BLD AUTO: 4.18 10E6/UL (ref 4.4–5.9)
RBC MORPH BLD: ABNORMAL
RBC MORPH BLD: NORMAL
RBC URINE: 10 /HPF
RBC URINE: 124 /HPF
RBC URINE: 46 /HPF
RBC URINE: 9 /HPF
RSV RNA SPEC NAA+PROBE: NEGATIVE
RSV RNA SPEC QL NAA+PROBE: NOT DETECTED
RSV RNA SPEC QL NAA+PROBE: NOT DETECTED
RV+EV RNA SPEC QL NAA+PROBE: NOT DETECTED
S PNEUM AG SPEC QL: NEGATIVE
SA TARGET DNA: NEGATIVE
SAO2 % BLDV: 33 % (ref 94–100)
SARS-COV-2 RNA RESP QL NAA+PROBE: NEGATIVE
SARS-COV-2 RNA RESP QL NAA+PROBE: NEGATIVE
SCANNED LAB RESULT: NORMAL
SODIUM BLD-SCNC: 140 MMOL/L (ref 133–144)
SODIUM SERPL-SCNC: 140 MMOL/L (ref 136–145)
SODIUM SERPL-SCNC: 141 MMOL/L (ref 136–145)
SODIUM SERPL-SCNC: 142 MMOL/L (ref 136–145)
SODIUM SERPL-SCNC: 143 MMOL/L (ref 136–145)
SODIUM SERPL-SCNC: 144 MMOL/L (ref 136–145)
SODIUM SERPL-SCNC: 145 MMOL/L (ref 136–145)
SODIUM SERPL-SCNC: 146 MMOL/L (ref 136–145)
SODIUM SERPL-SCNC: 146 MMOL/L (ref 136–145)
SODIUM SERPL-SCNC: 147 MMOL/L (ref 136–145)
SODIUM SERPL-SCNC: 148 MMOL/L (ref 136–145)
SODIUM SERPL-SCNC: 149 MMOL/L (ref 136–145)
SODIUM SERPL-SCNC: 150 MMOL/L (ref 136–145)
SODIUM SERPL-SCNC: 151 MMOL/L (ref 136–145)
SODIUM SERPL-SCNC: 151 MMOL/L (ref 136–145)
SODIUM SERPL-SCNC: 153 MMOL/L (ref 136–145)
SP GR UR STRIP: 1.01 (ref 1–1.03)
SP GR UR STRIP: 1.02 (ref 1–1.03)
SP GR UR STRIP: 1.02 (ref 1–1.03)
SP GR UR STRIP: 1.03 (ref 1–1.03)
SPECIMEN EXPIRATION DATE: NORMAL
SPECIMEN EXPIRATION DATE: NORMAL
SQUAMOUS EPITHELIAL: <1 /HPF
STRONGYLOIDES IGG SER IA-ACNC: 0 IV
SYSTOLIC BLOOD PRESSURE - MUSE: NORMAL MMHG
T AXIS - MUSE: -5 DEGREES
T AXIS - MUSE: 30 DEGREES
T AXIS - MUSE: 67 DEGREES
T AXIS - MUSE: 9 DEGREES
TRANSITIONAL EPI: <1 /HPF
TROPONIN T SERPL HS-MCNC: 115 NG/L
TROPONIN T SERPL HS-MCNC: 29 NG/L
TROPONIN T SERPL HS-MCNC: 47 NG/L
TROPONIN T SERPL HS-MCNC: 82 NG/L
TROPONIN T SERPL HS-MCNC: 88 NG/L
UNIT ABO/RH: NORMAL
UNIT ABO/RH: NORMAL
UNIT NUMBER: NORMAL
UNIT NUMBER: NORMAL
UNIT STATUS: NORMAL
UNIT STATUS: NORMAL
UNIT TYPE ISBT: 7300
UNIT TYPE ISBT: 7300
UROBILINOGEN UR STRIP-MCNC: NORMAL MG/DL
UUN UR-MCNC: 141 MG/DL (ref 801–1666)
UUN UR-MCNC: 897 MG/DL (ref 801–1666)
VENTRICULAR RATE- MUSE: 126 BPM
VENTRICULAR RATE- MUSE: 145 BPM
VENTRICULAR RATE- MUSE: 73 BPM
VENTRICULAR RATE- MUSE: 87 BPM
WBC # BLD AUTO: 10.9 10E3/UL (ref 4–11)
WBC # BLD AUTO: 11.1 10E3/UL (ref 4–11)
WBC # BLD AUTO: 11.1 10E3/UL (ref 4–11)
WBC # BLD AUTO: 11.7 10E3/UL (ref 4–11)
WBC # BLD AUTO: 11.9 10E3/UL (ref 4–11)
WBC # BLD AUTO: 12.3 10E3/UL (ref 4–11)
WBC # BLD AUTO: 12.4 10E3/UL (ref 4–11)
WBC # BLD AUTO: 12.9 10E3/UL (ref 4–11)
WBC # BLD AUTO: 13.3 10E3/UL (ref 4–11)
WBC # BLD AUTO: 13.5 10E3/UL (ref 4–11)
WBC # BLD AUTO: 14.9 10E3/UL (ref 4–11)
WBC # BLD AUTO: 20.2 10E3/UL (ref 4–11)
WBC # BLD AUTO: 7 10E3/UL (ref 4–11)
WBC # BLD AUTO: 8 10E3/UL (ref 4–11)
WBC # BLD AUTO: 8 10E3/UL (ref 4–11)
WBC # BLD AUTO: 8.6 10E3/UL (ref 4–11)
WBC # BLD AUTO: 8.9 10E3/UL (ref 4–11)
WBC # BLD AUTO: 9.3 10E3/UL (ref 4–11)
WBC # BLD AUTO: 9.4 10E3/UL (ref 4–11)
WBC # FLD AUTO: 1466 /UL
WBC URINE: 1 /HPF
WBC URINE: 4 /HPF
WBC URINE: 8 /HPF
WBC URINE: 8 /HPF

## 2023-01-01 PROCEDURE — 84295 ASSAY OF SERUM SODIUM: CPT | Performed by: NURSE PRACTITIONER

## 2023-01-01 PROCEDURE — 87556 M.TUBERCULO DNA AMP PROBE: CPT

## 2023-01-01 PROCEDURE — 999N000040 HC STATISTIC CONSULT NO CHARGE VASC ACCESS

## 2023-01-01 PROCEDURE — 71045 X-RAY EXAM CHEST 1 VIEW: CPT | Mod: 26 | Performed by: RADIOLOGY

## 2023-01-01 PROCEDURE — 200N000002 HC R&B ICU UMMC

## 2023-01-01 PROCEDURE — 85610 PROTHROMBIN TIME: CPT | Performed by: EMERGENCY MEDICINE

## 2023-01-01 PROCEDURE — 80048 BASIC METABOLIC PNL TOTAL CA: CPT | Performed by: NURSE PRACTITIONER

## 2023-01-01 PROCEDURE — 999N000157 HC STATISTIC RCP TIME EA 10 MIN

## 2023-01-01 PROCEDURE — 250N000013 HC RX MED GY IP 250 OP 250 PS 637

## 2023-01-01 PROCEDURE — 83735 ASSAY OF MAGNESIUM: CPT

## 2023-01-01 PROCEDURE — 83735 ASSAY OF MAGNESIUM: CPT | Performed by: NURSE PRACTITIONER

## 2023-01-01 PROCEDURE — 82805 BLOOD GASES W/O2 SATURATION: CPT

## 2023-01-01 PROCEDURE — 96361 HYDRATE IV INFUSION ADD-ON: CPT

## 2023-01-01 PROCEDURE — 82610 CYSTATIN C: CPT | Performed by: NURSE PRACTITIONER

## 2023-01-01 PROCEDURE — 94668 MNPJ CHEST WALL SBSQ: CPT

## 2023-01-01 PROCEDURE — 84484 ASSAY OF TROPONIN QUANT: CPT

## 2023-01-01 PROCEDURE — 94660 CPAP INITIATION&MGMT: CPT

## 2023-01-01 PROCEDURE — 250N000009 HC RX 250: Performed by: PHYSICIAN ASSISTANT

## 2023-01-01 PROCEDURE — 258N000003 HC RX IP 258 OP 636

## 2023-01-01 PROCEDURE — 94640 AIRWAY INHALATION TREATMENT: CPT | Mod: 76

## 2023-01-01 PROCEDURE — 86901 BLOOD TYPING SEROLOGIC RH(D): CPT | Performed by: ANESTHESIOLOGY

## 2023-01-01 PROCEDURE — 250N000012 HC RX MED GY IP 250 OP 636 PS 637: Performed by: NURSE PRACTITIONER

## 2023-01-01 PROCEDURE — 87556 M.TUBERCULO DNA AMP PROBE: CPT | Performed by: INTERNAL MEDICINE

## 2023-01-01 PROCEDURE — 84484 ASSAY OF TROPONIN QUANT: CPT | Performed by: NURSE PRACTITIONER

## 2023-01-01 PROCEDURE — 250N000009 HC RX 250: Performed by: NURSE PRACTITIONER

## 2023-01-01 PROCEDURE — 250N000011 HC RX IP 250 OP 636: Performed by: INTERNAL MEDICINE

## 2023-01-01 PROCEDURE — 99207 PR NO BILLABLE SERVICE THIS VISIT: CPT

## 2023-01-01 PROCEDURE — 250N000011 HC RX IP 250 OP 636: Performed by: NURSE PRACTITIONER

## 2023-01-01 PROCEDURE — 250N000009 HC RX 250

## 2023-01-01 PROCEDURE — 85027 COMPLETE CBC AUTOMATED: CPT | Performed by: NURSE PRACTITIONER

## 2023-01-01 PROCEDURE — 70450 CT HEAD/BRAIN W/O DYE: CPT | Mod: 26 | Performed by: RADIOLOGY

## 2023-01-01 PROCEDURE — 999N000185 HC STATISTIC TRANSPORT TIME EA 15 MIN

## 2023-01-01 PROCEDURE — 87205 SMEAR GRAM STAIN: CPT | Performed by: NURSE PRACTITIONER

## 2023-01-01 PROCEDURE — 82945 GLUCOSE OTHER FLUID: CPT

## 2023-01-01 PROCEDURE — 74176 CT ABD & PELVIS W/O CONTRAST: CPT

## 2023-01-01 PROCEDURE — 250N000011 HC RX IP 250 OP 636

## 2023-01-01 PROCEDURE — 250N000013 HC RX MED GY IP 250 OP 250 PS 637: Performed by: STUDENT IN AN ORGANIZED HEALTH CARE EDUCATION/TRAINING PROGRAM

## 2023-01-01 PROCEDURE — 99291 CRITICAL CARE FIRST HOUR: CPT

## 2023-01-01 PROCEDURE — 250N000013 HC RX MED GY IP 250 OP 250 PS 637: Performed by: INTERNAL MEDICINE

## 2023-01-01 PROCEDURE — 258N000003 HC RX IP 258 OP 636: Performed by: INTERNAL MEDICINE

## 2023-01-01 PROCEDURE — 84100 ASSAY OF PHOSPHORUS: CPT | Performed by: NURSE PRACTITIONER

## 2023-01-01 PROCEDURE — 36415 COLL VENOUS BLD VENIPUNCTURE: CPT | Performed by: NURSE PRACTITIONER

## 2023-01-01 PROCEDURE — 99233 SBSQ HOSP IP/OBS HIGH 50: CPT | Performed by: REGISTERED NURSE

## 2023-01-01 PROCEDURE — 87389 HIV-1 AG W/HIV-1&-2 AB AG IA: CPT

## 2023-01-01 PROCEDURE — 71045 X-RAY EXAM CHEST 1 VIEW: CPT

## 2023-01-01 PROCEDURE — 87040 BLOOD CULTURE FOR BACTERIA: CPT | Performed by: NURSE PRACTITIONER

## 2023-01-01 PROCEDURE — 250N000013 HC RX MED GY IP 250 OP 250 PS 637: Performed by: NURSE PRACTITIONER

## 2023-01-01 PROCEDURE — 82248 BILIRUBIN DIRECT: CPT | Performed by: NURSE PRACTITIONER

## 2023-01-01 PROCEDURE — 999N000147 HC STATISTIC PT IP EVAL DEFER

## 2023-01-01 PROCEDURE — G0463 HOSPITAL OUTPT CLINIC VISIT: HCPCS | Mod: 25

## 2023-01-01 PROCEDURE — 88342 IMHCHEM/IMCYTCHM 1ST ANTB: CPT | Mod: 26 | Performed by: PATHOLOGY

## 2023-01-01 PROCEDURE — 87205 SMEAR GRAM STAIN: CPT

## 2023-01-01 PROCEDURE — 71250 CT THORAX DX C-: CPT

## 2023-01-01 PROCEDURE — 86923 COMPATIBILITY TEST ELECTRIC: CPT

## 2023-01-01 PROCEDURE — 82803 BLOOD GASES ANY COMBINATION: CPT

## 2023-01-01 PROCEDURE — 87015 SPECIMEN INFECT AGNT CONCNTJ: CPT | Performed by: REGISTERED NURSE

## 2023-01-01 PROCEDURE — 250N000012 HC RX MED GY IP 250 OP 636 PS 637

## 2023-01-01 PROCEDURE — 94667 MNPJ CHEST WALL 1ST: CPT

## 2023-01-01 PROCEDURE — 82805 BLOOD GASES W/O2 SATURATION: CPT | Performed by: NURSE PRACTITIONER

## 2023-01-01 PROCEDURE — 82803 BLOOD GASES ANY COMBINATION: CPT | Performed by: INTERNAL MEDICINE

## 2023-01-01 PROCEDURE — 36569 INSJ PICC 5 YR+ W/O IMAGING: CPT

## 2023-01-01 PROCEDURE — 99223 1ST HOSP IP/OBS HIGH 75: CPT | Mod: FS | Performed by: REGISTERED NURSE

## 2023-01-01 PROCEDURE — 74018 RADEX ABDOMEN 1 VIEW: CPT

## 2023-01-01 PROCEDURE — 71045 X-RAY EXAM CHEST 1 VIEW: CPT | Mod: 26 | Performed by: STUDENT IN AN ORGANIZED HEALTH CARE EDUCATION/TRAINING PROGRAM

## 2023-01-01 PROCEDURE — 84311 SPECTROPHOTOMETRY: CPT

## 2023-01-01 PROCEDURE — 87899 AGENT NOS ASSAY W/OPTIC: CPT | Performed by: NURSE PRACTITIONER

## 2023-01-01 PROCEDURE — 88305 TISSUE EXAM BY PATHOLOGIST: CPT | Mod: 26 | Performed by: PATHOLOGY

## 2023-01-01 PROCEDURE — 94640 AIRWAY INHALATION TREATMENT: CPT

## 2023-01-01 PROCEDURE — 99291 CRITICAL CARE FIRST HOUR: CPT | Mod: FS | Performed by: ANESTHESIOLOGY

## 2023-01-01 PROCEDURE — 87798 DETECT AGENT NOS DNA AMP: CPT | Performed by: REGISTERED NURSE

## 2023-01-01 PROCEDURE — 83605 ASSAY OF LACTIC ACID: CPT

## 2023-01-01 PROCEDURE — 999N000065 XR ABDOMEN PORT 1 VIEW

## 2023-01-01 PROCEDURE — 87206 SMEAR FLUORESCENT/ACID STAI: CPT | Performed by: STUDENT IN AN ORGANIZED HEALTH CARE EDUCATION/TRAINING PROGRAM

## 2023-01-01 PROCEDURE — 85027 COMPLETE CBC AUTOMATED: CPT

## 2023-01-01 PROCEDURE — 87077 CULTURE AEROBIC IDENTIFY: CPT | Performed by: NURSE PRACTITIONER

## 2023-01-01 PROCEDURE — 258N000003 HC RX IP 258 OP 636: Performed by: EMERGENCY MEDICINE

## 2023-01-01 PROCEDURE — 250N000009 HC RX 250: Performed by: INTERNAL MEDICINE

## 2023-01-01 PROCEDURE — 87106 FUNGI IDENTIFICATION YEAST: CPT

## 2023-01-01 PROCEDURE — G0463 HOSPITAL OUTPT CLINIC VISIT: HCPCS

## 2023-01-01 PROCEDURE — 99292 CRITICAL CARE ADDL 30 MIN: CPT | Mod: FS

## 2023-01-01 PROCEDURE — 82947 ASSAY GLUCOSE BLOOD QUANT: CPT | Performed by: EMERGENCY MEDICINE

## 2023-01-01 PROCEDURE — 93010 ELECTROCARDIOGRAM REPORT: CPT | Performed by: INTERNAL MEDICINE

## 2023-01-01 PROCEDURE — 85018 HEMOGLOBIN: CPT | Performed by: NURSE PRACTITIONER

## 2023-01-01 PROCEDURE — 86850 RBC ANTIBODY SCREEN: CPT | Performed by: INTERNAL MEDICINE

## 2023-01-01 PROCEDURE — 84145 PROCALCITONIN (PCT): CPT | Performed by: NURSE PRACTITIONER

## 2023-01-01 PROCEDURE — P9047 ALBUMIN (HUMAN), 25%, 50ML: HCPCS

## 2023-01-01 PROCEDURE — 250N000009 HC RX 250: Performed by: STUDENT IN AN ORGANIZED HEALTH CARE EDUCATION/TRAINING PROGRAM

## 2023-01-01 PROCEDURE — 85007 BL SMEAR W/DIFF WBC COUNT: CPT | Performed by: NURSE PRACTITIONER

## 2023-01-01 PROCEDURE — 82310 ASSAY OF CALCIUM: CPT | Performed by: NURSE PRACTITIONER

## 2023-01-01 PROCEDURE — 258N000003 HC RX IP 258 OP 636: Performed by: STUDENT IN AN ORGANIZED HEALTH CARE EDUCATION/TRAINING PROGRAM

## 2023-01-01 PROCEDURE — 84484 ASSAY OF TROPONIN QUANT: CPT | Performed by: EMERGENCY MEDICINE

## 2023-01-01 PROCEDURE — 84540 ASSAY OF URINE/UREA-N: CPT

## 2023-01-01 PROCEDURE — 36415 COLL VENOUS BLD VENIPUNCTURE: CPT | Performed by: STUDENT IN AN ORGANIZED HEALTH CARE EDUCATION/TRAINING PROGRAM

## 2023-01-01 PROCEDURE — 85018 HEMOGLOBIN: CPT

## 2023-01-01 PROCEDURE — 70450 CT HEAD/BRAIN W/O DYE: CPT

## 2023-01-01 PROCEDURE — C9113 INJ PANTOPRAZOLE SODIUM, VIA: HCPCS | Performed by: INTERNAL MEDICINE

## 2023-01-01 PROCEDURE — 83690 ASSAY OF LIPASE: CPT | Performed by: NURSE PRACTITIONER

## 2023-01-01 PROCEDURE — 82248 BILIRUBIN DIRECT: CPT

## 2023-01-01 PROCEDURE — 82610 CYSTATIN C: CPT

## 2023-01-01 PROCEDURE — 83605 ASSAY OF LACTIC ACID: CPT | Performed by: EMERGENCY MEDICINE

## 2023-01-01 PROCEDURE — 99291 CRITICAL CARE FIRST HOUR: CPT | Mod: FS

## 2023-01-01 PROCEDURE — 87070 CULTURE OTHR SPECIMN AEROBIC: CPT

## 2023-01-01 PROCEDURE — 83615 LACTATE (LD) (LDH) ENZYME: CPT

## 2023-01-01 PROCEDURE — 86140 C-REACTIVE PROTEIN: CPT | Performed by: NURSE PRACTITIONER

## 2023-01-01 PROCEDURE — 93005 ELECTROCARDIOGRAM TRACING: CPT

## 2023-01-01 PROCEDURE — 82140 ASSAY OF AMMONIA: CPT

## 2023-01-01 PROCEDURE — 84100 ASSAY OF PHOSPHORUS: CPT | Performed by: STUDENT IN AN ORGANIZED HEALTH CARE EDUCATION/TRAINING PROGRAM

## 2023-01-01 PROCEDURE — 80053 COMPREHEN METABOLIC PANEL: CPT | Performed by: NURSE PRACTITIONER

## 2023-01-01 PROCEDURE — 258N000001 HC RX 258

## 2023-01-01 PROCEDURE — 81001 URINALYSIS AUTO W/SCOPE: CPT | Performed by: NURSE PRACTITIONER

## 2023-01-01 PROCEDURE — 86140 C-REACTIVE PROTEIN: CPT

## 2023-01-01 PROCEDURE — 93970 EXTREMITY STUDY: CPT

## 2023-01-01 PROCEDURE — 99285 EMERGENCY DEPT VISIT HI MDM: CPT | Mod: CS,25

## 2023-01-01 PROCEDURE — 84100 ASSAY OF PHOSPHORUS: CPT | Performed by: INTERNAL MEDICINE

## 2023-01-01 PROCEDURE — 82947 ASSAY GLUCOSE BLOOD QUANT: CPT

## 2023-01-01 PROCEDURE — 36415 COLL VENOUS BLD VENIPUNCTURE: CPT

## 2023-01-01 PROCEDURE — 84295 ASSAY OF SERUM SODIUM: CPT

## 2023-01-01 PROCEDURE — 87899 AGENT NOS ASSAY W/OPTIC: CPT

## 2023-01-01 PROCEDURE — 92610 EVALUATE SWALLOWING FUNCTION: CPT | Mod: GN | Performed by: SPEECH-LANGUAGE PATHOLOGIST

## 2023-01-01 PROCEDURE — 82803 BLOOD GASES ANY COMBINATION: CPT | Performed by: NURSE PRACTITIONER

## 2023-01-01 PROCEDURE — 85379 FIBRIN DEGRADATION QUANT: CPT | Performed by: NURSE PRACTITIONER

## 2023-01-01 PROCEDURE — 272N000502 HC NEEDLE CR3

## 2023-01-01 PROCEDURE — 999N000065 XR CHEST PORT 1 VIEW

## 2023-01-01 PROCEDURE — 87637 SARSCOV2&INF A&B&RSV AMP PRB: CPT | Performed by: STUDENT IN AN ORGANIZED HEALTH CARE EDUCATION/TRAINING PROGRAM

## 2023-01-01 PROCEDURE — 74018 RADEX ABDOMEN 1 VIEW: CPT | Mod: 26 | Performed by: RADIOLOGY

## 2023-01-01 PROCEDURE — 258N000003 HC RX IP 258 OP 636: Performed by: NURSE PRACTITIONER

## 2023-01-01 PROCEDURE — 85027 COMPLETE CBC AUTOMATED: CPT | Performed by: EMERGENCY MEDICINE

## 2023-01-01 PROCEDURE — 87086 URINE CULTURE/COLONY COUNT: CPT

## 2023-01-01 PROCEDURE — 99233 SBSQ HOSP IP/OBS HIGH 50: CPT | Performed by: INTERNAL MEDICINE

## 2023-01-01 PROCEDURE — 88112 CYTOPATH CELL ENHANCE TECH: CPT | Mod: TC

## 2023-01-01 PROCEDURE — 80320 DRUG SCREEN QUANTALCOHOLS: CPT | Performed by: NURSE PRACTITIONER

## 2023-01-01 PROCEDURE — 88305 TISSUE EXAM BY PATHOLOGIST: CPT | Mod: TC

## 2023-01-01 PROCEDURE — 87075 CULTR BACTERIA EXCEPT BLOOD: CPT

## 2023-01-01 PROCEDURE — 84132 ASSAY OF SERUM POTASSIUM: CPT | Performed by: NURSE PRACTITIONER

## 2023-01-01 PROCEDURE — 250N000011 HC RX IP 250 OP 636: Performed by: EMERGENCY MEDICINE

## 2023-01-01 PROCEDURE — 83605 ASSAY OF LACTIC ACID: CPT | Performed by: NURSE PRACTITIONER

## 2023-01-01 PROCEDURE — P9016 RBC LEUKOCYTES REDUCED: HCPCS

## 2023-01-01 PROCEDURE — 84100 ASSAY OF PHOSPHORUS: CPT

## 2023-01-01 PROCEDURE — 84145 PROCALCITONIN (PCT): CPT

## 2023-01-01 PROCEDURE — 83036 HEMOGLOBIN GLYCOSYLATED A1C: CPT

## 2023-01-01 PROCEDURE — 87070 CULTURE OTHR SPECIMN AEROBIC: CPT | Performed by: NURSE PRACTITIONER

## 2023-01-01 PROCEDURE — 120N000002 HC R&B MED SURG/OB UMMC

## 2023-01-01 PROCEDURE — 96365 THER/PROPH/DIAG IV INF INIT: CPT

## 2023-01-01 PROCEDURE — 272N000277 HC DEVICE 4FR SECURACATH

## 2023-01-01 PROCEDURE — 71250 CT THORAX DX C-: CPT | Mod: 26 | Performed by: RADIOLOGY

## 2023-01-01 PROCEDURE — 89050 BODY FLUID CELL COUNT: CPT

## 2023-01-01 PROCEDURE — 87101 SKIN FUNGI CULTURE: CPT

## 2023-01-01 PROCEDURE — 85014 HEMATOCRIT: CPT | Performed by: NURSE PRACTITIONER

## 2023-01-01 PROCEDURE — 999N000044 HC STATISTIC CVC DRESSING CHANGE

## 2023-01-01 PROCEDURE — 83880 ASSAY OF NATRIURETIC PEPTIDE: CPT | Performed by: EMERGENCY MEDICINE

## 2023-01-01 PROCEDURE — 87305 ASPERGILLUS AG IA: CPT | Performed by: NURSE PRACTITIONER

## 2023-01-01 PROCEDURE — 250N000011 HC RX IP 250 OP 636: Performed by: ANESTHESIOLOGY

## 2023-01-01 PROCEDURE — 83605 ASSAY OF LACTIC ACID: CPT | Performed by: STUDENT IN AN ORGANIZED HEALTH CARE EDUCATION/TRAINING PROGRAM

## 2023-01-01 PROCEDURE — U0003 INFECTIOUS AGENT DETECTION BY NUCLEIC ACID (DNA OR RNA); SEVERE ACUTE RESPIRATORY SYNDROME CORONAVIRUS 2 (SARS-COV-2) (CORONAVIRUS DISEASE [COVID-19]), AMPLIFIED PROBE TECHNIQUE, MAKING USE OF HIGH THROUGHPUT TECHNOLOGIES AS DESCRIBED BY CMS-2020-01-R: HCPCS | Performed by: NURSE PRACTITIONER

## 2023-01-01 PROCEDURE — 250N000012 HC RX MED GY IP 250 OP 636 PS 637: Performed by: INTERNAL MEDICINE

## 2023-01-01 PROCEDURE — 36415 COLL VENOUS BLD VENIPUNCTURE: CPT | Performed by: ANESTHESIOLOGY

## 2023-01-01 PROCEDURE — 85007 BL SMEAR W/DIFF WBC COUNT: CPT | Performed by: INTERNAL MEDICINE

## 2023-01-01 PROCEDURE — 83615 LACTATE (LD) (LDH) ENZYME: CPT | Performed by: NURSE PRACTITIONER

## 2023-01-01 PROCEDURE — 82310 ASSAY OF CALCIUM: CPT

## 2023-01-01 PROCEDURE — 87449 NOS EACH ORGANISM AG IA: CPT | Performed by: NURSE PRACTITIONER

## 2023-01-01 PROCEDURE — 87641 MR-STAPH DNA AMP PROBE: CPT | Performed by: NURSE PRACTITIONER

## 2023-01-01 PROCEDURE — 84157 ASSAY OF PROTEIN OTHER: CPT

## 2023-01-01 PROCEDURE — 272N000457 HC KIT, 4 FR SV DUAL LUMEN POWERPICC

## 2023-01-01 PROCEDURE — 76705 ECHO EXAM OF ABDOMEN: CPT

## 2023-01-01 PROCEDURE — 87206 SMEAR FLUORESCENT/ACID STAI: CPT

## 2023-01-01 PROCEDURE — 88341 IMHCHEM/IMCYTCHM EA ADD ANTB: CPT | Mod: 26 | Performed by: PATHOLOGY

## 2023-01-01 PROCEDURE — 87798 DETECT AGENT NOS DNA AMP: CPT

## 2023-01-01 PROCEDURE — 99285 EMERGENCY DEPT VISIT HI MDM: CPT | Mod: CS | Performed by: EMERGENCY MEDICINE

## 2023-01-01 PROCEDURE — 85379 FIBRIN DEGRADATION QUANT: CPT | Performed by: STUDENT IN AN ORGANIZED HEALTH CARE EDUCATION/TRAINING PROGRAM

## 2023-01-01 PROCEDURE — 83735 ASSAY OF MAGNESIUM: CPT | Performed by: STUDENT IN AN ORGANIZED HEALTH CARE EDUCATION/TRAINING PROGRAM

## 2023-01-01 PROCEDURE — 99223 1ST HOSP IP/OBS HIGH 75: CPT | Performed by: INTERNAL MEDICINE

## 2023-01-01 PROCEDURE — 99232 SBSQ HOSP IP/OBS MODERATE 35: CPT | Performed by: INTERNAL MEDICINE

## 2023-01-01 PROCEDURE — 87449 NOS EACH ORGANISM AG IA: CPT

## 2023-01-01 PROCEDURE — 86850 RBC ANTIBODY SCREEN: CPT | Performed by: ANESTHESIOLOGY

## 2023-01-01 PROCEDURE — 99232 SBSQ HOSP IP/OBS MODERATE 35: CPT | Performed by: NURSE PRACTITIONER

## 2023-01-01 PROCEDURE — 80053 COMPREHEN METABOLIC PANEL: CPT

## 2023-01-01 PROCEDURE — 87556 M.TUBERCULO DNA AMP PROBE: CPT | Performed by: STUDENT IN AN ORGANIZED HEALTH CARE EDUCATION/TRAINING PROGRAM

## 2023-01-01 PROCEDURE — 250N000013 HC RX MED GY IP 250 OP 250 PS 637: Performed by: PHYSICIAN ASSISTANT

## 2023-01-01 PROCEDURE — C9803 HOPD COVID-19 SPEC COLLECT: HCPCS

## 2023-01-01 PROCEDURE — 87633 RESP VIRUS 12-25 TARGETS: CPT | Performed by: NURSE PRACTITIONER

## 2023-01-01 PROCEDURE — 85007 BL SMEAR W/DIFF WBC COUNT: CPT | Performed by: EMERGENCY MEDICINE

## 2023-01-01 PROCEDURE — 85041 AUTOMATED RBC COUNT: CPT | Performed by: NURSE PRACTITIONER

## 2023-01-01 PROCEDURE — 87798 DETECT AGENT NOS DNA AMP: CPT | Performed by: STUDENT IN AN ORGANIZED HEALTH CARE EDUCATION/TRAINING PROGRAM

## 2023-01-01 PROCEDURE — 74018 RADEX ABDOMEN 1 VIEW: CPT | Mod: 76

## 2023-01-01 PROCEDURE — 84155 ASSAY OF PROTEIN SERUM: CPT

## 2023-01-01 PROCEDURE — 5A09557 ASSISTANCE WITH RESPIRATORY VENTILATION, GREATER THAN 96 CONSECUTIVE HOURS, CONTINUOUS POSITIVE AIRWAY PRESSURE: ICD-10-PCS | Performed by: ANESTHESIOLOGY

## 2023-01-01 PROCEDURE — 81003 URINALYSIS AUTO W/O SCOPE: CPT | Performed by: NURSE PRACTITIONER

## 2023-01-01 PROCEDURE — 99233 SBSQ HOSP IP/OBS HIGH 50: CPT | Performed by: NURSE PRACTITIONER

## 2023-01-01 PROCEDURE — 36415 COLL VENOUS BLD VENIPUNCTURE: CPT | Performed by: EMERGENCY MEDICINE

## 2023-01-01 PROCEDURE — 86022 PLATELET ANTIBODIES: CPT | Performed by: STUDENT IN AN ORGANIZED HEALTH CARE EDUCATION/TRAINING PROGRAM

## 2023-01-01 PROCEDURE — 87040 BLOOD CULTURE FOR BACTERIA: CPT | Performed by: EMERGENCY MEDICINE

## 2023-01-01 PROCEDURE — 32555 ASPIRATE PLEURA W/ IMAGING: CPT | Mod: RT | Performed by: PHYSICIAN ASSISTANT

## 2023-01-01 PROCEDURE — 87449 NOS EACH ORGANISM AG IA: CPT | Performed by: STUDENT IN AN ORGANIZED HEALTH CARE EDUCATION/TRAINING PROGRAM

## 2023-01-01 PROCEDURE — 32555 ASPIRATE PLEURA W/ IMAGING: CPT

## 2023-01-01 PROCEDURE — 80048 BASIC METABOLIC PNL TOTAL CA: CPT

## 2023-01-01 PROCEDURE — 999N000007 HC SITE CHECK

## 2023-01-01 PROCEDURE — 82374 ASSAY BLOOD CARBON DIOXIDE: CPT | Performed by: NURSE PRACTITIONER

## 2023-01-01 PROCEDURE — 250N000009 HC RX 250: Performed by: EMERGENCY MEDICINE

## 2023-01-01 PROCEDURE — 76705 ECHO EXAM OF ABDOMEN: CPT | Mod: 26 | Performed by: RADIOLOGY

## 2023-01-01 PROCEDURE — 89051 BODY FLUID CELL COUNT: CPT

## 2023-01-01 PROCEDURE — 86901 BLOOD TYPING SEROLOGIC RH(D): CPT | Performed by: INTERNAL MEDICINE

## 2023-01-01 PROCEDURE — 81001 URINALYSIS AUTO W/SCOPE: CPT | Performed by: STUDENT IN AN ORGANIZED HEALTH CARE EDUCATION/TRAINING PROGRAM

## 2023-01-01 PROCEDURE — 999N000065 XR ABDOMEN 1 VIEW

## 2023-01-01 PROCEDURE — 74176 CT ABD & PELVIS W/O CONTRAST: CPT | Mod: 26 | Performed by: RADIOLOGY

## 2023-01-01 PROCEDURE — 82565 ASSAY OF CREATININE: CPT | Performed by: STUDENT IN AN ORGANIZED HEALTH CARE EDUCATION/TRAINING PROGRAM

## 2023-01-01 PROCEDURE — P9045 ALBUMIN (HUMAN), 5%, 250 ML: HCPCS | Performed by: NURSE PRACTITIONER

## 2023-01-01 PROCEDURE — 87305 ASPERGILLUS AG IA: CPT | Performed by: REGISTERED NURSE

## 2023-01-01 PROCEDURE — 3E043XZ INTRODUCTION OF VASOPRESSOR INTO CENTRAL VEIN, PERCUTANEOUS APPROACH: ICD-10-PCS | Performed by: ANESTHESIOLOGY

## 2023-01-01 PROCEDURE — 0W993ZZ DRAINAGE OF RIGHT PLEURAL CAVITY, PERCUTANEOUS APPROACH: ICD-10-PCS | Performed by: PHYSICIAN ASSISTANT

## 2023-01-01 PROCEDURE — 82310 ASSAY OF CALCIUM: CPT | Performed by: STUDENT IN AN ORGANIZED HEALTH CARE EDUCATION/TRAINING PROGRAM

## 2023-01-01 PROCEDURE — 83735 ASSAY OF MAGNESIUM: CPT | Performed by: INTERNAL MEDICINE

## 2023-01-01 PROCEDURE — 86682 HELMINTH ANTIBODY: CPT

## 2023-01-01 PROCEDURE — 84132 ASSAY OF SERUM POTASSIUM: CPT

## 2023-01-01 PROCEDURE — 87385 HISTOPLASMA CAPSUL AG IA: CPT | Performed by: STUDENT IN AN ORGANIZED HEALTH CARE EDUCATION/TRAINING PROGRAM

## 2023-01-01 PROCEDURE — 93970 EXTREMITY STUDY: CPT | Mod: 26 | Performed by: RADIOLOGY

## 2023-01-01 PROCEDURE — 99223 1ST HOSP IP/OBS HIGH 75: CPT | Mod: AI | Performed by: FAMILY MEDICINE

## 2023-01-01 PROCEDURE — 93010 ELECTROCARDIOGRAM REPORT: CPT | Performed by: EMERGENCY MEDICINE

## 2023-01-01 PROCEDURE — 99233 SBSQ HOSP IP/OBS HIGH 50: CPT | Mod: GC | Performed by: STUDENT IN AN ORGANIZED HEALTH CARE EDUCATION/TRAINING PROGRAM

## 2023-01-01 PROCEDURE — 87103 BLOOD FUNGUS CULTURE: CPT | Performed by: NURSE PRACTITIONER

## 2023-01-01 PROCEDURE — 84145 PROCALCITONIN (PCT): CPT | Performed by: STUDENT IN AN ORGANIZED HEALTH CARE EDUCATION/TRAINING PROGRAM

## 2023-01-01 PROCEDURE — 82374 ASSAY BLOOD CARBON DIOXIDE: CPT | Performed by: INTERNAL MEDICINE

## 2023-01-01 PROCEDURE — 88112 CYTOPATH CELL ENHANCE TECH: CPT | Mod: 26 | Performed by: PATHOLOGY

## 2023-01-01 RX ORDER — SULFAMETHOXAZOLE/TRIMETHOPRIM 800-160 MG
1 TABLET ORAL
Qty: 60 TABLET | Refills: 0 | Status: SHIPPED | OUTPATIENT
Start: 2023-01-01

## 2023-01-01 RX ORDER — DEXTROSE MONOHYDRATE 25 G/50ML
25-50 INJECTION, SOLUTION INTRAVENOUS
Status: DISCONTINUED | OUTPATIENT
Start: 2023-01-01 | End: 2023-01-01

## 2023-01-01 RX ORDER — MAGNESIUM SULFATE HEPTAHYDRATE 40 MG/ML
2 INJECTION, SOLUTION INTRAVENOUS ONCE
Status: COMPLETED | OUTPATIENT
Start: 2023-01-01 | End: 2023-01-01

## 2023-01-01 RX ORDER — BISACODYL 10 MG
10 SUPPOSITORY, RECTAL RECTAL DAILY
Status: DISCONTINUED | OUTPATIENT
Start: 2023-01-01 | End: 2023-01-01

## 2023-01-01 RX ORDER — POLYETHYLENE GLYCOL 3350 17 G/17G
17 POWDER, FOR SOLUTION ORAL DAILY PRN
Status: DISCONTINUED | OUTPATIENT
Start: 2023-01-01 | End: 2023-01-01

## 2023-01-01 RX ORDER — AMOXICILLIN 250 MG
2 CAPSULE ORAL 2 TIMES DAILY
Status: DISCONTINUED | OUTPATIENT
Start: 2023-01-01 | End: 2023-01-01

## 2023-01-01 RX ORDER — NYSTATIN 100000/ML
500000 SUSPENSION, ORAL (FINAL DOSE FORM) ORAL 4 TIMES DAILY
Status: DISCONTINUED | OUTPATIENT
Start: 2023-01-01 | End: 2023-01-01

## 2023-01-01 RX ORDER — POTASSIUM CHLORIDE 1.5 G/1.58G
20 POWDER, FOR SOLUTION ORAL ONCE
Status: COMPLETED | OUTPATIENT
Start: 2023-01-01 | End: 2023-01-01

## 2023-01-01 RX ORDER — METHYLPREDNISOLONE SODIUM SUCCINATE 125 MG/2ML
60 INJECTION, POWDER, LYOPHILIZED, FOR SOLUTION INTRAMUSCULAR; INTRAVENOUS EVERY 6 HOURS
Status: COMPLETED | OUTPATIENT
Start: 2023-01-01 | End: 2023-01-01

## 2023-01-01 RX ORDER — POLYETHYLENE GLYCOL 3350 17 G/17G
17 POWDER, FOR SOLUTION ORAL 2 TIMES DAILY
Status: DISCONTINUED | OUTPATIENT
Start: 2023-01-01 | End: 2023-01-01

## 2023-01-01 RX ORDER — POLYVINYL ALCOHOL 14 MG/ML
1-2 SOLUTION/ DROPS OPHTHALMIC
Status: DISCONTINUED | OUTPATIENT
Start: 2023-01-01 | End: 2023-01-01

## 2023-01-01 RX ORDER — HEPARIN SODIUM 5000 [USP'U]/.5ML
5000 INJECTION, SOLUTION INTRAVENOUS; SUBCUTANEOUS EVERY 12 HOURS
Status: DISCONTINUED | OUTPATIENT
Start: 2023-01-01 | End: 2023-01-01 | Stop reason: HOSPADM

## 2023-01-01 RX ORDER — MORPHINE SULFATE 2 MG/ML
1 INJECTION, SOLUTION INTRAMUSCULAR; INTRAVENOUS EVERY 4 HOURS PRN
Status: DISCONTINUED | OUTPATIENT
Start: 2023-01-01 | End: 2023-01-01

## 2023-01-01 RX ORDER — PIPERACILLIN SODIUM, TAZOBACTAM SODIUM 4; .5 G/20ML; G/20ML
4.5 INJECTION, POWDER, LYOPHILIZED, FOR SOLUTION INTRAVENOUS EVERY 6 HOURS
Status: DISCONTINUED | OUTPATIENT
Start: 2023-01-01 | End: 2023-01-01

## 2023-01-01 RX ORDER — PREDNISONE 20 MG/1
20 TABLET ORAL ONCE
Status: COMPLETED | OUTPATIENT
Start: 2023-01-01 | End: 2023-01-01

## 2023-01-01 RX ORDER — ACETAMINOPHEN 325 MG/10.15ML
650 LIQUID ORAL EVERY 4 HOURS PRN
Status: DISCONTINUED | OUTPATIENT
Start: 2023-01-01 | End: 2023-01-01 | Stop reason: HOSPADM

## 2023-01-01 RX ORDER — ACETYLCYSTEINE 200 MG/ML
2 SOLUTION ORAL; RESPIRATORY (INHALATION) 3 TIMES DAILY
Status: DISCONTINUED | OUTPATIENT
Start: 2023-01-01 | End: 2023-01-01

## 2023-01-01 RX ORDER — MEROPENEM 1 G/1
1 INJECTION, POWDER, FOR SOLUTION INTRAVENOUS EVERY 12 HOURS
Status: DISCONTINUED | OUTPATIENT
Start: 2023-01-01 | End: 2023-01-01

## 2023-01-01 RX ORDER — HYDROXYZINE HYDROCHLORIDE 25 MG/1
25 TABLET, FILM COATED ORAL EVERY 6 HOURS PRN
Status: DISCONTINUED | OUTPATIENT
Start: 2023-01-01 | End: 2023-01-01 | Stop reason: HOSPADM

## 2023-01-01 RX ORDER — DEXTROSE MONOHYDRATE 50 MG/ML
INJECTION, SOLUTION INTRAVENOUS
Status: COMPLETED
Start: 2023-01-01 | End: 2023-01-01

## 2023-01-01 RX ORDER — PROCHLORPERAZINE MALEATE 5 MG
5 TABLET ORAL EVERY 6 HOURS PRN
Status: DISCONTINUED | OUTPATIENT
Start: 2023-01-01 | End: 2023-01-01

## 2023-01-01 RX ORDER — NOREPINEPHRINE BITARTRATE 0.06 MG/ML
.01-.6 INJECTION, SOLUTION INTRAVENOUS CONTINUOUS
Status: DISCONTINUED | OUTPATIENT
Start: 2023-01-01 | End: 2023-01-01

## 2023-01-01 RX ORDER — OLANZAPINE 10 MG/2ML
2.5-5 INJECTION, POWDER, FOR SOLUTION INTRAMUSCULAR
Status: DISCONTINUED | OUTPATIENT
Start: 2023-01-01 | End: 2023-01-01

## 2023-01-01 RX ORDER — POTASSIUM CHLORIDE 1.5 G/1.58G
40 POWDER, FOR SOLUTION ORAL ONCE
Status: COMPLETED | OUTPATIENT
Start: 2023-01-01 | End: 2023-01-01

## 2023-01-01 RX ORDER — POTASSIUM CHLORIDE 1.5 G/1.58G
20 POWDER, FOR SOLUTION ORAL 2 TIMES DAILY
Status: DISCONTINUED | OUTPATIENT
Start: 2023-01-01 | End: 2023-01-01

## 2023-01-01 RX ORDER — SODIUM CHLORIDE, SODIUM LACTATE, POTASSIUM CHLORIDE, CALCIUM CHLORIDE 600; 310; 30; 20 MG/100ML; MG/100ML; MG/100ML; MG/100ML
INJECTION, SOLUTION INTRAVENOUS CONTINUOUS
Status: DISCONTINUED | OUTPATIENT
Start: 2023-01-01 | End: 2023-01-01

## 2023-01-01 RX ORDER — FUROSEMIDE 10 MG/ML
20 INJECTION INTRAMUSCULAR; INTRAVENOUS ONCE
Status: COMPLETED | OUTPATIENT
Start: 2023-01-01 | End: 2023-01-01

## 2023-01-01 RX ORDER — NICOTINE POLACRILEX 4 MG
15-30 LOZENGE BUCCAL
Status: DISCONTINUED | OUTPATIENT
Start: 2023-01-01 | End: 2023-01-01

## 2023-01-01 RX ORDER — POTASSIUM CHLORIDE 29.8 MG/ML
20 INJECTION INTRAVENOUS ONCE
Status: COMPLETED | OUTPATIENT
Start: 2023-01-01 | End: 2023-01-01

## 2023-01-01 RX ORDER — ROPIVACAINE IN 0.9% SOD CHL/PF 0.1 %
.03-.125 PLASTIC BAG, INJECTION (ML) EPIDURAL CONTINUOUS
Status: DISCONTINUED | OUTPATIENT
Start: 2023-01-01 | End: 2023-01-01

## 2023-01-01 RX ORDER — HYDRALAZINE HYDROCHLORIDE 20 MG/ML
10 INJECTION INTRAMUSCULAR; INTRAVENOUS EVERY 6 HOURS PRN
Status: DISCONTINUED | OUTPATIENT
Start: 2023-01-01 | End: 2023-01-01

## 2023-01-01 RX ORDER — PROCHLORPERAZINE 25 MG
12.5 SUPPOSITORY, RECTAL RECTAL EVERY 12 HOURS PRN
Status: DISCONTINUED | OUTPATIENT
Start: 2023-01-01 | End: 2023-01-01

## 2023-01-01 RX ORDER — AZITHROMYCIN 250 MG/1
250 TABLET, FILM COATED ORAL DAILY
Status: ON HOLD | COMMUNITY
End: 2023-01-01

## 2023-01-01 RX ORDER — AZITHROMYCIN 250 MG/1
250 TABLET, FILM COATED ORAL DAILY
Qty: 4 TABLET | Refills: 0 | Status: SHIPPED | OUTPATIENT
Start: 2023-01-01 | End: 2023-01-01

## 2023-01-01 RX ORDER — POLYVINYL ALCOHOL 14 MG/ML
1-2 SOLUTION/ DROPS OPHTHALMIC PRN
COMMUNITY
Start: 2022-01-21

## 2023-01-01 RX ORDER — NYSTATIN 100000/ML
500000 SUSPENSION, ORAL (FINAL DOSE FORM) ORAL 4 TIMES DAILY
Qty: 140 ML | Refills: 0 | Status: SHIPPED | OUTPATIENT
Start: 2023-01-01 | End: 2023-01-01

## 2023-01-01 RX ORDER — OLANZAPINE 5 MG/1
5 TABLET, ORALLY DISINTEGRATING ORAL AT BEDTIME
Status: DISCONTINUED | OUTPATIENT
Start: 2023-01-01 | End: 2023-01-01

## 2023-01-01 RX ORDER — CARBOXYMETHYLCELLULOSE SODIUM 5 MG/ML
1-2 SOLUTION/ DROPS OPHTHALMIC
Status: DISCONTINUED | OUTPATIENT
Start: 2023-01-01 | End: 2023-01-01 | Stop reason: HOSPADM

## 2023-01-01 RX ORDER — IPRATROPIUM BROMIDE AND ALBUTEROL SULFATE 2.5; .5 MG/3ML; MG/3ML
SOLUTION RESPIRATORY (INHALATION)
Status: COMPLETED
Start: 2023-01-01 | End: 2023-01-01

## 2023-01-01 RX ORDER — ALBUMIN (HUMAN) 12.5 G/50ML
25 SOLUTION INTRAVENOUS EVERY 6 HOURS
Status: COMPLETED | OUTPATIENT
Start: 2023-01-01 | End: 2023-01-01

## 2023-01-01 RX ORDER — METOLAZONE 2.5 MG/1
10 TABLET ORAL ONCE
Status: COMPLETED | OUTPATIENT
Start: 2023-01-01 | End: 2023-01-01

## 2023-01-01 RX ORDER — MEROPENEM 500 MG/1
500 INJECTION, POWDER, FOR SOLUTION INTRAVENOUS EVERY 12 HOURS
Status: DISCONTINUED | OUTPATIENT
Start: 2023-01-01 | End: 2023-01-01

## 2023-01-01 RX ORDER — POTASSIUM CHLORIDE 20MEQ/15ML
40 LIQUID (ML) ORAL ONCE
Status: COMPLETED | OUTPATIENT
Start: 2023-01-01 | End: 2023-01-01

## 2023-01-01 RX ORDER — METOLAZONE 2.5 MG/1
10 TABLET ORAL
Status: DISCONTINUED | OUTPATIENT
Start: 2023-01-01 | End: 2023-01-01

## 2023-01-01 RX ORDER — ALBUMIN (HUMAN) 12.5 G/50ML
SOLUTION INTRAVENOUS
Status: COMPLETED
Start: 2023-01-01 | End: 2023-01-01

## 2023-01-01 RX ORDER — POLYETHYLENE GLYCOL 3350 17 G/17G
17 POWDER, FOR SOLUTION ORAL DAILY
Status: DISCONTINUED | OUTPATIENT
Start: 2023-01-01 | End: 2023-01-01

## 2023-01-01 RX ORDER — LIDOCAINE 40 MG/G
CREAM TOPICAL
Status: ACTIVE | OUTPATIENT
Start: 2023-01-01 | End: 2023-01-01

## 2023-01-01 RX ORDER — PIPERACILLIN SODIUM, TAZOBACTAM SODIUM 4; .5 G/20ML; G/20ML
4.5 INJECTION, POWDER, LYOPHILIZED, FOR SOLUTION INTRAVENOUS EVERY 6 HOURS
Status: COMPLETED | OUTPATIENT
Start: 2023-01-01 | End: 2023-01-01

## 2023-01-01 RX ORDER — ONDANSETRON 2 MG/ML
4 INJECTION INTRAMUSCULAR; INTRAVENOUS EVERY 6 HOURS PRN
Status: DISCONTINUED | OUTPATIENT
Start: 2023-01-01 | End: 2023-01-01 | Stop reason: HOSPADM

## 2023-01-01 RX ORDER — NALOXONE HYDROCHLORIDE 0.4 MG/ML
0.4 INJECTION, SOLUTION INTRAMUSCULAR; INTRAVENOUS; SUBCUTANEOUS
Status: DISCONTINUED | OUTPATIENT
Start: 2023-01-01 | End: 2023-01-01 | Stop reason: HOSPADM

## 2023-01-01 RX ORDER — LABETALOL HYDROCHLORIDE 5 MG/ML
10 INJECTION, SOLUTION INTRAVENOUS EVERY 4 HOURS PRN
Status: DISCONTINUED | OUTPATIENT
Start: 2023-01-01 | End: 2023-01-01

## 2023-01-01 RX ORDER — AMOXICILLIN 250 MG
1 CAPSULE ORAL 2 TIMES DAILY PRN
Status: DISCONTINUED | OUTPATIENT
Start: 2023-01-01 | End: 2023-01-01

## 2023-01-01 RX ORDER — CIPROFLOXACIN 500 MG/1
500 TABLET, FILM COATED ORAL 2 TIMES DAILY
Qty: 10 TABLET | Refills: 0 | Status: SHIPPED | OUTPATIENT
Start: 2023-01-01 | End: 2023-01-01

## 2023-01-01 RX ORDER — ACETAZOLAMIDE 500 MG/5ML
500 INJECTION, POWDER, LYOPHILIZED, FOR SOLUTION INTRAVENOUS
Status: COMPLETED | OUTPATIENT
Start: 2023-01-01 | End: 2023-01-01

## 2023-01-01 RX ORDER — DEXTROSE MONOHYDRATE 100 MG/ML
INJECTION, SOLUTION INTRAVENOUS CONTINUOUS PRN
Status: DISCONTINUED | OUTPATIENT
Start: 2023-01-01 | End: 2023-01-01 | Stop reason: HOSPADM

## 2023-01-01 RX ORDER — ACETAZOLAMIDE 500 MG/5ML
500 INJECTION, POWDER, LYOPHILIZED, FOR SOLUTION INTRAVENOUS ONCE
Status: COMPLETED | OUTPATIENT
Start: 2023-01-01 | End: 2023-01-01

## 2023-01-01 RX ORDER — VANCOMYCIN HYDROCHLORIDE 1 G/200ML
1000 INJECTION, SOLUTION INTRAVENOUS EVERY 24 HOURS
Status: DISCONTINUED | OUTPATIENT
Start: 2023-01-01 | End: 2023-01-01

## 2023-01-01 RX ORDER — ACETAMINOPHEN 500 MG
1000 TABLET ORAL ONCE
Status: COMPLETED | OUTPATIENT
Start: 2023-01-01 | End: 2023-01-01

## 2023-01-01 RX ORDER — ONDANSETRON 4 MG/1
4 TABLET, ORALLY DISINTEGRATING ORAL EVERY 6 HOURS PRN
Status: DISCONTINUED | OUTPATIENT
Start: 2023-01-01 | End: 2023-01-01

## 2023-01-01 RX ORDER — SODIUM CHLORIDE 9 MG/ML
INJECTION, SOLUTION INTRAVENOUS
Status: COMPLETED
Start: 2023-01-01 | End: 2023-01-01

## 2023-01-01 RX ORDER — DEXTROSE MONOHYDRATE 25 G/50ML
25-50 INJECTION, SOLUTION INTRAVENOUS
Status: DISCONTINUED | OUTPATIENT
Start: 2023-01-01 | End: 2023-01-01 | Stop reason: HOSPADM

## 2023-01-01 RX ORDER — ATOVAQUONE 750 MG/5ML
1500 SUSPENSION ORAL DAILY
Status: DISCONTINUED | OUTPATIENT
Start: 2023-01-01 | End: 2023-01-01

## 2023-01-01 RX ORDER — PREDNISONE 20 MG/1
20 TABLET ORAL DAILY
Status: DISCONTINUED | OUTPATIENT
Start: 2023-01-01 | End: 2023-01-01 | Stop reason: HOSPADM

## 2023-01-01 RX ORDER — ACETAMINOPHEN 325 MG/1
650 TABLET ORAL EVERY 6 HOURS
Status: DISCONTINUED | OUTPATIENT
Start: 2023-01-01 | End: 2023-01-01 | Stop reason: HOSPADM

## 2023-01-01 RX ORDER — POTASSIUM CHLORIDE 20MEQ/15ML
20 LIQUID (ML) ORAL ONCE
Status: COMPLETED | OUTPATIENT
Start: 2023-01-01 | End: 2023-01-01

## 2023-01-01 RX ORDER — PANTOPRAZOLE SODIUM 40 MG/1
40 TABLET, DELAYED RELEASE ORAL
Status: DISCONTINUED | OUTPATIENT
Start: 2023-01-01 | End: 2023-01-01

## 2023-01-01 RX ORDER — CEFTRIAXONE 2 G/1
2 INJECTION, POWDER, FOR SOLUTION INTRAMUSCULAR; INTRAVENOUS ONCE
Status: COMPLETED | OUTPATIENT
Start: 2023-01-01 | End: 2023-01-01

## 2023-01-01 RX ORDER — ONDANSETRON 4 MG/1
4 TABLET, ORALLY DISINTEGRATING ORAL EVERY 6 HOURS PRN
Status: DISCONTINUED | OUTPATIENT
Start: 2023-01-01 | End: 2023-01-01 | Stop reason: HOSPADM

## 2023-01-01 RX ORDER — HEPARIN SODIUM,PORCINE 10 UNIT/ML
5-20 VIAL (ML) INTRAVENOUS EVERY 24 HOURS
Status: DISCONTINUED | OUTPATIENT
Start: 2023-01-01 | End: 2023-01-01

## 2023-01-01 RX ORDER — ATOVAQUONE 750 MG/5ML
1500 SUSPENSION ORAL DAILY
Status: DISCONTINUED | OUTPATIENT
Start: 2023-03-26 | End: 2023-01-01 | Stop reason: HOSPADM

## 2023-01-01 RX ORDER — ENOXAPARIN SODIUM 100 MG/ML
40 INJECTION SUBCUTANEOUS EVERY 24 HOURS
Status: DISCONTINUED | OUTPATIENT
Start: 2023-01-01 | End: 2023-01-01

## 2023-01-01 RX ORDER — PREDNISONE 20 MG/1
20 TABLET ORAL DAILY
Status: DISCONTINUED | OUTPATIENT
Start: 2023-01-01 | End: 2023-01-01

## 2023-01-01 RX ORDER — SULFAMETHOXAZOLE/TRIMETHOPRIM 800-160 MG
1 TABLET ORAL
Status: DISCONTINUED | OUTPATIENT
Start: 2023-01-01 | End: 2023-01-01 | Stop reason: HOSPADM

## 2023-01-01 RX ORDER — SIMETHICONE 40MG/0.6ML
20 SUSPENSION, DROPS(FINAL DOSAGE FORM)(ML) ORAL 4 TIMES DAILY PRN
Qty: 45 ML | Refills: 0 | Status: SHIPPED | OUTPATIENT
Start: 2023-01-01

## 2023-01-01 RX ORDER — ALBUMIN (HUMAN) 12.5 G/50ML
25 SOLUTION INTRAVENOUS ONCE
Status: DISCONTINUED | OUTPATIENT
Start: 2023-01-01 | End: 2023-01-01

## 2023-01-01 RX ORDER — POLYETHYLENE GLYCOL 3350 17 G/17G
17 POWDER, FOR SOLUTION ORAL 2 TIMES DAILY
Status: DISCONTINUED | OUTPATIENT
Start: 2023-01-01 | End: 2023-01-01 | Stop reason: HOSPADM

## 2023-01-01 RX ORDER — OLANZAPINE 10 MG/2ML
5 INJECTION, POWDER, FOR SOLUTION INTRAMUSCULAR
Status: COMPLETED | OUTPATIENT
Start: 2023-01-01 | End: 2023-01-01

## 2023-01-01 RX ORDER — DEXTROSE MONOHYDRATE 50 MG/ML
INJECTION, SOLUTION INTRAVENOUS CONTINUOUS
Status: ACTIVE | OUTPATIENT
Start: 2023-01-01 | End: 2023-01-01

## 2023-01-01 RX ORDER — ACETYLCYSTEINE 200 MG/ML
2 SOLUTION ORAL; RESPIRATORY (INHALATION) EVERY 4 HOURS
Status: DISCONTINUED | OUTPATIENT
Start: 2023-01-01 | End: 2023-01-01

## 2023-01-01 RX ORDER — METHYLPREDNISOLONE SODIUM SUCCINATE 40 MG/ML
20 INJECTION, POWDER, LYOPHILIZED, FOR SOLUTION INTRAMUSCULAR; INTRAVENOUS DAILY
Status: DISCONTINUED | OUTPATIENT
Start: 2023-01-01 | End: 2023-01-01

## 2023-01-01 RX ORDER — OLANZAPINE 5 MG/1
5 TABLET, ORALLY DISINTEGRATING ORAL
Status: DISCONTINUED | OUTPATIENT
Start: 2023-01-01 | End: 2023-01-01 | Stop reason: HOSPADM

## 2023-01-01 RX ORDER — NOREPINEPHRINE BITARTRATE 0.06 MG/ML
INJECTION, SOLUTION INTRAVENOUS
Status: COMPLETED
Start: 2023-01-01 | End: 2023-01-01

## 2023-01-01 RX ORDER — ACETAZOLAMIDE 500 MG/5ML
500 INJECTION, POWDER, LYOPHILIZED, FOR SOLUTION INTRAVENOUS 3 TIMES DAILY
Status: DISCONTINUED | OUTPATIENT
Start: 2023-01-01 | End: 2023-01-01

## 2023-01-01 RX ORDER — NICOTINE POLACRILEX 4 MG
15-30 LOZENGE BUCCAL
Status: DISCONTINUED | OUTPATIENT
Start: 2023-01-01 | End: 2023-01-01 | Stop reason: HOSPADM

## 2023-01-01 RX ORDER — LEVALBUTEROL INHALATION SOLUTION 1.25 MG/3ML
1.25 SOLUTION RESPIRATORY (INHALATION) EVERY 4 HOURS PRN
Status: DISCONTINUED | OUTPATIENT
Start: 2023-01-01 | End: 2023-01-01 | Stop reason: HOSPADM

## 2023-01-01 RX ORDER — BUDESONIDE 0.5 MG/2ML
0.5 INHALANT ORAL 2 TIMES DAILY
Status: DISCONTINUED | OUTPATIENT
Start: 2023-01-01 | End: 2023-01-01 | Stop reason: HOSPADM

## 2023-01-01 RX ORDER — AMOXICILLIN 250 MG
2 CAPSULE ORAL 2 TIMES DAILY PRN
Status: DISCONTINUED | OUTPATIENT
Start: 2023-01-01 | End: 2023-01-01

## 2023-01-01 RX ORDER — FLUTICASONE FUROATE AND VILANTEROL 200; 25 UG/1; UG/1
1 POWDER RESPIRATORY (INHALATION) DAILY
Status: DISCONTINUED | OUTPATIENT
Start: 2023-01-01 | End: 2023-01-01

## 2023-01-01 RX ORDER — MEROPENEM 1 G/1
1 INJECTION, POWDER, FOR SOLUTION INTRAVENOUS EVERY 8 HOURS
Status: DISCONTINUED | OUTPATIENT
Start: 2023-01-01 | End: 2023-01-01 | Stop reason: HOSPADM

## 2023-01-01 RX ORDER — HYDROXYZINE HYDROCHLORIDE 25 MG/1
50 TABLET, FILM COATED ORAL EVERY 6 HOURS PRN
Status: DISCONTINUED | OUTPATIENT
Start: 2023-01-01 | End: 2023-01-01 | Stop reason: HOSPADM

## 2023-01-01 RX ORDER — METHYLPREDNISOLONE SODIUM SUCCINATE 125 MG/2ML
125 INJECTION, POWDER, LYOPHILIZED, FOR SOLUTION INTRAMUSCULAR; INTRAVENOUS ONCE
Status: COMPLETED | OUTPATIENT
Start: 2023-01-01 | End: 2023-01-01

## 2023-01-01 RX ORDER — DEXTROSE, SODIUM CHLORIDE, SODIUM LACTATE, POTASSIUM CHLORIDE, AND CALCIUM CHLORIDE 5; .6; .31; .03; .02 G/100ML; G/100ML; G/100ML; G/100ML; G/100ML
INJECTION, SOLUTION INTRAVENOUS CONTINUOUS
Status: DISCONTINUED | OUTPATIENT
Start: 2023-01-01 | End: 2023-01-01

## 2023-01-01 RX ORDER — MAGNESIUM OXIDE 400 MG/1
400 TABLET ORAL EVERY 4 HOURS
Status: COMPLETED | OUTPATIENT
Start: 2023-01-01 | End: 2023-01-01

## 2023-01-01 RX ORDER — NALOXONE HYDROCHLORIDE 0.4 MG/ML
0.2 INJECTION, SOLUTION INTRAMUSCULAR; INTRAVENOUS; SUBCUTANEOUS
Status: DISCONTINUED | OUTPATIENT
Start: 2023-01-01 | End: 2023-01-01 | Stop reason: HOSPADM

## 2023-01-01 RX ORDER — ENOXAPARIN SODIUM 100 MG/ML
30 INJECTION SUBCUTANEOUS EVERY 24 HOURS
Status: DISCONTINUED | OUTPATIENT
Start: 2023-01-01 | End: 2023-01-01

## 2023-01-01 RX ORDER — IPRATROPIUM BROMIDE AND ALBUTEROL SULFATE 2.5; .5 MG/3ML; MG/3ML
3 SOLUTION RESPIRATORY (INHALATION) EVERY 4 HOURS
Status: DISCONTINUED | OUTPATIENT
Start: 2023-01-01 | End: 2023-01-01

## 2023-01-01 RX ORDER — AZITHROMYCIN 500 MG/5ML
500 INJECTION, POWDER, LYOPHILIZED, FOR SOLUTION INTRAVENOUS ONCE
Status: COMPLETED | OUTPATIENT
Start: 2023-01-01 | End: 2023-01-01

## 2023-01-01 RX ORDER — POTASSIUM CHLORIDE 7.45 MG/ML
10 INJECTION INTRAVENOUS
Status: COMPLETED | OUTPATIENT
Start: 2023-01-01 | End: 2023-01-01

## 2023-01-01 RX ORDER — SENNOSIDES 8.6 MG
2 TABLET ORAL 2 TIMES DAILY
Status: DISCONTINUED | OUTPATIENT
Start: 2023-01-01 | End: 2023-01-01 | Stop reason: HOSPADM

## 2023-01-01 RX ORDER — BISACODYL 10 MG
10 SUPPOSITORY, RECTAL RECTAL DAILY PRN
Status: DISCONTINUED | OUTPATIENT
Start: 2023-01-01 | End: 2023-01-01 | Stop reason: HOSPADM

## 2023-01-01 RX ORDER — IPRATROPIUM BROMIDE AND ALBUTEROL SULFATE 2.5; .5 MG/3ML; MG/3ML
3 SOLUTION RESPIRATORY (INHALATION)
Status: COMPLETED | OUTPATIENT
Start: 2023-01-01 | End: 2023-01-01

## 2023-01-01 RX ORDER — ONDANSETRON 2 MG/ML
4 INJECTION INTRAMUSCULAR; INTRAVENOUS EVERY 6 HOURS PRN
Status: DISCONTINUED | OUTPATIENT
Start: 2023-01-01 | End: 2023-01-01

## 2023-01-01 RX ORDER — FUROSEMIDE 10 MG/ML
40 INJECTION INTRAMUSCULAR; INTRAVENOUS ONCE
Status: COMPLETED | OUTPATIENT
Start: 2023-01-01 | End: 2023-01-01

## 2023-01-01 RX ORDER — FLUTICASONE PROPIONATE 50 MCG
1 SPRAY, SUSPENSION (ML) NASAL DAILY
COMMUNITY
Start: 2022-01-01

## 2023-01-01 RX ORDER — PIPERACILLIN SODIUM, TAZOBACTAM SODIUM 3; .375 G/15ML; G/15ML
3.38 INJECTION, POWDER, LYOPHILIZED, FOR SOLUTION INTRAVENOUS EVERY 6 HOURS
Status: DISCONTINUED | OUTPATIENT
Start: 2023-01-01 | End: 2023-01-01

## 2023-01-01 RX ORDER — ACETAMINOPHEN 650 MG/1
650 SUPPOSITORY RECTAL EVERY 4 HOURS PRN
Status: DISCONTINUED | OUTPATIENT
Start: 2023-01-01 | End: 2023-01-01

## 2023-01-01 RX ORDER — HALOPERIDOL 5 MG/ML
5 INJECTION INTRAMUSCULAR ONCE
Status: COMPLETED | OUTPATIENT
Start: 2023-01-01 | End: 2023-01-01

## 2023-01-01 RX ORDER — SULFAMETHOXAZOLE/TRIMETHOPRIM 800-160 MG
1 TABLET ORAL 2 TIMES DAILY
Status: DISCONTINUED | OUTPATIENT
Start: 2023-01-01 | End: 2023-01-01

## 2023-01-01 RX ORDER — NOREPINEPHRINE BITARTRATE 0.06 MG/ML
.01-.3 INJECTION, SOLUTION INTRAVENOUS CONTINUOUS
Status: DISCONTINUED | OUTPATIENT
Start: 2023-01-01 | End: 2023-01-01 | Stop reason: HOSPADM

## 2023-01-01 RX ORDER — LEVALBUTEROL INHALATION SOLUTION 1.25 MG/3ML
1.25 SOLUTION RESPIRATORY (INHALATION) 3 TIMES DAILY
Status: DISCONTINUED | OUTPATIENT
Start: 2023-01-01 | End: 2023-01-01 | Stop reason: HOSPADM

## 2023-01-01 RX ORDER — LIDOCAINE 40 MG/G
CREAM TOPICAL
Status: DISCONTINUED | OUTPATIENT
Start: 2023-01-01 | End: 2023-01-01

## 2023-01-01 RX ORDER — LIDOCAINE HYDROCHLORIDE 20 MG/ML
5 SOLUTION OROPHARYNGEAL ONCE
Status: COMPLETED | OUTPATIENT
Start: 2023-01-01 | End: 2023-01-01

## 2023-01-01 RX ORDER — METHYLPREDNISOLONE SODIUM SUCCINATE 125 MG/2ML
60 INJECTION, POWDER, LYOPHILIZED, FOR SOLUTION INTRAMUSCULAR; INTRAVENOUS EVERY 6 HOURS
Status: DISCONTINUED | OUTPATIENT
Start: 2023-01-01 | End: 2023-01-01 | Stop reason: HOSPADM

## 2023-01-01 RX ORDER — OLANZAPINE 5 MG/1
5 TABLET, ORALLY DISINTEGRATING ORAL
Status: DISCONTINUED | OUTPATIENT
Start: 2023-01-01 | End: 2023-01-01

## 2023-01-01 RX ORDER — LIDOCAINE HYDROCHLORIDE 10 MG/ML
INJECTION, SOLUTION EPIDURAL; INFILTRATION; INTRACAUDAL; PERINEURAL
Status: DISCONTINUED
Start: 2023-01-01 | End: 2023-01-01 | Stop reason: WASHOUT

## 2023-01-01 RX ORDER — ALBUTEROL SULFATE 90 UG/1
2 AEROSOL, METERED RESPIRATORY (INHALATION) EVERY 4 HOURS PRN
Status: DISCONTINUED | OUTPATIENT
Start: 2023-01-01 | End: 2023-01-01 | Stop reason: HOSPADM

## 2023-01-01 RX ORDER — SODIUM CHLORIDE 9 MG/ML
INJECTION, SOLUTION INTRAVENOUS CONTINUOUS
Status: ACTIVE | OUTPATIENT
Start: 2023-01-01 | End: 2023-01-01

## 2023-01-01 RX ORDER — HALOPERIDOL 5 MG/ML
2-5 INJECTION INTRAMUSCULAR EVERY 6 HOURS PRN
Status: DISCONTINUED | OUTPATIENT
Start: 2023-01-01 | End: 2023-01-01 | Stop reason: HOSPADM

## 2023-01-01 RX ORDER — CIPROFLOXACIN 2 MG/ML
400 INJECTION, SOLUTION INTRAVENOUS EVERY 8 HOURS
Status: DISCONTINUED | OUTPATIENT
Start: 2023-01-01 | End: 2023-01-01

## 2023-01-01 RX ORDER — HEPARIN SODIUM,PORCINE 10 UNIT/ML
5-20 VIAL (ML) INTRAVENOUS
Status: DISCONTINUED | OUTPATIENT
Start: 2023-01-01 | End: 2023-01-01 | Stop reason: HOSPADM

## 2023-01-01 RX ORDER — LEVALBUTEROL INHALATION SOLUTION 1.25 MG/3ML
1.25 SOLUTION RESPIRATORY (INHALATION) EVERY 4 HOURS
Status: DISCONTINUED | OUTPATIENT
Start: 2023-01-01 | End: 2023-01-01

## 2023-01-01 RX ORDER — METOPROLOL TARTRATE 1 MG/ML
5 INJECTION, SOLUTION INTRAVENOUS ONCE
Status: COMPLETED | OUTPATIENT
Start: 2023-01-01 | End: 2023-01-01

## 2023-01-01 RX ORDER — NYSTATIN 100000/ML
500000 SUSPENSION, ORAL (FINAL DOSE FORM) ORAL 4 TIMES DAILY
Status: COMPLETED | OUTPATIENT
Start: 2023-01-01 | End: 2023-01-01

## 2023-01-01 RX ORDER — FERROUS SULFATE 325(65) MG
325 TABLET ORAL
Status: DISCONTINUED | OUTPATIENT
Start: 2023-01-01 | End: 2023-01-01

## 2023-01-01 RX ORDER — LABETALOL HYDROCHLORIDE 5 MG/ML
20 INJECTION, SOLUTION INTRAVENOUS EVERY 6 HOURS PRN
Status: DISCONTINUED | OUTPATIENT
Start: 2023-01-01 | End: 2023-01-01

## 2023-01-01 RX ADMIN — PANTOPRAZOLE SODIUM 40 MG: 40 INJECTION, POWDER, FOR SOLUTION INTRAVENOUS at 15:38

## 2023-01-01 RX ADMIN — INSULIN ASPART 9 UNITS: 100 INJECTION, SOLUTION INTRAVENOUS; SUBCUTANEOUS at 08:28

## 2023-01-01 RX ADMIN — Medication 50 MG: at 09:46

## 2023-01-01 RX ADMIN — HUMAN INSULIN 3 UNITS/HR: 100 INJECTION, SOLUTION SUBCUTANEOUS at 23:33

## 2023-01-01 RX ADMIN — HEPARIN SODIUM 5000 UNITS: 5000 INJECTION, SOLUTION INTRAVENOUS; SUBCUTANEOUS at 09:14

## 2023-01-01 RX ADMIN — METHYLPREDNISOLONE SODIUM SUCCINATE 62.5 MG: 125 INJECTION, POWDER, FOR SOLUTION INTRAMUSCULAR; INTRAVENOUS at 18:18

## 2023-01-01 RX ADMIN — ALBUMIN HUMAN 12.5 G: 0.05 INJECTION, SOLUTION INTRAVENOUS at 04:22

## 2023-01-01 RX ADMIN — INSULIN ASPART 5 UNITS: 100 INJECTION, SOLUTION INTRAVENOUS; SUBCUTANEOUS at 04:06

## 2023-01-01 RX ADMIN — Medication 40 MG: at 08:41

## 2023-01-01 RX ADMIN — HYDROXYZINE HYDROCHLORIDE 50 MG: 25 TABLET ORAL at 07:52

## 2023-01-01 RX ADMIN — POLYETHYLENE GLYCOL 3350 17 G: 17 POWDER, FOR SOLUTION ORAL at 21:07

## 2023-01-01 RX ADMIN — ATOVAQUONE 1500 MG: 750 SUSPENSION ORAL at 08:46

## 2023-01-01 RX ADMIN — MEROPENEM 500 MG: 500 INJECTION, POWDER, FOR SOLUTION INTRAVENOUS at 14:14

## 2023-01-01 RX ADMIN — NYSTATIN 500000 UNITS: 100000 SUSPENSION ORAL at 07:52

## 2023-01-01 RX ADMIN — LEVALBUTEROL HYDROCHLORIDE 1.25 MG: 1.25 SOLUTION RESPIRATORY (INHALATION) at 19:49

## 2023-01-01 RX ADMIN — ACETAMINOPHEN 650 MG: 325 TABLET, FILM COATED ORAL at 09:40

## 2023-01-01 RX ADMIN — PIPERACILLIN SODIUM AND TAZOBACTAM SODIUM 4.5 G: 4; .5 INJECTION, POWDER, LYOPHILIZED, FOR SOLUTION INTRAVENOUS at 06:03

## 2023-01-01 RX ADMIN — BUDESONIDE INHALATION 0.5 MG: 0.5 SUSPENSION RESPIRATORY (INHALATION) at 08:47

## 2023-01-01 RX ADMIN — LEVALBUTEROL HYDROCHLORIDE 1.25 MG: 1.25 SOLUTION RESPIRATORY (INHALATION) at 20:53

## 2023-01-01 RX ADMIN — LEVALBUTEROL HYDROCHLORIDE 1.25 MG: 1.25 SOLUTION RESPIRATORY (INHALATION) at 08:47

## 2023-01-01 RX ADMIN — SULFAMETHOXAZOLE AND TRIMETHOPRIM 1 TABLET: 800; 160 TABLET ORAL at 08:04

## 2023-01-01 RX ADMIN — Medication: at 07:43

## 2023-01-01 RX ADMIN — ACETAMINOPHEN 650 MG: 325 SUSPENSION ORAL at 19:02

## 2023-01-01 RX ADMIN — PIPERACILLIN AND TAZOBACTAM 4.5 G: 4; .5 INJECTION, POWDER, FOR SOLUTION INTRAVENOUS at 16:05

## 2023-01-01 RX ADMIN — HUMAN INSULIN 7 UNITS/HR: 100 INJECTION, SOLUTION SUBCUTANEOUS at 21:17

## 2023-01-01 RX ADMIN — ACETAZOLAMIDE SODIUM 500 MG: 500 INJECTION, POWDER, LYOPHILIZED, FOR SOLUTION INTRAVENOUS at 21:11

## 2023-01-01 RX ADMIN — DEXMEDETOMIDINE HYDROCHLORIDE 0.5 MCG/KG/HR: 100 INJECTION, SOLUTION INTRAVENOUS at 00:17

## 2023-01-01 RX ADMIN — Medication: at 08:10

## 2023-01-01 RX ADMIN — ACETAMINOPHEN 650 MG: 325 TABLET, FILM COATED ORAL at 22:20

## 2023-01-01 RX ADMIN — OLANZAPINE 5 MG: 5 TABLET, ORALLY DISINTEGRATING ORAL at 21:17

## 2023-01-01 RX ADMIN — DEXMEDETOMIDINE HYDROCHLORIDE 0.2 MCG/KG/HR: 100 INJECTION, SOLUTION INTRAVENOUS at 03:14

## 2023-01-01 RX ADMIN — LEVALBUTEROL HYDROCHLORIDE 1.25 MG: 1.25 SOLUTION RESPIRATORY (INHALATION) at 17:02

## 2023-01-01 RX ADMIN — HUMAN INSULIN 4 UNITS/HR: 100 INJECTION, SOLUTION SUBCUTANEOUS at 12:56

## 2023-01-01 RX ADMIN — METHYLPREDNISOLONE SODIUM SUCCINATE 62.5 MG: 125 INJECTION, POWDER, FOR SOLUTION INTRAMUSCULAR; INTRAVENOUS at 13:50

## 2023-01-01 RX ADMIN — BUDESONIDE INHALATION 0.5 MG: 0.5 SUSPENSION RESPIRATORY (INHALATION) at 07:59

## 2023-01-01 RX ADMIN — INSULIN ASPART 7 UNITS: 100 INJECTION, SOLUTION INTRAVENOUS; SUBCUTANEOUS at 20:20

## 2023-01-01 RX ADMIN — MEROPENEM 1 G: 1 INJECTION, POWDER, FOR SOLUTION INTRAVENOUS at 01:34

## 2023-01-01 RX ADMIN — POTASSIUM & SODIUM PHOSPHATES POWDER PACK 280-160-250 MG 1 PACKET: 280-160-250 PACK at 09:40

## 2023-01-01 RX ADMIN — SENNOSIDES 2 TABLET: 8.6 TABLET, FILM COATED ORAL at 08:51

## 2023-01-01 RX ADMIN — IPRATROPIUM BROMIDE 0.5 MG: 0.5 SOLUTION RESPIRATORY (INHALATION) at 23:54

## 2023-01-01 RX ADMIN — IPRATROPIUM BROMIDE AND ALBUTEROL SULFATE 3 ML: 2.5; .5 SOLUTION RESPIRATORY (INHALATION) at 13:48

## 2023-01-01 RX ADMIN — ACETAZOLAMIDE SODIUM 500 MG: 500 INJECTION, POWDER, LYOPHILIZED, FOR SOLUTION INTRAVENOUS at 18:42

## 2023-01-01 RX ADMIN — PREDNISONE 20 MG: 20 TABLET ORAL at 23:18

## 2023-01-01 RX ADMIN — MORPHINE SULFATE 1 MG: 2 INJECTION, SOLUTION INTRAMUSCULAR; INTRAVENOUS at 18:18

## 2023-01-01 RX ADMIN — LIDOCAINE HYDROCHLORIDE 4 ML: 10 INJECTION, SOLUTION EPIDURAL; INFILTRATION; INTRACAUDAL; PERINEURAL at 14:12

## 2023-01-01 RX ADMIN — IPRATROPIUM BROMIDE 0.5 MG: 0.5 SOLUTION RESPIRATORY (INHALATION) at 21:02

## 2023-01-01 RX ADMIN — OLANZAPINE 5 MG: 10 INJECTION, POWDER, FOR SOLUTION INTRAMUSCULAR at 02:48

## 2023-01-01 RX ADMIN — ALTEPLASE 2 MG: 2.2 INJECTION, POWDER, LYOPHILIZED, FOR SOLUTION INTRAVENOUS at 06:08

## 2023-01-01 RX ADMIN — IPRATROPIUM BROMIDE 0.5 MG: 0.5 SOLUTION RESPIRATORY (INHALATION) at 20:55

## 2023-01-01 RX ADMIN — NYSTATIN 500000 UNITS: 100000 SUSPENSION ORAL at 08:15

## 2023-01-01 RX ADMIN — OLANZAPINE 5 MG: 5 TABLET, ORALLY DISINTEGRATING ORAL at 21:37

## 2023-01-01 RX ADMIN — MAGNESIUM SULFATE 2 G: 2 INJECTION INTRAVENOUS at 06:25

## 2023-01-01 RX ADMIN — HALOPERIDOL LACTATE 5 MG: 5 INJECTION, SOLUTION INTRAMUSCULAR at 20:14

## 2023-01-01 RX ADMIN — HUMAN INSULIN 6 UNITS/HR: 100 INJECTION, SOLUTION SUBCUTANEOUS at 17:13

## 2023-01-01 RX ADMIN — DEXMEDETOMIDINE HYDROCHLORIDE 0.5 MCG/KG/HR: 100 INJECTION, SOLUTION INTRAVENOUS at 09:25

## 2023-01-01 RX ADMIN — LEVALBUTEROL HYDROCHLORIDE 1.25 MG: 1.25 SOLUTION RESPIRATORY (INHALATION) at 13:50

## 2023-01-01 RX ADMIN — PIPERACILLIN SODIUM AND TAZOBACTAM SODIUM 4.5 G: 4; .5 INJECTION, POWDER, LYOPHILIZED, FOR SOLUTION INTRAVENOUS at 23:32

## 2023-01-01 RX ADMIN — DEXMEDETOMIDINE HYDROCHLORIDE 0.8 MCG/KG/HR: 100 INJECTION, SOLUTION INTRAVENOUS at 04:48

## 2023-01-01 RX ADMIN — ACETYLCYSTEINE 2 ML: 200 SOLUTION ORAL; RESPIRATORY (INHALATION) at 00:10

## 2023-01-01 RX ADMIN — DEXMEDETOMIDINE HYDROCHLORIDE 0.8 MCG/KG/HR: 100 INJECTION, SOLUTION INTRAVENOUS at 20:43

## 2023-01-01 RX ADMIN — SENNOSIDES AND DOCUSATE SODIUM 2 TABLET: 50; 8.6 TABLET ORAL at 22:55

## 2023-01-01 RX ADMIN — Medication 5 MG: at 18:19

## 2023-01-01 RX ADMIN — POTASSIUM CHLORIDE 10 MEQ: 7.46 INJECTION, SOLUTION INTRAVENOUS at 09:06

## 2023-01-01 RX ADMIN — HYDROXYZINE HYDROCHLORIDE 25 MG: 25 TABLET ORAL at 08:07

## 2023-01-01 RX ADMIN — MAGNESIUM SULFATE 2 G: 2 INJECTION INTRAVENOUS at 01:19

## 2023-01-01 RX ADMIN — LEVALBUTEROL HYDROCHLORIDE 1.25 MG: 1.25 SOLUTION RESPIRATORY (INHALATION) at 20:49

## 2023-01-01 RX ADMIN — POLYETHYLENE GLYCOL 3350 17 G: 17 POWDER, FOR SOLUTION ORAL at 08:04

## 2023-01-01 RX ADMIN — Medication 5 MG: at 20:37

## 2023-01-01 RX ADMIN — CEFEPIME HYDROCHLORIDE 2 G: 2 INJECTION, POWDER, FOR SOLUTION INTRAVENOUS at 06:51

## 2023-01-01 RX ADMIN — LEVALBUTEROL HYDROCHLORIDE 1.25 MG: 1.25 SOLUTION RESPIRATORY (INHALATION) at 20:33

## 2023-01-01 RX ADMIN — HALOPERIDOL LACTATE 5 MG: 5 INJECTION, SOLUTION INTRAMUSCULAR at 22:49

## 2023-01-01 RX ADMIN — IPRATROPIUM BROMIDE 0.5 MG: 0.5 SOLUTION RESPIRATORY (INHALATION) at 14:14

## 2023-01-01 RX ADMIN — BISACODYL 10 MG: 10 SUPPOSITORY RECTAL at 19:47

## 2023-01-01 RX ADMIN — MEROPENEM 1 G: 1 INJECTION, POWDER, FOR SOLUTION INTRAVENOUS at 12:46

## 2023-01-01 RX ADMIN — BUDESONIDE INHALATION 0.5 MG: 0.5 SUSPENSION RESPIRATORY (INHALATION) at 21:02

## 2023-01-01 RX ADMIN — METHYLPREDNISOLONE SODIUM SUCCINATE 20 MG: 40 INJECTION, POWDER, FOR SOLUTION INTRAMUSCULAR; INTRAVENOUS at 09:27

## 2023-01-01 RX ADMIN — Medication 50 MG: at 16:42

## 2023-01-01 RX ADMIN — IPRATROPIUM BROMIDE 0.5 MG: 0.5 SOLUTION RESPIRATORY (INHALATION) at 16:22

## 2023-01-01 RX ADMIN — METOLAZONE 10 MG: 2.5 TABLET ORAL at 13:34

## 2023-01-01 RX ADMIN — ENOXAPARIN SODIUM 30 MG: 30 INJECTION SUBCUTANEOUS at 09:09

## 2023-01-01 RX ADMIN — Medication 40 MG: at 17:16

## 2023-01-01 RX ADMIN — ACETAZOLAMIDE 500 MG: 500 INJECTION, POWDER, LYOPHILIZED, FOR SOLUTION INTRAVENOUS at 18:01

## 2023-01-01 RX ADMIN — MEROPENEM 1 G: 1 INJECTION, POWDER, FOR SOLUTION INTRAVENOUS at 13:34

## 2023-01-01 RX ADMIN — Medication 50 MG: at 09:30

## 2023-01-01 RX ADMIN — VASOPRESSIN 2.4 UNITS/HR: 20 INJECTION, SOLUTION INTRAMUSCULAR; SUBCUTANEOUS at 19:55

## 2023-01-01 RX ADMIN — HYDROXYZINE HYDROCHLORIDE 50 MG: 25 TABLET ORAL at 17:40

## 2023-01-01 RX ADMIN — IPRATROPIUM BROMIDE 0.5 MG: 0.5 SOLUTION RESPIRATORY (INHALATION) at 20:33

## 2023-01-01 RX ADMIN — POTASSIUM & SODIUM PHOSPHATES POWDER PACK 280-160-250 MG 1 PACKET: 280-160-250 PACK at 08:27

## 2023-01-01 RX ADMIN — HUMAN INSULIN 16 UNITS/HR: 100 INJECTION, SOLUTION SUBCUTANEOUS at 12:34

## 2023-01-01 RX ADMIN — DEXMEDETOMIDINE HYDROCHLORIDE 0.8 MCG/KG/HR: 100 INJECTION, SOLUTION INTRAVENOUS at 21:01

## 2023-01-01 RX ADMIN — SODIUM CHLORIDE, SODIUM LACTATE, POTASSIUM CHLORIDE, CALCIUM CHLORIDE AND DEXTROSE MONOHYDRATE: 5; 600; 310; 30; 20 INJECTION, SOLUTION INTRAVENOUS at 23:57

## 2023-01-01 RX ADMIN — NYSTATIN 500000 UNITS: 100000 SUSPENSION ORAL at 19:46

## 2023-01-01 RX ADMIN — POTASSIUM & SODIUM PHOSPHATES POWDER PACK 280-160-250 MG 1 PACKET: 280-160-250 PACK at 11:48

## 2023-01-01 RX ADMIN — MEROPENEM 1 G: 1 INJECTION, POWDER, FOR SOLUTION INTRAVENOUS at 09:07

## 2023-01-01 RX ADMIN — VASOPRESSIN 2.4 UNITS/HR: 20 INJECTION, SOLUTION INTRAMUSCULAR; SUBCUTANEOUS at 04:31

## 2023-01-01 RX ADMIN — SODIUM CHLORIDE, POTASSIUM CHLORIDE, SODIUM LACTATE AND CALCIUM CHLORIDE 250 ML: 600; 310; 30; 20 INJECTION, SOLUTION INTRAVENOUS at 03:09

## 2023-01-01 RX ADMIN — ACETYLCYSTEINE 2 ML: 200 SOLUTION ORAL; RESPIRATORY (INHALATION) at 00:03

## 2023-01-01 RX ADMIN — ACETYLCYSTEINE 2 ML: 200 SOLUTION ORAL; RESPIRATORY (INHALATION) at 08:26

## 2023-01-01 RX ADMIN — INSULIN ASPART 1 UNITS: 100 INJECTION, SOLUTION INTRAVENOUS; SUBCUTANEOUS at 17:32

## 2023-01-01 RX ADMIN — ACETYLCYSTEINE 2 ML: 200 SOLUTION ORAL; RESPIRATORY (INHALATION) at 20:53

## 2023-01-01 RX ADMIN — ACETYLCYSTEINE 2 ML: 200 SOLUTION ORAL; RESPIRATORY (INHALATION) at 08:22

## 2023-01-01 RX ADMIN — NYSTATIN 500000 UNITS: 100000 SUSPENSION ORAL at 13:14

## 2023-01-01 RX ADMIN — LEVALBUTEROL HYDROCHLORIDE 1.25 MG: 1.25 SOLUTION RESPIRATORY (INHALATION) at 08:42

## 2023-01-01 RX ADMIN — IPRATROPIUM BROMIDE 0.5 MG: 0.5 SOLUTION RESPIRATORY (INHALATION) at 19:46

## 2023-01-01 RX ADMIN — LEVALBUTEROL HYDROCHLORIDE 1.25 MG: 1.25 SOLUTION RESPIRATORY (INHALATION) at 16:22

## 2023-01-01 RX ADMIN — POTASSIUM CHLORIDE 20 MEQ: 40 SOLUTION ORAL at 11:22

## 2023-01-01 RX ADMIN — ACETAMINOPHEN 650 MG: 325 SUSPENSION ORAL at 08:49

## 2023-01-01 RX ADMIN — BUDESONIDE INHALATION 0.5 MG: 0.5 SUSPENSION RESPIRATORY (INHALATION) at 08:22

## 2023-01-01 RX ADMIN — METHYLPREDNISOLONE SODIUM SUCCINATE 62.5 MG: 125 INJECTION, POWDER, FOR SOLUTION INTRAMUSCULAR; INTRAVENOUS at 01:30

## 2023-01-01 RX ADMIN — VASOPRESSIN 2.4 UNITS/HR: 20 INJECTION, SOLUTION INTRAMUSCULAR; SUBCUTANEOUS at 11:34

## 2023-01-01 RX ADMIN — NYSTATIN 500000 UNITS: 100000 SUSPENSION ORAL at 09:27

## 2023-01-01 RX ADMIN — Medication 50 MG: at 22:41

## 2023-01-01 RX ADMIN — Medication: at 20:25

## 2023-01-01 RX ADMIN — INSULIN GLARGINE 10 UNITS: 100 INJECTION, SOLUTION SUBCUTANEOUS at 05:04

## 2023-01-01 RX ADMIN — CEFEPIME HYDROCHLORIDE 2 G: 2 INJECTION, POWDER, FOR SOLUTION INTRAVENOUS at 16:26

## 2023-01-01 RX ADMIN — IPRATROPIUM BROMIDE 0.5 MG: 0.5 SOLUTION RESPIRATORY (INHALATION) at 20:28

## 2023-01-01 RX ADMIN — IPRATROPIUM BROMIDE 0.5 MG: 0.5 SOLUTION RESPIRATORY (INHALATION) at 08:42

## 2023-01-01 RX ADMIN — NYSTATIN 500000 UNITS: 100000 SUSPENSION ORAL at 20:13

## 2023-01-01 RX ADMIN — ALBUMIN HUMAN 25 G: 0.25 SOLUTION INTRAVENOUS at 17:05

## 2023-01-01 RX ADMIN — LEVALBUTEROL HYDROCHLORIDE 1.25 MG: 1.25 SOLUTION RESPIRATORY (INHALATION) at 12:27

## 2023-01-01 RX ADMIN — ATOVAQUONE 1500 MG: 750 SUSPENSION ORAL at 16:09

## 2023-01-01 RX ADMIN — PREDNISONE 20 MG: 20 TABLET ORAL at 08:52

## 2023-01-01 RX ADMIN — IPRATROPIUM BROMIDE 0.5 MG: 0.5 SOLUTION RESPIRATORY (INHALATION) at 19:49

## 2023-01-01 RX ADMIN — Medication 50 MG: at 09:15

## 2023-01-01 RX ADMIN — ACETYLCYSTEINE 2 ML: 200 SOLUTION ORAL; RESPIRATORY (INHALATION) at 20:37

## 2023-01-01 RX ADMIN — LEVALBUTEROL HYDROCHLORIDE 1.25 MG: 1.25 SOLUTION RESPIRATORY (INHALATION) at 19:45

## 2023-01-01 RX ADMIN — LEVALBUTEROL HYDROCHLORIDE 1.25 MG: 1.25 SOLUTION RESPIRATORY (INHALATION) at 04:39

## 2023-01-01 RX ADMIN — LEVALBUTEROL HYDROCHLORIDE 1.25 MG: 1.25 SOLUTION RESPIRATORY (INHALATION) at 01:33

## 2023-01-01 RX ADMIN — BUDESONIDE INHALATION 0.5 MG: 0.5 SUSPENSION RESPIRATORY (INHALATION) at 20:28

## 2023-01-01 RX ADMIN — POLYETHYLENE GLYCOL 3350 17 G: 17 POWDER, FOR SOLUTION ORAL at 11:29

## 2023-01-01 RX ADMIN — Medication 50 MG: at 22:01

## 2023-01-01 RX ADMIN — Medication 40 MG: at 08:15

## 2023-01-01 RX ADMIN — LEVALBUTEROL HYDROCHLORIDE 1.25 MG: 1.25 SOLUTION RESPIRATORY (INHALATION) at 14:41

## 2023-01-01 RX ADMIN — ACETAMINOPHEN 650 MG: 325 TABLET, FILM COATED ORAL at 15:53

## 2023-01-01 RX ADMIN — SENNOSIDES 2 TABLET: 8.6 TABLET, FILM COATED ORAL at 21:15

## 2023-01-01 RX ADMIN — INSULIN ASPART 3 UNITS: 100 INJECTION, SOLUTION INTRAVENOUS; SUBCUTANEOUS at 00:10

## 2023-01-01 RX ADMIN — METHYLPREDNISOLONE SODIUM SUCCINATE 62.5 MG: 125 INJECTION, POWDER, FOR SOLUTION INTRAMUSCULAR; INTRAVENOUS at 08:15

## 2023-01-01 RX ADMIN — POTASSIUM & SODIUM PHOSPHATES POWDER PACK 280-160-250 MG 1 PACKET: 280-160-250 PACK at 13:35

## 2023-01-01 RX ADMIN — ACETYLCYSTEINE 2 ML: 200 SOLUTION ORAL; RESPIRATORY (INHALATION) at 15:49

## 2023-01-01 RX ADMIN — IPRATROPIUM BROMIDE 0.5 MG: 0.5 SOLUTION RESPIRATORY (INHALATION) at 15:49

## 2023-01-01 RX ADMIN — METOLAZONE 10 MG: 2.5 TABLET ORAL at 10:53

## 2023-01-01 RX ADMIN — BUDESONIDE INHALATION 0.5 MG: 0.5 SUSPENSION RESPIRATORY (INHALATION) at 07:45

## 2023-01-01 RX ADMIN — LEVALBUTEROL HYDROCHLORIDE 1.25 MG: 1.25 SOLUTION RESPIRATORY (INHALATION) at 21:02

## 2023-01-01 RX ADMIN — Medication 5 MG: at 20:14

## 2023-01-01 RX ADMIN — FUROSEMIDE 40 MG: 10 INJECTION, SOLUTION INTRAVENOUS at 18:23

## 2023-01-01 RX ADMIN — IPRATROPIUM BROMIDE 0.5 MG: 0.5 SOLUTION RESPIRATORY (INHALATION) at 09:16

## 2023-01-01 RX ADMIN — SENNOSIDES AND DOCUSATE SODIUM 2 TABLET: 50; 8.6 TABLET ORAL at 08:05

## 2023-01-01 RX ADMIN — AMIODARONE HYDROCHLORIDE 150 MG: 1.5 INJECTION, SOLUTION INTRAVENOUS at 13:35

## 2023-01-01 RX ADMIN — FUROSEMIDE 40 MG: 10 INJECTION, SOLUTION INTRAVENOUS at 11:16

## 2023-01-01 RX ADMIN — PIPERACILLIN SODIUM AND TAZOBACTAM SODIUM 4.5 G: 4; .5 INJECTION, POWDER, LYOPHILIZED, FOR SOLUTION INTRAVENOUS at 17:37

## 2023-01-01 RX ADMIN — MAGNESIUM OXIDE TAB 400 MG (241.3 MG ELEMENTAL MG) 400 MG: 400 (241.3 MG) TAB at 10:38

## 2023-01-01 RX ADMIN — IPRATROPIUM BROMIDE 0.5 MG: 0.5 SOLUTION RESPIRATORY (INHALATION) at 20:17

## 2023-01-01 RX ADMIN — IPRATROPIUM BROMIDE 0.5 MG: 0.5 SOLUTION RESPIRATORY (INHALATION) at 13:08

## 2023-01-01 RX ADMIN — ACETYLCYSTEINE 2 ML: 200 SOLUTION ORAL; RESPIRATORY (INHALATION) at 11:56

## 2023-01-01 RX ADMIN — LEVALBUTEROL HYDROCHLORIDE 1.25 MG: 1.25 SOLUTION RESPIRATORY (INHALATION) at 20:09

## 2023-01-01 RX ADMIN — HUMAN INSULIN 8 UNITS/HR: 100 INJECTION, SOLUTION SUBCUTANEOUS at 14:45

## 2023-01-01 RX ADMIN — IPRATROPIUM BROMIDE 0.5 MG: 0.5 SOLUTION RESPIRATORY (INHALATION) at 08:36

## 2023-01-01 RX ADMIN — LEVALBUTEROL HYDROCHLORIDE 1.25 MG: 1.25 SOLUTION RESPIRATORY (INHALATION) at 14:14

## 2023-01-01 RX ADMIN — ACETYLCYSTEINE 2 ML: 200 SOLUTION ORAL; RESPIRATORY (INHALATION) at 08:42

## 2023-01-01 RX ADMIN — OLANZAPINE 5 MG: 5 TABLET, ORALLY DISINTEGRATING ORAL at 21:38

## 2023-01-01 RX ADMIN — POLYETHYLENE GLYCOL 3350 17 G: 17 POWDER, FOR SOLUTION ORAL at 07:43

## 2023-01-01 RX ADMIN — INSULIN ASPART 7 UNITS: 100 INJECTION, SOLUTION INTRAVENOUS; SUBCUTANEOUS at 16:32

## 2023-01-01 RX ADMIN — BUDESONIDE INHALATION 0.5 MG: 0.5 SUSPENSION RESPIRATORY (INHALATION) at 19:46

## 2023-01-01 RX ADMIN — FUROSEMIDE 20 MG: 10 INJECTION, SOLUTION INTRAMUSCULAR; INTRAVENOUS at 09:59

## 2023-01-01 RX ADMIN — METOLAZONE 10 MG: 2.5 TABLET ORAL at 17:25

## 2023-01-01 RX ADMIN — IPRATROPIUM BROMIDE 0.5 MG: 0.5 SOLUTION RESPIRATORY (INHALATION) at 08:51

## 2023-01-01 RX ADMIN — IPRATROPIUM BROMIDE 0.5 MG: 0.5 SOLUTION RESPIRATORY (INHALATION) at 08:47

## 2023-01-01 RX ADMIN — DEXTROSE MONOHYDRATE 25 ML: 25 INJECTION, SOLUTION INTRAVENOUS at 23:30

## 2023-01-01 RX ADMIN — BUDESONIDE INHALATION 0.5 MG: 0.5 SUSPENSION RESPIRATORY (INHALATION) at 20:49

## 2023-01-01 RX ADMIN — ENOXAPARIN SODIUM 30 MG: 30 INJECTION SUBCUTANEOUS at 09:05

## 2023-01-01 RX ADMIN — INSULIN ASPART 6 UNITS: 100 INJECTION, SOLUTION INTRAVENOUS; SUBCUTANEOUS at 19:58

## 2023-01-01 RX ADMIN — LEVALBUTEROL HYDROCHLORIDE 1.25 MG: 1.25 SOLUTION RESPIRATORY (INHALATION) at 16:28

## 2023-01-01 RX ADMIN — MEROPENEM 500 MG: 500 INJECTION, POWDER, FOR SOLUTION INTRAVENOUS at 01:40

## 2023-01-01 RX ADMIN — METHYLPREDNISOLONE SODIUM SUCCINATE 62.5 MG: 125 INJECTION, POWDER, FOR SOLUTION INTRAMUSCULAR; INTRAVENOUS at 13:44

## 2023-01-01 RX ADMIN — HUMAN INSULIN 6 UNITS/HR: 100 INJECTION, SOLUTION SUBCUTANEOUS at 14:27

## 2023-01-01 RX ADMIN — Medication: at 15:00

## 2023-01-01 RX ADMIN — HEPARIN SODIUM 5000 UNITS: 5000 INJECTION, SOLUTION INTRAVENOUS; SUBCUTANEOUS at 08:33

## 2023-01-01 RX ADMIN — NYSTATIN 500000 UNITS: 100000 SUSPENSION ORAL at 21:17

## 2023-01-01 RX ADMIN — LEVALBUTEROL HYDROCHLORIDE 1.25 MG: 1.25 SOLUTION RESPIRATORY (INHALATION) at 14:55

## 2023-01-01 RX ADMIN — HUMAN INSULIN 2 UNITS/HR: 100 INJECTION, SOLUTION SUBCUTANEOUS at 17:16

## 2023-01-01 RX ADMIN — ATOVAQUONE 1500 MG: 750 SUSPENSION ORAL at 07:43

## 2023-01-01 RX ADMIN — NYSTATIN 500000 UNITS: 100000 SUSPENSION ORAL at 08:05

## 2023-01-01 RX ADMIN — IPRATROPIUM BROMIDE 0.5 MG: 0.5 SOLUTION RESPIRATORY (INHALATION) at 04:28

## 2023-01-01 RX ADMIN — DEXMEDETOMIDINE HYDROCHLORIDE 0.9 MCG/KG/HR: 100 INJECTION, SOLUTION INTRAVENOUS at 11:46

## 2023-01-01 RX ADMIN — LEVALBUTEROL HYDROCHLORIDE 1.25 MG: 1.25 SOLUTION RESPIRATORY (INHALATION) at 07:25

## 2023-01-01 RX ADMIN — ACETYLCYSTEINE 2 ML: 200 SOLUTION ORAL; RESPIRATORY (INHALATION) at 13:04

## 2023-01-01 RX ADMIN — INSULIN ASPART 7 UNITS: 100 INJECTION, SOLUTION INTRAVENOUS; SUBCUTANEOUS at 17:46

## 2023-01-01 RX ADMIN — HEPARIN SODIUM 5000 UNITS: 5000 INJECTION, SOLUTION INTRAVENOUS; SUBCUTANEOUS at 21:22

## 2023-01-01 RX ADMIN — HUMAN INSULIN 10 UNITS/HR: 100 INJECTION, SOLUTION SUBCUTANEOUS at 23:03

## 2023-01-01 RX ADMIN — HUMAN INSULIN 12 UNITS/HR: 100 INJECTION, SOLUTION SUBCUTANEOUS at 21:58

## 2023-01-01 RX ADMIN — IPRATROPIUM BROMIDE 0.5 MG: 0.5 SOLUTION RESPIRATORY (INHALATION) at 20:10

## 2023-01-01 RX ADMIN — IPRATROPIUM BROMIDE 0.5 MG: 0.5 SOLUTION RESPIRATORY (INHALATION) at 09:06

## 2023-01-01 RX ADMIN — ACETAMINOPHEN 650 MG: 325 TABLET, FILM COATED ORAL at 04:04

## 2023-01-01 RX ADMIN — OLANZAPINE 5 MG: 10 INJECTION, POWDER, FOR SOLUTION INTRAMUSCULAR at 05:36

## 2023-01-01 RX ADMIN — AMIODARONE HYDROCHLORIDE 1 MG/MIN: 50 INJECTION, SOLUTION INTRAVENOUS at 13:49

## 2023-01-01 RX ADMIN — POTASSIUM CHLORIDE 20 MEQ: 1.5 FOR SOLUTION ORAL at 20:10

## 2023-01-01 RX ADMIN — MICAFUNGIN 100 MG: 10 INJECTION, POWDER, LYOPHILIZED, FOR SOLUTION INTRAVENOUS at 15:03

## 2023-01-01 RX ADMIN — METHYLPREDNISOLONE SODIUM SUCCINATE 62.5 MG: 125 INJECTION, POWDER, FOR SOLUTION INTRAMUSCULAR; INTRAVENOUS at 14:50

## 2023-01-01 RX ADMIN — HYDROXYZINE HYDROCHLORIDE 50 MG: 25 TABLET ORAL at 08:57

## 2023-01-01 RX ADMIN — ACETAMINOPHEN 650 MG: 325 TABLET, FILM COATED ORAL at 09:12

## 2023-01-01 RX ADMIN — NYSTATIN 500000 UNITS: 100000 SUSPENSION ORAL at 13:08

## 2023-01-01 RX ADMIN — IPRATROPIUM BROMIDE 0.5 MG: 0.5 SOLUTION RESPIRATORY (INHALATION) at 12:26

## 2023-01-01 RX ADMIN — CEFEPIME HYDROCHLORIDE 2 G: 2 INJECTION, POWDER, FOR SOLUTION INTRAVENOUS at 21:37

## 2023-01-01 RX ADMIN — ACETAMINOPHEN 650 MG: 325 TABLET, FILM COATED ORAL at 05:50

## 2023-01-01 RX ADMIN — IPRATROPIUM BROMIDE 0.5 MG: 0.5 SOLUTION RESPIRATORY (INHALATION) at 15:07

## 2023-01-01 RX ADMIN — ACETAZOLAMIDE 500 MG: 500 INJECTION, POWDER, LYOPHILIZED, FOR SOLUTION INTRAVENOUS at 10:52

## 2023-01-01 RX ADMIN — PIPERACILLIN AND TAZOBACTAM 4.5 G: 4; .5 INJECTION, POWDER, FOR SOLUTION INTRAVENOUS at 16:55

## 2023-01-01 RX ADMIN — POTASSIUM CHLORIDE 20 MEQ: 1.5 FOR SOLUTION ORAL at 21:10

## 2023-01-01 RX ADMIN — LEVALBUTEROL HYDROCHLORIDE 1.25 MG: 1.25 SOLUTION RESPIRATORY (INHALATION) at 15:49

## 2023-01-01 RX ADMIN — SODIUM CHLORIDE, POTASSIUM CHLORIDE, SODIUM LACTATE AND CALCIUM CHLORIDE: 600; 310; 30; 20 INJECTION, SOLUTION INTRAVENOUS at 12:17

## 2023-01-01 RX ADMIN — IPRATROPIUM BROMIDE 0.5 MG: 0.5 SOLUTION RESPIRATORY (INHALATION) at 20:49

## 2023-01-01 RX ADMIN — LEVALBUTEROL HYDROCHLORIDE 1.25 MG: 1.25 SOLUTION RESPIRATORY (INHALATION) at 00:11

## 2023-01-01 RX ADMIN — HEPARIN SODIUM 5000 UNITS: 5000 INJECTION, SOLUTION INTRAVENOUS; SUBCUTANEOUS at 21:15

## 2023-01-01 RX ADMIN — Medication 50 MG: at 04:05

## 2023-01-01 RX ADMIN — PREDNISONE 20 MG: 20 TABLET ORAL at 08:27

## 2023-01-01 RX ADMIN — HUMAN INSULIN 4 UNITS/HR: 100 INJECTION, SOLUTION SUBCUTANEOUS at 22:52

## 2023-01-01 RX ADMIN — MEROPENEM 1 G: 1 INJECTION, POWDER, FOR SOLUTION INTRAVENOUS at 00:24

## 2023-01-01 RX ADMIN — NYSTATIN 500000 UNITS: 100000 SUSPENSION ORAL at 16:55

## 2023-01-01 RX ADMIN — LEVALBUTEROL HYDROCHLORIDE 1.25 MG: 1.25 SOLUTION RESPIRATORY (INHALATION) at 08:22

## 2023-01-01 RX ADMIN — IPRATROPIUM BROMIDE AND ALBUTEROL SULFATE 3 ML: 2.5; .5 SOLUTION RESPIRATORY (INHALATION) at 14:29

## 2023-01-01 RX ADMIN — IPRATROPIUM BROMIDE 0.5 MG: 0.5 SOLUTION RESPIRATORY (INHALATION) at 15:09

## 2023-01-01 RX ADMIN — DEXMEDETOMIDINE HYDROCHLORIDE 1 MCG/KG/HR: 100 INJECTION, SOLUTION INTRAVENOUS at 12:33

## 2023-01-01 RX ADMIN — POTASSIUM PHOSPHATE, MONOBASIC AND POTASSIUM PHOSPHATE, DIBASIC 15 MMOL: 224; 236 INJECTION, SOLUTION INTRAVENOUS at 15:13

## 2023-01-01 RX ADMIN — LEVALBUTEROL HYDROCHLORIDE 1.25 MG: 1.25 SOLUTION RESPIRATORY (INHALATION) at 11:56

## 2023-01-01 RX ADMIN — METHYLPREDNISOLONE SODIUM SUCCINATE 62.5 MG: 125 INJECTION, POWDER, FOR SOLUTION INTRAMUSCULAR; INTRAVENOUS at 12:54

## 2023-01-01 RX ADMIN — LIDOCAINE HYDROCHLORIDE 5 ML: 20 SOLUTION ORAL; TOPICAL at 17:12

## 2023-01-01 RX ADMIN — IPRATROPIUM BROMIDE 0.5 MG: 0.5 SOLUTION RESPIRATORY (INHALATION) at 07:40

## 2023-01-01 RX ADMIN — LEVALBUTEROL HYDROCHLORIDE 1.25 MG: 1.25 SOLUTION RESPIRATORY (INHALATION) at 15:05

## 2023-01-01 RX ADMIN — IPRATROPIUM BROMIDE 0.5 MG: 0.5 SOLUTION RESPIRATORY (INHALATION) at 08:19

## 2023-01-01 RX ADMIN — INSULIN ASPART 5 UNITS: 100 INJECTION, SOLUTION INTRAVENOUS; SUBCUTANEOUS at 04:17

## 2023-01-01 RX ADMIN — ACETAMINOPHEN 650 MG: 325 SUSPENSION ORAL at 08:38

## 2023-01-01 RX ADMIN — BUDESONIDE INHALATION 0.5 MG: 0.5 SUSPENSION RESPIRATORY (INHALATION) at 20:55

## 2023-01-01 RX ADMIN — LEVALBUTEROL HYDROCHLORIDE 1.25 MG: 1.25 SOLUTION RESPIRATORY (INHALATION) at 09:04

## 2023-01-01 RX ADMIN — LEVALBUTEROL HYDROCHLORIDE 1.25 MG: 1.25 SOLUTION RESPIRATORY (INHALATION) at 08:26

## 2023-01-01 RX ADMIN — LEVALBUTEROL HYDROCHLORIDE 1.25 MG: 1.25 SOLUTION RESPIRATORY (INHALATION) at 20:19

## 2023-01-01 RX ADMIN — NYSTATIN 500000 UNITS: 100000 SUSPENSION ORAL at 13:50

## 2023-01-01 RX ADMIN — POTASSIUM & SODIUM PHOSPHATES POWDER PACK 280-160-250 MG 1 PACKET: 280-160-250 PACK at 11:30

## 2023-01-01 RX ADMIN — IPRATROPIUM BROMIDE 0.5 MG: 0.5 SOLUTION RESPIRATORY (INHALATION) at 20:19

## 2023-01-01 RX ADMIN — ACETAMINOPHEN 650 MG: 325 TABLET, FILM COATED ORAL at 21:21

## 2023-01-01 RX ADMIN — INSULIN ASPART 5 UNITS: 100 INJECTION, SOLUTION INTRAVENOUS; SUBCUTANEOUS at 12:47

## 2023-01-01 RX ADMIN — ALTEPLASE 2 MG: 2.2 INJECTION, POWDER, LYOPHILIZED, FOR SOLUTION INTRAVENOUS at 06:05

## 2023-01-01 RX ADMIN — HALOPERIDOL LACTATE 5 MG: 5 INJECTION, SOLUTION INTRAMUSCULAR at 02:58

## 2023-01-01 RX ADMIN — Medication: at 21:15

## 2023-01-01 RX ADMIN — SODIUM CHLORIDE: 9 INJECTION, SOLUTION INTRAVENOUS at 15:24

## 2023-01-01 RX ADMIN — HYDROXYZINE HYDROCHLORIDE 50 MG: 25 TABLET ORAL at 08:38

## 2023-01-01 RX ADMIN — MEROPENEM 500 MG: 500 INJECTION, POWDER, FOR SOLUTION INTRAVENOUS at 00:23

## 2023-01-01 RX ADMIN — BISACODYL 10 MG: 10 SUPPOSITORY RECTAL at 22:56

## 2023-01-01 RX ADMIN — PIPERACILLIN SODIUM AND TAZOBACTAM SODIUM 4.5 G: 4; .5 INJECTION, POWDER, LYOPHILIZED, FOR SOLUTION INTRAVENOUS at 11:35

## 2023-01-01 RX ADMIN — Medication 5 MG: at 20:10

## 2023-01-01 RX ADMIN — ACETYLCYSTEINE 2 ML: 200 SOLUTION ORAL; RESPIRATORY (INHALATION) at 15:05

## 2023-01-01 RX ADMIN — HALOPERIDOL LACTATE 5 MG: 5 INJECTION, SOLUTION INTRAMUSCULAR at 13:38

## 2023-01-01 RX ADMIN — ACETAMINOPHEN 650 MG: 325 TABLET, FILM COATED ORAL at 04:45

## 2023-01-01 RX ADMIN — Medication 5 MG: at 21:38

## 2023-01-01 RX ADMIN — Medication 40 MG: at 16:49

## 2023-01-01 RX ADMIN — IPRATROPIUM BROMIDE 0.5 MG: 0.5 SOLUTION RESPIRATORY (INHALATION) at 08:26

## 2023-01-01 RX ADMIN — POTASSIUM & SODIUM PHOSPHATES POWDER PACK 280-160-250 MG 1 PACKET: 280-160-250 PACK at 07:01

## 2023-01-01 RX ADMIN — METHYLPREDNISOLONE SODIUM SUCCINATE 20 MG: 40 INJECTION, POWDER, FOR SOLUTION INTRAMUSCULAR; INTRAVENOUS at 08:05

## 2023-01-01 RX ADMIN — BUDESONIDE INHALATION 0.5 MG: 0.5 SUSPENSION RESPIRATORY (INHALATION) at 09:04

## 2023-01-01 RX ADMIN — Medication 50 MG: at 22:22

## 2023-01-01 RX ADMIN — ACETAMINOPHEN 650 MG: 325 SUSPENSION ORAL at 15:17

## 2023-01-01 RX ADMIN — OLANZAPINE 5 MG: 10 INJECTION, POWDER, LYOPHILIZED, FOR SOLUTION INTRAMUSCULAR at 10:20

## 2023-01-01 RX ADMIN — ACETYLCYSTEINE 2 ML: 200 SOLUTION ORAL; RESPIRATORY (INHALATION) at 14:41

## 2023-01-01 RX ADMIN — CEFTRIAXONE SODIUM 2 G: 2 INJECTION, POWDER, FOR SOLUTION INTRAMUSCULAR; INTRAVENOUS at 14:51

## 2023-01-01 RX ADMIN — ACETAMINOPHEN 650 MG: 325 TABLET, FILM COATED ORAL at 15:11

## 2023-01-01 RX ADMIN — HUMAN INSULIN 3 UNITS/HR: 100 INJECTION, SOLUTION SUBCUTANEOUS at 06:20

## 2023-01-01 RX ADMIN — ACETYLCYSTEINE 2 ML: 200 SOLUTION ORAL; RESPIRATORY (INHALATION) at 19:46

## 2023-01-01 RX ADMIN — IPRATROPIUM BROMIDE 0.5 MG: 0.5 SOLUTION RESPIRATORY (INHALATION) at 12:27

## 2023-01-01 RX ADMIN — ACETAMINOPHEN 650 MG: 325 TABLET, FILM COATED ORAL at 09:09

## 2023-01-01 RX ADMIN — BUDESONIDE INHALATION 0.5 MG: 0.5 SUSPENSION RESPIRATORY (INHALATION) at 08:36

## 2023-01-01 RX ADMIN — LABETALOL HYDROCHLORIDE 20 MG: 5 INJECTION, SOLUTION INTRAVENOUS at 21:41

## 2023-01-01 RX ADMIN — FUROSEMIDE 10 MG/HR: 10 INJECTION, SOLUTION INTRAMUSCULAR; INTRAVENOUS at 18:02

## 2023-01-01 RX ADMIN — IPRATROPIUM BROMIDE 0.5 MG: 0.5 SOLUTION RESPIRATORY (INHALATION) at 09:49

## 2023-01-01 RX ADMIN — HYDROXYZINE HYDROCHLORIDE 50 MG: 25 TABLET ORAL at 20:14

## 2023-01-01 RX ADMIN — PIPERACILLIN AND TAZOBACTAM 4.5 G: 4; .5 INJECTION, POWDER, FOR SOLUTION INTRAVENOUS at 21:22

## 2023-01-01 RX ADMIN — ACETAZOLAMIDE 500 MG: 500 INJECTION, POWDER, LYOPHILIZED, FOR SOLUTION INTRAVENOUS at 12:46

## 2023-01-01 RX ADMIN — BUDESONIDE INHALATION 0.5 MG: 0.5 SUSPENSION RESPIRATORY (INHALATION) at 08:51

## 2023-01-01 RX ADMIN — ACETYLCYSTEINE 2 ML: 200 SOLUTION ORAL; RESPIRATORY (INHALATION) at 07:40

## 2023-01-01 RX ADMIN — OLANZAPINE 5 MG: 5 TABLET, ORALLY DISINTEGRATING ORAL at 21:11

## 2023-01-01 RX ADMIN — IPRATROPIUM BROMIDE 0.5 MG: 0.5 SOLUTION RESPIRATORY (INHALATION) at 21:30

## 2023-01-01 RX ADMIN — IPRATROPIUM BROMIDE 0.5 MG: 0.5 SOLUTION RESPIRATORY (INHALATION) at 07:45

## 2023-01-01 RX ADMIN — ACETAMINOPHEN 650 MG: 325 TABLET, FILM COATED ORAL at 21:15

## 2023-01-01 RX ADMIN — NYSTATIN 500000 UNITS: 100000 SUSPENSION ORAL at 16:05

## 2023-01-01 RX ADMIN — HUMAN INSULIN 2 UNITS/HR: 100 INJECTION, SOLUTION SUBCUTANEOUS at 06:03

## 2023-01-01 RX ADMIN — ACETAMINOPHEN 650 MG: 325 TABLET, FILM COATED ORAL at 16:42

## 2023-01-01 RX ADMIN — BUDESONIDE INHALATION 0.5 MG: 0.5 SUSPENSION RESPIRATORY (INHALATION) at 20:53

## 2023-01-01 RX ADMIN — IPRATROPIUM BROMIDE 0.5 MG: 0.5 SOLUTION RESPIRATORY (INHALATION) at 13:04

## 2023-01-01 RX ADMIN — LEVALBUTEROL HYDROCHLORIDE 1.25 MG: 1.25 SOLUTION RESPIRATORY (INHALATION) at 04:25

## 2023-01-01 RX ADMIN — POLYETHYLENE GLYCOL 3350 17 G: 17 POWDER, FOR SOLUTION ORAL at 19:47

## 2023-01-01 RX ADMIN — METHYLPREDNISOLONE SODIUM SUCCINATE 62.5 MG: 125 INJECTION, POWDER, FOR SOLUTION INTRAMUSCULAR; INTRAVENOUS at 01:19

## 2023-01-01 RX ADMIN — LEVALBUTEROL HYDROCHLORIDE 1.25 MG: 1.25 SOLUTION RESPIRATORY (INHALATION) at 13:04

## 2023-01-01 RX ADMIN — LEVALBUTEROL HYDROCHLORIDE 1.25 MG: 1.25 SOLUTION RESPIRATORY (INHALATION) at 20:37

## 2023-01-01 RX ADMIN — HEPARIN SODIUM 5000 UNITS: 5000 INJECTION, SOLUTION INTRAVENOUS; SUBCUTANEOUS at 22:20

## 2023-01-01 RX ADMIN — ACETAZOLAMIDE 500 MG: 500 INJECTION, POWDER, LYOPHILIZED, FOR SOLUTION INTRAVENOUS at 09:03

## 2023-01-01 RX ADMIN — VANCOMYCIN HYDROCHLORIDE 1250 MG: 10 INJECTION, POWDER, LYOPHILIZED, FOR SOLUTION INTRAVENOUS at 04:34

## 2023-01-01 RX ADMIN — HUMAN INSULIN 2 UNITS/HR: 100 INJECTION, SOLUTION SUBCUTANEOUS at 17:27

## 2023-01-01 RX ADMIN — POTASSIUM & SODIUM PHOSPHATES POWDER PACK 280-160-250 MG 1 PACKET: 280-160-250 PACK at 15:52

## 2023-01-01 RX ADMIN — VANCOMYCIN HYDROCHLORIDE 1000 MG: 1 INJECTION, SOLUTION INTRAVENOUS at 05:50

## 2023-01-01 RX ADMIN — BUDESONIDE INHALATION 0.5 MG: 0.5 SUSPENSION RESPIRATORY (INHALATION) at 20:37

## 2023-01-01 RX ADMIN — METHYLPREDNISOLONE SODIUM SUCCINATE 125 MG: 125 INJECTION, POWDER, FOR SOLUTION INTRAMUSCULAR; INTRAVENOUS at 06:07

## 2023-01-01 RX ADMIN — POTASSIUM & SODIUM PHOSPHATES POWDER PACK 280-160-250 MG 1 PACKET: 280-160-250 PACK at 08:56

## 2023-01-01 RX ADMIN — OLANZAPINE 5 MG: 5 TABLET, ORALLY DISINTEGRATING ORAL at 21:07

## 2023-01-01 RX ADMIN — HUMAN INSULIN 3.5 UNITS/HR: 100 INJECTION, SOLUTION SUBCUTANEOUS at 05:57

## 2023-01-01 RX ADMIN — VANCOMYCIN HYDROCHLORIDE 1250 MG: 10 INJECTION, POWDER, LYOPHILIZED, FOR SOLUTION INTRAVENOUS at 05:14

## 2023-01-01 RX ADMIN — OLANZAPINE 5 MG: 5 TABLET, ORALLY DISINTEGRATING ORAL at 21:18

## 2023-01-01 RX ADMIN — ACETYLCYSTEINE 2 ML: 200 SOLUTION ORAL; RESPIRATORY (INHALATION) at 23:55

## 2023-01-01 RX ADMIN — AMIODARONE HYDROCHLORIDE 1 MG/MIN: 50 INJECTION, SOLUTION INTRAVENOUS at 17:13

## 2023-01-01 RX ADMIN — BUDESONIDE INHALATION 0.5 MG: 0.5 SUSPENSION RESPIRATORY (INHALATION) at 19:49

## 2023-01-01 RX ADMIN — HYDROXYZINE HYDROCHLORIDE 50 MG: 25 TABLET ORAL at 21:07

## 2023-01-01 RX ADMIN — HEPARIN SODIUM 5000 UNITS: 5000 INJECTION, SOLUTION INTRAVENOUS; SUBCUTANEOUS at 21:21

## 2023-01-01 RX ADMIN — ATOVAQUONE 1500 MG: 750 SUSPENSION ORAL at 08:10

## 2023-01-01 RX ADMIN — IPRATROPIUM BROMIDE 0.5 MG: 0.5 SOLUTION RESPIRATORY (INHALATION) at 07:25

## 2023-01-01 RX ADMIN — POTASSIUM PHOSPHATE, MONOBASIC AND POTASSIUM PHOSPHATE, DIBASIC 15 MMOL: 224; 236 INJECTION, SOLUTION INTRAVENOUS at 07:43

## 2023-01-01 RX ADMIN — ACETYLCYSTEINE 2 ML: 200 SOLUTION ORAL; RESPIRATORY (INHALATION) at 21:29

## 2023-01-01 RX ADMIN — Medication 50 MG: at 15:06

## 2023-01-01 RX ADMIN — MEROPENEM 500 MG: 500 INJECTION, POWDER, FOR SOLUTION INTRAVENOUS at 13:00

## 2023-01-01 RX ADMIN — LEVALBUTEROL HYDROCHLORIDE 1.25 MG: 1.25 SOLUTION RESPIRATORY (INHALATION) at 12:19

## 2023-01-01 RX ADMIN — Medication 50 MG: at 04:12

## 2023-01-01 RX ADMIN — SULFAMETHOXAZOLE AND TRIMETHOPRIM 1 TABLET: 800; 160 TABLET ORAL at 09:09

## 2023-01-01 RX ADMIN — DEXMEDETOMIDINE HYDROCHLORIDE 0.9 MCG/KG/HR: 100 INJECTION, SOLUTION INTRAVENOUS at 17:24

## 2023-01-01 RX ADMIN — DEXMEDETOMIDINE HYDROCHLORIDE 0.2 MCG/KG/HR: 100 INJECTION, SOLUTION INTRAVENOUS at 13:29

## 2023-01-01 RX ADMIN — ACETYLCYSTEINE 2 ML: 200 SOLUTION ORAL; RESPIRATORY (INHALATION) at 08:51

## 2023-01-01 RX ADMIN — IPRATROPIUM BROMIDE 0.5 MG: 0.5 SOLUTION RESPIRATORY (INHALATION) at 07:59

## 2023-01-01 RX ADMIN — DEXMEDETOMIDINE HYDROCHLORIDE 1.2 MCG/KG/HR: 100 INJECTION, SOLUTION INTRAVENOUS at 23:04

## 2023-01-01 RX ADMIN — ACETYLCYSTEINE 2 ML: 200 SOLUTION ORAL; RESPIRATORY (INHALATION) at 03:14

## 2023-01-01 RX ADMIN — HUMAN INSULIN 4 UNITS/HR: 100 INJECTION, SOLUTION SUBCUTANEOUS at 22:01

## 2023-01-01 RX ADMIN — ENOXAPARIN SODIUM 30 MG: 30 INJECTION SUBCUTANEOUS at 10:19

## 2023-01-01 RX ADMIN — PIPERACILLIN AND TAZOBACTAM 4.5 G: 4; .5 INJECTION, POWDER, FOR SOLUTION INTRAVENOUS at 10:20

## 2023-01-01 RX ADMIN — LEVALBUTEROL HYDROCHLORIDE 1.25 MG: 1.25 SOLUTION RESPIRATORY (INHALATION) at 20:17

## 2023-01-01 RX ADMIN — POTASSIUM PHOSPHATE, MONOBASIC AND POTASSIUM PHOSPHATE, DIBASIC 15 MMOL: 224; 236 INJECTION, SOLUTION INTRAVENOUS at 18:17

## 2023-01-01 RX ADMIN — LEVALBUTEROL HYDROCHLORIDE 1.25 MG: 1.25 SOLUTION RESPIRATORY (INHALATION) at 13:57

## 2023-01-01 RX ADMIN — HUMAN INSULIN 6 UNITS/HR: 100 INJECTION, SOLUTION SUBCUTANEOUS at 02:05

## 2023-01-01 RX ADMIN — SULFAMETHOXAZOLE AND TRIMETHOPRIM 1 TABLET: 800; 160 TABLET ORAL at 09:35

## 2023-01-01 RX ADMIN — NOREPINEPHRINE BITARTRATE 0.03 MCG/KG/MIN: 0.06 INJECTION, SOLUTION INTRAVENOUS at 13:00

## 2023-01-01 RX ADMIN — BUDESONIDE INHALATION 0.5 MG: 0.5 SUSPENSION RESPIRATORY (INHALATION) at 13:04

## 2023-01-01 RX ADMIN — POTASSIUM CHLORIDE 20 MEQ: 1.5 FOR SOLUTION ORAL at 08:38

## 2023-01-01 RX ADMIN — ACETAMINOPHEN 650 MG: 325 TABLET, FILM COATED ORAL at 08:27

## 2023-01-01 RX ADMIN — BUDESONIDE INHALATION 0.5 MG: 0.5 SUSPENSION RESPIRATORY (INHALATION) at 20:19

## 2023-01-01 RX ADMIN — Medication 0.04 MCG/KG/MIN: at 17:33

## 2023-01-01 RX ADMIN — IPRATROPIUM BROMIDE 0.5 MG: 0.5 SOLUTION RESPIRATORY (INHALATION) at 01:33

## 2023-01-01 RX ADMIN — ACETAMINOPHEN 650 MG: 325 SUSPENSION ORAL at 01:47

## 2023-01-01 RX ADMIN — HUMAN INSULIN 6 UNITS/HR: 100 INJECTION, SOLUTION SUBCUTANEOUS at 04:04

## 2023-01-01 RX ADMIN — Medication 40 MG: at 16:05

## 2023-01-01 RX ADMIN — HYDROXYZINE HYDROCHLORIDE 50 MG: 25 TABLET ORAL at 20:10

## 2023-01-01 RX ADMIN — Medication 40 MG: at 07:52

## 2023-01-01 RX ADMIN — INSULIN ASPART 5 UNITS: 100 INJECTION, SOLUTION INTRAVENOUS; SUBCUTANEOUS at 13:07

## 2023-01-01 RX ADMIN — SULFAMETHOXAZOLE AND TRIMETHOPRIM 1 TABLET: 800; 160 TABLET ORAL at 09:30

## 2023-01-01 RX ADMIN — LEVALBUTEROL HYDROCHLORIDE 1.25 MG: 1.25 SOLUTION RESPIRATORY (INHALATION) at 09:06

## 2023-01-01 RX ADMIN — CARBOXYMETHYLCELLULOSE SODIUM 2 DROP: 5 SOLUTION/ DROPS OPHTHALMIC at 08:57

## 2023-01-01 RX ADMIN — METHYLPREDNISOLONE SODIUM SUCCINATE 20 MG: 40 INJECTION, POWDER, FOR SOLUTION INTRAMUSCULAR; INTRAVENOUS at 12:16

## 2023-01-01 RX ADMIN — HEPARIN SODIUM 5000 UNITS: 5000 INJECTION, SOLUTION INTRAVENOUS; SUBCUTANEOUS at 09:49

## 2023-01-01 RX ADMIN — POLYETHYLENE GLYCOL 3350 17 G: 17 POWDER, FOR SOLUTION ORAL at 09:39

## 2023-01-01 RX ADMIN — HUMAN INSULIN 2 UNITS/HR: 100 INJECTION, SOLUTION SUBCUTANEOUS at 02:26

## 2023-01-01 RX ADMIN — BUDESONIDE INHALATION 0.5 MG: 0.5 SUSPENSION RESPIRATORY (INHALATION) at 21:30

## 2023-01-01 RX ADMIN — ACETYLCYSTEINE 2 ML: 200 SOLUTION ORAL; RESPIRATORY (INHALATION) at 09:16

## 2023-01-01 RX ADMIN — LEVALBUTEROL HYDROCHLORIDE 1.25 MG: 1.25 SOLUTION RESPIRATORY (INHALATION) at 08:12

## 2023-01-01 RX ADMIN — METHYLPREDNISOLONE SODIUM SUCCINATE 62.5 MG: 125 INJECTION, POWDER, FOR SOLUTION INTRAMUSCULAR; INTRAVENOUS at 07:41

## 2023-01-01 RX ADMIN — AMIODARONE HYDROCHLORIDE 150 MG: 1.5 INJECTION, SOLUTION INTRAVENOUS at 17:25

## 2023-01-01 RX ADMIN — Medication: at 08:45

## 2023-01-01 RX ADMIN — LEVALBUTEROL HYDROCHLORIDE 1.25 MG: 1.25 SOLUTION RESPIRATORY (INHALATION) at 07:45

## 2023-01-01 RX ADMIN — Medication 40 MG: at 15:32

## 2023-01-01 RX ADMIN — POTASSIUM PHOSPHATE, MONOBASIC AND POTASSIUM PHOSPHATE, DIBASIC 15 MMOL: 224; 236 INJECTION, SOLUTION INTRAVENOUS at 13:07

## 2023-01-01 RX ADMIN — BUDESONIDE INHALATION 0.5 MG: 0.5 SUSPENSION RESPIRATORY (INHALATION) at 08:26

## 2023-01-01 RX ADMIN — POTASSIUM CHLORIDE 20 MEQ: 1.5 POWDER, FOR SOLUTION ORAL at 21:21

## 2023-01-01 RX ADMIN — POLYETHYLENE GLYCOL 3350 17 G: 17 POWDER, FOR SOLUTION ORAL at 22:55

## 2023-01-01 RX ADMIN — FUROSEMIDE 15 MG/HR: 10 INJECTION, SOLUTION INTRAMUSCULAR; INTRAVENOUS at 13:51

## 2023-01-01 RX ADMIN — LEVALBUTEROL HYDROCHLORIDE 1.25 MG: 1.25 SOLUTION RESPIRATORY (INHALATION) at 03:36

## 2023-01-01 RX ADMIN — ACETAMINOPHEN 1000 MG: 500 TABLET ORAL at 21:22

## 2023-01-01 RX ADMIN — SODIUM CHLORIDE, POTASSIUM CHLORIDE, SODIUM LACTATE AND CALCIUM CHLORIDE: 600; 310; 30; 20 INJECTION, SOLUTION INTRAVENOUS at 17:15

## 2023-01-01 RX ADMIN — LEVALBUTEROL HYDROCHLORIDE 1.25 MG: 1.25 SOLUTION RESPIRATORY (INHALATION) at 09:50

## 2023-01-01 RX ADMIN — ACETYLCYSTEINE 2 ML: 200 SOLUTION ORAL; RESPIRATORY (INHALATION) at 00:28

## 2023-01-01 RX ADMIN — POTASSIUM CHLORIDE 20 MEQ: 29.8 INJECTION, SOLUTION INTRAVENOUS at 10:45

## 2023-01-01 RX ADMIN — MEROPENEM 1 G: 1 INJECTION, POWDER, FOR SOLUTION INTRAVENOUS at 13:11

## 2023-01-01 RX ADMIN — BUDESONIDE INHALATION 0.5 MG: 0.5 SUSPENSION RESPIRATORY (INHALATION) at 09:16

## 2023-01-01 RX ADMIN — LEVALBUTEROL HYDROCHLORIDE 1.25 MG: 1.25 SOLUTION RESPIRATORY (INHALATION) at 23:55

## 2023-01-01 RX ADMIN — LEVALBUTEROL HYDROCHLORIDE 1.25 MG: 1.25 SOLUTION RESPIRATORY (INHALATION) at 15:56

## 2023-01-01 RX ADMIN — LEVALBUTEROL HYDROCHLORIDE 1.25 MG: 1.25 SOLUTION RESPIRATORY (INHALATION) at 09:16

## 2023-01-01 RX ADMIN — PREDNISONE 20 MG: 20 TABLET ORAL at 08:57

## 2023-01-01 RX ADMIN — IPRATROPIUM BROMIDE 0.5 MG: 0.5 SOLUTION RESPIRATORY (INHALATION) at 20:37

## 2023-01-01 RX ADMIN — HUMAN INSULIN 0 UNITS/HR: 100 INJECTION, SOLUTION SUBCUTANEOUS at 00:27

## 2023-01-01 RX ADMIN — HEPARIN SODIUM 5000 UNITS: 5000 INJECTION, SOLUTION INTRAVENOUS; SUBCUTANEOUS at 09:30

## 2023-01-01 RX ADMIN — ACETYLCYSTEINE 2 ML: 200 SOLUTION ORAL; RESPIRATORY (INHALATION) at 14:55

## 2023-01-01 RX ADMIN — LEVALBUTEROL HYDROCHLORIDE 1.25 MG: 1.25 SOLUTION RESPIRATORY (INHALATION) at 13:08

## 2023-01-01 RX ADMIN — IPRATROPIUM BROMIDE 0.5 MG: 0.5 SOLUTION RESPIRATORY (INHALATION) at 00:28

## 2023-01-01 RX ADMIN — POTASSIUM CHLORIDE 40 MEQ: 40 SOLUTION ORAL at 07:43

## 2023-01-01 RX ADMIN — ACETAMINOPHEN 650 MG: 325 TABLET, FILM COATED ORAL at 03:59

## 2023-01-01 RX ADMIN — ATOVAQUONE 1500 MG: 750 SUSPENSION ORAL at 08:33

## 2023-01-01 RX ADMIN — PIPERACILLIN AND TAZOBACTAM 4.5 G: 4; .5 INJECTION, POWDER, FOR SOLUTION INTRAVENOUS at 03:24

## 2023-01-01 RX ADMIN — VASOPRESSIN 1 UNITS/HR: 20 INJECTION, SOLUTION INTRAMUSCULAR; SUBCUTANEOUS at 13:10

## 2023-01-01 RX ADMIN — CEFEPIME HYDROCHLORIDE 2 G: 2 INJECTION, POWDER, FOR SOLUTION INTRAVENOUS at 23:21

## 2023-01-01 RX ADMIN — PIPERACILLIN SODIUM AND TAZOBACTAM SODIUM 4.5 G: 4; .5 INJECTION, POWDER, LYOPHILIZED, FOR SOLUTION INTRAVENOUS at 21:11

## 2023-01-01 RX ADMIN — HALOPERIDOL LACTATE 5 MG: 5 INJECTION, SOLUTION INTRAMUSCULAR at 01:01

## 2023-01-01 RX ADMIN — DEXMEDETOMIDINE HYDROCHLORIDE 0.4 MCG/KG/HR: 100 INJECTION, SOLUTION INTRAVENOUS at 15:37

## 2023-01-01 RX ADMIN — HEPARIN SODIUM 5000 UNITS: 5000 INJECTION, SOLUTION INTRAVENOUS; SUBCUTANEOUS at 08:46

## 2023-01-01 RX ADMIN — OLANZAPINE 5 MG: 10 INJECTION, POWDER, FOR SOLUTION INTRAMUSCULAR at 09:53

## 2023-01-01 RX ADMIN — POTASSIUM & SODIUM PHOSPHATES POWDER PACK 280-160-250 MG 1 PACKET: 280-160-250 PACK at 08:51

## 2023-01-01 RX ADMIN — LEVALBUTEROL HYDROCHLORIDE 1.25 MG: 1.25 SOLUTION RESPIRATORY (INHALATION) at 00:03

## 2023-01-01 RX ADMIN — SODIUM CHLORIDE, POTASSIUM CHLORIDE, SODIUM LACTATE AND CALCIUM CHLORIDE 250 ML: 600; 310; 30; 20 INJECTION, SOLUTION INTRAVENOUS at 05:14

## 2023-01-01 RX ADMIN — ACETAMINOPHEN 650 MG: 325 SUSPENSION ORAL at 20:10

## 2023-01-01 RX ADMIN — Medication: at 13:11

## 2023-01-01 RX ADMIN — ACETYLCYSTEINE 2 ML: 200 SOLUTION ORAL; RESPIRATORY (INHALATION) at 16:22

## 2023-01-01 RX ADMIN — ACETYLCYSTEINE 2 ML: 200 SOLUTION ORAL; RESPIRATORY (INHALATION) at 16:28

## 2023-01-01 RX ADMIN — POTASSIUM CHLORIDE 20 MEQ: 40 SOLUTION ORAL at 01:19

## 2023-01-01 RX ADMIN — POTASSIUM & SODIUM PHOSPHATES POWDER PACK 280-160-250 MG 1 PACKET: 280-160-250 PACK at 12:33

## 2023-01-01 RX ADMIN — BUDESONIDE INHALATION 0.5 MG: 0.5 SUSPENSION RESPIRATORY (INHALATION) at 09:06

## 2023-01-01 RX ADMIN — Medication 50 MG: at 15:56

## 2023-01-01 RX ADMIN — SODIUM PHOSPHATE, MONOBASIC, MONOHYDRATE AND SODIUM PHOSPHATE, DIBASIC, ANHYDROUS 15 MMOL: 276; 142 INJECTION, SOLUTION INTRAVENOUS at 09:23

## 2023-01-01 RX ADMIN — NYSTATIN 500000 UNITS: 100000 SUSPENSION ORAL at 20:34

## 2023-01-01 RX ADMIN — POTASSIUM CHLORIDE 40 MEQ: 1.5 POWDER, FOR SOLUTION ORAL at 17:28

## 2023-01-01 RX ADMIN — MICAFUNGIN 100 MG: 10 INJECTION, POWDER, LYOPHILIZED, FOR SOLUTION INTRAVENOUS at 15:04

## 2023-01-01 RX ADMIN — METOPROLOL TARTRATE 5 MG: 5 INJECTION INTRAVENOUS at 10:57

## 2023-01-01 RX ADMIN — HUMAN INSULIN 4 UNITS/HR: 100 INJECTION, SOLUTION SUBCUTANEOUS at 09:15

## 2023-01-01 RX ADMIN — INSULIN ASPART 5 UNITS: 100 INJECTION, SOLUTION INTRAVENOUS; SUBCUTANEOUS at 00:18

## 2023-01-01 RX ADMIN — DEXTROSE MONOHYDRATE 250 ML: 50 INJECTION, SOLUTION INTRAVENOUS at 21:20

## 2023-01-01 RX ADMIN — Medication 5 MG: at 21:11

## 2023-01-01 RX ADMIN — PANTOPRAZOLE SODIUM 40 MG: 40 TABLET, DELAYED RELEASE ORAL at 08:27

## 2023-01-01 RX ADMIN — SODIUM CHLORIDE 250 ML: 9 INJECTION, SOLUTION INTRAVENOUS at 13:04

## 2023-01-01 RX ADMIN — LIDOCAINE HYDROCHLORIDE 15 ML: 20 SOLUTION ORAL; TOPICAL at 18:17

## 2023-01-01 RX ADMIN — IPRATROPIUM BROMIDE 0.5 MG: 0.5 SOLUTION RESPIRATORY (INHALATION) at 04:39

## 2023-01-01 RX ADMIN — BUDESONIDE INHALATION 0.5 MG: 0.5 SUSPENSION RESPIRATORY (INHALATION) at 20:33

## 2023-01-01 RX ADMIN — PIPERACILLIN AND TAZOBACTAM 4.5 G: 4; .5 INJECTION, POWDER, FOR SOLUTION INTRAVENOUS at 10:38

## 2023-01-01 RX ADMIN — ACETYLCYSTEINE 2 ML: 200 SOLUTION ORAL; RESPIRATORY (INHALATION) at 20:17

## 2023-01-01 RX ADMIN — INSULIN ASPART 1 UNITS: 100 INJECTION, SOLUTION INTRAVENOUS; SUBCUTANEOUS at 18:44

## 2023-01-01 RX ADMIN — DEXMEDETOMIDINE HYDROCHLORIDE 0.8 MCG/KG/HR: 100 INJECTION, SOLUTION INTRAVENOUS at 13:04

## 2023-01-01 RX ADMIN — BUDESONIDE INHALATION 0.5 MG: 0.5 SUSPENSION RESPIRATORY (INHALATION) at 07:40

## 2023-01-01 RX ADMIN — OLANZAPINE 5 MG: 5 TABLET, ORALLY DISINTEGRATING ORAL at 21:40

## 2023-01-01 RX ADMIN — POLYETHYLENE GLYCOL 3350 17 G: 17 POWDER, FOR SOLUTION ORAL at 09:07

## 2023-01-01 RX ADMIN — ENOXAPARIN SODIUM 30 MG: 30 INJECTION SUBCUTANEOUS at 09:57

## 2023-01-01 RX ADMIN — ACETYLCYSTEINE 2 ML: 200 SOLUTION ORAL; RESPIRATORY (INHALATION) at 07:26

## 2023-01-01 RX ADMIN — ACETYLCYSTEINE 2 ML: 200 SOLUTION ORAL; RESPIRATORY (INHALATION) at 04:25

## 2023-01-01 RX ADMIN — ACETAMINOPHEN 650 MG: 325 TABLET, FILM COATED ORAL at 13:34

## 2023-01-01 RX ADMIN — LEVALBUTEROL HYDROCHLORIDE 1.25 MG: 1.25 SOLUTION RESPIRATORY (INHALATION) at 03:14

## 2023-01-01 RX ADMIN — FUROSEMIDE 20 MG: 10 INJECTION, SOLUTION INTRAMUSCULAR; INTRAVENOUS at 16:05

## 2023-01-01 RX ADMIN — HUMAN INSULIN 10 UNITS/HR: 100 INJECTION, SOLUTION SUBCUTANEOUS at 23:32

## 2023-01-01 RX ADMIN — SULFAMETHOXAZOLE AND TRIMETHOPRIM 1 TABLET: 800; 160 TABLET ORAL at 18:00

## 2023-01-01 RX ADMIN — ACETYLCYSTEINE 2 ML: 200 SOLUTION ORAL; RESPIRATORY (INHALATION) at 20:33

## 2023-01-01 RX ADMIN — PANTOPRAZOLE SODIUM 40 MG: 40 INJECTION, POWDER, FOR SOLUTION INTRAVENOUS at 09:27

## 2023-01-01 RX ADMIN — POTASSIUM CHLORIDE 20 MEQ: 1.5 POWDER, FOR SOLUTION ORAL at 08:27

## 2023-01-01 RX ADMIN — DEXMEDETOMIDINE HYDROCHLORIDE 0.9 MCG/KG/HR: 100 INJECTION, SOLUTION INTRAVENOUS at 19:02

## 2023-01-01 RX ADMIN — ACETAMINOPHEN 650 MG: 325 TABLET, FILM COATED ORAL at 21:22

## 2023-01-01 RX ADMIN — HUMAN INSULIN 2 UNITS/HR: 100 INJECTION, SOLUTION SUBCUTANEOUS at 13:15

## 2023-01-01 RX ADMIN — Medication 40 MG: at 09:07

## 2023-01-01 RX ADMIN — SODIUM CHLORIDE 500 ML: 9 INJECTION, SOLUTION INTRAVENOUS at 14:00

## 2023-01-01 RX ADMIN — ENOXAPARIN SODIUM 30 MG: 30 INJECTION SUBCUTANEOUS at 10:38

## 2023-01-01 RX ADMIN — DEXMEDETOMIDINE HYDROCHLORIDE 0.8 MCG/KG/HR: 100 INJECTION, SOLUTION INTRAVENOUS at 05:04

## 2023-01-01 RX ADMIN — ACETAMINOPHEN 650 MG: 325 SUSPENSION ORAL at 20:13

## 2023-01-01 RX ADMIN — BUDESONIDE INHALATION 0.5 MG: 0.5 SUSPENSION RESPIRATORY (INHALATION) at 20:10

## 2023-01-01 RX ADMIN — LABETALOL HYDROCHLORIDE 20 MG: 5 INJECTION, SOLUTION INTRAVENOUS at 00:45

## 2023-01-01 RX ADMIN — DEXTROSE MONOHYDRATE 1000 ML: 100 INJECTION, SOLUTION INTRAVENOUS at 23:30

## 2023-01-01 RX ADMIN — OLANZAPINE 5 MG: 5 TABLET, ORALLY DISINTEGRATING ORAL at 22:00

## 2023-01-01 RX ADMIN — LEVALBUTEROL HYDROCHLORIDE 1.25 MG: 1.25 SOLUTION RESPIRATORY (INHALATION) at 20:28

## 2023-01-01 RX ADMIN — Medication 0.04 MCG/KG/MIN: at 18:33

## 2023-01-01 RX ADMIN — LEVALBUTEROL HYDROCHLORIDE 1.25 MG: 1.25 SOLUTION RESPIRATORY (INHALATION) at 15:09

## 2023-01-01 RX ADMIN — Medication 0.03 MCG/KG/MIN: at 12:23

## 2023-01-01 RX ADMIN — ACETAMINOPHEN 650 MG: 325 TABLET, FILM COATED ORAL at 15:06

## 2023-01-01 RX ADMIN — HALOPERIDOL LACTATE 5 MG: 5 INJECTION, SOLUTION INTRAMUSCULAR at 20:10

## 2023-01-01 RX ADMIN — POTASSIUM & SODIUM PHOSPHATES POWDER PACK 280-160-250 MG 1 PACKET: 280-160-250 PACK at 10:38

## 2023-01-01 RX ADMIN — PIPERACILLIN AND TAZOBACTAM 4.5 G: 4; .5 INJECTION, POWDER, FOR SOLUTION INTRAVENOUS at 05:42

## 2023-01-01 RX ADMIN — ENOXAPARIN SODIUM 30 MG: 30 INJECTION SUBCUTANEOUS at 11:08

## 2023-01-01 RX ADMIN — SENNOSIDES AND DOCUSATE SODIUM 2 TABLET: 50; 8.6 TABLET ORAL at 09:06

## 2023-01-01 RX ADMIN — METOLAZONE 10 MG: 2.5 TABLET ORAL at 08:57

## 2023-01-01 RX ADMIN — METOLAZONE 10 MG: 2.5 TABLET ORAL at 22:48

## 2023-01-01 RX ADMIN — LEVALBUTEROL HYDROCHLORIDE 1.25 MG: 1.25 SOLUTION RESPIRATORY (INHALATION) at 04:29

## 2023-01-01 RX ADMIN — Medication 40 MG: at 08:56

## 2023-01-01 RX ADMIN — MORPHINE SULFATE 1 MG: 2 INJECTION, SOLUTION INTRAMUSCULAR; INTRAVENOUS at 10:10

## 2023-01-01 RX ADMIN — Medication 50 MG: at 21:22

## 2023-01-01 RX ADMIN — HUMAN INSULIN 8 UNITS/HR: 100 INJECTION, SOLUTION SUBCUTANEOUS at 16:53

## 2023-01-01 RX ADMIN — DEXMEDETOMIDINE HYDROCHLORIDE 0.9 MCG/KG/HR: 100 INJECTION, SOLUTION INTRAVENOUS at 02:34

## 2023-01-01 RX ADMIN — IPRATROPIUM BROMIDE 0.5 MG: 0.5 SOLUTION RESPIRATORY (INHALATION) at 20:53

## 2023-01-01 RX ADMIN — BUDESONIDE INHALATION 0.5 MG: 0.5 SUSPENSION RESPIRATORY (INHALATION) at 07:25

## 2023-01-01 RX ADMIN — IPRATROPIUM BROMIDE 0.5 MG: 0.5 SOLUTION RESPIRATORY (INHALATION) at 13:50

## 2023-01-01 RX ADMIN — ACETAMINOPHEN 650 MG: 325 SUSPENSION ORAL at 13:26

## 2023-01-01 RX ADMIN — SODIUM PHOSPHATE, MONOBASIC, MONOHYDRATE AND SODIUM PHOSPHATE, DIBASIC, ANHYDROUS 9 MMOL: 276; 142 INJECTION, SOLUTION INTRAVENOUS at 22:53

## 2023-01-01 RX ADMIN — HUMAN INSULIN 2 UNITS/HR: 100 INJECTION, SOLUTION SUBCUTANEOUS at 14:18

## 2023-01-01 RX ADMIN — PIPERACILLIN AND TAZOBACTAM 4.5 G: 4; .5 INJECTION, POWDER, FOR SOLUTION INTRAVENOUS at 22:21

## 2023-01-01 RX ADMIN — ENOXAPARIN SODIUM 30 MG: 30 INJECTION SUBCUTANEOUS at 09:22

## 2023-01-01 RX ADMIN — VASOPRESSIN 0.5 UNITS/HR: 20 INJECTION, SOLUTION INTRAMUSCULAR; SUBCUTANEOUS at 14:52

## 2023-01-01 RX ADMIN — Medication: at 14:56

## 2023-01-01 RX ADMIN — Medication 0.03 MCG/KG/MIN: at 13:00

## 2023-01-01 RX ADMIN — BUDESONIDE INHALATION 0.5 MG: 0.5 SUSPENSION RESPIRATORY (INHALATION) at 08:12

## 2023-01-01 RX ADMIN — HUMAN INSULIN 0.5 UNITS/HR: 100 INJECTION, SOLUTION SUBCUTANEOUS at 00:41

## 2023-01-01 RX ADMIN — ALBUMIN HUMAN 25 G: 0.25 SOLUTION INTRAVENOUS at 23:33

## 2023-01-01 RX ADMIN — ALBUMIN HUMAN 25 G: 0.25 SOLUTION INTRAVENOUS at 03:06

## 2023-01-01 RX ADMIN — HUMAN INSULIN 2 UNITS/HR: 100 INJECTION, SOLUTION SUBCUTANEOUS at 16:36

## 2023-01-01 RX ADMIN — METHYLPREDNISOLONE SODIUM SUCCINATE 62.5 MG: 125 INJECTION, POWDER, FOR SOLUTION INTRAMUSCULAR; INTRAVENOUS at 18:17

## 2023-01-01 RX ADMIN — PANTOPRAZOLE SODIUM 40 MG: 40 INJECTION, POWDER, FOR SOLUTION INTRAVENOUS at 06:59

## 2023-01-01 RX ADMIN — HUMAN INSULIN 8 UNITS/HR: 100 INJECTION, SOLUTION SUBCUTANEOUS at 10:12

## 2023-01-01 RX ADMIN — NYSTATIN 500000 UNITS: 100000 SUSPENSION ORAL at 15:38

## 2023-01-01 RX ADMIN — HUMAN INSULIN 4 UNITS/HR: 100 INJECTION, SOLUTION SUBCUTANEOUS at 21:00

## 2023-01-01 RX ADMIN — HYDROXYZINE HYDROCHLORIDE 50 MG: 25 TABLET ORAL at 18:19

## 2023-01-01 RX ADMIN — IPRATROPIUM BROMIDE 0.5 MG: 0.5 SOLUTION RESPIRATORY (INHALATION) at 09:04

## 2023-01-01 RX ADMIN — DEXMEDETOMIDINE HYDROCHLORIDE 1 MCG/KG/HR: 100 INJECTION, SOLUTION INTRAVENOUS at 05:58

## 2023-01-01 RX ADMIN — VANCOMYCIN HYDROCHLORIDE 1250 MG: 10 INJECTION, POWDER, LYOPHILIZED, FOR SOLUTION INTRAVENOUS at 08:26

## 2023-01-01 RX ADMIN — IPRATROPIUM BROMIDE 0.5 MG: 0.5 SOLUTION RESPIRATORY (INHALATION) at 13:57

## 2023-01-01 RX ADMIN — HUMAN INSULIN 7 UNITS/HR: 100 INJECTION, SOLUTION SUBCUTANEOUS at 07:49

## 2023-01-01 RX ADMIN — POTASSIUM PHOSPHATE, MONOBASIC AND POTASSIUM PHOSPHATE, DIBASIC 15 MMOL: 224; 236 INJECTION, SOLUTION INTRAVENOUS at 10:06

## 2023-01-01 RX ADMIN — ACETYLCYSTEINE 2 ML: 200 SOLUTION ORAL; RESPIRATORY (INHALATION) at 04:28

## 2023-01-01 RX ADMIN — NYSTATIN 500000 UNITS: 100000 SUSPENSION ORAL at 08:27

## 2023-01-01 RX ADMIN — OLANZAPINE 5 MG: 10 INJECTION, POWDER, FOR SOLUTION INTRAMUSCULAR at 16:08

## 2023-01-01 RX ADMIN — PREDNISONE 20 MG: 20 TABLET ORAL at 07:53

## 2023-01-01 RX ADMIN — IPRATROPIUM BROMIDE 0.5 MG: 0.5 SOLUTION RESPIRATORY (INHALATION) at 03:36

## 2023-01-01 RX ADMIN — HUMAN INSULIN 7 UNITS/HR: 100 INJECTION, SOLUTION SUBCUTANEOUS at 06:58

## 2023-01-01 RX ADMIN — NYSTATIN 500000 UNITS: 100000 SUSPENSION ORAL at 20:10

## 2023-01-01 RX ADMIN — ENOXAPARIN SODIUM 30 MG: 30 INJECTION SUBCUTANEOUS at 10:54

## 2023-01-01 RX ADMIN — NYSTATIN 500000 UNITS: 100000 SUSPENSION ORAL at 12:54

## 2023-01-01 RX ADMIN — PIPERACILLIN AND TAZOBACTAM 4.5 G: 4; .5 INJECTION, POWDER, FOR SOLUTION INTRAVENOUS at 09:25

## 2023-01-01 RX ADMIN — LEVALBUTEROL HYDROCHLORIDE 1.25 MG: 1.25 SOLUTION RESPIRATORY (INHALATION) at 21:30

## 2023-01-01 RX ADMIN — PIPERACILLIN AND TAZOBACTAM 4.5 G: 4; .5 INJECTION, POWDER, FOR SOLUTION INTRAVENOUS at 15:38

## 2023-01-01 RX ADMIN — ACETAMINOPHEN 650 MG: 325 SUSPENSION ORAL at 07:52

## 2023-01-01 RX ADMIN — HALOPERIDOL LACTATE 5 MG: 5 INJECTION, SOLUTION INTRAMUSCULAR at 23:04

## 2023-01-01 RX ADMIN — ACETAMINOPHEN 650 MG: 325 SUSPENSION ORAL at 17:37

## 2023-01-01 RX ADMIN — NYSTATIN 500000 UNITS: 100000 SUSPENSION ORAL at 17:16

## 2023-01-01 RX ADMIN — MAGNESIUM SULFATE 2 G: 2 INJECTION INTRAVENOUS at 12:43

## 2023-01-01 RX ADMIN — MORPHINE SULFATE 1 MG: 2 INJECTION, SOLUTION INTRAMUSCULAR; INTRAVENOUS at 15:17

## 2023-01-01 RX ADMIN — ACETYLCYSTEINE 2 ML: 200 SOLUTION ORAL; RESPIRATORY (INHALATION) at 03:36

## 2023-01-01 RX ADMIN — ENOXAPARIN SODIUM 30 MG: 30 INJECTION SUBCUTANEOUS at 09:59

## 2023-01-01 RX ADMIN — SENNOSIDES AND DOCUSATE SODIUM 2 TABLET: 50; 8.6 TABLET ORAL at 07:52

## 2023-01-01 RX ADMIN — MORPHINE SULFATE 1 MG: 2 INJECTION, SOLUTION INTRAMUSCULAR; INTRAVENOUS at 19:02

## 2023-01-01 RX ADMIN — PIPERACILLIN AND TAZOBACTAM 3.38 G: 3; .375 INJECTION, POWDER, LYOPHILIZED, FOR SOLUTION INTRAVENOUS at 04:21

## 2023-01-01 RX ADMIN — HUMAN INSULIN 4 UNITS/HR: 100 INJECTION, SOLUTION SUBCUTANEOUS at 09:54

## 2023-01-01 RX ADMIN — POLYETHYLENE GLYCOL 3350 17 G: 17 POWDER, FOR SOLUTION ORAL at 07:53

## 2023-01-01 RX ADMIN — CIPROFLOXACIN 400 MG: 2 INJECTION, SOLUTION INTRAVENOUS at 02:07

## 2023-01-01 RX ADMIN — PREDNISONE 20 MG: 20 TABLET ORAL at 08:28

## 2023-01-01 RX ADMIN — Medication 50 MG: at 05:50

## 2023-01-01 RX ADMIN — NYSTATIN 500000 UNITS: 100000 SUSPENSION ORAL at 21:06

## 2023-01-01 RX ADMIN — NYSTATIN 500000 UNITS: 100000 SUSPENSION ORAL at 07:41

## 2023-01-01 RX ADMIN — IPRATROPIUM BROMIDE 0.5 MG: 0.5 SOLUTION RESPIRATORY (INHALATION) at 11:56

## 2023-01-01 RX ADMIN — Medication 0.03 MCG/KG/MIN: at 05:13

## 2023-01-01 RX ADMIN — ACETYLCYSTEINE 2 ML: 200 SOLUTION ORAL; RESPIRATORY (INHALATION) at 09:50

## 2023-01-01 RX ADMIN — LEVALBUTEROL HYDROCHLORIDE 1.25 MG: 1.25 SOLUTION RESPIRATORY (INHALATION) at 20:55

## 2023-01-01 RX ADMIN — NOREPINEPHRINE BITARTRATE 0.03 MCG/KG/MIN: 0.06 INJECTION, SOLUTION INTRAVENOUS at 05:13

## 2023-01-01 RX ADMIN — MAGNESIUM OXIDE TAB 400 MG (241.3 MG ELEMENTAL MG) 400 MG: 400 (241.3 MG) TAB at 06:59

## 2023-01-01 RX ADMIN — MEROPENEM 1 G: 1 INJECTION, POWDER, FOR SOLUTION INTRAVENOUS at 01:54

## 2023-01-01 RX ADMIN — ACETYLCYSTEINE 2 ML: 200 SOLUTION ORAL; RESPIRATORY (INHALATION) at 04:39

## 2023-01-01 RX ADMIN — ACETYLCYSTEINE 2 ML: 200 SOLUTION ORAL; RESPIRATORY (INHALATION) at 12:26

## 2023-01-01 RX ADMIN — FERROUS SULFATE TAB 325 MG (65 MG ELEMENTAL FE) 325 MG: 325 (65 FE) TAB at 08:27

## 2023-01-01 RX ADMIN — PANTOPRAZOLE SODIUM 40 MG: 40 INJECTION, POWDER, FOR SOLUTION INTRAVENOUS at 16:55

## 2023-01-01 RX ADMIN — INSULIN ASPART 1 UNITS: 100 INJECTION, SOLUTION INTRAVENOUS; SUBCUTANEOUS at 07:13

## 2023-01-01 RX ADMIN — HUMAN INSULIN 4 UNITS/HR: 100 INJECTION, SOLUTION SUBCUTANEOUS at 13:45

## 2023-01-01 RX ADMIN — POLYETHYLENE GLYCOL 3350 17 G: 17 POWDER, FOR SOLUTION ORAL at 20:13

## 2023-01-01 RX ADMIN — Medication 5 MG: at 21:37

## 2023-01-01 RX ADMIN — IPRATROPIUM BROMIDE 0.5 MG: 0.5 SOLUTION RESPIRATORY (INHALATION) at 14:55

## 2023-01-01 RX ADMIN — Medication 50 MG: at 03:59

## 2023-01-01 RX ADMIN — Medication 5 MG: at 21:19

## 2023-01-01 RX ADMIN — NYSTATIN 500000 UNITS: 100000 SUSPENSION ORAL at 12:44

## 2023-01-01 RX ADMIN — NYSTATIN 500000 UNITS: 100000 SUSPENSION ORAL at 08:39

## 2023-01-01 RX ADMIN — SENNOSIDES AND DOCUSATE SODIUM 2 TABLET: 50; 8.6 TABLET ORAL at 21:07

## 2023-01-01 RX ADMIN — POTASSIUM CHLORIDE 40 MEQ: 1.5 POWDER, FOR SOLUTION ORAL at 09:40

## 2023-01-01 RX ADMIN — NYSTATIN 500000 UNITS: 100000 SUSPENSION ORAL at 16:49

## 2023-01-01 RX ADMIN — IPRATROPIUM BROMIDE 0.5 MG: 0.5 SOLUTION RESPIRATORY (INHALATION) at 00:11

## 2023-01-01 RX ADMIN — LEVALBUTEROL HYDROCHLORIDE 1.25 MG: 1.25 SOLUTION RESPIRATORY (INHALATION) at 08:36

## 2023-01-01 RX ADMIN — SENNOSIDES AND DOCUSATE SODIUM 2 TABLET: 50; 8.6 TABLET ORAL at 19:46

## 2023-01-01 RX ADMIN — ACETAMINOPHEN 650 MG: 325 TABLET, FILM COATED ORAL at 04:12

## 2023-01-01 RX ADMIN — METHYLPREDNISOLONE SODIUM SUCCINATE 62.5 MG: 125 INJECTION, POWDER, FOR SOLUTION INTRAMUSCULAR; INTRAVENOUS at 19:46

## 2023-01-01 RX ADMIN — PIPERACILLIN SODIUM AND TAZOBACTAM SODIUM 4.5 G: 4; .5 INJECTION, POWDER, LYOPHILIZED, FOR SOLUTION INTRAVENOUS at 05:12

## 2023-01-01 RX ADMIN — LEVALBUTEROL HYDROCHLORIDE 1.25 MG: 1.25 SOLUTION RESPIRATORY (INHALATION) at 08:51

## 2023-01-01 RX ADMIN — PANTOPRAZOLE SODIUM 40 MG: 40 TABLET, DELAYED RELEASE ORAL at 16:26

## 2023-01-01 RX ADMIN — BISACODYL 10 MG: 10 SUPPOSITORY RECTAL at 11:50

## 2023-01-01 RX ADMIN — MORPHINE SULFATE 1 MG: 2 INJECTION, SOLUTION INTRAMUSCULAR; INTRAVENOUS at 11:30

## 2023-01-01 RX ADMIN — POTASSIUM CHLORIDE 10 MEQ: 7.46 INJECTION, SOLUTION INTRAVENOUS at 07:41

## 2023-01-01 RX ADMIN — METHYLPREDNISOLONE SODIUM SUCCINATE 62.5 MG: 125 INJECTION, POWDER, FOR SOLUTION INTRAMUSCULAR; INTRAVENOUS at 01:43

## 2023-01-01 RX ADMIN — POTASSIUM PHOSPHATE, MONOBASIC AND POTASSIUM PHOSPHATE, DIBASIC 15 MMOL: 224; 236 INJECTION, SOLUTION INTRAVENOUS at 09:57

## 2023-01-01 RX ADMIN — PREDNISONE 20 MG: 20 TABLET ORAL at 16:26

## 2023-01-01 RX ADMIN — DEXTROSE MONOHYDRATE 50 ML: 25 INJECTION, SOLUTION INTRAVENOUS at 05:10

## 2023-01-01 RX ADMIN — DEXTROSE MONOHYDRATE 25 ML: 25 INJECTION, SOLUTION INTRAVENOUS at 05:09

## 2023-01-01 RX ADMIN — LEVALBUTEROL HYDROCHLORIDE 1.25 MG: 1.25 SOLUTION RESPIRATORY (INHALATION) at 12:26

## 2023-01-01 RX ADMIN — Medication 50 MG: at 16:09

## 2023-01-01 RX ADMIN — Medication 40 MG: at 17:41

## 2023-01-01 RX ADMIN — LEVALBUTEROL HYDROCHLORIDE 1.25 MG: 1.25 SOLUTION RESPIRATORY (INHALATION) at 07:59

## 2023-01-01 RX ADMIN — METHYLPREDNISOLONE SODIUM SUCCINATE 62.5 MG: 125 INJECTION, POWDER, FOR SOLUTION INTRAMUSCULAR; INTRAVENOUS at 08:39

## 2023-01-01 RX ADMIN — Medication 5 MG: at 23:18

## 2023-01-01 RX ADMIN — HUMAN INSULIN 4 UNITS/HR: 100 INJECTION, SOLUTION SUBCUTANEOUS at 13:09

## 2023-01-01 RX ADMIN — SENNOSIDES AND DOCUSATE SODIUM 1 TABLET: 50; 8.6 TABLET ORAL at 20:14

## 2023-01-01 RX ADMIN — NYSTATIN 500000 UNITS: 100000 SUSPENSION ORAL at 00:42

## 2023-01-01 RX ADMIN — ACETAMINOPHEN 650 MG: 325 TABLET, FILM COATED ORAL at 09:30

## 2023-01-01 RX ADMIN — AZITHROMYCIN MONOHYDRATE 500 MG: 500 INJECTION, POWDER, LYOPHILIZED, FOR SOLUTION INTRAVENOUS at 16:32

## 2023-01-01 RX ADMIN — HALOPERIDOL LACTATE 5 MG: 5 INJECTION, SOLUTION INTRAMUSCULAR at 01:36

## 2023-01-01 RX ADMIN — DEXTROSE MONOHYDRATE 25 ML: 25 INJECTION, SOLUTION INTRAVENOUS at 04:28

## 2023-01-01 ASSESSMENT — ACTIVITIES OF DAILY LIVING (ADL)
ADLS_ACUITY_SCORE: 73
DRESS: 2-->COMPLETELY DEPENDENT (NOT DEVELOPMENTALLY APPROPRIATE)
ADLS_ACUITY_SCORE: 51
ADLS_ACUITY_SCORE: 75
ADLS_ACUITY_SCORE: 73
ADLS_ACUITY_SCORE: 75
ADLS_ACUITY_SCORE: 73
ADLS_ACUITY_SCORE: 71
EATING/SWALLOWING: OTHER (SEE COMMENTS)
ADLS_ACUITY_SCORE: 75
ADLS_ACUITY_SCORE: 47
ADLS_ACUITY_SCORE: 71
ADLS_ACUITY_SCORE: 75
BATHING: 2-->COMPLETELY DEPENDENT (NOT DEVELOPMENTALLY APPROPRIATE)
ADLS_ACUITY_SCORE: 71
TOILETING: 2-->COMPLETELY DEPENDENT
TRANSFERRING: 2-->COMPLETELY DEPENDENT (NOT DEVELOPMENTALLY APPROPRIATE)
ADLS_ACUITY_SCORE: 75
ADLS_ACUITY_SCORE: 75
ADLS_ACUITY_SCORE: 47
ADLS_ACUITY_SCORE: 75
ADLS_ACUITY_SCORE: 71
ADLS_ACUITY_SCORE: 73
ADLS_ACUITY_SCORE: 51
ADLS_ACUITY_SCORE: 75
ADLS_ACUITY_SCORE: 75
FALL_HISTORY_WITHIN_LAST_SIX_MONTHS: NO
ADLS_ACUITY_SCORE: 51
ADLS_ACUITY_SCORE: 75
ADLS_ACUITY_SCORE: 31
ADLS_ACUITY_SCORE: 71
ADLS_ACUITY_SCORE: 71
SWALLOWING: 2-->DIFFICULTY SWALLOWING LIQUIDS/FOODS
ADLS_ACUITY_SCORE: 71
ADLS_ACUITY_SCORE: 39
ADLS_ACUITY_SCORE: 73
WALKING_OR_CLIMBING_STAIRS_DIFFICULTY: YES
ADLS_ACUITY_SCORE: 71
ADLS_ACUITY_SCORE: 51
ADLS_ACUITY_SCORE: 71
ADLS_ACUITY_SCORE: 47
ADLS_ACUITY_SCORE: 75
ADLS_ACUITY_SCORE: 71
ADLS_ACUITY_SCORE: 73
EATING: 2-->COMPLETELY DEPENDENT (NOT DEVELOPMENTALLY APPROPRIATE)
ADLS_ACUITY_SCORE: 51
ADLS_ACUITY_SCORE: 33
ADLS_ACUITY_SCORE: 75
ADLS_ACUITY_SCORE: 71
ADLS_ACUITY_SCORE: 73
ADLS_ACUITY_SCORE: 75
ADLS_ACUITY_SCORE: 73
ADLS_ACUITY_SCORE: 51
ADLS_ACUITY_SCORE: 75
ADLS_ACUITY_SCORE: 51
ADLS_ACUITY_SCORE: 75
WEAR_GLASSES_OR_BLIND: NO
ADLS_ACUITY_SCORE: 75
ADLS_ACUITY_SCORE: 51
ADLS_ACUITY_SCORE: 71
ADLS_ACUITY_SCORE: 75
DRESS: 2-->COMPLETELY DEPENDENT
ADLS_ACUITY_SCORE: 51
ADLS_ACUITY_SCORE: 71
ADLS_ACUITY_SCORE: 73
ADLS_ACUITY_SCORE: 51
CHANGE_IN_FUNCTIONAL_STATUS_SINCE_ONSET_OF_CURRENT_ILLNESS/INJURY: YES
DEPENDENT_IADLS:: CLEANING;COOKING;LAUNDRY;SHOPPING;MEAL PREPARATION;MEDICATION MANAGEMENT;INCONTINENCE;TRANSPORTATION;MONEY MANAGEMENT
ADLS_ACUITY_SCORE: 75
TOILETING: 2-->COMPLETELY DEPENDENT (NOT DEVELOPMENTALLY APPROPRIATE)
ADLS_ACUITY_SCORE: 75
DIFFICULTY_EATING/SWALLOWING: YES
CONCENTRATING,_REMEMBERING_OR_MAKING_DECISIONS_DIFFICULTY: YES
ADLS_ACUITY_SCORE: 75
ADLS_ACUITY_SCORE: 73
ADLS_ACUITY_SCORE: 75
ADLS_ACUITY_SCORE: 73
ADLS_ACUITY_SCORE: 33
WALKING_OR_CLIMBING_STAIRS: AMBULATION DIFFICULTY, DEPENDENT;STAIR CLIMBING DIFFICULTY, DEPENDENT;TRANSFERRING DIFFICULTY, DEPENDENT
ADLS_ACUITY_SCORE: 73
ADLS_ACUITY_SCORE: 51
ADLS_ACUITY_SCORE: 75
ADLS_ACUITY_SCORE: 75
ADLS_ACUITY_SCORE: 73
ADLS_ACUITY_SCORE: 75
ADLS_ACUITY_SCORE: 51
ADLS_ACUITY_SCORE: 39
ADLS_ACUITY_SCORE: 35
DIFFICULTY_COMMUNICATING: NO
ADLS_ACUITY_SCORE: 71
ADLS_ACUITY_SCORE: 75
ADLS_ACUITY_SCORE: 75
ADLS_ACUITY_SCORE: 73
ADLS_ACUITY_SCORE: 39
ADLS_ACUITY_SCORE: 33
ADLS_ACUITY_SCORE: 71
ADLS_ACUITY_SCORE: 75
ADLS_ACUITY_SCORE: 73
ADLS_ACUITY_SCORE: 75
ADLS_ACUITY_SCORE: 73
ADLS_ACUITY_SCORE: 75
ADLS_ACUITY_SCORE: 75
ADLS_ACUITY_SCORE: 51
ADLS_ACUITY_SCORE: 73
ADLS_ACUITY_SCORE: 73
ADLS_ACUITY_SCORE: 75
ADLS_ACUITY_SCORE: 73
DRESSING/BATHING_DIFFICULTY: YES
CONCENTRATING,_REMEMBERING_OR_MAKING_DECISIONS_DIFFICULTY: YES
ADLS_ACUITY_SCORE: 75
ADLS_ACUITY_SCORE: 73
ADLS_ACUITY_SCORE: 75
ADLS_ACUITY_SCORE: 51
ADLS_ACUITY_SCORE: 75
ADLS_ACUITY_SCORE: 75
ADLS_ACUITY_SCORE: 51
ADLS_ACUITY_SCORE: 71
ADLS_ACUITY_SCORE: 73
ADLS_ACUITY_SCORE: 71
ADLS_ACUITY_SCORE: 71
ADLS_ACUITY_SCORE: 73
DOING_ERRANDS_INDEPENDENTLY_DIFFICULTY: YES
ADLS_ACUITY_SCORE: 51
DRESSING/BATHING: BATHING DIFFICULTY, DEPENDENT;DRESSING DIFFICULTY, DEPENDENT
ADLS_ACUITY_SCORE: 47
ADLS_ACUITY_SCORE: 73
ADLS_ACUITY_SCORE: 75
ADLS_ACUITY_SCORE: 71
TOILETING_ASSISTANCE: TOILETING DIFFICULTY, DEPENDENT
ADLS_ACUITY_SCORE: 75
ADLS_ACUITY_SCORE: 33
ADLS_ACUITY_SCORE: 51
DOING_ERRANDS_INDEPENDENTLY_DIFFICULTY: YES
ADLS_ACUITY_SCORE: 51
ADLS_ACUITY_SCORE: 35
ADLS_ACUITY_SCORE: 73
EATING/SWALLOWING: OTHER (SEE COMMENTS)
ADLS_ACUITY_SCORE: 73
ADLS_ACUITY_SCORE: 73
ADLS_ACUITY_SCORE: 47
ADLS_ACUITY_SCORE: 75
ADLS_ACUITY_SCORE: 71
ADLS_ACUITY_SCORE: 39
ADLS_ACUITY_SCORE: 73
SWALLOWING: 2-->DIFFICULTY SWALLOWING LIQUIDS/FOODS
ADLS_ACUITY_SCORE: 35
ADLS_ACUITY_SCORE: 73
ADLS_ACUITY_SCORE: 75
ADLS_ACUITY_SCORE: 71
TOILETING_ISSUES: YES
ADLS_ACUITY_SCORE: 75
ADLS_ACUITY_SCORE: 73
ADLS_ACUITY_SCORE: 75
ADLS_ACUITY_SCORE: 71
ADLS_ACUITY_SCORE: 71
ADLS_ACUITY_SCORE: 51
ADLS_ACUITY_SCORE: 75
ADLS_ACUITY_SCORE: 75
ADLS_ACUITY_SCORE: 51
ADLS_ACUITY_SCORE: 71
ADLS_ACUITY_SCORE: 73
ADLS_ACUITY_SCORE: 33
ADLS_ACUITY_SCORE: 73
ADLS_ACUITY_SCORE: 71
EQUIPMENT_CURRENTLY_USED_AT_HOME: CANE, STRAIGHT
ADLS_ACUITY_SCORE: 73
ADLS_ACUITY_SCORE: 73
ADLS_ACUITY_SCORE: 75
ADLS_ACUITY_SCORE: 51
ADLS_ACUITY_SCORE: 75
ADLS_ACUITY_SCORE: 75
ADLS_ACUITY_SCORE: 51
ADLS_ACUITY_SCORE: 51
ADLS_ACUITY_SCORE: 75
ADLS_ACUITY_SCORE: 33
ADLS_ACUITY_SCORE: 75
ADLS_ACUITY_SCORE: 73
ADLS_ACUITY_SCORE: 47
ADLS_ACUITY_SCORE: 75
ADLS_ACUITY_SCORE: 75
ADLS_ACUITY_SCORE: 71
ADLS_ACUITY_SCORE: 75
ADLS_ACUITY_SCORE: 71
ADLS_ACUITY_SCORE: 73
ADLS_ACUITY_SCORE: 71
ADLS_ACUITY_SCORE: 75
ADLS_ACUITY_SCORE: 73
ADLS_ACUITY_SCORE: 75
ADLS_ACUITY_SCORE: 73
ADLS_ACUITY_SCORE: 33
DIFFICULTY_EATING/SWALLOWING: YES
ADLS_ACUITY_SCORE: 75
HEARING_DIFFICULTY_OR_DEAF: NO
ADLS_ACUITY_SCORE: 35
ADLS_ACUITY_SCORE: 73
ADLS_ACUITY_SCORE: 75
ADLS_ACUITY_SCORE: 35
ADLS_ACUITY_SCORE: 75
ADLS_ACUITY_SCORE: 75
ADLS_ACUITY_SCORE: 73
EQUIPMENT_CURRENTLY_USED_AT_HOME: CANE, STRAIGHT
ADLS_ACUITY_SCORE: 31
ADLS_ACUITY_SCORE: 73
ADLS_ACUITY_SCORE: 73
EATING: 2-->COMPLETELY DEPENDENT
ADLS_ACUITY_SCORE: 33
ADLS_ACUITY_SCORE: 51
ADLS_ACUITY_SCORE: 75
ADLS_ACUITY_SCORE: 71
ADLS_ACUITY_SCORE: 51
ADLS_ACUITY_SCORE: 51
ADLS_ACUITY_SCORE: 75
ADLS_ACUITY_SCORE: 73
TRANSFERRING: 2-->COMPLETELY DEPENDENT
ADLS_ACUITY_SCORE: 73

## 2023-02-28 NOTE — TELEPHONE ENCOUNTER
LVM via  line for the patient to call back and schedule the following:    Appointment type: CXR  Provider: Henok  Return date: 5/1/23  Specialty phone number: [phone]  Additional appointment(s) needed: n/a  Additonal Notes: mailed letter

## 2023-03-09 PROBLEM — R91.8 PULMONARY INFILTRATES: Status: ACTIVE | Noted: 2022-01-01

## 2023-03-09 NOTE — ED TRIAGE NOTES
Pt has been on going shortness of breath, chest pain and generalized weakness for the past 4-6 months but has gotten worse the last day or so. Pt is at 90% RA in triage. Pt roomed promptly and chest EKG completed in triage.     Triage Assessment     Row Name 03/09/23 [id number]       Triage Assessment (Adult)    Airway WDL WDL       Respiratory WDL    Respiratory WDL X  SOB       Skin Circulation/Temperature WDL    Skin Circulation/Temperature WDL WDL       Cardiac WDL    Cardiac WDL X;chest pain       Peripheral/Neurovascular WDL    Peripheral Neurovascular WDL WDL       Cognitive/Neuro/Behavioral WDL    Cognitive/Neuro/Behavioral WDL WDL

## 2023-03-09 NOTE — H&P
[facility]    History and Physical - [facility]       Date of Admission:  3/9/2023    Assessment & Plan        Job Kaiser is an 81 yo [place] gentleman w/ h/o prior tuberculosis s/p treatment with 3 drug therapy for 6 weeks in [place], asthma, COPD, bronchiectasis, pulmonary fibrosis, T2DM, HFpEF with last EF 65% presenting with fatigue, shortness of breath, and pain with urination, with labs and imaging concerning for CAP and nephrolithiasis.    Acute on chronic respiratory failure  Sternal pain  History of asthma/COPD  History of bronchiectasis  History of pulmonary fibrosis  History of TB, treated  Patient presenting with increased shortness of breath, weakness, and cough. Initially requiring 10 L of oxygen for O2 sats in the 70s, however reassuringly was able to be titrated down to 2 to 3 L, currently stable (on 2L at baseline).  Given chest x-ray findings, elevated procal (7.8), leukocytosis (12.9), and pleuritic chest pain, concern for CAP.  Additionally on the differential is COPD exacerbation, however less likely due to lack of sputum changes, and patient reports DuoNeb did not help with symptoms.  Although patient has a history of heart failure, reassuringly his BNP is within normal limits, he appears volume down on exam, and possible pleural effusion is one-sided, making heart failure less likely. CAD ruled out by negative troponin, normal EKG.  PE cannot be ruled out, but less likely given no tachycardia, no risk factors besides recent poor mobility. Sternal pain may have mixed component of pleuritic chest pain and gastric reflux.  Given lower sensitivity of chest x-ray with chronic lung disease, paired with possibility of PE, CT chest ordered. Patient was given a dose of ceftriaxone and azithromycin in the ED, however his history of bronchiectasis increases risk for Pseudomonas.  Chronic prednisone also raises concerns for  immunosuppression, unclear why patient is on chronic prednisone although per chart review he has been taking it as far back as 2021. During last hospitalization there was concern for PJP given chronic prednisone use, inpatient pulm recommended prophylaxis with bactrim until pulm follow up, patient has not been taking bactrim and has not had pulm follow up.  - s/p one dose CTX and azithro, changed to cefepime 2g q8h, ciproflox 2g q8h  - S/p 3 L in the ED, 100mL/hr NS timed for 12 hours  - CRP pending   - Blood cultures pending  - Fungitell pending  - d-dimer ordered, results pending  - CT lung ordered, wo contrast ordered. If d-dimer elevated and concern for PE, consider CTPE with contrast  - Sputum cultures for bacteria, fungus, and acid fast stain ordered  - Supplemental O2 as needed  - Protonix BID  - ID consult placed  - PTA prednisone ordered, consider prednisone taper  - Can consider pulm consult  - PT consult placed  - PTA BID pulmicort, dulera (changed to breo ellipta while inpatient), albuterol prn, fluticasone  - PTA albuterol prn    History of nephrolithiasis  CVA tenderness  Patient with history of nephrolithiasis, left CVA tenderness, pain with urination, and blood in urine concerning for nephrolithiasis.  UA significant for large blood, RBC of 124, small LE, WBC of 8, no nitrite.  Patient has had poor p.o. intake including poor fluid intake, possible dehydration has led to repeat kidney stone. CT abdomen with contrast ordered to rule out nephrolithiasis. Patient has required lithiotropsy in the past.  Although urinalysis equivocal for UTI, given patient's pain with urination, urine culture ordered to rule out urinary infection.  - CT abdomen and pelvis ordered, results pending  - Urine culture ordered, results pending    Poor PO intake  Failure to thrive  Patient with recent decreased p.o. intake, family reports weight loss of about 10 pounds.  S/p 3 L fluids in the ED. Nursing reports trouble  swallowing pills.  No history of dysphagia.  Magnesium and phosphate within normal limits.  - Nutrition consult placed  - SLP consult placed  - Crush pills to swallow  - Ensure is ordered with meals    Thrush  Oral candidiasis observed on physical exam.  Patient has chronic prednisone use as well as uses steroid inhaler at home, likely steroid use is source of thrush.   - Nystatin rinse 4 times daily ordered  - Educate patient and family regarding rinsing mouth after inhalers  - Consider ordering HIV after discussion with patient    History of HFpEF   Echo from 2021 showing EF 60-65%. Not volume overloaded, BNP normal, suspicion for heart failure exacerbation is low.  Patient was discharged with 20 mg Lasix daily after last hospitalization November 2022, however patient has not been taking this medication at home.  Given patient's stability without Lasix and volume down status, will hold PTA Lasix.  -PTA Lasix 20 mg held    Diabetes  Last a1c 11/22 6.4, currently at goal.  - 4 times daily glucose checks, medium sliding scale ordered  - Hold PTA metformin, should discuss with his PCP whether he still needs to take this    Hx of severe iron deficiency anemia requiring blood transfusion  Prior hospitalization in November 2022 was significant for admission hemoglobin of 6.1, patient found to be iron deficient at that time and given an iron transfusion.  Admission hemoglobin today of 11, currently stable.  Ferritin may be falsely elevated given his probable infectious picture.  - Daily CBC  - Consider iron studies once infection resolves  - PTA ferrous sulfate 325mg daily    Insomnia  High risk for Delerium  Patient high risk for delirium based on age and delirium during prior hospitalizations.  - Melatonin 5mg scheduled, consider trazodone if patient unable to sleep  -Encourage out of bed with window shades open during the day    DNR, DNI code status  In discussion with son and patient, they expressed patient's wish to  not have CPR or be intubated.  However communication was limited due to chaotic atmosphere in the room.  Currently DNR/DNI CODE STATUS ordered, patient would benefit from a repeat conversation to ensure that this is his wishes.       Diet:   Regular diet  DVT Prophylaxis: Enoxaparin (Lovenox) subcutaneous, PDUA score of 5  Vu Catheter: Not present  Fluids: 100mg LR timed for 12h  Lines: None     Cardiac Monitoring: None  Code Status:   DNR/DNI    Clinically Significant Risk Factors Present on Admission           # Hypercalcemia: Highest Ca = 10.6 mg/dL in last 2 days, will monitor as appropriate   # Anion Gap Metabolic Acidosis: Highest Anion Gap = 20 mmol/L in last 2 days, will monitor and treat as appropriate  # Hypoalbuminemia: Lowest albumin = 2.8 g/dL at 3/9/2023  1:56 PM, will monitor as appropriate  # Coagulation Defect: INR = 1.38 (Ref range: 0.85 - 1.15) and/or PTT = N/A, will monitor for bleeding                 Disposition Plan      Expected Discharge Date: 03/11/2023                The patient's care was discussed with the Attending Physician, Dr. Nayeli Grey.      Neyda Couch MD  [facility]  [facility]  Securely message with [employer] (more info)  Text page via [facility] Paging/Directory   See signed in provider for up to date coverage information  ______________________________________________________________________    Chief Complaint   Dyspnea, fatigue, pain with urination    History is obtained from the patient, electronic health record, emergency department physician and patient's 2 sons, one in room and one by phone. An  is needed to ommunicate with patient.    History of Present Illness      Job Kaiser is an 83 yo M[place]ese gentleman w/ h/o prior tuberculosis s/p treatment with 3 drug therapy for 6 weeks in [facility], asthma, COPD, bronchiectasis, pulmonary fibrosis, T2DM, HFpEF with last EF 65% presenting with  fatigue, shortness of breath, and pain with urination.    Patient reports symptoms started 2 months ago, however worsened in the past couple days.  He has been feeling increased fatigue.  At baseline he is up and walking around, however has felt increasingly weak and for the past 2 weeks has been staying in bed.  He also has had increased shortness of breath as well as chest pain in the sternal area for the past 2 weeks.  Describes chest pain as a congestion sensation in the chest.  He additionally has been having increased cough, is coughing up white sputum.  Denies fever or chills.  Does endorse pain with urination, no blood in the urine.  He does have a history of nephrolithiasis, has required lithotripsy in the past.  His appetite has been poor, eating only a couple bites of food a day.  No changes in bowel movements, no diarrhea.  No nausea or vomiting.    Patient lives at home with his son who is a PCA, also has help from 2 of his other children who brings him food.  No recent travel or known sick contacts.  Patient was admitted to the hospital in November 2022 for respiratory failure, was treated with antibiotics and discharged on home prednisone.  At that time was started on Bactrim suppression therapy for suppression for PJP given his chronic prednisone use.  Was also discharged on 2 L of oxygen. Plan was to follow-up with pulmonology to discuss prednisone taper, as well as discuss requirement for PJP prophylaxis.  However, pulmonology follow-up has not occurred.    Past Medical History    Past Medical History:   Diagnosis Date     Bronchiectasis (H)      COPD (chronic obstructive pulmonary disease) (H)      Nephrolithiasis      Tuberculosis, treated 1996       Past Surgical History   Past Surgical History:   Procedure Laterality Date     NO HISTORY OF SURGERY         Prior to Admission Medications   Prior to Admission Medications   Prescriptions Last Dose Informant Patient Reported? Taking?   albuterol  (PROAIR HFA/PROVENTIL HFA/VENTOLIN HFA) 108 (90 Base) MCG/ACT inhaler   No No   Sig: Inhale 2 puffs into the lungs every 4 hours as needed for shortness of breath / dyspnea or wheezing   aspirin 81 MG EC tablet   Yes No   Sig: Take 81 mg by mouth daily   budesonide (PULMICORT) 0.5 MG/2ML neb solution   No No   Sig: Take 2 mLs (0.5 mg) by nebulization daily for 360 days   diclofenac (VOLTAREN) 1 % topical gel   Yes No   Sig: Apply topically 4 times daily   ferrous sulfate (FEROSUL) 325 (65 Fe) MG tablet   No No   Sig: Take 1 tablet (325 mg) by mouth Every Mon, Wed, Fri Morning   furosemide (LASIX) 20 MG tablet   No No   Sig: Take 1 tablet (20 mg) by mouth daily   metFORMIN (GLUCOPHAGE) 1000 MG tablet   Yes No   Sig: Take 1,000 mg by mouth 2 times daily (with meals)   mometasone-formoterol (DULERA) 200-5 MCG/ACT inhaler   No No   Sig: Inhale 2 puffs into the lungs 2 times daily   predniSONE (DELTASONE) 20 MG tablet   No No   Sig: Take 1 tablet (20 mg) by mouth daily   sulfamethoxazole-trimethoprim (BACTRIM DS) 800-160 MG tablet   No No   Sig: Take 1 tablet by mouth Every Mon, Wed, Fri Morning      Facility-Administered Medications: None        Social History   I have reviewed this patient's social history and updated it with pertinent information if needed.  Social History     Tobacco Use     Smoking status: Former     Types: Cigarettes     Quit date: 1995     Years since quittin.2   Substance Use Topics     Alcohol use: No     Drug use: No       Family History       Allergies   No Known Allergies     Physical Exam   Vital Signs: Temp: 98.3  F (36.8  C) Temp src: Oral BP: 121/56 Pulse: 76   Resp: 22 SpO2: 95 % O2 Device: Nasal cannula Oxygen Delivery: 3 LPM  Weight: 0 lbs 0 oz    Constitutional: awake, alert, cooperative, no apparent distress, and appears stated age  Eyes: Lids and lashes normal, extra ocular muscles intact, sclera clear, conjunctiva normal  ENT: Oral candidiasis observed in oral  pharynx  Hematologic / Lymphatic: no cervical lymphadenopathy and no supraclavicular lymphadenopathy  Respiratory: no increased work of breathing, diffuse rhonchi with decreased breath sounds bilaterally, no retractions   Cardiovascular: Normal apical impulse, regular rate and rhythm, normal S1 and S2, no S3 or S4, and no murmur noted  GI: No scars, normal bowel sounds, soft, non-distended, non-tender, no masses palpated, no hepatosplenomegally. L. CVA tenderness  Skin: no bruising or bleeding  Musculoskeletal: no lower extremity pitting edema present  Neuropsychiatric: General: mildly agitated, and normal eye contact    Medical Decision Making             Data   ------------------------- PAST 24 HR DATA REVIEWED -----------------------------------------------    I have personally reviewed the following data over the past 24 hrs:    12.9 (H)  \   12.6 (L)   / 263     140 93 (L) 25.3 (H) /  91   4.7 28 1.09 \       ALT: 13 AST: 54 (H) AP: 97 TBILI: <0.2   ALB: 2.8 (L) TOT PROTEIN: 7.9 LIPASE: N/A       Trop: 29 (H) BNP: 220       Procal: 7.82 (HH) CRP: 11.15 (H) Lactic Acid: 0.9       INR:  1.38 (H) PTT:  N/A   D-dimer:  N/A Fibrinogen:  N/A       Imaging results reviewed over the past 24 hrs:   Recent Results (from the past 24 hour(s))   XR Chest Port 1 View    Narrative    XR CHEST PORT 1 VIEW   3/9/2023 2:32 PM     HISTORY: Shortness of breath    COMPARISON: Chest radiograph 11/20/2022      Impression    IMPRESSION: Grossly stable size of cardiomediastinal silhouette given  portable technique. Persistent changes of pulmonary fibrosis with  new/enlarging right pleural effusion. There may be some scattered  superimposed alveolar opacities as well, developing pneumonia is not  excluded. No pneumothorax. Bones are unchanged.    PUJA NAIR MD         SYSTEM ID:  [id number]

## 2023-03-09 NOTE — ED PROVIDER NOTES
[facility] EMERGENCY DEPARTMENT ([facility])     March 9, 2023  ED Provider Note  [facility]      History     Chief Complaint   Patient presents with     Shortness of Breath     O2 is 90% on RA     Generalized Weakness     Chest Pain     On going for the past 4-6 months, has been seen by ED and primary care for this in the past     HPI  Job Kaiser is a 83 year old [place] male who presents with 2 sons who states that he has been sick with his respiratory illness for the past 4 months. The patient apparently is on 2 L of oxygen at home and is on a number of medications for his respirations as well as Lasix. The patient is unable to provide much history because of his shortness of breath, but family member states that he has had increasing respiratory distress in the last few days. The patient has a history of COPD, TB, and previous pneumonia.    This part of the medical record was transcribed by Rani Rider Medical Scribe, from a dictation done by Jeevan Sandra MD.     Past Medical History  Past Medical History:   Diagnosis Date     Bronchiectasis (H)      COPD (chronic obstructive pulmonary disease) (H)      Nephrolithiasis      Tuberculosis, treated 1996     Past Surgical History:   Procedure Laterality Date     NO HISTORY OF SURGERY       albuterol (PROAIR HFA/PROVENTIL HFA/VENTOLIN HFA) 108 (90 Base) MCG/ACT inhaler  aspirin 81 MG EC tablet  budesonide (PULMICORT) 0.5 MG/2ML neb solution  diclofenac (VOLTAREN) 1 % topical gel  ferrous sulfate (FEROSUL) 325 (65 Fe) MG tablet  furosemide (LASIX) 20 MG tablet  metFORMIN (GLUCOPHAGE) 1000 MG tablet  mometasone-formoterol (DULERA) 200-5 MCG/ACT inhaler  predniSONE (DELTASONE) 20 MG tablet  sulfamethoxazole-trimethoprim (BACTRIM DS) 800-160 MG tablet      No Known Allergies     Family History  Family History   Problem Relation Age of Onset     Glaucoma No family hx of      Macular Degeneration No family hx of       Social History   Social History     Tobacco Use     Smoking status: Former     Types: Cigarettes     Quit date: 1995     Years since quittin.2   Substance Use Topics     Alcohol use: No     Drug use: No      Past medical history, past surgical history, medications, allergies, family history, and social history were reviewed with the patient. No additional pertinent items.      A medically appropriate review of systems was performed with pertinent positives and negatives noted in the HPI, and all other systems negative.    Physical Exam   BP: (!) 153/75  Pulse: 85  Temp: 98.3  F (36.8  C)  Resp: 16  SpO2: 90 %  Physical Exam  Vitals and nursing note reviewed.   Constitutional:       Comments: Elderly frail and moderately short of breath with an initial O2 sat in the 70s on room air   HENT:      Head: Atraumatic.   Eyes:      Extraocular Movements: Extraocular movements intact.      Pupils: Pupils are equal, round, and reactive to light.   Cardiovascular:      Rate and Rhythm: Regular rhythm.   Pulmonary:      Comments: Patient has diffuse rhonchi with decreased breath sounds bilaterally  Musculoskeletal:      Cervical back: Neck supple.      Right lower leg: No tenderness. No edema.      Left lower leg: No tenderness. No edema.   Neurological:      General: No focal deficit present.      Mental Status: He is oriented to person, place, and time.      Comments: Moving all extremities with equal strength   Psychiatric:         Mood and Affect: Mood normal.           ED Course, Procedures, & Data      Procedures          Patient was placed on cardiac monitor and oximetry as well as nasal cannula oxygen.  IV was established for blood draw.  EKG revealed a normal sinus rhythm at a rate of 87 with a ND interval point 138 and a QRS duration of point 096.  The patient had a normal axis with some PACs but no acute ST or T wave changes significant for ischemia.  This is read by me personally.     Results for orders  placed or performed during the hospital encounter of 03/09/23   XR Chest Port 1 View     Status: None    Narrative    XR CHEST PORT 1 VIEW   3/9/2023 2:32 PM     HISTORY: Shortness of breath    COMPARISON: Chest radiograph 11/20/2022      Impression    IMPRESSION: Grossly stable size of cardiomediastinal silhouette given  portable technique. Persistent changes of pulmonary fibrosis with  new/enlarging right pleural effusion. There may be some scattered  superimposed alveolar opacities as well, developing pneumonia is not  excluded. No pneumothorax. Bones are unchanged.    PUJA NAIR MD         SYSTEM ID:  [id number]   INR     Status: Abnormal   Result Value Ref Range    INR 1.38 (H) 0.85 - 1.15   Comprehensive metabolic panel     Status: Abnormal   Result Value Ref Range    Sodium 141 136 - 145 mmol/L    Potassium 4.8 3.4 - 5.3 mmol/L    Chloride 93 (L) 98 - 107 mmol/L    Carbon Dioxide (CO2) 28 22 - 29 mmol/L    Anion Gap 20 (H) 7 - 15 mmol/L    Urea Nitrogen 25.3 (H) 8.0 - 23.0 mg/dL    Creatinine 1.09 0.67 - 1.17 mg/dL    Calcium 10.6 (H) 8.8 - 10.2 mg/dL    Glucose 118 (H) 70 - 99 mg/dL    Alkaline Phosphatase 97 40 - 129 U/L    AST 54 (H) 10 - 50 U/L    ALT 13 10 - 50 U/L    Protein Total 7.9 6.4 - 8.3 g/dL    Albumin 2.8 (L) 3.5 - 5.2 g/dL    Bilirubin Total <0.2 <=1.2 mg/dL    GFR Estimate 67 >60 mL/min/1.73m2   Nt probnp inpatient (BNP)     Status: Normal   Result Value Ref Range    N terminal Pro BNP Inpatient 220 0 - 1,800 pg/mL   Troponin T, High Sensitivity     Status: Abnormal   Result Value Ref Range    Troponin T, High Sensitivity 29 (H) <=22 ng/L   Lactic acid whole blood     Status: Abnormal   Result Value Ref Range    Lactic Acid 2.2 (H) 0.7 - 2.0 mmol/L   CBC with platelets and differential     Status: Abnormal   Result Value Ref Range    WBC Count 12.9 (H) 4.0 - 11.0 10e3/uL    RBC Count 4.18 (L) 4.40 - 5.90 10e6/uL    Hemoglobin 11.0 (L) 13.3 - 17.7 g/dL    Hematocrit 36.3 (L) 40.0 - 53.0 %     MCV 87 78 - 100 fL    MCH 26.3 (L) 26.5 - 33.0 pg    MCHC 30.3 (L) 31.5 - 36.5 g/dL    RDW 22.5 (H) 10.0 - 15.0 %    Platelet Count 263 150 - 450 10e3/uL   iStat Gases Electrolytes ICA Glucose Venous, POCT     Status: Abnormal   Result Value Ref Range    CPB Applied Yes     Hematocrit POCT 37 (L) 40 - 53 %    Calcium, Ionized Whole Blood POCT 5.2 4.4 - 5.2 mg/dL    Glucose Whole Blood POCT 119 (H) 70 - 99 mg/dL    Bicarbonate Venous POCT 33 (H) 21 - 28 mmol/L    Hemoglobin POCT 12.6 (L) 13.3 - 17.7 g/dL    Potassium POCT 4.7 3.4 - 5.3 mmol/L    Sodium POCT 140 133 - 144 mmol/L    pCO2 Venous POCT 60 (H) 40 - 50 mm Hg    pO2 Venous POCT 22 (L) 25 - 47 mm Hg    pH Venous POCT 7.35 7.32 - 7.43    O2 Sat, Venous POCT 33 (L) 94 - 100 %   Manual Differential     Status: Abnormal   Result Value Ref Range    % Neutrophils 77 %    % Lymphocytes 9 %    % Monocytes 7 %    % Eosinophils 0 %    % Basophils 1 %    Absolute Neutrophils 9.9 (H) 1.6 - 8.3 10e3/uL    Absolute Lymphocytes 1.1 0.8 - 5.3 10e3/uL    Absolute Monocytes 0.9 0.0 - 1.3 10e3/uL    Absolute Eosinophils 0.0 0.0 - 0.7 10e3/uL    Absolute Basophils 0.1 0.0 - 0.2 10e3/uL    RBC Morphology Confirmed RBC Indices     Platelet Assessment  Automated Count Confirmed. Platelet morphology is normal.     Automated Count Confirmed. Platelet morphology is normal.   EKG 12 lead     Status: None   Result Value Ref Range    Systolic Blood Pressure  mmHg    Diastolic Blood Pressure  mmHg    Ventricular Rate 87 BPM    Atrial Rate 87 BPM    SD Interval 138 ms    QRS Duration 96 ms     ms    QTc 435 ms    P Axis 59 degrees    R AXIS 73 degrees    T Axis 67 degrees    Interpretation ECG       Sinus rhythm with Premature atrial complexes  Otherwise normal ECG  Unconfirmed report - interpretation of this ECG is computer generated - see medical record for final interpretation  Confirmed by - EMERGENCY ROOM, PHYSICIAN ([id number]),  BENNIE LOTT ([id number]) on 3/9/2023 3:10:14  PM     CBC with platelets differential     Status: Abnormal    Narrative    The following orders were created for panel order CBC with platelets differential.  Procedure                               Abnormality         Status                     ---------                               -----------         ------                     CBC with platelets and d...[[id number]]  Abnormal            Final result               Manual Differential[[id number]]          Abnormal            Final result                 Please view results for these tests on the individual orders.     Medications   sodium chloride (PF) 0.9% PF flush 3 mL (3 mLs Intracatheter $Given 3/9/23 1357)   sodium chloride (PF) 0.9% PF flush 3 mL (has no administration in time range)   sodium chloride 0.9% infusion ( Intravenous $New Bag 3/9/23 1524)   azithromycin 500 mg (ZITHROMAX) in 0.9% NaCl 250 mL intermittent infusion 500 mg (has no administration in time range)   0.9% sodium chloride BOLUS (0 mLs Intravenous Stopped 3/9/23 1500)   ipratropium - albuterol 0.5 mg/2.5 mg/3 mL (DUONEB) neb solution 3 mL (3 mLs Nebulization $Given 3/9/23 1429)   cefTRIAXone (ROCEPHIN) 2 g vial to attach to  ml bag for ADULTS or NS 50 ml bag for PEDS (2 g Intravenous $New Bag 3/9/23 1451)       Patient was given DuoNebs x2 and placed on supplemental oxygen and had improvement in his respiratory status.    COVID test is pending       Critical care was performed.   Critical Care Addendum  My initial assessment, based on my review of vital signs and physical exam, established a high suspicion that Job A Job has respiratory distress, which requires immediate intervention, and therefore he is critically ill.     After the initial assessment, the care team initiated multiple lab tests, initiated IV fluid administration and initiated medication therapy with DuoNebs and antibiotics to provide stabilization care. Due to the critical nature of this patient, I reassessed vital  signs, physical exam and 12 lead ECG analysis multiple times prior to his disposition.     Time also spent performing documentation, discussion with family to obtain medical information for decision making and reviewing test results.     Critical care time (excluding teaching time and procedures): Greater than 60 minutes.     Assessment & Plan      I have reviewed the nursing notes.    At this time the patient will be admitted to the medicine service for continued care      Final diagnoses:   Pulmonary infiltrates - with right pleural effusion and hypoxia       I, Rani Rider, am serving as a trained medical scribe to document services personally performed by Jeevan Sandra MD, based on the provider's statements to me.      IJeevan MD, was physically present and have reviewed and verified the accuracy of this note documented by Rani Rider.    Jeevan Sandra MD  [facility] EMERGENCY DEPARTMENT  3/9/2023     Jeevan Sandra MD  03/09/23 1917

## 2023-03-10 PROBLEM — J10.1 INFLUENZA A: Status: RESOLVED | Noted: 2022-01-01 | Resolved: 2023-01-01

## 2023-03-10 PROBLEM — E11.9 TYPE 2 DIABETES MELLITUS WITHOUT COMPLICATION, WITHOUT LONG-TERM CURRENT USE OF INSULIN (H): Status: ACTIVE | Noted: 2023-01-01

## 2023-03-10 PROBLEM — J96.22 ACUTE ON CHRONIC RESPIRATORY FAILURE WITH HYPOXIA AND HYPERCAPNIA (H): Status: ACTIVE | Noted: 2023-01-01

## 2023-03-10 PROBLEM — J96.21 ACUTE ON CHRONIC RESPIRATORY FAILURE WITH HYPOXIA AND HYPERCAPNIA (H): Status: ACTIVE | Noted: 2023-01-01

## 2023-03-10 NOTE — CONSULTS
[facility] [place]     Patient:  Job Kaiser   Date of birth 1940, Medical record number [id number]  Date of Visit:  03/10/2023  Date of Admission: 3/9/2023  Consult Requester:Artemio Hart DO          Assessment and Recommendations:   ASSESSMENT:  1. Dyspnea; acute on chronic   2. Productive cough; chronic  3. Intermittent fevers; self-reported  4. Chest pain; acute  5. History of TB ( s/p treatment - unclear if he had active TB vs latent Tb at that time)   6. Significant weight loss without trying; chronic  7. Chronic prednisone (20 mg/day) use  8. Elevated procalcitonin  9. Leukocytosis  10. Neprholithiasis  11. Influenza A ( Dx 11/19/22)   12. Asthma/ COPD   13. Bronchiectasis   14. Limited activity due to dyspnea on exertion for at least 1 year   15. SARsCoV2 neg 11/19 and 11/20/22    DISCUSSION:   Job Kaiser is an 81 yo [place] gentleman w/ h/o prior tuberculosis s/p treatment with 3 drug therapy for 6 weeks in [facility], asthma, COPD, bronchiectasis, pulmonary fibrosis, T2DM, HFpEF with last EF 65% presenting with fatigue, shortness of breath, and pain with urination. ID was consulted regarding concern for help discerning if tuberculosis has been officially ruled out, ongoing need for PJP prophylaxis in the setting of chronic steroid use, and unusual causes of recurrent pulmonary infections. Patient presented to [facility] with 4 month period of progressively worsening fatigue, weight loss, dyspnea, and productive cough with associated fevers and chills. Over the last week, his family reports his breathing has become significantly worse, and yesterday he developed chest pain, which prompted them to bring him in. Has been frequently treated for COPD exacerbations and community-acquired pneumonia.  He has a history and constellation of signs and symptoms concerning for active pulmonary tuberculosis. He was reportedly treated in [facility] for a pulmonary TB infection with a 3-drug  "regimen for 6 weeks per chart review; if he was on a 3-drug regimen, this would indicate pulmonary TB and 6-weeks is not a sufficient treatment course, which is typically 9-months. He has been seen frequently throughout [id number] by family medicine for treatment of COPD exacerbation and CAP. After most recent hospitalization at [facility] 11/2022, he was recommended to follow up with pulmonary to finish TB rule out. Per [facility], frequent ED visits for COPD exacerbation. Currently afebrile and hemodynamically stable. On exam, he is tachypneic and dypsneic. He is unable to answer many of my questions due to his shortness of breath. Labs remarkable for elevated WBC, CRP, and procalcitonin. His CT findings are concerning for possible malignancy with pleural effusions and new extensive mediastinal lymphadenopathy. Previous CT OSH (11/19/22) had concerning findings of \"peripheral honeycombing with an upper lobe predominance\" (which is concerning for TB with the upper lobe predominance). Feel given unknown history of adequate treatment of TB, inability to rule out with three AFB smears, and ongoing symptoms concerning for active pulmonary TB, recommend patient be definitively rule out during this hospitalization. Given the significant risk to the public if the patient has active pulmonary TB, will recommend he have a 72 hour hold be placed until we can obtain three AFB smears. Will need airborne precautions until ruled out.     Further ID workup for other possible causes of pneumonia-like symptoms, per below. Continue IV cefepime. No need for ciprofloxacin at this point. Will adjust antibiotics as able. Given ongoing 20 mg/day prednisone use, will recommend restarting PJP prophylaxis.    Agree with pulmonary consult to help further advise further workup of acute on chronic pulmonary disease.    RECOMMENDATION:  1. Please place patient on airborne precautions  2. Will need 3 AFB smears with culture and PCR to rule-out TB; " one today, and two tomorrow with the 2nd being performed in the early morning, and the 3rd being done 8 hours later.  3. Recommend 72 hour hold on patient if he attempts to refuse given non-compliance with follow up for TB rule out  4. Continue IV cefepime  5. Stop IV ciprofloxacin  6. Recommend Bactrim DS 1 tablet once daily for PJP prophylaxis  7. Sputum aerobic, anaerobic cultures  8. Urine histoplasma antigen  9. Serum blastomyces antigen  10. Aspergillus galactomannan antigen, done for you  11. PJP pcr and DFA; done for you      Thank you for this consult. ID will continue to follow.     Patient was discussed with Dr. Lee.     Alissa Lomeli, APRN, CNP  Infectious Diseases  Pager# [phone]  ________________________________________________________________    Consult Question: Hx of TB (treated), ? Chronic prednisone use, ? Bactrim prophylaxis for PJP, here with suspected PNA. Guidance for continuing prednisone, bactrim, additional testing iso immunosuppresion.  Admission Diagnosis: Pulmonary infiltrates [R91.8]         History of Present Illness:   HPI obtained through chart review and per interview with patient and family member with the aide of a  through iPad    Job Kaiser is an 83 yo [place] gentleman w/ h/o prior tuberculosis s/p treatment with 3 drug therapy for 6 weeks in [facility], asthma, COPD, bronchiectasis, pulmonary fibrosis, T2DM, HFpEF with last EF 65% presenting with fatigue, shortness of breath, and pain with urination, with labs and imaging concerning for CAP and nephrolithiasis.    Last seen by ID 11/27/22 for dyspnea and diagnosed with influenza and treated with Tamiflu and azithromycin. Had TB workup with 1 negative AFB smear and culture (history of being treated in [facility] with 3-drug regimen for 6 weeks per chart review but no actual documentation). Other infectious workup November 2022 included Sputum cultured positive for C albicans and Staph epidermidis, negative legionella  and Strep pneumo urine antigens, negative BD glucan, negative blood cultures, and negative COVID-19.  Recommended at that time to follow up with pulmonary and start PJP prophylaxis given long term use of 20 mg prednisone daily (unclear reason for current prescription). Does not appear that he followed up with pulmonary and per H&P note, patient reports not taking Bactrim.    Patient has had 4 months of weight loss, fatigue, productive cough, intermittent fevers and chills, with progressively worsening chest pain. Over the past week his shortness of breath has worsened significantly and developed chest pain yesterday, prompting his family to bring him to [facility] for evaluation. Per family, when patient has a fever, they do not check the temperature, and give him Tylenol. Denies headache, night sweats, hemoptysis, nausea, vomiting, abdominal pain, diarrhea, dysuria, myalgias, arthralgias, lymphadenopathy, acute rash, or new wounds. Per chart review in [facility], patient does endorse night sweats as far back as 10/2022 with associated cough and shortness of breath.     Patient has been afebrile and hemodynamically stable here, requiring 2L O2 per nasal canula. Labs significant for mild leukocytosis, elevated CRP and procalcitonin. CT chest wo contrast with findings of fibrotic and cystic changes in lower lobes and significant for new extensive mediastinal lymphadenopathy and bilateral pleural effusions. CT abdomen pelvis shows a large stable renal stone in the left renal pelvis (2.5cm) and multiple other non-obstructing kidney stones in the right kidney.     Lives with son in an apartment tower in [place].     I spoke with [facility] today and the TB Clinic in [facility]. They recommended that patient could be held for 72 hours due to being a possible public health risk if our suspicion for pulmonary TB is high.    History concerning for incomplete Tuberculosis rule out: Per chart review and Dr. Lee's note  "today:    Deidre Hendricks 3/1/2015:   \"History of Tuberculosis. Per pulm clinic notes, previous providers have indicated prior TB treatment (3 drug x 6 wks in [facility] 1996) but patient unable to corroborate this. Negative sputum AFB x 3 at [facility] 4/2014. Has declined outpatient sputum samples to either look for airway colonization or ongoing AFB infection. Despite look of CXR, was thought unlikely to have active TB during recent pulm clinic visit. Some possibility of reactivation exists given intermittent blood in sputum, chest imaging, night sweats (also reports weight loss but chart review does not support). - Patient was placed in airborne isolation and did provide a single sputum AFB stain/culture (results still pending). Will contact TB clinic tomorrow (Monday) to inform them of patient's situation.\"  - it appears this did not happen per chart review    2014 [facility] records suggest failure to show up for TB rule out testing as well:   \"History of treated TB in [facility] 1995 or 1996. History positive TB skin test 2004.   4.14.14 addendum. Telephone call to patient with [employer] . [facility] first sputum collection for AFB #1 of 3. Taxi ordered. Will arrange sputums #2 and #3 and have patient return to see Dr. Sifuentes.  4.17.14 Patient failed above appointment. Telephone call to patient 2 times today - voicemail box full. Unable to leave message.  4.21.14 Addendum. Telephone call to patient with [employer] [place] . Voicemail message left requesting that he call me.  4.22.14 addendum. TB  stopped by patient's house yesterday to remind him of appts and give letter for 4.28.14 [facility] appointment. Telephone call today with [employer] [place] . Patient had difficulty talking and the  had a hard time understanding him. I explained the need for sputum collection prior to the appointment " "on 14. Patient stated that he is very sick and if he is feeling better tomorrow will come to the clinic. Patient stated \"you are crazy\" and does not wish to come back and forth for so many appointments because he is so sick. Appt with taxi scheduled for tomorrow - if patient fails appt - we will attempt to have him collect a specimen at home.  14 TB  brought patient to clinic to collect sputum. Will have him collect #2 and #3 and home and Outreach will deliver to clinic.  14 addendum. Patient failed above appointment. Telephone call to patient for purpose of rescheduling appt. Voicemail mailbox full- unable to leave message. Letter sent - see Letters.  14 addendum. Telephone call to patient yesterday with [employer] . Explained to patient that his appointment needed to be rescheduled - he became upset and said that he is never coming to this clinic again. He came and we did not see him. There is no record of patient arriving at clinic for his appt. MD notified. \"         Review of Systems:   12-point ROS performed, pertinent positives and negatives per above         Past Medical History:     Past Medical History:   Diagnosis Date     Bronchiectasis (H)      COPD (chronic obstructive pulmonary disease) (H)      Influenza A 2022     Nephrolithiasis      Tuberculosis, treated             Past Surgical History:     Past Surgical History:   Procedure Laterality Date     NO HISTORY OF SURGERY              Family History:   Reviewed and non-contributory.   Family History   Problem Relation Age of Onset     Glaucoma No family hx of      Macular Degeneration No family hx of             Social History:     Social History     Tobacco Use     Smoking status: Former     Types: Cigarettes     Quit date: 1995     Years since quittin.2     Smokeless tobacco: Not on file   Substance Use Topics     Alcohol use: No     History   Sexual Activity     Sexual " activity: Not Currently            Current Medications:       ceFEPIme  2 g Intravenous Q8H     ciprofloxacin  400 mg Intravenous Q8H     enoxaparin ANTICOAGULANT  40 mg Subcutaneous Q24H     ferrous sulfate  325 mg Oral Q Mon Wed Fri AM     [Held by provider] fluticasone-vilanterol  1 puff Inhalation Daily     insulin aspart  1-7 Units Subcutaneous TID AC     insulin aspart  1-5 Units Subcutaneous At Bedtime     ipratropium  0.5 mg Nebulization Q4H    And     levalbuterol  1.25 mg Nebulization Q4H     melatonin  5 mg Oral At Bedtime     nystatin  500,000 Units Oral 4x Daily     pantoprazole  40 mg Oral BID AC     predniSONE  20 mg Oral Daily     sodium chloride (PF)  3 mL Intracatheter Q8H     sodium chloride (PF)  3 mL Intracatheter Q8H     sodium chloride                Allergies:   No Known Allergies         Physical Exam:   Vitals were reviewed  Patient Vitals for the past 24 hrs:   BP Temp Temp src Pulse Resp SpO2 Weight   03/10/23 0753 120/48 (!) 96  F (35.6  C) Oral 62 22 -- --   03/09/23 2300 -- -- -- -- -- -- 58.4 kg (128 lb 12 oz)   03/09/23 2056 113/41 (!) 95.7  F (35.4  C) Oral 72 28 98 % --   03/09/23 1958 138/70 98.2  F (36.8  C) Oral 83 18 100 % --   03/09/23 1945 138/70 98.2  F (36.8  C) Oral 83 20 100 % --   03/09/23 1900 127/60 -- -- 73 28 90 % --   03/09/23 1845 -- -- -- 67 25 98 % --   03/09/23 1700 121/56 -- -- 76 22 95 % --   03/09/23 1630 124/61 -- -- 74 22 99 % --   03/09/23 1600 135/64 -- -- 74 26 98 % --   03/09/23 1530 -- -- -- 78 23 99 % --   03/09/23 1515 -- -- -- 87 (!) 31 99 % --   03/09/23 1504 (!) 148/51 -- -- 84 (!) 31 95 % --   03/09/23 1500 -- -- -- 88 30 98 % --   03/09/23 1445 -- -- -- 70 20 93 % --   03/09/23 1439 131/57 -- -- 76 (!) 39 97 % --   03/09/23 1429 138/66 -- -- 69 24 98 % --   03/09/23 1345 -- -- -- 96 29 95 % --   03/09/23 1333 -- -- -- -- -- 90 % --   03/09/23 1325 (!) 153/75 98.3  F (36.8  C) Oral 85 16 -- --       Physical Examination:  GENERAL:   well-developed, well-nourished, in bed in no acute distress. Cachectic  HEENT:  Head is normocephalic, atraumatic   EYES:  Eyes have anicteric sclerae without conjunctival injection   ENT:  Oropharynx is moist without exudates or ulcers. Tongue is midline  NECK:  Supple. No cervical lymphadenopathy  LUNGS:  tachypnea and dyspnea, lung sound coarse anteriorly, on O2 per nasal canula   CARDIOVASCULAR:  Regular rate and rhythm with no murmurs, gallops or rubs.  ABDOMEN:  Normal bowel sounds, soft, nontender. No appreciable hepatosplenomegaly  SKIN:  No acute rashes.  Line(s) are in place without any surrounding erythema or exudate.   NEUROLOGIC:  Grossly nonfocal. Active x4 extremities         Laboratory Data:     Inflammatory Markers    Recent Labs   Lab Test 11/20/22  1828 03/01/15  0349   CRP 80.0* 20.3*       Hematology Studies    Recent Labs   Lab Test 03/10/23  0704 03/09/23  1358 03/09/23  1356 11/24/22  0806 11/23/22  1513 11/22/22  0550 11/21/22  1121 06/27/22 2027 02/06/20  2339 03/01/15  0349   WBC 11.7*  --  12.9* 8.8 6.3 10.2 12.3*   < > 7.2 7.7   ANEU  --   --  9.9*  --   --   --   --   --  6.0 5.9   AEOS  --   --  0.0  --   --   --   --   --  0.1 0.4   HGB 10.0* 12.6* 11.0* 7.7* 8.5* 9.3* 7.8*   < > 12.3* 13.8   MCV 89  --  87 74* 72* 72* 71*   < > 92 95     --  263 308 320 361 309   < > 251 238    < > = values in this interval not displayed.       Metabolic Studies     Recent Labs   Lab Test 03/10/23  0704 03/09/23  1358 03/09/23  1356 11/23/22  1513 11/22/22  0550 11/21/22  1121 11/20/22  1828    140 141 143 140  --  136   POTASSIUM 4.7 4.7 4.8 4.1 3.6  --  4.5   CHLORIDE 99  --  93* 102 99  --  100   CO2 27  --  28 37* 36*  --  26   BUN 21.4  --  25.3* 29 17  --  15   CR 0.93  --  1.09 0.98 0.87 0.81 0.80  0.78   GFRESTIMATED 81  --  67 77 86 88 88  89       Hepatic Studies    Recent Labs   Lab Test 03/09/23  1356 06/27/22 2027 09/10/21  1210 02/06/20  2339   BILITOTAL <0.2 0.2 0.3  0.3   ALKPHOS 97 66 65 62   ALBUMIN 2.8* 3.2* 3.4 3.4   AST 54* 18 22 24   ALT 13 14 17 18       Microbiology:  Culture   Date Value Ref Range Status   03/09/2023 No growth after 12 hours  Preliminary   03/09/2023 No growth after 12 hours  Preliminary   11/23/2022 1+ Candida albicans (A)  Final     Comment:     Susceptibilities not routinely done   11/23/2022 1+ Staphylococcus epidermidis (A)  Final     Comment:     Susceptibilities not routinely done   11/20/2022 No Growth  Final   11/20/2022 No Growth  Final   11/20/2022 No Growth  Final       Urine Studies    Recent Labs   Lab Test 03/09/23 2054 11/20/22 2117 02/07/20  0025   LEUKEST Small* Negative Negative   WBCU 8* 0 6*       Vancomycin Levels  No lab results found.    Invalid input(s): VANCO    Hepatitis B Testing No lab results found.  Hepatitis C Testing   No results found for: HCVAB, HQTG, HCGENO, HCPCR, HQTRNA, HEPRNA  Respiratory Virus Testing    No results found for: RS, FLUAG          Imaging:   CT chest high resolution without contrast     INDICATION: Chronic lung disease with pulmonary fibrosis     COMPARISON: 7/30/2018    IMPRESSION: Continued fibrotic lung disease with extensive mediastinal  lymphadenopathy which is new and new bilateral pleural effusions which  could be malignant. No obvious adrenal nodule although the adrenals  are not completely imaged. Recommend PET/CT an/or consideration for  mediastinal lymph node sampling and/or diagnostic thoracentesis to  exclude malignancy      EXAMINATION: CT ABDOMEN PELVIS W/O CONTRAST, 3/10/2023 9:51 AM    IMPRESSION:   1.  Stable large renal stone at the left renal pelvis measuring up to  2.5 cm. No significant change in the very mild dilatation of the left  intrarenal collecting system.  2.  Several stable nonobstructing stones in the right kidney.  3.  Moderate colonic stool burden.   4.  Right greater than left pleural effusions. Fibrotic changes in the  visualized lung bases. Please see same day  chest CT for full dictation  of the chest.      CT 11/19/2022 OSH  IMPRESSION:   Peripheral honeycombing but with predominantly an upper lobe predominance. The findings can be seen in the setting of hypersensitivity pneumonitis, drug reaction or collagen vascular disease. TB could be a consideration although this pattern of disease would be somewhat atypical.

## 2023-03-10 NOTE — PROGRESS NOTES
[facility]    Progress Note - [facility]       Date of Admission:  3/9/2023    Major changes today:   - 72-hour hold placed due to public health risk of possible TB reactivation   - Requires TB rule out with AFB x3; airborne precautions until that time   - Continue Cefepime for CAP; discontinue Cipro   - Restart Bactrim for PJP ppx   - Zyprexa and Hydroxyzine PRN for agitation   - If TB ruled out, plan for outpatient malignancy workup     Assessment & Plan     Job Kaiser is an 83 yo [place] gentleman w/ h/o prior tuberculosis s/p treatment with 3 drug therapy for 6 weeks in [place], asthma, COPD, bronchiectasis, pulmonary fibrosis, T2DM, HFpEF with last EF 65% presenting with fatigue, shortness of breath, and pain with urination, with labs and imaging concerning for CAP and nephrolithiasis.    # Community acquired pneumonia, at risk for Pseudomonas   # Concern for malignancy vs. TB reactivation   # Chronic prednisone use, at risk for PJP   # Acute on chronic respiratory failure, resolved   # Sternal pain, improved   # On chronic O2, 2L baseline   # History of asthma/COPD  # History of bronchiectasis  # History of pulmonary fibrosis  # History of TB, treated  Patient admitted with months-long history of progressively worsening dyspnea, productive cough with white sputum, generalized weakness, and weight loss. Family also reports possible fever- though not measured. No hemoptysis per report. Afebrile during admission. Initially hypoxic requiring 10L NC, now returned to baseline 2L. Exam with diffuse rhonchi and mildly increased WOB. Labs with mild leukocytosis, elevated procal/CRP. Chest CT with extensive mediastinal lymphadenopathy and new bilateral pleural effusions. No cavitary lesions noted. Suspicious for CAP given procal. At risk for Pseudomonal infection with history of bronchiectasis, as well as PJP with chronic steroid use. Also concern  for malignancy (indolent course, weight loss) vs. TB reactivation (at risk due to steroids, likely inadequate previous treatment with 3 drugs for 6 weeks). Per ROBERT/Dl Mcmahon/ID, recommended to hold patient for TB rule out with three negative AFB smears (see brief progress note for more details). If TB adequately ruled out, patient and family strongly prefer to discharge home with outpatient follow-up for malignancy workup. Less likely asthma/COPD exacerbation (no sputum change, minimal effect of Duoneb), HFpEF exacerbation (normal BNP, euvolemic on exam), PE (negative D-dimer), ACS (reassuring ECG).   - ID and Pulmonology consulted; appreciate recommendations   - Continue NC O2 for O2 >92%  - Continue Cefepime; discontinue Ciprofloxacin per ID   - Continue Prednisone 20 mg daily  - Restart Bactrim three times weekly for PJP ppx   - AFB x3 8-hours apart; one collected, second timed for 0600 3/11, third time for 1500 3/11   - Duoneb q4hr; hold PTA Pulmnicort/Dulera, Albuterol PRN, Fluticasone   - Plan for Oncology, Pulmonology, and PET scan referral at discharge   - Follow sputum culture, BD glucan, HIV, Fungitell      # Agitation, situational   # Insomnia  # High risk for delirium  Patient and family desiring discharge. 72-hour hold placed due to public health risks of possible TB reactivation (see brief progress note for more details). Patient with situational agitation due to remaining in hospital. Also high risk for delirium based on age and documented delirium during prior hospitalizations. qTC 435 3/9.   - Zyprexa 5 mg PO or 2.5-5mg IM PRN q2hr for agitation; max 20 mg daily due to age   - Hydroxyzine 25-50 mg q6hr PRN for agitation/restlessness   - Melatonin 5mg scheduled, consider trazodone if patient unable to sleep  - Delirium precautions    # Chronic prednisone use, at risk for PJP   Patient on Prednisone 20 mg daily since 2021 per chart review. Unclear indication. During admission 11/2022, patient  advised to follow-up with Pulmnology to discuss necessity of ongoing steroid- but was unable to make follow-up appointment as family unaware of recommendation per report. Started on Bactrim for PJP ppx at that time, but has not taken medication since discharge.   - Continue Prednisone 20 mg daily  - Restart Bactrim three times weekly for PJP ppx      # History of nephrolithiasis  # CVA tenderness, resolved   Previous nephrolithiasis x2, requiring lithotripsy once. Admitted with dysuria and left CVA tenderness. UA with blood/RBC. Not consistent with infection. CT Abdomen with stable large left kidney stone with mild dilation of collecting system- unchanged from previous. On repeat evaluation, CVA tenderness has resolved- likely secondary to IVF versus antibiotic coverage.   - Continue Cefepime; discontinue Ciprofloxacin   - Encourage PO fluids   - Follow urine culture      # Poor PO intake  # Failure to thrive  # Severe malnutrition in the context of acute on chronic illness  Patient with recent decreased PO intake, family reports weight loss of about 10 pounds (see above for concern for malignancy vs. TB reactivation). S/p 3 L fluids in the ED. Nursing reports trouble swallowing pills.  No history of dysphagia.  Electrolytes WNL.   - Nutrition and SLP following; appreciate recommendations   - Crush pills to swallow  - Ensure with meals     # Thrush  Oral candidiasis observed on physical exam.  Patient has chronic prednisone use as well as uses steroid inhaler at home, likely steroid use is source of thrush.   - Nystatin rinse 4 times daily ordered  - Educate patient and family regarding rinsing mouth after inhalers  - Follow HIV lab      # History of HFpEF   Echo from 2021 showing EF 60-65%. Not volume overloaded, BNP normal, suspicion for heart failure exacerbation is low.  Patient was discharged with 20 mg Lasix daily after last hospitalization November 2022, but has not been taking at home.  Given patient's  stability without Lasix and volume down status, will hold PTA Lasix.  -PTA Lasix 20 mg held     # Diabetes  Last a1c 11/22 6.4, currently at goal.  - 4 times daily glucose checks, medium sliding scale ordered  - Hold PTA metformin, should discuss with his PCP whether he still needs to take this     # Hx of severe iron deficiency anemia requiring blood transfusion  Prior hospitalization in November 2022 was significant for admission hemoglobin of 6.1, patient found to be iron deficient at that time and given an iron transfusion.  Admission hemoglobin today of 11, currently stable.  Ferritin may be falsely elevated given his probable infectious picture.  - Daily CBC  - Consider iron studies once infection resolves  - PTA ferrous sulfate 325mg daily     Diet:   Regular diet with Ensure   DVT Prophylaxis: Enoxaparin (Lovenox) subcutaneous, PADUA score 5  Vu Catheter: Not present  Fluids: PO  Lines: None     Cardiac Monitoring: None  Code Status: Full code; confirmed with patient and son 3/10 AM       Clinically Significant Risk Factors Present on Admission           # Hypercalcemia: Highest Ca = 10.6 mg/dL in last 2 days, will monitor as appropriate   # Anion Gap Metabolic Acidosis: Highest Anion Gap = 20 mmol/L in last 2 days, will monitor and treat as appropriate  # Hypoalbuminemia: Lowest albumin = 2.8 g/dL at 3/9/2023  1:56 PM, will monitor as appropriate  # Coagulation Defect: INR = 1.38 (Ref range: 0.85 - 1.15) and/or PTT = N/A, will monitor for bleeding                 Disposition Plan      Expected Discharge Date: 03/11/2023                The patient's care was discussed with the Attending Physician, Dr. Hart .    Chelsey Nolen MD  [facility]  [facility]  Securely message with [employer] (more info)  Text page via [facility] Paging/Directory   See signed in provider for up to date coverage  information  ______________________________________________________________________    Interval History     Overnight: Admitted. No acute events.     Subjective:     0800: Patient and son present on initial evaluation. Son reports gradual decline since hospitalization 11/2022. Patient used to be able to walk to bathroom and complete ADL's independently. He now requires assistance for all activities. He is on 2L O2 at home baseline. Unable to walk more than several feet secondary to dyspnea. Family was unable to follow up with Pulmonology after last hospitalization as they were not aware of appointment. Patient intermittently agitated and yelling during evaluation- son reports baseline. On interview with patient, he states that he does not feel well, he feels sick. Unable to report if he is in pain- he just repeats that he feels sick. Oriented to situation, time, self, and person.     1100: Patient and nephew present on repeat evaluation. Patient upset- reports that he has been in the hospital for six days and not receiving any medication. Nephew clarifies that patient has only been in hospital since last night. He shows him the IV with medication pole attached. Patient continues to report that he has not received medication. Reviewed results of CT Chest, which are concerning for malignancy. Patient continues to repeat that he does not have cancer. Clarified that diagnosis is not final and we need to do more testing. Patient states that he does not want more testing and would like to go home. He is unable to repeat back results of scan, current concern for infection, or risks of leaving hospital without further treatment/testing. Team member obtained collateral information from nephew, who states that this is patient's baseline. He denies lung diagnoses per family report. Has left AMA per chart review.     1200: Patient and several family members present on third evaluation. Family desires discharge with close  Pulm/Onc follow-up rather than remaining hospitalized. They are aware of risks and reasons to return to hospital. Initial plan for discharge home.     1400: Informed by ID that patient requires TB rule out with negative AFB x3 (one negative in 11/2022, so two additional). Discussed with patient and family. Family agreeable to stay at this time- would like discussion with [facility] as to whether they can provide one sample now and then send another in clinic.      Physical Exam   Vital Signs: Temp: (!) 96  F (35.6  C) Temp src: Oral BP: 120/48 Pulse: 62   Resp: 22 SpO2: 98 % O2 Device: Nasal cannula Oxygen Delivery: 2 LPM  Weight: 128 lbs 11.98 oz    Constitutional: awake, alert, agitated, no apparent distress, and appears stated age  Eyes: Lids and lashes normal, extra ocular muscles intact, sclera clear, conjunctiva normal  ENT: Oral candidiasis observed in oral pharynx  Hematologic / Lymphatic: no cervical lymphadenopathy and no supraclavicular lymphadenopathy  Respiratory: NC O2 in place, no respiratory distress, mild increased WOB- pauses after sentence to take breath, no retractions, diffuse rhonchi with decreased breath sounds bilaterally, scattered expiratory wheeze, no crackles   Cardiovascular: Normal apical impulse, regular rate and rhythm, normal S1 and S2, no S3 or S4, and no murmur noted  GI: No scars, normal bowel sounds, soft, non-distended, non-tender, no masses palpated, no hepatosplenomegally. No CVA tenderness  Skin: No bruising or bleeding  Musculoskeletal: No lower extremity pitting edema present  Neuropsychiatric: Alert and oriented x3 on initial exam. Agitated during second exam with waxing/waning orientation (states has been in hospital for six days).     Medical Decision Making   Please see A&P for additional details of medical decision making.      Data     I have personally reviewed the following data over the past 24 hrs:    11.7 (H)  \   10.0 (L)   / 232     142 99 21.4 /  121 (H)   4.7 27  0.93 \       Procal: N/A CRP: N/A Lactic Acid: 0.9       INR:  N/A PTT:  N/A   D-dimer:  0.43 Fibrinogen:  N/A       Imaging results reviewed over the past 24 hrs:   Recent Results (from the past 24 hour(s))   CT Abdomen Pelvis w/o Contrast    Narrative    EXAMINATION: CT ABDOMEN PELVIS W/O CONTRAST, 3/10/2023 9:51 AM    TECHNIQUE:  Helical CT images from the lung bases through the  symphysis pubis were obtained without contrast.  Coronal reformatted  images were generated at a workstation for further assessment.    COMPARISON: CT abdomen pelvis 6/27/2022, CT chest same day    HISTORY: evaluate for nephrolithiasis    FINDINGS:  Examination is partially limited due to motion artifact and  non-contrast technique.    Abdomen and pelvis:   Liver: No suspicious liver lesions.   Gallbladder: No gallstones. No evidence of acute cholecystitis.  Spleen: Normal size.  Pancreas: No suspicious pancreatic lesions. The pancreatic duct is not  dilated.  Adrenal glands: No adrenal nodules.  Urinary system: Stable 2.5 x 1.5 x 1.0 cm stone in the left renal  pelvis. Similar very mild left intrarenal dilatation. Unchanged renal  stone burden within the right kidney with a few scattered  subcentimeter nonobstructing stone, the largest measuring up to 4 mm.  No right hydronephrosis. No hydroureter. No stones identified along  the course of the ureters. No stones present in the urinary bladder.  Reproductive organs: Prostatic enlargement.   Bowel: No abnormally dilated loops of large or small bowel. No  abnormal bowel wall thickening. Moderate colonic stool burden.  Scattered colonic diverticulosis without evidence of acute  diverticulitis. The appendix is unremarkable.  Lymph nodes: No retroperitoneal, mesenteric, or pelvic  lymphadenopathy.  Fluid: No free fluid within the abdomen.  Vessels: No infrarenal aortic aneurysm. Calcifications of the  abdominal aorta and iliac arteries.    Lung bases: Right greater than left pleural  effusions. The visualized  lung bases demonstrate fibrotic and cystic changes in the lower lobes.    Bones and soft tissues: No suspicious osseous lesions. Mild  degenerative changes throughout the visualized spine. Large right and  small left fat-containing inguinal hernias.      Impression    IMPRESSION:   1.  Stable large renal stone at the left renal pelvis measuring up to  2.5 cm. No significant change in the very mild dilatation of the left  intrarenal collecting system.  2.  Several stable nonobstructing stones in the right kidney.  3.  Moderate colonic stool burden.   4.  Right greater than left pleural effusions. Fibrotic changes in the  visualized lung bases. Please see same day chest CT for full dictation  of the chest.    I have personally reviewed the examination and initial interpretation  and I agree with the findings.    NEFTALI PALMER MD         SYSTEM ID:  [id number]   CT Chest Hi-Resolution wo Contrast    Narrative    CT chest high resolution without contrast    INDICATION: Chronic lung disease with pulmonary fibrosis    COMPARISON: 7/30/2018    FINDINGS: No contrast. Inspiratory next to respond imaging obtained.  The included thyroid appears unremarkable. The mid ascending aorta is  normal in size. Pulmonary artery is upper normal in size at 3.0 cm.  Multiple rounded densities in the pretracheal and right lower  paratracheal stations are consistent with lymphadenopathy which are  clearly new from prior examination. Subcarinal prominent soft tissue  also effaces the esophagus with apparent lymph node measuring  approximately 2.9 x 2.8 cm. There are new bilateral pleural effusions  which could be malignant. There is no pericardial effusion.  Bone detail shows lower cervical degenerative disc disease. No  suspicious sclerotic or lytic/destructive bone lesion.  Detail of the lungs shows prominent subpleural and other fibrotic  changes in the lungs with lower lobe predominance with  cystic  bronchiectasis in the lower lobes. The pleural-based fibrotic changes  certainly could mask superimposed pulmonary nodules.    Expiratory imaging shows minimal air trapping. Major airways shows  some posterior membrane concavity of the trachea without severe  narrowing. Therefore, this would not meet dynamic expiration criteria  for tracheobronchomalacia neck protocol was not performed regardless.      Impression    IMPRESSION: Continued fibrotic lung disease with extensive mediastinal  lymphadenopathy which is new and new bilateral pleural effusions which  could be malignant. No obvious adrenal nodule although the adrenals  are not completely imaged. Recommend PET/CT an/or consideration for  mediastinal lymph node sampling and/or diagnostic thoracentesis to  exclude malignancy.    STEPHANIE DUVAL MD         SYSTEM ID:  [id number]

## 2023-03-10 NOTE — PROGRESS NOTES
Ax1 with a gait belt.     Pt is [place] speaking and requires an  to communicate.     CT scan completed this AM.    PIV was leaking from the site this AM-- flyer called to place new IV-- placed in the RUE.      Most of the subjective assessment was answered by family members relaying information.      Difficult to relay information even with the use of an .  I/O completed to the best of staff ability.  Pt has been urinating in the urinal, but does not frequently report it and has gone in the toilet this shift.     72h hold was placed this shift-- pt was not agreeable to stay in the hospital until the results came back-- 72h hold in effect until lab tests are completed.      LBM 3/10.      Pt has seemed agitated at times and has been frustrated about having to stay in the hospital.

## 2023-03-10 NOTE — PLAN OF CARE
Goal Outcome Evaluation:      Plan of Care Reviewed With: patient    Overall Patient Progress: improving    Outcome Evaluation: Patient likely with poor intakes prior to admission. Agreeable to Ensure and would like to continue that at home.

## 2023-03-10 NOTE — PROGRESS NOTES
"CLINICAL SWALLOW EVALUATION     03/10/23 1401   Appointment Info   Signing Clinician's Name / Credentials (SLP) [facility]   General Information   Onset of Illness/Injury or Date of Surgery 03/09/23   Referring Physician Neyda Couch MD   Patient/Family Therapy Goal Statement (SLP) Patient did not state; agreed to swallow evaluation.   Pertinent History of Current Problem Per provider note: \"Job Kaiser is an 81 yo [place] gentleman w/ h/o prior tuberculosis s/p treatment with 3 drug therapy for 6 weeks in [place], asthma, COPD, bronchiectasis, pulmonary fibrosis, T2DM, HFpEF with last EF 65% presenting with fatigue, shortness of breath, and pain with urination, with labs and imaging concerning for CAP and nephrolithiasis. Patient with recent decreased p.o. intake, family reports weight loss of about 10 pounds.  S/p 3 L fluids in the ED. Nursing reports trouble swallowing pills.  No history of dysphagia. Oral candidiasis observed on physical exam. Patient lives at home with his son who is a PCA, also has help from 2 of his other children who brings him food.\" CXR: Persistent changes of pulmonary fibrosis with  new/enlarging right pleural effusion. There may be some scattered  superimposed alveolar opacities as well, developing pneumonia is not  excluded. No pneumothorax.   General Observations Patient awake, cooperative. Patient's cousin present. Patient/family reported patient usually eats soft foods cut into small 1 inch size pieces. They report 4 month hx of increased coughing. RN erported patient took pills whole with pudding without swallowing concerns and that he has otherwise had minimal oral intake today. RN note yesterday reported difficulty swallowing pills.       Present yes   Language [place]  (iPad)   Type of Evaluation   Type of Evaluation Swallow Evaluation   Oral Motor   Oral Musculature generally intact   Structural Abnormalities none present   Mucosal Quality " dry  (thrush per MD)   Dentition (Oral Motor)   Dentition (Oral Motor) edentulous;dental appliance/dentures   Dental Appliance/Denture (Oral Motor) not used when eating  (patient reported he does not typically wear his dentures)   Facial Symmetry (Oral Motor)   Facial Symmetry (Oral Motor) WNL   Lip Function (Oral Motor)   Lip Range of Motion (Oral Motor) WNL   Lip Strength (Oral Motor) WFL   Tongue Function (Oral Motor)   Tongue Coordination/Speed (Oral Motor) WNL   Tongue ROM (Oral Motor) WNL   Jaw Function (Oral Motor)   Jaw Function (Oral Motor) WNL   Cough/Swallow/Gag Reflex (Oral Motor)   Soft Palate/Velum (Oral Motor) unable/difficult to assess   Volitional Throat Clear/Cough (Oral Motor) reduced strength   Vocal Quality/Secretion Management (Oral Motor)   Vocal Quality (Oral Motor) breathy;hoarse;hypophonic  (variable loudness)   Secretion Management (Oral Motor) WNL   General Swallowing Observations   Past History of Dysphagia None per EMR review. Patient's cousin reported patient does well with soup and soft foods and that patient/family modify foods for ease of chewing given missing dentition.They reported no other swallowing difficulty.   Respiratory Support (General Swallowing Observations) nasal cannula   Current Diet/Method of Nutritional Intake (General Swallowing Observations, NIS) regular diet;thin liquids (level 0)   Swallowing Evaluation Clinical swallow evaluation   Clinical Swallow Evaluation   Feeding Assistance set up only required   Clinical Swallow Evaluation Textures Trialed thin liquids;mildly thick liquids;pureed;solid foods   Clinical Swallow Eval: Thin Liquid Texture Trial   Mode of Presentation, Thin Liquids straw;spoon;fed by clinician;self-fed   Volume of Liquid or Food Presented 3-4 oz thin water   Oral Phase of Swallow   (incoordination)   Pharyngeal Phase of Swallow throat clearing   Diagnostic Statement Patient's cousin reported throat clearing not typical for patient.    Clinical Swallow Eval: Mildly Thick Liquids   Diagnostic Statement Prepared and planned to assess mildly thick liquids, however MD came into room for assessment and recommends patient move to negative airflow room while ruling out TB. MD recommended SLP return at that time. Not able to assess this consistency this date.   Clinical Swallow Evaluation: Puree Solid Texture Trial   Mode of Presentation, Puree spoon;self-fed   Volume of Puree Presented 3 bites applesauce   Oral Phase, Puree WFL   Pharyngeal Phase, Puree intact   Diagnostic Statement No overt aspiration signs occurerd.   Clinical Swallow Evaluation: Solid Food Texture Trial   Diagnostic Statement Not able to assess this date (see above).   Swallowing Recommendations   Diet Consistency Recommendations regular diet;thin liquids (level 0)  (until further assessment can be completed; select softer menu items and cut into small 1 inch size pieces)   Supervision Level for Intake close supervision needed   Mode of Delivery Recommendations bolus size, small;slow rate of intake   Recommended Feeding/Eating Techniques (Swallow Eval) maintain upright sitting position for eating;set-up and prepare tray   Medication Administration Recommendations, Swallowing (SLP) One at a time or crushed.   General Therapy Interventions   Planned Therapy Interventions Dysphagia Treatment   Dysphagia treatment Instruction of safe swallow strategies   Clinical Impression   Criteria for Skilled Therapeutic Interventions Met (SLP Eval) Yes, treatment indicated   SLP Diagnosis Dysphagia   Risks & Benefits of therapy have been explained evaluation/treatment results reviewed;care plan/treatment goals reviewed;risks/benefits reviewed;current/potential barriers reviewed;participants voiced agreement with care plan;participants included;patient  (Not able to complete full exam this date (see above). Will complete during next session.)   Clinical Impression Comments Clinical swallow  evaluation initiated this date, but unable to complete full exam due to MD visit and request for SLP to return once patient in negative airflow room while ruling out TB. Patient assessed with sips of thin liquid and applesauce. Note mild throat clearing with sips of thin liquid. No overt aspiration signs occurred with puree texture. Not able to assess alternate liquid or solid consistencies this date. Recommend cautious continuation of current regular diet and thin liquids given close supervision and swallow strategies (fully upright position, small single sips/bites, slow pace). Select softer menu items and cut foods into small 1 inch size pieces. Monitor for signs/symptoms of aspiration and hold PO if occur. SLP to assess further 3/11/2023 with further recommendations to follow.   SLP Total Evaluation Time   Eval: oral/pharyngeal swallow function, clinical swallow Minutes (77412) 12   SLP Goals   Therapy Frequency (SLP Eval) 5 times/wk   SLP Predicted Duration/Target Date for Goal Attainment 03/24/23   SLP Goals Swallow   SLP: Safely tolerate diet without signs/symptoms of aspiration Regular diet;Thin liquids   SLP Discharge Planning   SLP Plan Further assess swallow (not able to complete full BSS 3/10). Make further/updated recommendations. TB precautions.   SLP Discharge Recommendation home with home care speech therapy   SLP Rationale for DC Rec Below baseline swallow function.   SLP Brief overview of current status  Recommend cautious continuation of current regular diet and thin liquids given close supervision and swallow strategies (fully upright position, small single sips/bites, slow pace). Select softer menu items and cut foods into small 1 inch size pieces. Monitor for signs/symptoms of aspiration and hold PO if occur. SLP to assess further 3/11/2023 with further recommendations to follow.   Total Session Time   Total Session Time (sum of timed and untimed services) 12

## 2023-03-10 NOTE — PLAN OF CARE
Goal Outcome Evaluation:     VS: VSS   02 > 95% when on oxygen   No chest pain  SOB with any movement.   COPD Exacerbation   Intermittent coughing (some wet cough)- has inhaler from home - PRN orders. Approved by doc to keep at bedside   O2: On O2 @3LPM   Output: Voids spontaneously in urinal with assistance   Last BM: No BM during NOC and none reported by pt/ family   Activity: Assist of 1 with cane @ bedside   Up for meals? Sits up for meals, prefers cultural foods   Skin: Rash under left breast and in perineum. Small healing wound on left butt. See flow sheet for details.   Pain: Headache managed by Tylenol.   CMS: Intact. Patient AO X4, but needs clearer communication to understand when completing cares and giving meds.    Dressing: On R- PIV- CDI   Diet: Regular diet; Cultural foods   LDA: R-PIV - Fore arm    Plan : Per doc, continue to run and complete tests to determine what infection pt has,   Continue with ABX & IV Fluids         Additional Info: Nurse noted difficulties swallowing while giving meds. Sons not sure how he takes his pills at home. Doc consulted and approved to crush pills. Given with apple sauce, but needs constant encouragement to take his meds and son who is at bedside overnight (permission granted) assists with continuous encouragement and explanation. SLP ordered.     Declined Novalox     HX of pneumonia & COPD, Has UTI, possible memory issues due to age, abnormal xrays,

## 2023-03-10 NOTE — PROGRESS NOTES
Pt ripped off his R PIV on fore arm that was newly placed due to pt discomfort and pain. New R PIV upper fore arm placed at 0645. Pt sleeping, IV ABX tubing keep occluding from folding elbow. RN in the AM notified

## 2023-03-10 NOTE — PROGRESS NOTES
"ID brief note re TB rule out    See consult note by Alissa Ventura for detailed info of HPI    Brief pertinent details of TB history:   Pt treated for TB 40 y ago(uknown latent vs active), recently with productive, cough, SOB, weakness, night sweats, and weight loss over the past year. Also h/o hemoptysis per past notes    CT chest 3/9/2022 with Continued fibrotic lung disease with extensive mediastinal lymphadenopathy which is new and new bilateral pleural effusions which could be malignant.     At recent admission in 11/2022 was seen by ID who recommended 3 AFB sputums smears, but it appear only one was done. Pt with h/o multiple AMA discharges and not wanting to be in the hospital enough to complete an evaluation    Recent CT chest wo contrast 11/19/22  Peripheral honeycombing but with predominantly an upper lobe predominance. The findings can be seen in the setting of hypersensitivity pneumonitis, drug reaction or collagen vascular disease. TB could be a consideration although this pattern of disease would be somewhat atypical.      Deidre Hendricks 3/1/2015:   \"History of Tuberculosis. Per pulm clinic notes, previous providers have indicated prior TB treatment (3 drug x 6 wks in [facility] 1996) but patient unable to corroborate this. Negative sputum AFB x 3 at [facility] 4/2014. Has declined outpatient sputum samples to either look for airway colonization or ongoing AFB infection. Despite look of CXR, was thought unlikely to have active TB during recent pulm clinic visit. Some possibility of reactivation exists given intermittent blood in sputum, chest imaging, night sweats (also reports weight loss but chart review does not support). - Patient was placed in airborne isolation and did provide a single sputum AFB stain/culture (results still pending). Will contact TB clinic tomorrow (Monday) to inform them of patient's situation.\"  - it appears this did not happen    2014 [facility] records suggest failure to show up for TB " "rule out testing as well:   \"History of treated TB in [place] 1995 or 1996. History positive TB skin test 2004.   4.14.14 addendum. Telephone call to patient with [employer] [place] . [facility] first sputum collection for AFB #1 of 3. Taxi ordered. Will arrange sputums #2 and #3 and have patient return to see Dr. Sifuentes.  4.17.14 Patient failed above appointment. Telephone call to patient 2 times today - voicemail box full. Unable to leave message.  4.21.14 Addendum. Telephone call to patient with [place] [employer] . Voicemail message left requesting that he call me.  4.22.14 addendum. TB  stopped by patient's house yesterday to remind him of appts and give letter for 4.28.14 [facility] appointment. Telephone call today with [place] [employer] . Patient had difficulty talking and the  had a hard time understanding him. I explained the need for sputum collection prior to the appointment on 4.28.14. Patient stated that he is very sick and if he is feeling better tomorrow will come to the clinic. Patient stated \"you are crazy\" and does not wish to come back and forth for so many appointments because he is so sick. Appt with taxi scheduled for tomorrow - if patient fails appt - we will attempt to have him collect a specimen at home.  4.23.14 TB  brought patient to clinic to collect sputum. Will have him collect #2 and #3 and home and Outreach will deliver to clinic.  5.2.14 addendum. Patient failed above appointment. Telephone call to patient for purpose of rescheduling appt. Voicemail mailbox full- unable to leave message. Letter sent - see Letters.  5.8.14 addendum. Telephone call to patient yesterday with [place] [employer] . Explained to patient that his appointment needed to be rescheduled - he became upset and said that he is never coming to this clinic again. " "He came and we did not see him. There is no record of patient arriving at clinic for his appt. MD notified. \"    We discussed with [facility]. They do not have a formal policy when such pts want to leave AMA. [facility] practice is to have a 72 hour hold for such pts. MD recommendation is per discretion of hospital     ID Recs re TB:  - Upper lobe predominant disease in pt with likely incompletely treated (6 weeks) TB in the past with characteristic TB symptoms including night sweats, weight loss, hemoptysis history, productive cough,and worsening CT chest findings, with history of multiple episodes of leaving before sputum AFBs could be done and history of not showing up to clinic for completing sputum AFBs  - The risk to public health of allowing this pt to be discharged prior to 3 AFB smears being completed are significant  - Therefore unfortunately would have to recommend a 72 hour hold for completion of 3 AFB smears and cultures with TB PCR, 8 hours apart, one of them an early morning sample  - Discussed placing pt on airborne isolation this afternoon w primary    Dw primary    Ngozi Lee  Staff Physician  Division of Infectious Diseases       "

## 2023-03-10 NOTE — PROGRESS NOTES
SPIRITUAL HEALTH SERVICES  SPIRITUAL ASSESSMENT Progress Note  [facility] ([facility]) UR ORTHO     REFERRAL SOURCE: Self initiated  visit    Pt was in his room greeted me in Islamic greetings and he welcomed the visit. Pt accompanied by his son, who was mainly conversant.  Son mentioned his father was admitted yesterday for asthma. He shared his father is restless. I prayed for his father, and provided him with Islamic prayer booklet.    PLAN: [facility] will remain the same    Liza Cloud  Chaplain Resident  Phone: [phone]

## 2023-03-10 NOTE — PROGRESS NOTES
CLINICAL NUTRITION SERVICES - ASSESSMENT NOTE     Nutrition Prescription    RECOMMENDATIONS FOR MDs/PROVIDERS TO ORDER:  Encourage oral intakes, offer/provide snacks/supplements PRN between meals    Malnutrition Status:    Severe malnutrition in the context of acute on chronic illness    Recommendations already ordered by Registered Dietitian (RD):  Continue ensure TID    Future/Additional Recommendations:  Monitor labs, intakes, and weight trends.     REASON FOR ASSESSMENT  Job MEREDITH Job is a/an 83 year old male assessed by the dietitian for Provider Order - failure to thrive, poor appetite    NUTRITION/MEDICAL HISTORY  History of prior tuberculosis s/p [facility], asthma, COPD, bronchiectasis, pulmonary fibrosis, T2DM, HFpEF with last EF 65% presenting with fatigue, shortness of breath, and pain with urination, with labs and imaging concerning for CAP and nephrolithiasis.    FINDINGS  RD met with patient and son at bedside. Patient states he is getting better. Son states he would like information and Ensure. Son helping to clean pt during visit and  having a difficult time understanding pt and son, so visit cut short. Provided coupons for Ensure for pt.    CURRENT NUTRITION ORDERS  Diet: Regular  Snacks/supplements: Ensure Enlive with meals    LABS   03/09/23 13:56 03/10/23 07:04   Sodium 141 142   Potassium 4.8 4.7   Urea Nitrogen 25.3 (H) 21.4   Creatinine 1.09 0.93   Magnesium 1.9    Phosphorus 2.5    ALT 13    AST 54 (H)    CRP Inflammation 11.15 (H)    Glucose 118 (H) 121 (H)     MEDICATIONS  Medications reviewed: cefepime, cipro, ferrous sulfate, prednisone    ANTHROPOMETRICS  Height: 182.9 cm (6')  Most Recent Weight: 58.4 kg (128 lb 12 oz)    IBW: 80.9 kg (72% IBW)  BMI: Underweight BMI <18.5  Weight History: Pt with limited weight history prior to admission, with 21 lb (14%) weight loss in just over 3 months.    Wt Readings from Last Encounters:    03/09/23 58.4 kg (128 lb 12 oz)   02/17/23 59 kg (130 lb) - Care everywhere   01/18/23 69.4 kg (153 lb)   11/23/22 67.9 kg (149 lb 11.1 oz)   11/18/22 80.6 kg (177 lb 12.8 oz)      10/11/22 72.1 kg (159 lb)   05/06/22 73 kg (161 lb)   02/06/20 85.7 kg (189 lb)   03/01/15 85.9 kg (189 lb 6.4 oz)   02/02/15 87.3 kg (192 lb 6.4 oz)   02/11/13 84.8 kg (186 lb 14.4 oz)     Dosing Weight: 58 kg - most recent weight.     ASSESSED NUTRITION NEEDS  Estimated Energy Needs: 0759-9818+ kcals/day (25 - 30+ kcals/kg)  Justification: Maintenance  Estimated Protein Needs: 64-81 grams protein/day (1.1 - 1.4 grams of pro/kg)  Justification: HF guidelines  Estimated Fluid Needs: 1 ml/kcal or per provider pending fluid status    PHYSICAL FINDINGS  See malnutrition section below.  Overall ill appearing     MALNUTRITION  % Intake: Unable to assess  % Weight Loss: > 7.5% in 3 months (severe)  Subcutaneous Fat Loss: Global mild-moderate  Muscle Loss: Global moderate-severe  Fluid Accumulation/Edema: None noted  Malnutrition Diagnosis: Severe malnutrition in the context of acute on chronic illness    NUTRITION DIAGNOSIS  Inadequate oral intake related to poor/varied appetite vs increased needs as evidenced by evident muscle/fat wasting.     INTERVENTIONS  Implementation  Medical food supplement therapy - continue as ordered    Goals  Patient to consume % of nutritionally adequate meal trays TID, or the equivalent with supplements/snacks.     Monitoring/Evaluation  Progress toward goals will be monitored and evaluated per protocol.    Yasmin Valverde MS, RDN, LDN  RD pager: [phone]  WB Weekend/Holiday Pager: [phone]

## 2023-03-11 NOTE — CONSULTS
3/11/2023 @ 1613  Care Management Initial Consult    General Information  Assessment completed with: VM-chart review, Family, Children, cousin and son Kelly  Type of CM/SW Visit: Initial Assessment    Primary Care Provider verified and updated as needed:     Readmission within the last 30 days:        Reason for Consult: discharge planning  Advance Care Planning: Advance Care Planning Reviewed: no concerns identified, questions answered       Communication Assessment  Patient's communication style: spoken language (non-English)    Hearing Difficulty or Deaf: no      Cognitive  Cognitive/Neuro/Behavioral: .WDL except, all  Level of Consciousness: confused  Arousal Level: arouses to touch/gentle shaking  Orientation: disoriented x 4  Mood/Behavior: restless  Best Language: 0 - No aphasia  Speech: garbled, hoarse, incoherent    Living Environment:   People in home: child(nivia), adult  Kelly Weir and Ally Weir  Current living Arrangements: apartment      Able to return to prior arrangements: yes     Family/Social Support:  Care provided by: child(nivia)  Provides care for: no one, unable/limited ability to care for self  Marital Status: Single  Children          Description of Support System: Supportive, Involved    Support Assessment: Adequate family and caregiver support, Adequate social supports    Current Resources:   Patient receiving home care services: No     Community Resources: Other (see comment) ([facility])  Equipment currently used at home: cane, straight  Supplies currently used at home:      Employment/Financial:  Employment Status: retired        Financial Concerns: No concerns identified   Referral to Financial Worker: No     Lifestyle & Psychosocial Needs:  Social Determinants of Health     Tobacco Use: Medium Risk     Smoking Tobacco Use: Former     Smokeless Tobacco Use: Unknown     Passive Exposure: Not on file   Alcohol Use: Not on file   Financial Resource Strain: Not on  file   Food Insecurity: Not on file   Transportation Needs: Not on file   Physical Activity: Not on file   Stress: Not on file   Social Connections: Not on file   Intimate Partner Violence: Not on file   Depression: Not at risk     PHQ-2 Score: 0   Housing Stability: Not on file     Functional Status:  Prior to admission patient needed assistance:   Dependent ADLs:: Ambulation-cane, Bathing, Dressing, Eating, Grooming, Incontinence, Transfers, Positioning, Toileting  Dependent IADLs:: Cleaning, Cooking, Laundry, Shopping, Meal Preparation, Medication Management, Incontinence, Transportation, Money Management     Mental Health Status:  Mental Health Status: No Current Concerns       Chemical Dependency Status:  Chemical Dependency Status: No Current Concerns           Values/Beliefs:  Spiritual, Cultural Beliefs, Hindu Practices, Values that affect care: no             Additional Information:    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 They were asking about  hours - writer referred them to their  through [facility] as this would come through his EW. They may be interested in Skilled Care Homecare if he meets the criteria. We will need to call family when he is medically ready for discharge so they can get the ride set up.     SW/CM will continue to follow.    Geneva Prather [facility] Hospital[facility] MSW   3/11/2023       Social Work and Care Management Department       SEARCHABLE in [facility] - search SOCIAL WORK       [place] (0800 - 1630) Saturday and Sunday     Units: 4A, 4C, & 4E Pager: [phone]     Units: 5A & 5B Pager: [phone]     Units: 6A & 6B   Pager: [phone]     Units: 6C & 6D Pager: [phone]     Units: 7A &7B  Pager: [phone]     Units: 7C, 7D, & 5C Pager: [phone]     Unit: [place] ED Pager: [phone]      [facility] (0800-1630) Saturday and Sunday      Units: 5 Ortho, 5 Med/Surg & WB  ED  Pager:[phone]     Units: 6 Med/Surg, 8A, & 10A ICU  Pager: [phone]        After hours (1630 - 0000) Saturday & Sunday; (7575-5601) Mon-Fri; (3000-7686) [facility] Recognized Holidays     Units: ALL  Pager: [phone]             3/11/2023 at 1213  Care Management Follow Up    Length of Stay (days): 2    Expected Discharge Date: TBD   Concerns to be Addressed: Discharge planning and assessment   Patient plan of care discussed at interdisciplinary rounds: no - weekend    Anticipated Discharge Disposition:  TBD     Anticipated Discharge Services:  TBD  Anticipated Discharge DME:  TBD    Patient/family educated on Medicare website which has current facility and service quality ratings:  N/a  Education Provided on the Discharge Plan:  N/a  Patient/Family in Agreement with the Plan:  TBD    Referrals Placed by CM/SW:  N/a  Private pay costs discussed: Not applicable    Additional Information:    Instructed by weekday  to complete assessment for this 83 year old male admitted 3.9.2023 with Acute on chronic hypoxic respiratory failure. Patient had a Rapid called this AM and transferred to ICU with BiPAP started.     Attempted to reach son Kelly (listed on face sheet) - no answer and mailbox was full. Writer will attempt again later.     DAMIÁN Hoyos MSW   3/11/2023       Social Work and Care Management Department       [facility] - [facility]       [place] (0800 - 1630) Saturday and Sunday     Units: 4A, 4C, & 4E Pager: [phone]     Units: 5A & 5B Pager: [phone]     Units: 6A & 6B   Pager: [phone]     Units: 6C & 6D Pager: [phone]     Units: 7A &7B  Pager: [phone]     Units: 7C, 7D, & 5C Pager: [phone]     Unit: [place] ED Pager: [phone]      [facility] (7914-3552) Saturday and Sunday      Units: 5 Ortho, 5 Med/Surg & WB ED  Pager:[phone]     Units: 6 Med/Surg, 8A, & 10A ICU  Pager: [phone]        After hours (1630 -  0000) Saturday & Sunday; (7352-2156) Mon-Fri; (6215-3346) [facility] Recognized Holidays     Units: ALL  Pager: [phone]

## 2023-03-11 NOTE — PROGRESS NOTES
Cassandra's Brief Progress Note    Cassandra's team paged for rapid response. RT went to room to deliver scheduled inhalers, patient found to be pulling his O2 mask off, desatting to the 50s, and confused. 1:1 sitter had been ordered earlier in day due to patient being on a hold, was ok'ed by overnight providing team to sit outside room given patient on respiratory precautions, so unknown how long patient was desatting for. Patient started on 10L O2, oxygen returned to 97%. Patient was newly tachycardic to 120s. BP stable at 120s/80s.     On exam, patient appeared confused, increased work of breathing, appeared uncomfortable. Continuously trying to pull mask of. Lungs exam revealed rales in all lung fields, decreased air movement. Cardiac exam revealed tachycardia but no new murmurs or heart sounds. Abdomen soft, non-tender, non-distended. Radial and pedal pulses 2+ bilaterally. Feet cold to touch, hands warm to touch.    Unclear etiology for sudden decompensation. Patient currently being worked up for tuberculosis reactivation vs new malignancy.    Plan:  -EKG ordered, was difficult to interpret due to artifact, revealed sinus tachycardia but no obvious ST elevations or T wave abnormalities.   -Lactate, troponin, VBG, CMP ordered.   -CXR ordered.  -BiPAP started  -Discussed overnight events with family member Xaviererich Carmella over the phone  -Called family member Xaviererich Carmella to provide update. Discussed code status, patient remains full code.  -Patient transferred to ICU

## 2023-03-11 NOTE — PROGRESS NOTES
Got some calls from IDDL/nurse/our NP Alissa. There appears to robert been some confusion about needing to add on MTB pcr testing since this was recommended by MD    To clarify, [facility] usually automatically does MTB PCR as a reflex if AFB stain is positive. So not add on pcr needed at this time    We will continue to wait for the pending afb stains x 2, and will need one more afb stain and culture as previously planned this afternoon, 8 hours from last    I have discontinued the order for pcr testing    Ngozi Lee  Staff Physician  Division of Infectious Diseases

## 2023-03-11 NOTE — PLAN OF CARE
VS: Temp: 98.1  F (36.7  C) Temp src: Axillary BP: (!) 182/92 (artifact, pt moving during BP reading) Pulse: (!) 129   Resp: 25 SpO2: 99 % O2 Device: BiPAP/CPAP Oxygen Delivery: 3 LPM    O2: SpO2 > 95% and stable on 3 LPM NC. Pt had wheezing on expiration. Using abdominal muscles.    Output: Voids spontaneously without difficulty using beside urinal.   Last BM: 3/10/2023 per RN report. BS active.   Activity: Up with Ax1 using walker & GB.    Skin: Intact. Dry.   Pain: Pt mentioned that he had chest pain by pointing at his chest when he was asked about pain.   CMS: Pt was oriented to self only, confused & hard to communicate with. Pt is Panamanian &  was called, but was still hard to communicate with.   Dressing: None   Diet: Regular diet. Denies nausea/vomiting.   BG overnight before transferring to ICU was : 235   LDA: R PIV  Infusing NS TKO.   Equipment: IV pole, personal belongings,    Plan: Continue with plan of care. Call light within reach, pt has 1:1 sitter.   Additional Info: Pt was on 1:1 sitter all the time & didn't leave the pt until Rapid response arrived.  At the beggning of the shift pt was pulling his NC & sitting on the bedside. This writer tried to calm him down, put him back to bed & connect him with NC & his SpO2 was 88-90's on 3L of O2 & bedside sitter was with him. Rt was called to give his nebulizer.  @ 0115 Charge RN called Rapid response & informed this writer that Pt was desating w/ SpO2 going down to 50's. Rt gave his nebulizers & put him on 7L of O2; his SpO2 went up to 90's, but he was still in respiratory distress, using his abdominal muscles & tachycardic to 120s.  was called, but it was hard to communicate with the pt & was confused & agitated. Pt was transferred to ICU & BiPAP was started, & report was given to ICU RN.

## 2023-03-11 NOTE — PROGRESS NOTES
[facility]   BRIEF PROGRESS NOTE FOR 72-HOUR-HOLD PLACEMENT    SUMMARY/PLAN:    Patient determined to be at risk of reactivation of TB given known history of likely inadequately treated infection, indolent course of symptoms, immune suppression due to chronic steroid use, and CT Chest with upper lobe predominant disease. Poses risk to public safety due to spread, particularly given that patient lives in an apartment complex with other people and may have drug-resistance due to previous partial treatment that could have greater infectivity. Requires rule out with AFB x3. Quant Gold not adequate due to previous infection. Concern that patient would not be able to safely complete outpatient rule out due to history of AMA discharges and non adherence  with follow-up on previous attempts (see ID note 3/10 for details)- as well as risk of spread while completing outpatient rule out. [facility] recommended completing rule out in hospital if high suspicion for TB reactivation with emergency hold if required for the interest in public health. Per ID evaluation, they are concerned about reactivation and recommended placing hold if patient desires AMA discharge. After long conversation with patient and family, they continued to decline testing and requested to leave. Based on all information available, weighing patient autonomy with public health interest, it was determined that a 72-hour-hold was necessary in order to complete testing to rule out TB and prevent possible spread to public.   - 72 hour hold placed 3/10 at 1651   - Airborne precautions   - 1:1 sitter due to risk of elopement   - Please see daily rounding note for full A/P     ASSESSMENT:  Patient presents with indolent course of dyspnea, productive cough, and weight loss. CT Chest 3/10 with worsening upper lobe predominant disease, no cavitary lesions noted on read. Notable history of TB treated with three drugs for six weeks in [place]- likely  inadequate treatment based on current recommendations. Per chart review, multiple attempts to rule out reactivation of TB unsuccessful due to patient lost to follow-up for additional tests/appointments.     Discussed with ID team- who have moderately high suspicion for reactivation of TB given current presentation and history- as well as patient being at risk due to immune suppression with chronic steroid use. If reactivation is present, the patient could have drug-resistant TB given previous partial treatment. Patient also lives in apartment complex with higher risk of spread. They recommended rule out TB with three negative AFB smears at least eight hours apart. Quant Gold not adequate given known history of infection in past. Family and patient did not want to stay for testing to rule out reactivation. They feel it is unlikely that he has active TB given that he lives with his son, who is asymptomatic. Given risk to public health with untreated TB infection, ID team reach out to [facility] and [facility]. Per [facility], there is nor formal policy for testing and protocol would fall to the discretion of hospital. Per [facility], it is recommended to perform testing in hospital if adequate suspicion for TB exists given significant risk of spread and concern for safety of public. [facility] reported that there is precedent at [facility] for placing hold to complete rule-out TB testing in the interest of public health when patients are desiring to leave AMA. Discussed with Ethics- who stated that both public safety and patient autonomy must be weighed, but that placing a hold would be reasonable in the interest of public health and safety. ID recommendation was to place hold in order to complete TB rule out. They voiced concerns given history of AMA discharges and non-adherence to follow up that rule out could not be reliably completed as outpatient- particularly given risks of spread by living in apartment complex.      After the above consultations, I returned to the room and discussed with patient/family. Formal  was offered, but they declined. They requested that family member translate. The information above was relayed to the patient. Expressed importance of determining if active infection was present both for patient and public safety. For patient safety, this infection could be contributing to the patient's distressing symptoms and requires adequate treatment. From a public health standpoint, the infectious is highly contagious and requires rule-out to contain spread. Patient and son continued to decline staying. They stated that unless there is a 100% certainty that the infection is present, then they would prefer discharge home. Explained that we need three samples eight hours apart in order to prove that infection is not active. They continued to request discharge and asked for patient's IV to be removed. Eventually, I conveyed that a hold would be placed in the interest of public health and safety in order to rule out active TB infection. Patient informed that he is not able to leave the hospital at this time due to concerns for public health. Discussed that if he would try to leave that he would be stopped by staff. Patient and family frustrated by this decision. Answered their questions/concerns as best as I could, and stepped out to provide them space to process the information.     Discussed above with Dr. Gaby Rodriguez prior to placing the hold, and she was in agreement with plan.     Chelsey Nolen MD  6:27 PM

## 2023-03-11 NOTE — PLAN OF CARE
VS: BP (!) 149/56 (BP Location: Right arm)   Pulse 77   Temp (!) 96  F (35.6  C) (Axillary)   Resp 20   Wt 58.4 kg (128 lb 12 oz)   SpO2 100%   BMI 17.46 kg/m     O2: O2>90% on 2L via nasal cannula, SOB on exertion  Lung sounds wheezy   On continuous pulse Ox    Output: Voids spontaneously into bedside urinal, needs assistance with placement. Pt prefers to stand to urinate at side of bed, declined use of bedside commode    Last BM: 3/10 per pt report    Activity: Ax1 w/ gb, pt declined use of walker, Steady gait but has generalized weakness    Up for meals? Sits up for meals   Skin: Declined full skin assessment this shift, visible skin intact    Pain: Denied pain    CMS: AOx4, denies numbness and tingling    Dressing: None    Diet: Reg, denies nausea and vomiting. Poor appetite this shift   Crush meds with apple sauce    LDA: R forearm PIV TKO w/ intermittent antibiotics    Plan: Continue POC for IV antibiotics   Additional Info: Tanzanian speaking only, needs  to communicate, family was present to help translate    on 72 hour hold starting today, 3/10/23 at 1651 due to previously stating to want to leave AMA   On 1:1, sitter outside room due to airborne precautions  Has family at bedside

## 2023-03-11 NOTE — PROGRESS NOTES
ID brief weekend cross cover note    Noted ON had desaturation, puling mask off, on BIPAP and transferred to ICU, confused, tachycardiac, BP stable, noted switch to Vanc and Zosyn (due to c/f cefepime neurotox)    WBC 12-->11-->20    Procalcitonin  7-->2    Troponin 29-->47-->83  Cr ok. UA unremarkable    CXR with                                                               1.  Unchanged patchy opacities suggesting chronic fibrotic changes on  the right..  2.  Decreased right-sided pleural effusion.  3.  Unchanged small left-sided pleural effusion.    Noted AFB from sputum x 2 pending     RECOMMENDATION:  - Sputum PJP PCR and DFA might need recollection, noted add on still pending  - Please get urine histoplasma antigen  - Please get serum blastomyces antigen  - Awaiting AFB smear results x 2, and collection of third sample  -  BD glucan, asp galactoannan, Strep pneumoniae, legionella urine antigen, aspergillus galctomannan, sputum culture, MRSA nares results pending  - Ok to continue Vanc and Zosyn  - Agree on finding out baseline mental status, based on our interactions yesterday feel some amount of delirium on underlying dementia likely at play  - Continue Bactrim  - Continue airborne precautions, on 72 hour hold  - If all negative, will need a bronch/biopsy for malignancy eval eventually     I have reviewed interval events, vital, labs, images, cultures and antibiotics    Ngozi Lee  Staff Physician  Division of Infectious Diseases

## 2023-03-11 NOTE — H&P
MEDICAL ICU H&P  03/11/2023    Date of Hospital Admission: 03/09/2023  Date of ICU Admission: 03/11/2023  Reason for Critical Care Admission: Acute on chronic hypoxic respiratory failure  Date of Service (when I saw the patient): 03/11/2023    ASSESSMENT: Job Kaiser is an 84 yo [place]ambican speaking gentleman w/ h/o prior tuberculosis s/p treatment with 3 drug therapy for 6 weeks in [facility], asthma, COPD on 2L home 02, bronchiectasis, pulmonary fibrosis, T2DM, HFpEF  (last EF 65%) who presented on 3/9/2023 with progressive fatigue, shortness of breath, productive cough, weight loss, and pain with urination, with labs and imaging concerning for CAP vs. incompletely treated respiratory TB and nephrolithiasis. Pt initiated on cefepime and prophylactic bactrim given longstanding steroid use, with initial improvement in 02 needs to baseline of 2L per NC. On 3/11, pt was found to be confused, tachycardic to 140's and hypoxic with sats in the 50's after pulling off his supplemental 02. Given the abrupt change in mental status and 02 needs, Mr. Kaiser was transferred to the ICU for bipap therapy and potential need for mechanical intubation.       CHANGES and MAJOR THINGS TODAY:   - Admit to ICU  - Bipap   - ABG   - PRN haldol, precedex for agitation  - add vancomycin     PLAN:    Neuro:  # Encephalopathy, likely metabolic  # Acute Pain  # Agitation  # Insomnia  - PRN tylenol available  - minimize sedating medications as able   - delirium precautions  - Precedex for agitation  - Melatonin 5mg q HS   - PRN haldol available   - PRN hydroxyzine 25-50mg q 6H    - continue scheduled PM olanzapine   - RASS goal 0 to -1       Pulmonary:  # Acute on chronic hypoxic hypercapneic respiratory failure, broad differential CAP vs. Aspiration PNA vs. TB vs. PE vs. COPD exacerbation vs. Atypical PNA (at risk for PJP given long term steroid use without PJP prophy)   # COPD on 2L home 02  # Asthma  # History of Pulmonary  fibrosis/bronchiectasis  # Mediastinal lymphadenopathy per Chest CT  # Bilateral pleural effusion concerning for malignancy per Chest CT 3/10/2022  - Bipap to keep sats > 90%  - scheduled duonebs, mucomyst  - continue PTA prednisone 20mg q day   - hold PTA BREO ellipta (pt unable to take)   - CXR on ICU admission  - ABG when on bipap   - see ID for infectious work up/plan  - Consider CT PE if d-dimer elevated   - check d dimer    - Plan for oncology, pulmonology and PET scan referral at discharge   - Consider diagnostic paracentesis when stable     # History of incompletely treated TB Upper lobe predominant disease in pt with likely incompletely treated (6 weeks) TB in the past with characteristic TB symptoms including night sweats, weight loss, hemoptysis history, productive cough,and worsening CT chest findings, with history of multiple episodes of leaving before sputum AFBs could be done and history of not showing up to clinic for completing sputum AFBs  - airborne isolation   - Collecting 3 AFB smears and cultures with TB PCR 8 hours apart, next collections due at 0600, 1500  - Placed on a 72H hold 3/10 while AFB smears collected given pt's attempts to leave AMA and concern for public health risk.       Cardiovascular:  # Tachycardia, normotensive, unclear if secondary to agitation, hypovolemia, hypoxia.   # Elevated Troponin. Trop mildly elevated at 47. Pt complains of chest pain, suspect pleuritic in nature.  # HFpEF  - repeat trop in 3 hours   - Goal MAP > 65  - Continuous cardiac monitoring   - consider echo in am if ongoing tachycardia     GI/Nutrition:  # Severe Malnutrition in the context of acute on chronic illness   # Poor PO intake  # Failure to thrive  - NPO given tenuous respiratory status   - Nutrition and SLP following     # Thrush.   Oral candidiasis observed on physical exam.  Patient has chronic prednisone use as well as uses steroid inhaler at home, likely steroid use is source of thrush.   -  Nystatin rinse 4 times daily ordered  - Educate patient and family regarding rinsing mouth after inhalers    Renal/Fluids/Electrolytes:  # AGMA   # History of nephrolithiasis  # Volume status   # Hypercalcemia  Previous nephrolithiasis x2, requiring lithotripsy once. Admitted with dysuria and left CVA tenderness. UA with blood/RBC. Not consistent with infection. CT Abdomen 3/9 with stable large left kidney stone with mild dilation of collecting system- unchanged from previous.   - Monitor I/O closely   - abx per ID section   - Follow urine culture  - Electrolyte replacements per ICU protocol  - 250mL LR bolus X 2    Endocrine:  # DM2  # Stress and steroid induced hyperglycemia  - sliding scale insulin coverage   - Goal BG < 180  - Hypoglycemia protocols      ID:  # CAP vs. PJP pneumonia vs. Reactivated TB   - order blood, urine, sputum cx  - strep pneumo and legionella antigen   - Check MRSA, RVP  - Continue plan to collect AFB sputum cx X 3   - will discontinue cefepime in favor of zosyn given encephalopathy  - add vancomycin   - ID following, appreciate recommendations     Hematology:    # Anemia of chronic illness  # Leukocytosis  - Trend CBC daily    Musculoskeletal:  # Weakness/Deconditioning  -OT/PT consults      Skin:  No active issues  - Appreciate diligent nursing cares     General Cares/Prophylaxis:    DVT Prophylaxis: Enoxaparin (Lovenox) SQ  GI Prophylaxis: Not indicated  Restraints: Bilateral Mitts   Family Communication: Updated son Ally and niece Natacha per phone   Code Status: Full Code     Lines/tubes/drains:  - PIV x 2      Disposition:  - Medical ICU     Patient seen and findings/plan discussed with medical ICU staff, Dr. Meena Navarro, CNP      Clinically Significant Risk Factors           # Hypercalcemia: Highest Ca = 10.6 mg/dL in last 2 days, will monitor as appropriate   # Anion Gap Metabolic Acidosis: Highest Anion Gap = 20 mmol/L in last 2 days, will monitor and treat as  appropriate  # Hypoalbuminemia: Lowest albumin = 2.8 g/dL at 3/9/2023  1:56 PM, will monitor as appropriate            # Severe Malnutrition: based on nutrition assessment, PRESENT ON ADMISSION                -----------------------------------------------------------------------    HISTORY PRESENTING ILLNESS:  Job Kaiser is an 82 yo English speaking gentleman w/ h/o prior tuberculosis s/p treatment with 3 drug therapy for 6 weeks in [facility], asthma, COPD on 2L home 02, bronchiectasis, pulmonary fibrosis, T2DM, HFpEF  (last EF 65%) who presented on 3/9/2023 with progressive fatigue, shortness of breath, productive cough, weight loss, and pain with urination, with labs and imaging concerning for CAP vs. incompletely treated respiratory TB and nephrolithiasis. Pt initiated on cefepime and prophylactic bactrim given longstanding steroid use, with initial improvement in 02 needs to baseline of 2L per NC. On 3/11, pt was found to be confused, tachycardic to 140's and hypoxic with sats in the 50's after pulling off his supplemental 02. Given the abrupt change in mental status and 02 needs, Mr. Kaiser was transferred to the ICU for bipap therapy and potential need for mechanical intubation.     REVIEW OF SYSTEMS: Unable to assess given encephalopathy    PAST MEDICAL HISTORY:   Past Medical History:   Diagnosis Date     Bronchiectasis (H)      COPD (chronic obstructive pulmonary disease) (H)      Influenza A 2022     Nephrolithiasis      Tuberculosis, treated      SURGICAL HISTORY:  Past Surgical History:   Procedure Laterality Date     NO HISTORY OF SURGERY       SOCIAL HISTORY:  Social History     Socioeconomic History     Marital status: Single     Spouse name: None     Number of children: None     Years of education: None     Highest education level: None   Tobacco Use     Smoking status: Former     Types: Cigarettes     Quit date: 1995     Years since quittin.2   Substance and Sexual Activity      Alcohol use: No     Drug use: No     Sexual activity: Not Currently   Social History Narrative    From [place], moved to the [place] in 2004. Previously worked as a [employer], but not working currently.     FAMILY HISTORY:   Family History   Problem Relation Age of Onset     Glaucoma No family hx of      Macular Degeneration No family hx of      ALLERGIES:   No Known Allergies  MEDICATIONS:  No current facility-administered medications on file prior to encounter.  albuterol (PROAIR HFA/PROVENTIL HFA/VENTOLIN HFA) 108 (90 Base) MCG/ACT inhaler, Inhale 2 puffs into the lungs every 4 hours as needed for shortness of breath / dyspnea or wheezing  furosemide (LASIX) 20 MG tablet, Take 1 tablet (20 mg) by mouth daily  metFORMIN (GLUCOPHAGE) 1000 MG tablet, Take 1,000 mg by mouth 2 times daily (with meals)  mometasone-formoterol (DULERA) 200-5 MCG/ACT inhaler, Inhale 2 puffs into the lungs 2 times daily  polyvinyl alcohol (LIQUIFILM TEARS) 1.4 % ophthalmic solution, Place 1-2 drops into both eyes as needed  predniSONE (DELTASONE) 20 MG tablet, Take 1 tablet (20 mg) by mouth daily  aspirin 81 MG EC tablet, Take 81 mg by mouth daily  budesonide (PULMICORT) 0.5 MG/2ML neb solution, Take 2 mLs (0.5 mg) by nebulization daily for 360 days  diclofenac (VOLTAREN) 1 % topical gel, Apply topically 4 times daily  ferrous sulfate (FEROSUL) 325 (65 Fe) MG tablet, Take 1 tablet (325 mg) by mouth Every Mon, Wed, Fri Morning  fluticasone (FLONASE) 50 MCG/ACT nasal spray, Spray 1 spray in nostril daily        PHYSICAL EXAMINATION:  Temp:  [96  F (35.6  C)-98.1  F (36.7  C)] 98.1  F (36.7  C)  Pulse:  [62-77] 77  Resp:  [20-22] 20  BP: (120-149)/(48-56) 149/56  FiO2 (%):  [45 %] 45 %  SpO2:  [100 %] 100 %  General: Ill appearing frail, lying in bed, in acute respiratory distress  HEENT: NCAT, PERRL, neck supple & without lymphadenopathy   Neuro: Lethargic, unable to answer orientation questions, no following commands, VILLA x 4.   Pulm/Resp: Coarse  breath sounds bilaterally, no wheezes appreciated. Increased WOB with accesory muscle use.   CV: tachycardia on monitor with no m/r/g   Abdomen: Soft, non-distended, non-tender, hypoactive BS   : Defer   Incisions/Skin: No rashes, wounds, or ecchymosis noted on my cursory exam.    LABS: Reviewed.   Arterial Blood Gases   Recent Labs   Lab 03/11/23  0346   PH 7.29*   PCO2 55*   PO2 98   HCO3 27     Complete Blood Count   Recent Labs   Lab 03/11/23  0259 03/10/23  0704 03/09/23  1358 03/09/23  1356   WBC 20.2* 11.7*  --  12.9*   HGB 10.4* 10.0* 12.6* 11.0*    232  --  263     Basic Metabolic Panel  Recent Labs   Lab 03/11/23  0259 03/11/23  0147 03/10/23  2139 03/10/23  1824 03/10/23  0704 03/09/23  2319 03/09/23  1358 03/09/23  1356     --   --   --  142  --  140 141   POTASSIUM 5.1  --   --   --  4.7  --  4.7 4.8   CHLORIDE 97*  --   --   --  99  --   --  93*   CO2 24  --   --   --  27  --   --  28   BUN 18.9  --   --   --  21.4  --   --  25.3*   CR 0.93  --   --   --  0.93  --   --  1.09   * 235* 152* 149* 121*   < > 119* 118*    < > = values in this interval not displayed.     Liver Function Tests  Recent Labs   Lab 03/11/23 0259 03/09/23  1356   AST 53* 54*   ALT 12 13   ALKPHOS 85 97   BILITOTAL <0.2 <0.2   ALBUMIN 2.5* 2.8*   INR  --  1.38*     Coagulation Profile  Recent Labs   Lab 03/09/23  1356   INR 1.38*       IMAGING:  Recent Results (from the past 24 hour(s))   CT Abdomen Pelvis w/o Contrast    Narrative    EXAMINATION: CT ABDOMEN PELVIS W/O CONTRAST, 3/10/2023 9:51 AM    TECHNIQUE:  Helical CT images from the lung bases through the  symphysis pubis were obtained without contrast.  Coronal reformatted  images were generated at a workstation for further assessment.    COMPARISON: CT abdomen pelvis 6/27/2022, CT chest same day    HISTORY: evaluate for nephrolithiasis    FINDINGS:  Examination is partially limited due to motion artifact and  non-contrast technique.    Abdomen and  pelvis:   Liver: No suspicious liver lesions.   Gallbladder: No gallstones. No evidence of acute cholecystitis.  Spleen: Normal size.  Pancreas: No suspicious pancreatic lesions. The pancreatic duct is not  dilated.  Adrenal glands: No adrenal nodules.  Urinary system: Stable 2.5 x 1.5 x 1.0 cm stone in the left renal  pelvis. Similar very mild left intrarenal dilatation. Unchanged renal  stone burden within the right kidney with a few scattered  subcentimeter nonobstructing stone, the largest measuring up to 4 mm.  No right hydronephrosis. No hydroureter. No stones identified along  the course of the ureters. No stones present in the urinary bladder.  Reproductive organs: Prostatic enlargement.   Bowel: No abnormally dilated loops of large or small bowel. No  abnormal bowel wall thickening. Moderate colonic stool burden.  Scattered colonic diverticulosis without evidence of acute  diverticulitis. The appendix is unremarkable.  Lymph nodes: No retroperitoneal, mesenteric, or pelvic  lymphadenopathy.  Fluid: No free fluid within the abdomen.  Vessels: No infrarenal aortic aneurysm. Calcifications of the  abdominal aorta and iliac arteries.    Lung bases: Right greater than left pleural effusions. The visualized  lung bases demonstrate fibrotic and cystic changes in the lower lobes.    Bones and soft tissues: No suspicious osseous lesions. Mild  degenerative changes throughout the visualized spine. Large right and  small left fat-containing inguinal hernias.      Impression    IMPRESSION:   1.  Stable large renal stone at the left renal pelvis measuring up to  2.5 cm. No significant change in the very mild dilatation of the left  intrarenal collecting system.  2.  Several stable nonobstructing stones in the right kidney.  3.  Moderate colonic stool burden.   4.  Right greater than left pleural effusions. Fibrotic changes in the  visualized lung bases. Please see same day chest CT for full dictation  of the chest.    I  have personally reviewed the examination and initial interpretation  and I agree with the findings.    NEFTALI PALMER MD         SYSTEM ID:  [id number]   CT Chest Hi-Resolution wo Contrast    Narrative    CT chest high resolution without contrast    INDICATION: Chronic lung disease with pulmonary fibrosis    COMPARISON: 7/30/2018    FINDINGS: No contrast. Inspiratory next to respond imaging obtained.  The included thyroid appears unremarkable. The mid ascending aorta is  normal in size. Pulmonary artery is upper normal in size at 3.0 cm.  Multiple rounded densities in the pretracheal and right lower  paratracheal stations are consistent with lymphadenopathy which are  clearly new from prior examination. Subcarinal prominent soft tissue  also effaces the esophagus with apparent lymph node measuring  approximately 2.9 x 2.8 cm. There are new bilateral pleural effusions  which could be malignant. There is no pericardial effusion.  Bone detail shows lower cervical degenerative disc disease. No  suspicious sclerotic or lytic/destructive bone lesion.  Detail of the lungs shows prominent subpleural and other fibrotic  changes in the lungs with lower lobe predominance with cystic  bronchiectasis in the lower lobes. The pleural-based fibrotic changes  certainly could mask superimposed pulmonary nodules.    Expiratory imaging shows minimal air trapping. Major airways shows  some posterior membrane concavity of the trachea without severe  narrowing. Therefore, this would not meet dynamic expiration criteria  for tracheobronchomalacia neck protocol was not performed regardless.      Impression    IMPRESSION: Continued fibrotic lung disease with extensive mediastinal  lymphadenopathy which is new and new bilateral pleural effusions which  could be malignant. No obvious adrenal nodule although the adrenals  are not completely imaged. Recommend PET/CT an/or consideration for  mediastinal lymph node sampling and/or diagnostic  thoracentesis to  exclude malignancy.    STEPHANIE DUVAL MD         SYSTEM ID:  [id number]   XR Chest Port 1 View    Impression    RESIDENT PRELIMINARY INTERPRETATION  IMPRESSION:   1.  Unchanged patchy opacities suggesting chronic fibrotic changes  with or without superimposed multifocal infection.  2.  Improved right-sided pleural effusion, possibly positional.  3.  Unchanged small left-sided pleural effusion.

## 2023-03-11 NOTE — PROGRESS NOTES
Neuro: A&Ox0, not following commands. Pt is Finnish-speaking, but another RN that is also Finnish-speaking stated that he was not following commands and that he was speaking incoherently. Precedex currently paused, see MAR. Attempting to remove BiPAP despite mitts in place and sitter at bedside, not redirectable.  Cardiac: tachycardic, normotensive. V-run of 15 beats at 0513, TRUDI Shona notified and printed out via smart disclosure.  Resp: Currently on BiPAP, 35% FiO2. Coarse lung sounds. Frequent cough  GI/: no bowel movement overnight, incontinent of bladder as per report from 5 med surg, no void since transfer at 0245.  Skin: dry and flakey    Major Shift Events: 500mL LR bolus given, blood cultures drawn, abx started. PRN Haldol and zyprexa given around 0300 due to agitation and hitting staff. Precedex gtt initiated around 0315. 2 PIVs, one in each forearm. Precedex gtt paused at 0640 due to pt becoming hypotensive.    Plan: Plan for CT today, precedex was at 1.2 as per TRUDI verbal order to try to help pt remain still enough for CT. Pt's family plans to visit today, plan to chat with family and ask about pt's baseline status and functioning prior to admission.    For vital signs and complete assessments, please see documentation flowsheets.

## 2023-03-11 NOTE — PHARMACY-VANCOMYCIN DOSING SERVICE
Pharmacy Vancomycin Initial Note  Date of Service 2023  Patient's  1940  83 year old, male    Indication: [facility] Pneumonia    Current estimated CrCl = Estimated Creatinine Clearance: 49.7 mL/min (based on SCr of 0.93 mg/dL).    Creatinine for last 3 days  3/9/2023:  1:56 PM Creatinine 1.09 mg/dL  3/10/2023:  7:04 AM Creatinine 0.93 mg/dL  3/11/2023:  2:59 AM Creatinine 0.93 mg/dL    Recent Vancomycin Level(s) for last 3 days  No results found for requested labs within last 72 hours.      Vancomycin IV Administrations (past 72 hours)      No vancomycin orders with administrations in past 72 hours.                Nephrotoxins and other renal medications (From now, onward)    None          Contrast Orders - past 72 hours (72h ago, onward)    None          [employer] Prediction of Planned Initial Vancomycin Regimen  Loading dose: N/A  Regimen: 1250 mg IV every 24 hours.  Start time: 03:58 on 2023  Exposure target: AUC24 (range)400-600 mg/L.hr   AUC24,ss: 510 mg/L.hr  Probability of AUC24 > 400: 78 %  Ctrough,ss: 14.8 mg/L  Probability of Ctrough,ss > 20: 20 %  Probability of nephrotoxicity (Lodise LONNIE ): 10 %        Plan:  1. Start vancomycin  1250 mg IV q 12 h.   2. Vancomycin monitoring method: AUC  3. Vancomycin therapeutic monitoring goal: 400-600 mg*h/L  4. Pharmacy will check vancomycin levels as appropriate in 1-3 Days.    5. Serum creatinine levels will be ordered daily for the first week of therapy and at least twice weekly for subsequent weeks.      Nicollette McMann, PharmD, BCPS  [facility]  Central Pharmacy: [phone]

## 2023-03-12 NOTE — PROGRESS NOTES
[facility] ICU PROGRESS NOTE  03/12/2023      Date of Service (when I saw the patient): 03/12/2023    ASSESSMENT:  Job Kaiser is an 84 yo Rwandan speaking gentleman w/ h/o prior tuberculosis s/p treatment with 3 drug therapy for 6 weeks in [place], asthma, COPD on 2L home 02, bronchiectasis, pulmonary fibrosis, T2DM, HFpEF  (last EF 65%) who presented on 3/9/2023 with progressive fatigue, shortness of breath, productive cough, weight loss, and pain with urination, with labs and imaging concerning for CAP vs. incompletely treated respiratory TB and nephrolithiasis. Pt initiated on cefepime and prophylactic bactrim given longstanding steroid use, with initial improvement in 02 needs to baseline of 2L per NC. On 3/11, pt was found to be confused, tachycardic to 140's and hypoxic with sats in the 50's after pulling off his supplemental 02. Given the abrupt change in mental status and 02 needs, Mr. Kaiser was transferred to the ICU for bipap therapy and potential need for mechanical intubation.     CHANGES and MAJOR THINGS TODAY:     Last AFB sample obtained    Discontinued Vancomycin    Placed NJ at bedside    IR consulted to help with diagnostic thoracentesis with cytology    Family updated, code status addressed, will need ongoing discussion        PLAN:    Neuro:  # Encephalopathy, likely metabolic  # Acute Pain  # Agitation  # Insomnia  - minimize sedating medications as able   - delirium precautions  - Precedex for agitation  - Melatonin 5mg q HS   - PRN haldol available   - PRN hydroxyzine 25-50mg q 6H    - continue scheduled PM olanzapine   - RASS goal 0 to -1   - PRN tylenol available     Pulmonary:  # Acute on chronic hypoxic hypercapneic respiratory failure, broad differential CAP vs. Aspiration PNA vs. TB vs. PE vs. COPD exacerbation vs. Atypical PNA (at risk for PJP given long term steroid use without PJP prophy)   # COPD on 2L home 02  # Asthma  # History of Pulmonary fibrosis/bronchiectasis  # Mediastinal  lymphadenopathy per Chest CT  # Bilateral pleural effusion concerning for malignancy per Chest CT 3/10/2022  - Bipap to keep sats > 90%  - scheduled duonebs, mucomyst, added pulmicort  - sub methylprednisolone for PTA prednisone 20mg q day   - hold PTA BREO ellipta (pt unable to take)   - see ID for infectious work up/plan  - Plan for oncology, pulmonology and PET scan referral at discharge   - IR consulted for diagnostic thoracentesis     # History of incompletely treated TB Upper lobe predominant disease in pt with likely incompletely treated (6 weeks) TB in the past with characteristic TB symptoms including night sweats, weight loss, hemoptysis history, productive cough,and worsening CT chest findings, with history of multiple episodes of leaving before sputum AFBs could be done and history of not showing up to clinic for completing sputum AFBs  - airborne isolation   - Collecting 3 AFB smears and cultures with TB PCR 8 hours apart, next collections due at 0600, 1500  - Placed on a 72H hold 3/10 while AFB smears collected given pt's attempts to leave AMA and concern for public health risk.         Cardiovascular:  # Tachycardia, normotensive, unclear if secondary to agitation, hypovolemia, hypoxia.   # Elevated Troponin. Trop mildly elevated at 47. Pt complains of chest pain, suspect pleuritic in nature.  # HFpEF  - Goal MAP > 65  - Continuous cardiac monitoring        GI/Nutrition:  # Severe Malnutrition in the context of acute on chronic illness   # Poor PO intake  # Failure to thrive  - NPO given tenuous respiratory status   - Nutrition and SLP following  - Contact Speech for evaluation  - place small bore feeding tube at bedside  - nutrition consulted for TF       # Thrush.   Oral candidiasis observed on physical exam.  Patient has chronic prednisone use as well as uses steroid inhaler at home, likely steroid use is source of thrush.   - Nystatin rinse 4 times daily ordered  - Educate patient and family  "regarding rinsing mouth after inhalers     Renal/Fluids/Electrolytes:  # AGMA   # History of nephrolithiasis  # Volume status   # Hypercalcemia  # Hypernatremia  Previous nephrolithiasis x2, requiring lithotripsy once. Admitted with dysuria and left CVA tenderness. UA with blood/RBC. Not consistent with infection. CT Abdomen 3/9 with stable large left kidney stone with mild dilation of collecting system- unchanged from previous.   - Monitor I/O closely   - Electrolyte replacements per ICU protocol  - FWF at 75 mL per our to replenish 1800 ml fluid deficit  - Discontinue IV fluids  - Recheck sodium in the AM    Endocrine:  # DM2  # Stress and steroid induced hyperglycemia  - sliding scale insulin coverage   - Goal BG < 180  - Hypoglycemia protocols        ID:  # CAP vs. PJP pneumonia vs. Reactivated TB   - ID following, appreciate recommendations   - Per pulmonary notes \"prior TB treatment (3 drug x 6 wks in [place] 1996)\"  - History of failure to show up for TB rule out testing at [facility] [place] in [place]  - Upper lobe predominant disease in pt with likely incompletely treated (6 weeks) TB in the past with characteristic TB symptoms including night sweats, weight loss, hemoptysis history, productive cough,and worsening CT chest findings    Cultures:  Blood Cultures x 2 (3/9): NG x 3 days  Blood Cultures x 2 (3/11): NG x 1 day  Urine Culture (3/9): typical charles  Beta D glucan (3/10): Negative  Aspergillus (3/11): In process  Histoplasma (3/11): In process  Strep pneumo antigen (3/11): Negative  Legionella antigen (3/11): Negative  Respiratory Cultures (3/10): normal charles  Respiratory Cultures (3/11): normal charles  MRSA Nares (3/11): Negative  RVP (3/11): Negative  AFB (3/10): In process   AFB (3/11): In process   AFB (3/12): In process     Antibiotics:  ~ Azithromycin (3/9)  ~ cefepime (3/9 - 3/10)  ~ ceftriaxone (3/9)  ~ Vancomycin (3/11 - 3/12)  ~ Zosyn (3/11 - )       Hematology:    # Anemia of chronic illness  # " "Leukocytosis  - Trend CBC daily     Musculoskeletal:  # Weakness/Deconditioning  -OT/PT consults       Skin:  No active issues  - Appreciate diligent nursing cares     General Cares/Prophylaxis:    DVT Prophylaxis: Enoxaparin (Lovenox) SQ and Pneumatic Compression Devices  GI Prophylaxis: home protonix  Restraints: Not Indicated  Family Communication: Family updated at bedside  Code Status: Full Code    Lines/tubes/drains:  ~ PIV x 2    Disposition:  ~ [facility] ICU    Patient seen and findings/plan discussed with medical ICU staff, Dr. Bess.      I spent 45 minutes providing critical care, the patient was in critical condition due to hypercarbic hypoxemic respiratory failure.    CLARIBEL Faustin-ACN  Pager #[phone]  Critical Care  [facility]     ====================================  INTERVAL HISTORY:   Patient had pulled off his bipap overnight for a period of time and become hypoxic with SpO2 readings into the 70s per reports. Situation was identified by nursing staff and promptly placed on bipap and returned to O2 saturations in the 90s. This morning patient is vitally stable. Arouses to stimulatin, difficult interactions due to communication barrier and likely baseline confusion as family continues to report his behavior as typical and \"normal\" for him at home. Patient was hypercarbic this morning, with some mild improvement after being on HFNC this morning. Will continue to monitor closely.     OBJECTIVE:   1. VITAL SIGNS:   Temp:  [97.7  F (36.5  C)-99.1  F (37.3  C)] 97.7  F (36.5  C)  Pulse:  [] 108  Resp:  [17-36] 36  BP: ()/(47-97) 134/69  FiO2 (%):  [40 %-65 %] 60 %  SpO2:  [90 %-100 %] 94 %  FiO2 (%): 60 %  Resp: (!) 36    2. INTAKE/ OUTPUT:   I/O last 3 completed shifts:  In: 1028.34 [I.V.:1028.34]  Out: 1200 [Urine:1200]    3. Physical Exam  Constitutional:       Comments: On precedex   HENT:      Head: Normocephalic.      Mouth/Throat:      Mouth: Mucous membranes " are dry.   Eyes:      Pupils: Pupils are equal, round, and reactive to light.   Cardiovascular:      Rate and Rhythm: Normal rate and regular rhythm.      Heart sounds: Normal heart sounds.   Pulmonary:      Effort: Accessory muscle usage present.      Breath sounds: Wheezing present.   Abdominal:      General: Abdomen is flat.   Musculoskeletal:         General: Normal range of motion.      Cervical back: Normal range of motion.      Comments: Cachectic   Skin:     General: Skin is warm and dry.   Neurological:      Mental Status: Mental status is at baseline. He is lethargic.   Psychiatric:         Mood and Affect: Affect is labile.           4. LABS:   Arterial Blood Gases   Recent Labs   Lab 03/11/23  0346   PH 7.29*   PCO2 55*   PO2 98   HCO3 27     Complete Blood Count   Recent Labs   Lab 03/12/23  0527 03/11/23  0259 03/10/23  0704 03/09/23  1358 03/09/23  1356   WBC 11.1* 20.2* 11.7*  --  12.9*   HGB 9.2* 10.4* 10.0* 12.6* 11.0*    236 232  --  263     Basic Metabolic Panel  Recent Labs   Lab 03/12/23  0803 03/12/23  0554 03/12/23  0527 03/12/23  0011 03/11/23  0729 03/11/23  0632 03/11/23  0259 03/10/23  1824 03/10/23  0704 03/09/23  2319 03/09/23  1358 03/09/23  1356   NA  --   --  147*  --   --   --  140  --  142  --  140 141   POTASSIUM  --   --  4.5  --   --   --  5.1  --  4.7  --  4.7 4.8   CHLORIDE  --   --  104  --   --   --  97*  --  99  --   --  93*   CO2  --   --  27  --   --   --  24  --  27  --   --  28   BUN  --   --  20.3  --   --   --  18.9  --  21.4  --   --  25.3*   CR  --   --  1.06  --   --  0.96 0.93  --  0.93  --   --  1.09   GLC 99 108* 107* 122*   < >  --  183*   < > 121*   < > 119* 118*    < > = values in this interval not displayed.     Liver Function Tests  Recent Labs   Lab 03/11/23  0259 03/09/23  1356   AST 53* 54*   ALT 12 13   ALKPHOS 85 97   BILITOTAL <0.2 <0.2   ALBUMIN 2.5* 2.8*   INR  --  1.38*     Coagulation Profile  Recent Labs   Lab 03/09/23  1356   INR 1.38*        5. RADIOLOGY:   Recent Results (from the past 24 hour(s))   XR Chest Port 1 View    Narrative    EXAM: XR CHEST PORT 1 VIEW  3/12/2023 8:23 AM     HISTORY:  hypoxia       COMPARISON:  3/11/2023    FINDINGS:   The patient is slightly rotated to the right. The cardiac silhouette  is unchanged. No pneumothorax. Slightly increased bilateral pleural  effusions. Unchanged bilateral reticular and patchy airspace  opacities. Low lung volumes.      Impression    IMPRESSION:     1. Increased bilateral pleural effusions, greater on the right.  2. No significant change in diffuse interstitial and patchy airspace  opacities.    I have personally reviewed the examination and initial interpretation  and I agree with the findings.    LEXIE COLVIN MD         SYSTEM ID:  [id number]

## 2023-03-12 NOTE — PROGRESS NOTES
Noted IR consult for pleural tap. Please send aerobic, anaerobic, fungal and AFB cultures from fluid.

## 2023-03-12 NOTE — PROGRESS NOTES
Neuro: Disoriented, not following commands. Wallisian speaking, requires interpretor. Precedex gtt titrated up and down overnight, see MAR. Attempted to hit sitter overnight several times, PRN haldol and zyprexa given, see MAR.  Cardiac: a-fib, tachy when awake. Normotensive.  Resp: transitioned to BiPAP around 2300. NT suctioning necessary. Pt decompensates to 50s within 30 seconds when mask is removed.  GI: no bowel movement overnight  : male external catheter in place, draining well. Incontinent  Skin: no new concerns.    Major Shift Events:  Pt agitated and restless overnight. Around 0000 pt began desatting, was found to have disconnected the BiPAP tubing, bedside attendant was not within arms reach and was not attempting to fix tubing. Pt desatted to 50%, returned to 95% once BiPAP tubing was placed back on pt. ABGs show that pt is acidotic and retaining CO2 in the 70s.    Plan: Need to discuss goals of care with family as pt is not showing improvement w/ BiPAP and intubation is being considered. Also consider palliative consult or family care conference. Family stated they will be in visiting pt today.    For vital signs and complete assessments, please see documentation flowsheets.

## 2023-03-12 NOTE — PROGRESS NOTES
Patient changed to NIV ST mode,okayed by MD.  Patient desat down to 45% SATS while switching from HFNC to NIV.   Patient also had another desat episode to 50%. Somehow popped off NIV.     Currently on 14/6-50%. Airborne precautions in place, with 1:1 sitter.     NT sx patient 2x's this shift. Red streaked, brown thick secretions.    HFNC on standby.

## 2023-03-12 NOTE — PROGRESS NOTES
ID brief weekend cross cover note    Noted RN note about red streaked, brown thick secretions.    Afebrile, BP stable, atchypneic, hypoxic, hypercarbic    CXR w increased bilateral pleural effusions, greater on the right, no significant change in diffuse interstitial and patchy airspace opacities.    Noted 4 AFB stains and cultures pending at this point (3/10, 3/11 am and pm, 3/12 am)    Sputum culture, Strep pneumoniae, legionella urine antigen, B glucan neg       RECOMMENDATION:  - MRSA nares negative, can stop Vanc  - Urine histoplasma antigen and serum blasto antigen appears not done,ordered for you  - Sputum PJP PCR and DFA pending  - Awaiting AFB smear results x 3  - Aspergillus galctomannan pending  - Ok to continue Zosyn  - Agree on finding out baseline mental status, based on our interactions yesterday feel some amount of delirium on underlying dementia likely at play  - Continue Bactrim  - Continue airborne precautions, on 72 hour hold  - If all negative, will need a bronch/biopsy for malignancy eval eventually     ID will follow. Dr Xochilt Beltran and TRUDI Ventura will assume care on Monday 3/13/2023    I have reviewed interval events, vital, labs, images, cultures and antibiotics    Ngozi Lee  Staff Physician  Division of Infectious Diseases

## 2023-03-13 NOTE — PROGRESS NOTES
CLINICAL NUTRITION SERVICES - ASSESSMENT NOTE     Nutrition Prescription    RECOMMENDATIONS FOR MDs/PROVIDERS TO ORDER:  FYI to Providers: Suggest once TF at goal to adjust water flushes to 165 ml q 4 hrs (w/TF total water = 1995 ml/day)    Malnutrition Status:    Unable to fully determine due to no NFPA completed today    Recommendations already ordered by Registered Dietitian (RD):  If pt continues to tolerate TF and labs remain WNL, nursing can advance NG-TF with Osmolite 1.5 by 10 ml q 8 hrs to goal of 55 ml/hr to provide 1980 kcals, 84 gm protein, 267 gm CHO, no fiber, 1005 ml water/day - with current Provider ordered water flushes of 50 ml q hr, total water once TF at goal would = 2205 ml/day     Future/Additional Recommendations:  Monitor tolerance to TF and advancement to goal  Monitor wt/lab trends  Water flushes per Providers - recommendation for goals provided as above      REASON FOR ASSESSMENT  Job Kaiser is a/an 83 year old male assessed by the dietitian for Provider Order - Registered Dietitian to Assess and Order TF per Medical Nutrition Therapy Protocol and Cortrak tube placement order     NUTRITION/MEDICAL HISTORY  Per chart review: Pt admits to hospital in the setting of progressive fatigue, shortness of breath, productive cough, weight loss, and pain with urination, with labs and imaging concerning for CAP vs. incompletely treated respiratory TB and nephrolithiasis, due to change in mental status and 02 needs, Mr. Kaiser was transferred to the ICU for bipap therapy and potential need for mechanical intubation. Other past medical history noted for prior tuberculosis s/p treatment with 3 drug therapy for 6 weeks in [place], asthma, COPD, bronchiectasis, pulmonary fibrosis, T2DM, and HFpEF.    Pt reviewed during ICU team rounds, nursing placed feeding tube via Cortrak on weekend (is NG), Providers fine with continuing with tube placement as is, reviewed will place further advancement orders as noted  above, Providers adjusting water flushes, reviewed noted last bowel movement was on 3/10 per last chart documentation. No face to face visit today, pt is on airborne precautions to rule out TB and is confused, will defer visit today.     CURRENT NUTRITION ORDERS  Diet: NPO  Nutrition Support: NG TF with Osmolite 1.5 at 15 ml/hr with 50 ml water flush q hr to provide 540 kcals, ~23 gm protein, ~73 gm CHO, no fiber, 1474 ml/day     LABS  Labs reviewed    MEDICATIONS  High resistance correction scale insulin, Zyprexa, Prednisone, Precedex, Dulcolax, Miralax, Senna-docusate, Bactrim, Nystatin, IV Zosyn, Protonix     ANTHROPOMETRICS  Ht Readings from Last 1 Encounters:   11/20/22 1.829 m (6')   Most Recent Weight: 57 kg (125 lb 10.6 oz)    IBW: 80.9 kg  BMI: Underweight BMI <18.5  Weight History:   Wt Readings from Last 20 Encounters:   03/12/23 57 kg (125 lb 10.6 oz)   11/23/22 67.9 kg (149 lb 11.1 oz)     59 kg (130 lb) 02/17/2023 - Per CE     Weight assessment: Non-significant 3.8% weight loss in 1 month, significant 16% weight loss in >3 months.     Dosing Weight: 57 kg    ASSESSED NUTRITION NEEDS  Estimated Energy Needs: 4234-3406 kcals/day (30 - 35 kcals/kg)  Justification: Underweight/repletion   Estimated Protein Needs:  grams protein/day (1.2 - 2.0 grams of pro/kg)  Justification: Underweight/repletion   Estimated Fluid Needs: 1 mL/kcal  Justification: Maintenance    MALNUTRITION  % Intake: Unable to fully assess  % Weight Loss: > 7.5% in 3 months (severe)  Subcutaneous Fat Loss: Unable to assess today   Muscle Loss: Unable to assess today   Fluid Accumulation/Edema: None noted  Malnutrition Diagnosis: Unable to fully determine due to no NFPA completed today     NUTRITION DIAGNOSIS  Inadequate oral intake related to medical status/respiratory status/neurological status as evidenced by NPO order with pt requiring enteral nutrition support to meet estimated nutritional needs        INTERVENTIONS  Implementation  Collaboration with staff and Providers - as noted above   Enteral Nutrition - Orders updated as above  Feeding tube flush - per Providers, goal recommendations provided as above     Goals  Total avg nutritional intake to meet a minimum of 30 kcal/kg and 1.2 g PRO/kg daily (per dosing wt 57 kg).     Monitoring/Evaluation  Progress toward goals will be monitored and evaluated per protocol.    Radha Samuels RD, CNSC, LD  [address] pager: [phone]  Weekend/holiday pager: [phone]

## 2023-03-13 NOTE — PROGRESS NOTES
Patient continues with NIV ST- 16/5 50%.   Q4 nebs with CPT. BS improved on the right. Coarse/rhinci on left. Suctioned patient after pulmonary hygiene. Patient desats quickly (<70%) and agitated.   Able to recover patient with 100% . Sats 98% with FIO2 50% on Bipap.   Has 1:1 sitter in room.

## 2023-03-13 NOTE — CONSULTS
Interventional Radiology Consult Service Note    Patient is on IR schedule Tuesday 3/14 for a right thoracentesis. Patient with respiratory failure due to CAP vs aspiration pneumonia vs active TB. Will do at bedside. Team to enter diagnostic labs.   Labs WNL for procedure.    No NPO required.  Medications to be held include: None  Consent will be done prior to procedure with family via  and phone.    Please contact the IR charge RN at [phone] for estimated time of procedure.       Vitals:   BP (!) 144/76   Pulse 117   Temp 99.1  F (37.3  C) (Axillary)   Resp (!) 40   Wt 57 kg (125 lb 10.6 oz)   SpO2 96%   BMI 17.04 kg/m        Maira Tenorio PA-C  Interventional Radiology  Pager: [phone]

## 2023-03-13 NOTE — PROGRESS NOTES
ICU END OF SHIFT NOTE:For vital signs and complete assessments, please see documentation flowsheets.     Assessment:   Afebrile, RASS -1, not following commands. [place]ian speaking, requires .  Son is at bedside, Precedex gtt stopped at 4am, see MAR. As per report due to bradycardia and RASS out of goal,  Sinus tachy 115,Normotensive.  On  BiPAP . Desaturates quick to  70s when mask is removed, no bowel movement ,male external catheter in place, draining well. Incontinent, no new concerns. PIV x 2. Sitter at bedside.    Major Shift Events:  Desaturations with minor activity suctioning, turning, oral cares, episodic agitations, tachycardic, Family opted to to change code status from FC to DNI/ No CPR    Plan/ Interventions:   - Goals of care to be discussed by TRUDI to family member ( Family members opted DNI)  - Hemodynamic monitoring  - advance TF to goal of 55 ml per hour  - water flush decreased to 50 ml every hour  -IR schedule Tuesday 3/14 for a right thoracentesis at bedside family members were informed

## 2023-03-13 NOTE — PROGRESS NOTES
Major Shift Events:   -Pt was calm, drowsy and cooperative beginning of shift however he was bradycardic and hypotensive  -turned off precedex to increase HR and BP pt did not tolerate well and became agitated and hypertensive and tachycardic  -gave pt haldol 5mg plus scheduled zyprexa and melotonin  -Pt was still agitated turned on precedex and gradually increased back to .5 original setting at start of shift  - gave pt suppository, miralax and senna as part of bowel regiment  -gave another dose of haldol 0130  -turned off precedex at 0545  -pt slept since 0445  -    Plan:   -speak with family     For vital signs and complete assessments, please see documentation flowsheets.

## 2023-03-13 NOTE — PROGRESS NOTES
[facility] ICU PROGRESS NOTE  03/13/2023        Date of Service (when I saw the patient): 03/13/2023    ASSESSMENT:  Job Kaiser is an 82 yo Italian speaking gentleman w/ h/o prior tuberculosis s/p treatment with 3 drug therapy for 6 weeks in [facility], asthma, COPD on 2L home 02, bronchiectasis, pulmonary fibrosis, T2DM, HFpEF  (last EF 65%) who presented on 3/9/2023 with progressive fatigue, shortness of breath, productive cough, weight loss, and pain with urination, with labs and imaging concerning for CAP vs. incompletely treated respiratory TB and nephrolithiasis. Pt initiated on cefepime and prophylactic bactrim given longstanding steroid use, with initial improvement in 02 needs to baseline of 2L per NC. On 3/11, pt was found to be confused, tachycardic to 140's and hypoxic with sats in the 50's after pulling off his supplemental 02. Given the abrupt change in mental status and 02 needs, Mr. Kaiser was transferred to the ICU for bipap therapy and potential need for mechanical intubation.        CHANGES and MAJOR THINGS TODAY:   - Code status changed DNR/DNI  Discussion held at bedside with the use of Italian  with son in room and son Sidiq on speaker phone regarding code status. Discussed with family concern that patient's lung function is worsening and that he may need to have a breathing tube inserted and placed on mechanical ventilation. Sons both vocalized that they would not want patient intubated. Discussed CPR and electric shocks for cardiac failure, sons both vocalized they would not want CPR or shocks. Discussed with sons that we will continue to provide care for a goal of restoration up until the point of intubation and that in the event Job's heart were to stop, we would not provide CPR and would instead shift towards making him comfortable. Sons agreed with this plan. Sons also stated that they had this conversation yesterday with the provider and felt the same then. Informed sons that the  medical team wanted to use an  to confirm understanding on our part of goals of care. Orders placed for DNR/DNI.     - IR consult for thoracentesis  - Continue pulmonary hygiene  - Consider palliative consult for goals of care if patient does not show signs of improvement today.    PLAN:    Neuro:  # Encephalopathy, likely metabolic  # Acute Pain  # Agitation  # Insomnia  - Minimize sedating medications as able   - Delirium precautions  - Precedex for agitation -- off since ~0400.   - Melatonin 5mg q HS   - PRN haldol available   - PRN hydroxyzine 25-50mg q 6H    - Continue scheduled PM olanzapine   - RASS goal 0 to -1   - PRN tylenol available     Pulmonary:  # Acute on chronic hypoxic hypercapneic respiratory failure, broad differential CAP vs. Aspiration PNA vs. TB vs. PE vs. COPD exacerbation vs. Atypical PNA (at risk for PJP given long term steroid use without PJP prophy)   # COPD on 2L home 02  # Asthma  # History of Pulmonary fibrosis/bronchiectasis  # Mediastinal lymphadenopathy per Chest CT  # Bilateral pleural effusion concerning for malignancy per Chest CT 3/10/2022  - Bipap to keep sats > 90%  - Scheduled duonebs, mucomyst, added pulmicort  - Sub methylprednisolone for PTA prednisone 20mg q day   - Hold PTA BREO ellipta (pt unable to take)   - See ID for infectious work up/plan  - Plan for oncology, pulmonology and PET scan referral at discharge   - IR consulted for diagnostic thoracentesis     # History of incompletely treated TB   Upper lobe predominant disease in pt with likely incompletely treated (6 weeks) TB in the past with characteristic TB symptoms including night sweats, weight loss, hemoptysis history, productive cough,and worsening CT chest findings, with history of multiple episodes of leaving before sputum AFBs could be done and history of not showing up to clinic for completing sputum AFBs  - Airborne isolation   - Collected 3 AFB smears and cultures with TB PCR 8 hours apart,  results pending  - Placed on a 72H hold 3/10 while AFB smears collected given pt's attempts to leave AMA and concern for public health risk, paper work is not complete in chart. Admitting provider team paged for paperwork.        Cardiovascular:  # Tachycardia, normotensive, unclear if secondary to agitation, hypovolemia, hypoxia.   # Elevated Troponin. Trop mildly elevated at 47. Pt complains of chest pain, suspect pleuritic in nature.  # HFpEF  - Goal MAP > 65  - Continuous cardiac monitoring         GI/Nutrition:  # Severe Malnutrition in the context of acute on chronic illness   # Poor PO intake  # Failure to thrive  - NPO given tenuous respiratory status   - Feeding tube placed  - Nutrition consulted, appreciate recommendation  - SLP following, unable to participate in therapy today 2/2 to encephalopathy and BiPAP use.      # Thrush.   Oral candidiasis observed on physical exam.  Patient has chronic prednisone use as well as uses steroid inhaler at home, likely steroid use is source of thrush.   - Nystatin rinse 4 times daily ordered  - Educate patient and family regarding rinsing mouth after inhalers     Renal/Fluids/Electrolytes:  # AGMA   # History of nephrolithiasis  # Volume status   # Hypercalcemia  # Hypernatremia  Previous nephrolithiasis x2, requiring lithotripsy once. Admitted with dysuria and left CVA tenderness. UA with blood/RBC. Not consistent with infection. CT Abdomen 3/9 with stable large left kidney stone with mild dilation of collecting system- unchanged from previous.   - Monitor I/O closely   - Electrolyte replacements per ICU protocol  - FWF decreased to 50 mL per hour to replenish 1000 ml fluid deficit  - Daily BMP     Endocrine:  # DM2  # Stress and steroid induced hyperglycemia  - Sliding scale insulin coverage   - Goal BG < 180  - Hypoglycemia protocols        ID:  # CAP vs. PJP pneumonia vs. Reactivated TB   - ID following, appreciate recommendations   - Continue airborne precautions  "while continuing TB workup  - Per pulmonary notes \"prior TB treatment (3 drug x 6 wks in [place] 1996)\"  - History of failure to show up for TB rule out [place] [facility] [place]  - Upper lobe predominant disease in pt with likely incompletely treated (6 weeks) TB in the past with characteristic TB symptoms including night sweats, weight loss, hemoptysis history, productive cough,and worsening CT chest findings  - Bactrim three times weekly (MWF) for PJP ppx  - Continue Zosyn for possible PNA coverage (3/10 - today) -- per ID recommendations. Only needed 5 day coverage for CAP, leukocytosis resolved).   - Potential thoracentesis today with IR or at bedside of able, will send cultures per ID's recommendations     Cultures:  Aspergillus (3/11): In process  Histoplasma (3/11): In process  AFB (3/10): In process   AFB (3/11): In process   AFB (3/12): In process         Hematology:    # Anemia of chronic illness  # Leukocytosis  - Trend CBC daily     Musculoskeletal:  # Weakness/Deconditioning  - OT/PT consults       Skin:  No active issues  - Appreciate diligent nursing cares     General Cares/Prophylaxis:    DVT Prophylaxis: Enoxaparin (Lovenox) SQ  GI Prophylaxis: PPI  Restraints: Restraints for medical healing needed: NO    Lines/ tubes/ drains:  - PIV  - NJ      Disposition:  - [facility] ICU     Patient seen, findings and plan discussed with ICU staff, Dr. Bess.    Time spent on this Encounter   Billing:  I spent 50 minutes bedside and on the inpatient unit today managing the critical care of Job A Job in relation to the issues listed in this note.      CEDRIC Whitaker CNP    ====================================  INTERVAL HISTORY:   Overall course reviewed. Patient evaluated at bedside. Lethargic on BiPAP, moving all extremities spontaneously. Family at bedside    ROS: unable to complete given lethargy.     OBJECTIVE:   1. VITAL SIGNS:   Temp:  [97.9  F (36.6  C)-98.3  F (36.8  C)] 98.3  F (36.8  C)  Pulse:  " [] 76  Resp:  [18-36] 21  BP: ()/() 114/58  FiO2 (%):  [40 %-80 %] 50 %  SpO2:  [93 %-100 %] 99 %  FiO2 (%): 50 %  Resp: 21      2. INTAKE/ OUTPUT:   I/O last 3 completed shifts:  In: 1942.15 [I.V.:1372.15; NG/GT:570]  Out: 780 [Urine:700; Emesis/NG output:80]    3. PHYSICAL EXAMINATION:  General: lethargic  HEENT:PERRL, dry oropharynx, with BiPAP mask on  Neuro: lethargic, does not follow commands  Pulm/Resp: Accessory muscle used, expiratory wheeze present.  CV: RRR, S1S2  Abdomen: Soft, non-distended, non-tender, no rebound tenderness or guarding, no masses  : no kwong catheter in place  Incisions/Skin: warm and dry  MSK/Extremities: trace peripheral edema, moving all extremities, peripheral pulses intact, calves soft, extremities well perfused    4. INVESTIGATIONS:   Arterial Blood Gases   Recent Labs   Lab 03/11/23  0346   PH 7.29*   PCO2 55*   PO2 98   HCO3 27     Complete Blood Count   Recent Labs   Lab 03/13/23  0506 03/12/23  0527 03/11/23  0259 03/10/23  0704   WBC 9.4 11.1* 20.2* 11.7*   HGB 8.1* 9.2* 10.4* 10.0*    182 236 232     Basic Metabolic Panel  Recent Labs   Lab 03/13/23  0704 03/13/23  0506 03/13/23  0126 03/12/23  2138 03/12/23  1705 03/12/23  1702 03/12/23  0554 03/12/23  0527 03/11/23  0729 03/11/23  0632 03/11/23  0259 03/10/23  1824 03/10/23  0704   NA  --  145 147*  --   --  148*  --  147*  --   --  140  --  142   POTASSIUM  --  3.9  --   --   --   --   --  4.5  --   --  5.1  --  4.7   CHLORIDE  --  101  --   --   --   --   --  104  --   --  97*  --  99   CO2  --  31*  --   --   --   --   --  27  --   --  24  --  27   BUN  --  20.1  --   --   --   --   --  20.3  --   --  18.9  --  21.4   CR  --  0.95  --   --   --   --   --  1.06  --  0.96 0.93  --  0.93   * 156*  --  174* 189*  --    < > 107*   < >  --  183*   < > 121*    < > = values in this interval not displayed.     Liver Function Tests  Recent Labs   Lab 03/11/23  0259 03/09/23  1356   AST 53* 54*    ALT 12 13   ALKPHOS 85 97   BILITOTAL <0.2 <0.2   ALBUMIN 2.5* 2.8*   INR  --  1.38*     Pancreatic Enzymes  No lab results found in last 7 days.  Coagulation Profile  Recent Labs   Lab 03/09/23  1356   INR 1.38*         5. RADIOLOGY:   Recent Results (from the past 24 hour(s))   XR Chest Port 1 View    Narrative    EXAM: XR CHEST PORT 1 VIEW  3/12/2023 8:23 AM     HISTORY:  hypoxia       COMPARISON:  3/11/2023    FINDINGS:   The patient is slightly rotated to the right. The cardiac silhouette  is unchanged. No pneumothorax. Slightly increased bilateral pleural  effusions. Unchanged bilateral reticular and patchy airspace  opacities. Low lung volumes.      Impression    IMPRESSION:     1. Increased bilateral pleural effusions, greater on the right.  2. No significant change in diffuse interstitial and patchy airspace  opacities.    I have personally reviewed the examination and initial interpretation  and I agree with the findings.    LEXIE COLVIN MD         SYSTEM ID:  [id number]   XR Abdomen Port 1 View    Narrative    EXAMINATION:  XR ABDOMEN PORT 1 VIEW 3/12/2023     COMPARISON: CT abdomen and pelvis 3/10/2023    HISTORY: Verify small bowel feeding tube bedside placement.    TECHNIQUE: One frontal supine view of the abdomen.    FINDINGS: Feeding tube tip projects over the area of the stomach. No  abnormally dilated air-filled loops of bowel. Mild-to-moderate colonic  stool burden. The lung bases are not well-visualized. Large  calcification which appeared within the extrarenal pelvis of the left  kidney on recent CT. Calcifications in the right kidney also seen on  recent CT.      Impression    IMPRESSION:   1. Feeding tube tip projects over the stomach.  2. Nonobstructive bowel gas pattern.  3. Bilateral renal collecting system stones including a larger stone  in the left extrarenal pelvis.    I have personally reviewed the examination and initial interpretation  and I agree with the findings.    STEPHANIE KNUTSON  SENGClifton-Fine Hospital ID:  [id number]       =========================================

## 2023-03-13 NOTE — PROGRESS NOTES
[facility] [place] GENERAL INFECTIOUS DISEASES PROGRESS NOTE     Patient:  Job Kaiser   Date of birth 1940, Medical record number [id number]  Date of Visit:  03/13/2023  Date of Admission: 3/9/2023  Consult Requester:Artemio Hart DO          Assessment and Recommendations:   ASSESSMENT:  1. Dyspnea; acute on chronic   2. Productive cough; chronic  3. Intermittent fevers; self-reported  4. Chest pain; acute  5. History of TB ( s/p treatment - unclear if he had active TB vs latent Tb at that time)   6. Significant weight loss without trying; chronic  7. Chronic prednisone (20 mg/day) use  8. Elevated procalcitonin  9. Leukocytosis; resolved  10. Neprholithiasis   11. Asthma/ COPD   12. Bronchiectasis   13. Limited activity due to dyspnea on exertion for at least 1 year   14. SARsCoV2 neg 11/19 and 11/20/22    DISCUSSION:   Job Kaiser is an 81 yo [place] gentleman w/ h/o prior tuberculosis s/p treatment with 3 drug therapy for 6 weeks in [place], asthma, COPD, bronchiectasis, pulmonary fibrosis, T2DM, HFpEF with last EF 65% presenting with fatigue, shortness of breath, and pain with urination. ID was consulted regarding concern for help discerning if tuberculosis has been officially ruled out, ongoing need for PJP prophylaxis in the setting of chronic steroid use, and unusual causes of recurrent pulmonary infections. Patient presented to [facility] with 4 month period of progressively worsening fatigue, weight loss, dyspnea, and productive cough with associated fevers and chills. Over the last week, his family reports his breathing has become significantly worse, and yesterday he developed chest pain, which prompted them to bring him in.       CAP vs. PJP pneumonia vs. Reactivated TB vs. Malignancy  Further ID workup for other possible causes of pneumonia-like symptoms, per below. Has been adequately treated after today with 5-day course of antibiotics for CAP with normalized WBC; ok to stop empiric antibiotis. Given  ongoing 20 mg/day prednisone use, will recommend restarting PJP prophylaxis.    Afebrile, BP stable, tachypneic, tachycardic, hypoxic, hypercarbic     CXR w increased bilateral pleural effusions, greater on the right, no significant change in diffuse interstitial and patchy airspace opacities.     Noted 4 AFB stains and cultures pending at this point (3/10, 3/11 am and pm, 3/12 am)     Sputum culture, Strep pneumoniae, legionella urine antigen, B glucan, MRSA nares all neg      RECOMMENDATION:    1. Please place patient on airborne precautions  2. Awaiting AFB smear results x 4  3. Ok to stop IV Zosyn after today; (Leukocytosis resolved and 5-day course for community-acquired pneumonia  4. Continue Bactrim DS 1 tablet once daily for PJP prophylaxis  5. If pleural effusion tap, would appreciate cultures as well as cell anaylsis: AFB, Aerobic, Anaerobic, Fungal/Yeast, adenosine deaminase (specific for TB in pleural fluid)  6. Please also check strongyloides antibody IgG/IgM and Cryptococcal serum antigen  7. Aspergillus galactomannan antigen, pending  8. Histoplasma galactomannan urine antigen; pending  9. Blastomyces serum antigen; pending  10. PJP pcr and DFA; pending      Thank you for this consult. ID will continue to follow.     Patient was discussed with Dr. Beltran.     Alissa Lomeli, CEDRIC, CNP  Infectious Diseases  Pager# [phone]  ________________________________________________________________    Consult Question: Hx of TB (treated), ? Chronic prednisone use, ? Bactrim prophylaxis for PJP, here with suspected PNA. Guidance for continuing prednisone, bactrim, additional testing iso immunosuppresion.  Admission Diagnosis: Pulmonary infiltrates [R91.8]         Review of Systems:   Unable to obtain due to patient's current delirium and on BiPAP         Past Medical History:     Past Medical History:   Diagnosis Date     Bronchiectasis (H)      COPD (chronic obstructive pulmonary disease) (H)      Influenza A  2022     Nephrolithiasis      Tuberculosis, treated             Past Surgical History:     Past Surgical History:   Procedure Laterality Date     NO HISTORY OF SURGERY              Family History:   Reviewed and non-contributory.   Family History   Problem Relation Age of Onset     Glaucoma No family hx of      Macular Degeneration No family hx of             Social History:     Social History     Tobacco Use     Smoking status: Former     Types: Cigarettes     Quit date: 1995     Years since quittin.2     Smokeless tobacco: Not on file   Substance Use Topics     Alcohol use: No     History   Sexual Activity     Sexual activity: Not Currently            Current Medications:       acetylcysteine  2 mL Nebulization Q4H     bisacodyl  10 mg Rectal Daily     budesonide  0.5 mg Nebulization BID     enoxaparin ANTICOAGULANT  30 mg Subcutaneous Q24H     [Held by provider] ferrous sulfate  325 mg Oral Q  AM     [Held by provider] fluticasone-vilanterol  1 puff Inhalation Daily     insulin aspart  1-12 Units Subcutaneous Q4H     ipratropium  0.5 mg Nebulization Q4H    And     levalbuterol  1.25 mg Nebulization Q4H     melatonin  5 mg Oral QPM     nystatin  500,000 Units Oral 4x Daily     OLANZapine zydis  5 mg Oral At Bedtime     pantoprazole  40 mg Oral or Feeding Tube BID AC     piperacillin-tazobactam  4.5 g Intravenous Q6H     polyethylene glycol  17 g Oral BID     [START ON 3/14/2023] predniSONE  20 mg Oral or Feeding Tube Daily     senna-docusate  2 tablet Oral BID    Or     senna-docusate  2 tablet Oral BID     sodium chloride (PF)  3 mL Intracatheter Q8H     sodium chloride (PF)  3 mL Intracatheter Q8H     sulfamethoxazole-trimethoprim  1 tablet Oral Once per day on             Allergies:   No Known Allergies         Physical Exam:   Vitals were reviewed  Patient Vitals for the past 24 hrs:   BP Temp Temp src Pulse Resp SpO2   23 1100 -- 99.1  F (37.3  C) Axillary -- --  --   03/13/23 1000 (!) 160/87 -- -- 120 (!) 35 93 %   03/13/23 0900 (!) 142/81 -- -- 120 28 92 %   03/13/23 0851 -- -- -- -- -- 92 %   03/13/23 0800 (!) 142/58 98.8  F (37.1  C) Axillary 89 26 100 %   03/13/23 0730 (!) 127/96 -- -- 105 (!) 31 91 %   03/13/23 0700 114/58 -- -- 76 21 98 %   03/13/23 0600 92/50 -- -- 73 21 98 %   03/13/23 0500 93/50 -- -- 75 20 98 %   03/13/23 0426 (!) 89/51 -- -- 80 22 98 %   03/13/23 0400 (!) 140/75 -- -- 108 (!) 31 94 %   03/13/23 0300 107/83 -- -- 90 (!) 31 96 %   03/13/23 0200 90/77 -- -- (!) 121 29 95 %   03/13/23 0100 (!) 149/83 -- -- (!) 121 19 97 %   03/13/23 0017 123/68 -- -- 89 20 99 %   03/13/23 0000 129/68 -- -- 82 29 99 %   03/12/23 2355 -- -- -- 88 18 98 %   03/12/23 2300 109/57 -- -- 86 (!) 31 97 %   03/12/23 2254 109/57 -- -- 97 28 97 %   03/12/23 2235 -- -- -- 116 (!) 33 94 %   03/12/23 2200 (!) 162/81 -- -- 103 (!) 36 97 %   03/12/23 2131 -- -- -- 77 27 100 %   03/12/23 2100 (!) 87/52 -- -- 54 21 98 %   03/12/23 2000 (!) 84/49 98.3  F (36.8  C) Axillary 56 20 98 %   03/12/23 1944 116/61 -- -- 73 22 98 %   03/12/23 1900 (!) 85/51 -- -- 58 18 98 %   03/12/23 1800 (!) 85/49 -- -- 58 20 96 %   03/12/23 1700 127/67 -- -- 79 (!) 33 96 %   03/12/23 1622 -- -- -- -- -- 98 %   03/12/23 1600 114/63 98.2  F (36.8  C) Axillary 73 21 99 %   03/12/23 1400 95/55 -- -- 76 20 98 %   03/12/23 1304 -- -- -- -- -- 97 %   03/12/23 1200 (!) 150/111 98.3  F (36.8  C) Axillary 111 (!) 31 94 %       Physical Examination:  GENERAL:  well-developed, well-nourished, on BiPAP. Cachectic  HEENT:  Head is normocephalic, atraumatic   EYES:  Eyes have anicteric sclerae without conjunctival injection   ENT:  Oropharynx is moist without exudates or ulcers. Tongue is midline  NECK:  Supple.   LUNGS:  tachypnea and dyspnea, lung sound coarse anteriorly, on O2 per nasal canula   CARDIOVASCULAR:  Regular rate and rhythm with no murmurs, gallops or rubs.  ABDOMEN:  Normal bowel sounds, soft, nontender. No  appreciable hepatosplenomegaly  SKIN:  No acute rashes.  Line(s) are in place without any surrounding erythema or exudate.   NEUROLOGIC:  Grossly nonfocal. Active x4 extremities         Laboratory Data:     Inflammatory Markers    Recent Labs   Lab Test 11/20/22  1828 03/01/15  0349   CRP 80.0* 20.3*       Hematology Studies    Recent Labs   Lab Test 03/13/23  0506 03/12/23  0527 03/11/23  0259 03/10/23  0704 03/09/23  1358 03/09/23  1356 11/24/22  0806 06/27/22 2027 02/06/20  2339 03/01/15  0349   WBC 9.4 11.1* 20.2* 11.7*  --  12.9* 8.8   < > 7.2 7.7   ANEU  --   --  19.6*  --   --  9.9*  --   --  6.0 5.9   AEOS  --   --  0.0  --   --  0.0  --   --  0.1 0.4   HGB 8.1* 9.2* 10.4* 10.0* 12.6* 11.0* 7.7*   < > 12.3* 13.8   MCV 92 92 87 89  --  87 74*   < > 92 95    182 236 232  --  263 308   < > 251 238    < > = values in this interval not displayed.       Metabolic Studies     Recent Labs   Lab Test 03/13/23  0506 03/13/23  0126 03/12/23  1702 03/12/23  0527 03/11/23  0632 03/11/23  0259 03/10/23  0704 03/09/23  1358 03/09/23  1356    147* 148* 147*  --  140 142 140 141   POTASSIUM 3.9  --   --  4.5  --  5.1 4.7 4.7 4.8   CHLORIDE 101  --   --  104  --  97* 99  --  93*   CO2 31*  --   --  27  --  24 27  --  28   BUN 20.1  --   --  20.3  --  18.9 21.4  --  25.3*   CR 0.95  --   --  1.06 0.96 0.93 0.93  --  1.09   GFRESTIMATED 79  --   --  70 78 81 81  --  67       Hepatic Studies    Recent Labs   Lab Test 03/11/23  0259 03/09/23  1356 06/27/22  2027 09/10/21  1210 02/06/20  2339   BILITOTAL <0.2 <0.2 0.2 0.3 0.3   ALKPHOS 85 97 66 65 62   ALBUMIN 2.5* 2.8* 3.2* 3.4 3.4   AST 53* 54* 18 22 24   ALT 12 13 14 17 18       Microbiology:  Culture   Date Value Ref Range Status   03/11/2023   Final    >10 Squamous epithelial cells/low power field indicates oral contamination. Please recollect.   03/11/2023 No growth after 2 days  Preliminary   03/11/2023 No growth after 2 days  Preliminary   03/10/2023 Candida  albicans (A)  Preliminary   03/10/2023 1+ Normal charles  Final   03/09/2023 <10,000 CFU/mL Mixture of urogenital charles  Final   03/09/2023 No growth after 3 days  Preliminary   03/09/2023 No growth after 3 days  Preliminary   11/23/2022 1+ Candida albicans (A)  Final     Comment:     Susceptibilities not routinely done   11/23/2022 1+ Staphylococcus epidermidis (A)  Final     Comment:     Susceptibilities not routinely done   11/20/2022 No Growth  Final   11/20/2022 No Growth  Final   11/20/2022 No Growth  Final       Urine Studies    Recent Labs   Lab Test 03/11/23  1030 03/09/23  2054 11/20/22  2117 02/07/20  0025   LEUKEST Negative Small* Negative Negative   WBCU 8* 8* 0 6*       Vancomycin Levels  No lab results found.    Invalid input(s): VANCO    Hepatitis B Testing No lab results found.  Hepatitis C Testing   No results found for: HCVAB, HQTG, HCGENO, HCPCR, HQTRNA, HEPRNA  Respiratory Virus Testing    No results found for: RS, FLUAG          Imaging:   EXAM: XR CHEST PORT 1 VIEW  3/12/2023 8:23 AM                                                                    IMPRESSION:      1. Increased bilateral pleural effusions, greater on the right.  2. No significant change in diffuse interstitial and patchy airspace  Opacities.      CT chest high resolution without contrast     INDICATION: Chronic lung disease with pulmonary fibrosis     COMPARISON: 7/30/2018    IMPRESSION: Continued fibrotic lung disease with extensive mediastinal  lymphadenopathy which is new and new bilateral pleural effusions which  could be malignant. No obvious adrenal nodule although the adrenals  are not completely imaged. Recommend PET/CT an/or consideration for  mediastinal lymph node sampling and/or diagnostic thoracentesis to  exclude malignancy      EXAMINATION: CT ABDOMEN PELVIS W/O CONTRAST, 3/10/2023 9:51 AM    IMPRESSION:   1.  Stable large renal stone at the left renal pelvis measuring up to  2.5 cm. No significant change in the  very mild dilatation of the left  intrarenal collecting system.  2.  Several stable nonobstructing stones in the right kidney.  3.  Moderate colonic stool burden.   4.  Right greater than left pleural effusions. Fibrotic changes in the  visualized lung bases. Please see same day chest CT for full dictation  of the chest.      CT 11/19/2022 OSH  IMPRESSION:   Peripheral honeycombing but with predominantly an upper lobe predominance. The findings can be seen in the setting of hypersensitivity pneumonitis, drug reaction or collagen vascular disease. TB could be a consideration although this pattern of disease would be somewhat atypical.

## 2023-03-13 NOTE — PLAN OF CARE
Goal Outcome Evaluation:      Plan of Care Reviewed With: other (see comments)    Overall Patient Progress: no changeOverall Patient Progress: no change    Outcome Evaluation: RD updated TF orders with plans for advancement based on lab trends, see progress notes for full details

## 2023-03-14 NOTE — PROGRESS NOTES
/71   Pulse 72   Temp 98  F (36.7  C) (Axillary)   Resp (!) 38   Wt 64.6 kg (142 lb 6.7 oz)   SpO2 96%   BMI 19.32 kg/m      VSS; HTN overnight with systolics around 170. 1 dose prn labetalol and HR and BP stabilized well. Intermittently following commands when family member is communicating with him. Tachypnea with RR in the 30-40's. No PRN agitation meds given overnight. Patient slept well despite work of breathing. BIPAP 16/5 on 50%. VBG in AM shows worsening resp acidosis. Mag replaced. Voiding well. Plan to have a thoracentesis today and discuss care plan.     Mikel Juarez RN

## 2023-03-14 NOTE — PROGRESS NOTES
SPIRITUAL HEALTH SERVICES  SPIRITUAL ASSESSMENT Progress Note  [facility] ([facility] [place] 10 ICU       REFERRAL SOURCE: One-time routine [facility] consult for end of life concerns, Nondenominational specific.     DEMOGRAPHIC: Pt Job Kaiser identifies as Nondenominational and is of [place] descent.        ILLNESS CIRCUMSTANCE: I introduced myself to Job's family as the Lead Nondenominational  and oriented them to [facility].  Assessed emotional/spiritual needs and resources while offering reflective conversation, which integrated elements of illness and family narratives.     Three of Job's family members were present, (two sons and a niece) during our encounter. They shared that Job since recently has not been able to speak at this time. Job was receiving breath support and was unable to respond to me.     The family stated that they have consulted two Shaykhs to come by and recite verses of the Quran for Job.     SPIRITUAL INTERVENTIONS: Job's family welcomed Islamic incantations/prayer at bedside. We prayed together at their request.       PLAN: I will follow up with Job for the duration of his stay. [facility] is available to Job per request.     Swathi Jimenes  Lead Nondenominational   Pager [phone]    [facility] remains available 24/7 for emergent requests/referrals, either by having the switchboard page the on-call  or by entering an ASAP/STAT consult in Epic (this will also page the on-call ).

## 2023-03-14 NOTE — CONSULTS
[facility]  Palliative Care Consultation Note    Patient: Job Kaiser  Date of Admission:  3/9/2023    Requesting Clinician / Team: MICU, [place]  Reason for consult: Goals of care  Decisional support  Patient and family support    Recommendations:    Goals: Confirmed DNR/DNI; otherwise continue restorative efforts      Prognosis and goals discussion: I met with patient's son and 2 cousins who were in the room with patient. I explained that Job has chronic lung disease - bronchiectasis - that is likely related to his prior TB infection according to outpatient pulmonary note Aug 2022 and as a result he is likely to have recurrent bouts of exacerbations. Though he has responded to antibiotics and steroids during prior exacerbations this time is more severe and has unfortunately not been responding since coming to ICU.  I shared my concern with family that patient has not been able to tolerate any time off BiPAP and that CO2 is rising and RR is in 40's despite being on BiPAP which is worrisome and could indicate that his time is getting short and he may die in near future despite our best efforts. Family expressed understanding of our concerns and wish that we continue whatever treatments we can except for CPR or ventilator to help him recover. I explained that today the plan is to increase the steroids and attempt thoracetesis and continue bipap support. Encouraged them to let other family know our concerns to give them chance to visit if they wish in case he does die soon.       Decision making by next of kin-- adult children      Yarsanism melissa is important to patient and family - I have asked to have a Yarsanism  visit if possible, per family request       With such high respiratory rate could consider trying very low dose opioids - 1mg IV morphine q 4 hours PRN for air hunger. This could help him relax enough to get deeper breaths, though could risk increasing  CO2 even further which is why I would use it only PRN and only in low dose at this point. Explained the possible benefit (help him feel more comfortable) and risk (rising CO2) to family and they were open to trying very low dose if he seemed very uncomfortable.          These recommendations have been discussed with MICU team, RN.      Thank you for the opportunity to participate in the care of this patient and family. Our team: will continue to follow.     During regular M-F work hours -- if you are not sure who specifically to contact -- please contact us by sending a text page to our team consult pager at [phone].    After regular work hours and on weekends/holidays, you can call our answering service at [phone]. Also, who's on call for us is available in [employer].     Total time spent was >80 minutes,  >50% of time was spent counseling and/or coordination of care regarding discussion with family about prognosis, goals of care, coordinating with MICU, chart review and documentation.    Adrianne Guajardo MD / Palliative Medicine / Text me via [facility].     Team Consult Pager [phone] (answered 8am-430pm M-F) - ok to text page via [employer] / After-Hours Answering Service [phone] / Palliative Clinic in the [facility] [phone] (scheduling); [phone] (triage).        Assessments:  Job Kaiser is an 82 yo [place]ian speaking gentleman w/ h/o prior tuberculosis s/p treatment with 3 drug therapy for 6 weeks in [facility], asthma, COPD on 2L home 02, bronchiectasis, pulmonary fibrosis, T2DM, HFpEF  (last EF 65%) who presented on 3/9/2023 with progressive fatigue, shortness of breath, productive cough, weight loss, and pain with urination, with labs and imaging concerning for CAP vs. incompletely treated respiratory TB and nephrolithiasis. Pt initiated on cefepime and prophylactic bactrim given longstanding steroid use, with initial improvement in 02 needs to baseline of 2L per  NC. On 3/11, pt was found to be confused, tachycardic to 140's and hypoxic with sats in the 50's after pulling off his supplemental 02. Given the abrupt change in mental status and 02 needs, Mr. Kaiser was transferred to the ICU for bipap therapy and potential need for mechanical intubation.     Today, the patient was seen for:  Family support  Goals of care       Prognosis, Goals, & Planning:      Functional Status just prior to hospitalization: Not assessed      Prognosis, Goals, and/or Advance Care Planning were addressed today: Yes        Summary/Comments:       Patient's decision making preferences: unable to assess          Patient has decision-making capacity today for complex decisions: No            I have concerns about the patient/family's health literacy today: No           Patient has a completed Health Care Directive: No.       Code status: No CPR / No Intubation    Coping, Meaning, & Spirituality:   Mood, coping, and/or meaning in the context of serious illness were addressed today: Yes  Summary/Comments: Faith melissa important to them, family very worried about patient    Social:     Living situation: Patient lives at home with his son who is a PCA, also has help from 2 of his other children who brings him food    Key family / caregivers: adult children - 4    History of Present Illness:  History gathered today from: family/loved ones, medical chart, medical team members    Hx as summarized above  Per RN - patient will desat to 60's very quickly if bipap is removed. Also did not tolerate attempts on high flow oxygen. Has been having RR 40's and rising CO2 despite bipap. He does have a large pleural effusion and plan is to try to have IR do thoracentesis -both diagnostic and therapeutic. Currently getting antibiotics, steroids, nebs, PJP ppx. On [place] tube feeding.     Per son prior to this illness was doing ok at home. Per chart review pt has had sub-optimal follow up with pulmonary clinic, last seen in  Aug 2022. Has been in ER for respiratory symptoms in September, and October, admitted due to  Influenza A with COPD/respiratory exacerbation in November, and now again in March. So in past 7 months has had 2 admissions and 2 ED visits.     Key Palliative Symptom Data:  We are not helping to manage these symptoms currently in this patient.      ROS:  Unable - AMS     Past Medical History:  Past Medical History:   Diagnosis Date     Bronchiectasis (H)      COPD (chronic obstructive pulmonary disease) (H)      Influenza A 11/20/2022     Nephrolithiasis      Tuberculosis, treated 1996        Past Surgical History:  Past Surgical History:   Procedure Laterality Date     IR THORACENTESIS  3/14/2023     NO HISTORY OF SURGERY           Family History:  Family History   Problem Relation Age of Onset     Glaucoma No family hx of      Macular Degeneration No family hx of          Allergies:  No Known Allergies     Medications:  I have reviewed this patient's medication profile and medications from this hospitalization.       Physical Exam:  Vital Signs: Temp: 98  F (36.7  C) Temp src: Axillary BP: 138/65 Pulse: 83   Resp: (!) 32 SpO2: 95 % O2 Device: BiPAP/CPAP    Weight: 142 lbs 6.67 oz  Gen: sitting/lying in bed. Appears dyspneic but somnolent  HEENT: NCAT. Eyes closed  CV: RRR , Peripheral perfusion intact.   Resp: increased work of breathing, rapid shallow breaths on bipap support, diffuse course crackles on anterior lung ausculation  Abd: soft, nt, nd, +BS   Msk: no gross deformity, + sarcopenia  Skin:  no jaundice  Ext: warm, well perfused.   Neuro: somnolent. Per son he wakes to his voice.         Data reviewed:  Recent imaging reviewed, my comments on pertinents:       Recent lab data reviewed, my comments on pertinents:     CMPRecent Labs   Lab 03/14/23  1512 03/14/23  1047 03/14/23  0651 03/14/23  0449 03/13/23  0704 03/13/23  0506 03/13/23  0126 03/12/23  1705 03/12/23  1702 03/12/23  0554 03/12/23  0527  03/11/23  0729 03/11/23  0632 03/11/23  0259 03/09/23  1358 03/09/23  1356   NA  --   --   --  145  --  145 147*  --  148*  --  147*  --   --  140   < > 141   POTASSIUM  --   --   --  3.9  --  3.9  --   --   --   --  4.5  --   --  5.1   < > 4.8   CHLORIDE  --   --   --  98  --  101  --   --   --   --  104  --   --  97*   < > 93*   CO2  --   --   --  36*  --  31*  --   --   --   --  27  --   --  24   < > 28   ANIONGAP  --   --   --  11  --  13  --   --   --   --  16*  --   --  19*   < > 20*   * 259* 182* 219*   < > 156*  --    < >  --    < > 107*   < >  --  183*   < > 118*   BUN  --   --   --  23.4*  --  20.1  --   --   --   --  20.3  --   --  18.9   < > 25.3*   CR  --   --   --  0.95  --  0.95  --   --   --   --  1.06  --  0.96 0.93   < > 1.09   GFRESTIMATED  --   --   --  79  --  79  --   --   --   --  70  --  78 81   < > 67   MATT  --   --   --  9.4  --  9.7  --   --   --   --  9.6  --   --  10.2   < > 10.6*   MAG  --   --   --  1.8  --  1.9  --   --   --   --   --   --   --  1.9  --  1.9   PHOS  --   --   --  2.8  --  2.5  --   --   --   --   --   --   --  4.4  --  2.5   PROTTOTAL  --   --   --   --   --   --   --   --   --   --   --   --   --  7.0  --  7.9   ALBUMIN  --   --   --   --   --   --   --   --   --   --   --   --   --  2.5*  --  2.8*   BILITOTAL  --   --   --   --   --   --   --   --   --   --   --   --   --  <0.2  --  <0.2   ALKPHOS  --   --   --   --   --   --   --   --   --   --   --   --   --  85  --  97   AST  --   --   --   --   --   --   --   --   --   --   --   --   --  53*  --  54*   ALT  --   --   --   --   --   --   --   --   --   --   --   --   --  12  --  13    < > = values in this interval not displayed.     CBC  Recent Labs   Lab 03/14/23  0449 03/13/23  0506 03/12/23  0527 03/11/23  0259   WBC 12.3* 9.4 11.1* 20.2*   RBC 2.93* 3.00* 3.38* 3.91*   HGB 8.1* 8.1* 9.2* 10.4*   HCT 27.1* 27.5* 31.0* 34.1*   MCV 93 92 92 87   MCH 27.6 27.0 27.2 26.6   MCHC 29.9* 29.5* 29.7* 30.5*   RDW  21.2* 22.1* 22.1* 21.9*   * 180 182 236     INR  Recent Labs   Lab 03/09/23  1356   INR 1.38*     Arterial Blood Gas  Recent Labs   Lab 03/14/23  0449 03/13/23  0506 03/13/23  0126 03/12/23  2252 03/12/23  0017 03/11/23  0346   PH  --   --   --   --   --  7.29*   PCO2  --   --   --   --   --  55*   PO2  --   --   --   --   --  98   HCO3  --   --   --   --   --  27   O2PER 50 50 50 96   < > 45    < > = values in this interval not displayed.

## 2023-03-14 NOTE — PROGRESS NOTES
[facility] [place] GENERAL INFECTIOUS DISEASES PROGRESS NOTE     Patient:  Job Kaiser   YOB: 1940, MRN: [id number]  Date of Visit: 03/14/2023  Date of Admission: 3/9/2023  Consult Requester: Divya Robledo MD          ASSESSMENT AND PLAN     Job Kaiser is an 83 yo [place] citizen gentleman w/ h/o prior tuberculosis s/p incomplete treatment. He also has COPD, bronchiectasis, pulmonary fibrosis, HFpEF with last EF 65% presenting  with 4 month period of progressively worsening fatigue, weight loss, dyspnea, and productive cough with associated fevers and chills. He has been treated for CAP with 5 days antibiotics.    CT showed Fibrotic lung disease with new extensive mediastinal lymphadenopathy and bilateral pleural effusions. R thoracentesis performed 3/14/23 showing 1130 mL clear pink fluid,  Cell count 1466, 32% mono, . Exudative based on lights criteria.     Unclear to me if he really has PTB. AFB and MTB PCR negative. Cultures may take a while. We might trial him on treatment and see if he responds.     IMPRESSION  1. To consider pulmonary tuberculosis, in setting of reported incomplete TB treatment   2. To consider cancer   3. Exudative pleural effusion   4. Chronic prednisone (20 mg/day) use  5. Severe COPD, Bronchiectasis, fibrotic lung disease  6. Hypoxic respiratory failure on BIPAP    RECOMMENDATIONS:  1. Add cytology to R pleural fluid.   2. Defer to infection prevention for decision on airborne precautions  3. Continue Bactrim DS 1 tablet once daily for PJP prophylaxis  4. Pharmacy consult on PTB treatment dosages (ordered)    Thank you for the consult. ID will continue to follow with you. Please check [facility] for staff covering the service.     MDM: more than 3 notes and tests reviewed, discussed with primary team and other providers. Severe disease.    Xochilt Beltran MD  Infectious Diseases  Pager: [phone]  [employer]         SUBJECTIVE      Interval History and Events:  On BIPAP, hypoxic      Antimicrobial Treatment:  Piptazo 3/11-3/13  Cefepime 3/9-3/10  Vanco 3/11-3/12         OBJECTIVE       Physical Examination:    Temp:  [98  F (36.7  C)-99.1  F (37.3  C)] 98.5  F (36.9  C)  Pulse:  [] 83  Resp:  [17-46] 41  BP: (107-174)/(58-97) 144/67  FiO2 (%):  [50 %] 50 %  SpO2:  [92 %-99 %] 94 %    I/O last 3 completed shifts:  In: 1485 [I.V.:100; NG/GT:920]  Out: 2240 [Urine:2240]    Vitals:    03/09/23 2300 03/11/23 0300 03/12/23 0500 03/14/23 0600   Weight: 58.4 kg (128 lb 12 oz) 57.2 kg (126 lb 1.7 oz) 57 kg (125 lb 10.6 oz) 64.6 kg (142 lb 6.7 oz)     Constitutional: ill appearing, on BIPAP  Respiratory: Increased work of breathing, coarse breath sounds   Cardiovascular: RRR, no murmur noted. No peripheral edema.  GI: Normal bowel sounds, soft, non-distended and non-tender.    Laboratory Data:    WBC 12.3  Estimated Creatinine Clearance: 53.8 mL/min (based on SCr of 0.95 mg/dL).    3/14 R pleural cavity - Cell count 1466, 32% mono,     Microbiology:  3/13 Crypto Ag - neg   3/12 SCx AFB, MTB - neg  3/11 SCx AFB, MTB - neg  3/11 BCx -   3/11 Asperg Ag serum - neg   3/10 SCx AFB, MTB - neg  3/10 HIV - neg   2/2013 Strongy - neg

## 2023-03-14 NOTE — PROGRESS NOTES
ICU END OF SHIFT NOTE:For vital signs and complete assessments, please see documentation flowsheets.      Assessment:   Afebrile, RASS -1, 0 to plus 1, not following commands. Wallisian speaking, requires .  Son is at bedside,  Sinus tachy 115, tac hypnic at 40s, CO2 trending up last  VBBG Co2 was 85, Normotensive.  On  BiPAP . Desaturates quick to  70s when mask is removed, no bowel movement ,male external catheter in place, draining well. Incontinent, no new concerns. PIV x 2. Sitter at bedside. Noted decrease in Respiratory rate after IR did the bedside thoracentesis From RR of (44- 24 bpm)     Major Shift Events:  Desaturations with minor activity suctioning, turning, oral cares, episodic agitations, tachycardic, tac hypnic, as per night  Hand over, patient had been  intermittently following commands to family member and mouthing words in their vernacular language, hg 5.8 TRUDI informed  Rechecked Hg 8.2 g/dl  Plan/ Interventions:   - palliative consult  - advance TF to goal of 55 ml per hour  1400-IR  Did a bedside right thoracentesis at bedside family members were informed and consent were signed with the aid of   - insulin infusion started due to increasing CBG

## 2023-03-14 NOTE — PROCEDURES
[facility]    Procedure: IR THORACENTESIS    Date/Time: 3/14/2023 2:37 PM  Performed by: Ross Paris PA-C  Authorized by: Ross Paris PA-C       UNIVERSAL PROTOCOL   Site Marked: No  Prior Images Obtained and Reviewed:  Yes  Required items: Required blood products, implants, devices and special equipment available    Patient identity confirmed:  Verbally with patient, provided demographic data, hospital-assigned identification number and arm band  NA - No sedation, light sedation, or local anesthesia  Confirmation Checklist:  Patient's identity using two indicators, relevant allergies, procedure was appropriate and matched the consent or emergent situation and correct equipment/implants were available  Time out: Immediately prior to the procedure a time out was called    Universal Protocol: the Joint Commission Universal Protocol was followed    Preparation: Patient was prepped and draped in usual sterile fashion       ANESTHESIA    Anesthesia: Local infiltration  Local Anesthetic:  Lidocaine 1% without epinephrine  Anesthetic Total (mL):  4      SEDATION    Patient Sedated: No    Vital signs: Vital signs monitored during sedation    See dictated procedure note for full details.  Findings: Tolerated well    Specimens: fluid and/or tissue for laboratory analysis and culture    Complications: None    Condition: Stable    Plan: Labs pending  Repeat thoracentesis if symptoms indicate      PROCEDURE  Describe Procedure: Right diagnostic and therapeutic thoracentesis bedside. 1130 mL clear pink fluid from right pleural space. Respiratory rate from 42 to 26 breaths per minutes  Patient Tolerance:  Patient tolerated the procedure well with no immediate complications  Length of time physician/provider present for 1:1 monitoring during sedation: 0

## 2023-03-14 NOTE — PROGRESS NOTES
[facility] PROGRESS [facility]  03/14/2023        Date of Service (when I saw the patient): 03/14/2023    ASSESSMENT:  Job Kaiser is an 84 yo [place] speaking gentleman w/ h/o prior tuberculosis s/p treatment with 3 drug therapy for 6 weeks in [place], asthma, COPD on 2L home 02, bronchiectasis, pulmonary fibrosis, T2DM, HFpEF  (last EF 65%) who presented on 3/9/2023 with progressive fatigue, shortness of breath, productive cough, weight loss, and pain with urination, with labs and imaging concerning for CAP vs. incompletely treated respiratory TB and nephrolithiasis. Pt initiated on cefepime and prophylactic bactrim given longstanding steroid use, with initial improvement in 02 needs to baseline of 2L per NC. On 3/11, pt was found to be confused, tachycardic to 140's and hypoxic with sats in the 50's after pulling off his supplemental 02. Given the abrupt change in mental status and 02 needs, Mr. Kaiser was transferred to the ICU for bipap therapy and potential need for mechanical intubation.     CHANGES and MAJOR THINGS TODAY:   - Palliative consult for ongoing goals of care conversation  - IR for thoracentesis  - Continue pulmonary hygiene  - Reportedly got some sleep, intermittently following commands for family  - Transition to burst steroid for 3 days to assess response.     PLAN:    Neuro:  # Encephalopathy, likely metabolic  # Acute Pain  # Agitation  # Insomnia  - Minimize sedating medications as able   - Delirium precautions  - Melatonin 5mg q HS   - PRN haldol available   - PRN hydroxyzine 25-50mg q 6H    - Continue scheduled PM olanzapine   - RASS goal 0 to -1   - PRN tylenol available     Pulmonary:  # Acute on chronic hypoxic hypercapneic respiratory failure, broad differential CAP vs. Aspiration PNA vs. TB vs. PE vs. COPD exacerbation vs. Atypical PNA (at risk for PJP given long term steroid use without PJP prophy)   # COPD on 2L home 02  # Asthma  # History of Pulmonary fibrosis/bronchiectasis  #  Mediastinal lymphadenopathy per Chest CT  # Bilateral pleural effusion concerning for malignancy per Chest CT 3/10/2022  - Bipap to keep sats > 90%  - Scheduled duonebs, mucomyst, added pulmicort  - HOLD PTA prednisone 20mg q day in favor of burst steroid  - START methylpred 62.5 mg Q6H x3 days  - Hold PTA BREO ellipta (pt unable to take)   - See ID for infectious work up/plan  - Plan for oncology, pulmonology and PET scan referral at discharge   - IR consulted for diagnostic thoracentesis  - Diuresis as below  - Consider pulm consult pending thora response.     # History of incompletely treated TB   Upper lobe predominant disease in pt with likely incompletely treated (6 weeks) TB in the past with characteristic TB symptoms including night sweats, weight loss, hemoptysis history, productive cough,and worsening CT chest findings, with history of multiple episodes of leaving before sputum AFBs could be done and history of not showing up to clinic for completing sputum AFBs  - Airborne isolation   - Collected 3 AFB smears and cultures with TB PCR 8 hours apart, results pending  - Placed on a 72H hold 3/10 while AFB smears collected given pt's attempts to leave AMA and concern for public health risk.         Cardiovascular:  # Tachycardia, normotensive, unclear if secondary to agitation, hypovolemia, hypoxia.   # Elevated Troponin.  # HFpEF  - Goal MAP > 65  - Hydralazine 10mg Q6H PRN SBP >160  - Labetalol 20mg Q6H PRN SBP >160  - Continuous cardiac monitoring         GI/Nutrition:  # Severe Malnutrition in the context of acute on chronic illness   # Poor PO intake  # Failure to thrive  - NPO given tenuous respiratory status   - Feeding tube placed  - Nutrition consulted, appreciate recommendation  - SLP following, unable to participate in therapy today 2/2 to encephalopathy and BiPAP use.      # Thrush.   Oral candidiasis observed on physical exam.  Patient has chronic prednisone use as well as uses steroid inhaler at  "home, likely steroid use is source of thrush.   - Nystatin rinse 4 times daily ordered  - Educate patient and family regarding rinsing mouth after inhalers     Renal/Fluids/Electrolytes:  # AGMA   # History of nephrolithiasis  # Volume status   # Hypercalcemia  # Hypernatremia  Previous nephrolithiasis x2, requiring lithotripsy once. Admitted with dysuria and left CVA tenderness. UA with blood/RBC. Not consistent with infection. CT Abdomen 3/9 with stable large left kidney stone with mild dilation of collecting system- unchanged from previous.   - Monitor I/O closely   - Electrolyte replacements per ICU protocol  - FWF 50 mL per hour (FW deficit calculated at 1200 ml)  - Daily BMP  - Diurese with 20 mg lasix this AM, targeting negative 500 mL to 1L / 24 hours       Endocrine:  # DM2  # Stress and steroid induced hyperglycemia  - Sliding scale insulin coverage   - Goal BG < 180  - Hypoglycemia protocols        ID:  # CAP vs. PJP pneumonia vs. Reactivated TB   - ID following, appreciate recommendations   - Continue airborne precautions while continuing TB workup  - Per pulmonary notes \"[facility]"  - History of failure to show up for TB rule out testing [facility] 2014  - Upper lobe predominant disease in pt with likely incompletely treated (6 weeks) TB in the past with characteristic TB symptoms including night sweats, weight loss, hemoptysis history, productive cough,and worsening CT chest findings  - Bactrim three times weekly (MWF) for PJP ppx  - Continue Zosyn for possible PNA coverage (3/10 - today) -- per ID recommendations. Only needed 5 day coverage for CAP, leukocytosis resolved).   - Thoracentesis with IR -- time TBD, will send cultures per ID's recommendations     Cultures:  Aspergillus (3/11): In process  Histoplasma (3/11): In process  AFB (3/10): In process   AFB (3/11): In process   AFB (3/12): In process         Hematology:    # Anemia of chronic illness  # " Leukocytosis  - Trend CBC daily     Musculoskeletal:  # Weakness/Deconditioning  - OT/PT consults       Skin:  No active issues  - Appreciate diligent nursing cares     General Cares/Prophylaxis:    DVT Prophylaxis: Enoxaparin (Lovenox) SQ  GI Prophylaxis: PPI  Restraints: Restraints for medical healing needed: NO    Lines/ tubes/ drains:  - PIV  - NJ      Disposition:  -  ICU     Patient seen, findings and plan discussed with ICU staff, Dr. Bess.    Time spent on this Encounter   Billing:  I spent 50 minutes bedside and on the inpatient unit today managing the critical care of Job A Job in relation to the issues listed in this note.      CEDRIC Whitaker CNP    ====================================  INTERVAL HISTORY:   Overall course reviewed. Patient evaluated at bedside. Lethargic on BiPAP, moving all extremities spontaneously. Family at bedside    ROS: unable to complete given lethargy.     OBJECTIVE:   1. VITAL SIGNS:   Temp:  [98  F (36.7  C)-99.1  F (37.3  C)] 98  F (36.7  C)  Pulse:  [] 72  Resp:  [17-46] 38  BP: (107-174)/(58-97) 135/71  FiO2 (%):  [50 %-80 %] 50 %  SpO2:  [91 %-100 %] 96 %  FiO2 (%): 50 %  Resp: (!) 38      2. INTAKE/ OUTPUT:   I/O last 3 completed shifts:  In: 1485 [I.V.:100; NG/GT:920]  Out: 2240 [Urine:2240]    3. PHYSICAL EXAMINATION:  General: lethargic  HEENT:PERRL, dry oropharynx, with BiPAP mask on  Neuro: lethargic, does not follow commands  Pulm/Resp: Accessory muscle used, expiratory wheeze present.  CV: RRR, S1S2  Abdomen: Soft, non-distended, non-tender, no rebound tenderness or guarding, no masses  : no kwong catheter in place  Incisions/Skin: warm and dry  MSK/Extremities: trace peripheral edema, moving all extremities, peripheral pulses intact, calves soft, extremities well perfused    4. INVESTIGATIONS:   Arterial Blood Gases   Recent Labs   Lab 03/11/23  0346   PH 7.29*   PCO2 55*   PO2 98   HCO3 27     Complete Blood Count   Recent Labs   Lab  03/14/23  0449 03/13/23  0506 03/12/23  0527 03/11/23  0259   WBC 12.3* 9.4 11.1* 20.2*   HGB 8.1* 8.1* 9.2* 10.4*   * 180 182 236     Basic Metabolic Panel  Recent Labs   Lab 03/14/23  0651 03/14/23  0449 03/14/23  0355 03/13/23  2255 03/13/23  0704 03/13/23  0506 03/13/23  0126 03/12/23  1705 03/12/23  1702 03/12/23  0554 03/12/23  0527 03/11/23  0729 03/11/23  0632 03/11/23  0259   NA  --  145  --   --   --  145 147*  --  148*  --  147*  --   --  140   POTASSIUM  --  3.9  --   --   --  3.9  --   --   --   --  4.5  --   --  5.1   CHLORIDE  --  98  --   --   --  101  --   --   --   --  104  --   --  97*   CO2  --  36*  --   --   --  31*  --   --   --   --  27  --   --  24   BUN  --  23.4*  --   --   --  20.1  --   --   --   --  20.3  --   --  18.9   CR  --  0.95  --   --   --  0.95  --   --   --   --  1.06  --  0.96 0.93   * 219* 211* 212*   < > 156*  --    < >  --    < > 107*   < >  --  183*    < > = values in this interval not displayed.     Liver Function Tests  Recent Labs   Lab 03/11/23  0259 03/09/23  1356   AST 53* 54*   ALT 12 13   ALKPHOS 85 97   BILITOTAL <0.2 <0.2   ALBUMIN 2.5* 2.8*   INR  --  1.38*     Pancreatic Enzymes  No lab results found in last 7 days.  Coagulation Profile  Recent Labs   Lab 03/09/23  1356   INR 1.38*         5. RADIOLOGY:   No results found for this or any previous visit (from the past 24 hour(s)).    =========================================

## 2023-03-15 NOTE — PROGRESS NOTES
[facility] GENERAL INFECTIOUS DISEASES PROGRESS NOTE     Patient:  Job Kaiser   YOB: 1940, MRN: [id number]  Date of Visit: 03/15/2023  Date of Admission: 3/9/2023  Consult Requester: Divya Robledo MD          ASSESSMENT AND PLAN     Job Kaiser is an 83 yo [place]an gentleman w/ h/o prior tuberculosis s/p incomplete treatment. He also has COPD, bronchiectasis, pulmonary fibrosis, HFpEF with last EF 65% presenting  with 4 month period of progressively worsening fatigue, weight loss, dyspnea, and productive cough with associated fevers and chills. He has been treated for CAP with 5 days antibiotics.     CT showed Fibrotic lung disease with new extensive mediastinal lymphadenopathy and bilateral pleural effusions. R thoracentesis performed 3/14/23 showing 1130 mL clear pink fluid,  Cell count 1466, 32% mono. Exudative based on lights criteria.      Since he has underlying end-stage lung disease as assessed by pulmonology, we will not empirically treat PTB since this might cause more side effects without much benefit of emergent treatment in the overall picture.     IMPRESSION  1. To consider pulmonary tuberculosis, in setting of reported incomplete TB treatment   2. To consider cancer   3. Exudative pleural effusion   4. Chronic prednisone (20 mg/day) use  5. Severe COPD, Bronchiectasis, fibrotic lung disease - end-stage  6. Hypoxic respiratory failure on BIPAP    RECOMMENDATIONS:  1. Discontinue 72 hour hold.   2. Add cytology to R pleural fluid.   3. Defer to infection prevention for decision on airborne precautions  4. Continue Bactrim DS 1 tablet once daily for PJP prophylaxis if prednisone >20mg daily.   5. Page us if new micro data is available, particularly AFB cultures.     Since we have nothing to add at this point, we will sign off. Please check [facility] for staff covering the service.   Discussed case with primary team, >3 notes and 3 tests reviewed.     Xochilt Beltran MD  Infectious Diseases  Pager:  [phone]  [employer]ocera [employer]ico         SUBJECTIVE      Interval History and Events:  Still on BIPAP.     Antimicrobial Treatment:  Piptazo 3/11-3/13  Cefepime 3/9-3/10  Vanco 3/11-3/12         OBJECTIVE       Physical Examination:    Temp:  [98  F (36.7  C)-98.2  F (36.8  C)] 98  F (36.7  C)  Pulse:  [] 64  Resp:  [17-43] 29  BP: (113-175)/() 116/53  FiO2 (%):  [50 %] 50 %  SpO2:  [87 %-99 %] 99 %    I/O last 3 completed shifts:  In: 1636.19 [I.V.:411.19; NG/GT:560]  Out: 2965 [Urine:1815; Drains:1150]    Vitals:    03/09/23 2300 03/11/23 0300 03/12/23 0500 03/14/23 0600   Weight: 58.4 kg (128 lb 12 oz) 57.2 kg (126 lb 1.7 oz) 57 kg (125 lb 10.6 oz) 64.6 kg (142 lb 6.7 oz)     Constitutional: ill appearing, on BIPAP    Laboratory Data:    WBC 9.3   Estimated Creatinine Clearance: 91.3 mL/min (A) (based on SCr of 0.56 mg/dL (L)).     3/14 R pleural cavity - Cell count 1466, 32% mono,     Microbiology:    3/13 Crypto Ag - neg   3/12 SCx AFB, MTB - neg  3/11 SCx AFB, MTB - neg  3/11 BCx -   3/11 Asperg Ag serum - neg   3/10 SCx AFB, MTB - neg  3/10 HIV - neg   2/2013 Strongy - neg

## 2023-03-15 NOTE — PLAN OF CARE
Speech Language Therapy Discharge Summary    Reason for therapy discharge:    Change in medical status.    Progress towards therapy goal(s). See goals on Care Plan in [facility] electronic health record for goal details.  Goals not met.  Barriers to achieving goals:   limited tolerance for therapy.      Speech Therapy has been following patient since 3/10/23 to determine safety of PO intake.  In the days since, his respiratory status has declined and he continues to have AMS.  At present, patient is not appropriate for PO trials.  Will discontinue Speech Therapy and complete current orders at this time.  Discussed plan with Charge RN, Leila Heath, who was in agreement.    Therapy recommendation(s):    Please re-consult Speech Therapy if/when patient is appropriate to safely resume PO trials.

## 2023-03-15 NOTE — CONSULTS
Pulm Brief Note - late Note, closing consult.     Consult was requested by primary team 3/10/23 and cased was discussed with primary team who retracted the consult thus patient not physically seen as family discussing possible palliative focus. (though chart review and case discussion performed with primary team)      In brief pt is an 84 yo M with large mediastinal mass abutting esophagus with additional reticular opacities suggestive of ILD/fibrosis , LAD and effusion with history of TB.  Initial recommendation was effusion sampling for diagnostic and possible therapeutic procedure otherwise agreed that focus of care should be palliative as opposed to restorative given concern for malignancy (vs TB though any positive effect of treatment unlikely to provide rapid improvement in QOL). Case was additionally discussed with Pulmonary colleague with similar recommendations for a palliative focus      In discussion with JUDY Nova - with patient decompensation and transfer to ICU now requiring HFNC would further recommendation full palliative approach.     Maximus Orozco MD

## 2023-03-15 NOTE — PLAN OF CARE
ICU End of Shift Summary. See flowsheets for vital signs and detailed assessment.     Changes this shift: One episode of hypoglycemia overnight, treated with 25 mL of D50 recheck, WNL. Continues on insulin gtt.   Neuro: no changes overnight, continues to not follow commands  Cardiac:    intermittently tachycardiac overnight. Given PRN labetalol for BP >160.   Respiratory:  On BiPap 50% overnight.   GI/: Pt pulled out NG tube, attempted to place another but unsuccessful, TRUDI aware. Large loose BM overnight. External male cath in place for incontinence.  Diet/appetite: NPO, TF stopped at 2030.  Activity:  Turns in bed with assist of two.  Pain: CPOT 0  Skin: no new concerns  LDA's: R PIV infusing D10 and insulin gtt, L PIV SL.     Plan: continue to keep sitter at the bedside for safety with BiPap use.

## 2023-03-15 NOTE — PROGRESS NOTES
[facility]  Palliative Care Daily Progress Note       Recommendations & Counseling       Goals: DNR/DNI; otherwise continue restorative efforts       Prognosis and goals discussion: see note 3/14 for initial discussion. Today I met with some other family members - niece, uncle, cousin and reviewed medical updates and answered questions. They all seemed to understand that he is very ill and prognosis very guarded. They continue to hope that over time treatments will help him improve.        Decision making by next of kin-- adult children       Hindu melissa is important to patient and family - I have asked to have a Hindu  visit if possible, per family request        With such high respiratory rate could consider trying very low dose opioids - 1mg IV morphine q 4 hours PRN for air hunger. This could help him relax enough to get deeper breaths, though could risk increasing CO2 even further which is why I would use it only PRN and only in low dose at this point. Explained the possible benefit (help him feel more comfortable) and risk (rising CO2) to family and they were open to trying very low dose if he seemed very uncomfortable.        If patient remains on high flow and/or bipap over time anticipate need to plan a family care conference to review prognosis and expectations. Today seems slightly improved from day prior after thoracentesis but if no steady improvement in the coming days would plan for a conference. If that is planned palliative care would be happy to attend.     Total time spent was >35 minutes,  >50% of time was spent counseling and/or coordination of care regarding support and updates to family through use of  for patient with severe respiratory failure.    Adrianne Guajardo MD / Palliative Medicine / Text me via [facility].     Team Consult Pager [phone] (answered 8am-430pm M-F) - ok to text page via [employer] / After-Hours Answering  Service [phone] / Palliative Clinic in the [facility] at the [facility] - [phone] (scheduling); [phone] (triage).        Assessments          Job Kaiser is an 84 yo Kazakh speaking gentleman w/ h/o prior tuberculosis s/p treatment with 3 drug therapy for 6 weeks in [facility], asthma, COPD on 2L home 02, bronchiectasis, pulmonary fibrosis, T2DM, HFpEF  (last EF 65%) who presented on 3/9/2023 with progressive fatigue, shortness of breath, productive cough, weight loss, and pain with urination, with labs and imaging concerning for CAP vs. incompletely treated respiratory TB and nephrolithiasis. Pt initiated on cefepime and prophylactic bactrim given longstanding steroid use, with initial improvement in 02 needs to baseline of 2L per NC. On 3/11, pt was found to be confused, tachycardic to 140's and hypoxic with sats in the 50's after pulling off his supplemental 02. Given the abrupt change in mental status and 02 needs, Mr. Kaiser was transferred to the ICU for bipap therapy and potential need for mechanical intubation.      Today, the patient was seen for:  Family support    Prognosis, Goals, or Advance Care Planning was addressed today with: Yes.  Mood, coping, and/or meaning in the context of serious illness were addressed today: Yes.  Summary/Comments:            Interval History:     Chart review/discussion with unit or clinical team members:   No acute events  Slight improvement after thoracentesis yesterday  Was able to go on high flow today    Per patient or family/caregivers today:  Unable to provide hx, agitated and confused    Key Palliative Symptoms:  We are not helping to manage these symptoms currently in this patient.               Review of Systems:     Unable due to AMS          Medications:     I have reviewed this patient's medication profile and medications during this hospitalization.             Physical Exam:   Vitals were reviewed  Temp: 98.4  F (36.9  C) Temp src: Oral BP: (!)  144/86 Pulse: 72   Resp: 29 SpO2: 99 % O2 Device: High Flow Nasal Cannula (HFNC) Oxygen Delivery: 45 LPM  Gen: sitting/lying in bed. Appears uncomfortable , agitated  HEENT: NCAT. Conjunctiva clear. Sclera anicteric .  CV: RRR , Peripheral perfusion intact.   Resp: increased work of breathing, improved slightly after switching from bipap to high flow  Abd: soft, nt, nd, +BS   Msk: no gross deformity, + sarcopenia  Skin:  no jaundice  Ext: warm, well perfused.   Neuro: face symmetric. EOM, vision, hearing grossly intact. Speech fluent. Moves all extremities   Mental status/Psych: alert but agitated, not able to maintain attention or follow directions. Continuously trying to pull off oxygen, wearing mitts.                  Data Reviewed:     Reviewed recent pertinent imaging, comments:       Reviewed recent labs, comments:   ROUTINE ICU LABS (Last four results)  CMPRecent Labs   Lab 03/15/23  1621 03/15/23  1523 03/15/23  1445 03/15/23  1338 03/15/23  1241 03/15/23  0520 03/15/23  0427 03/14/23  1700 03/14/23  1604 03/14/23  0651 03/14/23  0449 03/13/23  0704 03/13/23  0506 03/11/23  0632 03/11/23  0259 03/09/23  1358 03/09/23  1356   NA  --   --   --   --  146*  148*  --  149*  --   --   --  145  --  145   < > 140   < > 141   POTASSIUM  --   --   --   --  4.1  --  3.7  --   --   --  3.9  --  3.9   < > 5.1   < > 4.8   CHLORIDE  --   --   --   --  92*  --  97*  --   --   --  98  --  101   < > 97*   < > 93*   CO2  --   --   --   --  36*  --  41*  --   --   --  36*  --  31*   < > 24   < > 28   ANIONGAP  --   --   --   --  18*  --  11  --   --   --  11  --  13   < > 19*   < > 20*   * 150* 151* 150* 187*   < > 191*   < > 201*   < > 219*   < > 156*   < > 183*   < > 118*   BUN  --   --   --   --  24.7*  --  24.1*  --   --   --  23.4*  --  20.1   < > 18.9   < > 25.3*   CR  --   --   --   --  0.50*  --  0.56*  --   --   --  0.95  --  0.95   < > 0.93   < > 1.09   GFRESTIMATED  --   --   --   --  >90  --  >90  --   --    --  79  --  79   < > 81   < > 67   MATT  --   --   --   --  9.2  --  9.4  --   --   --  9.4  --  9.7   < > 10.2   < > 10.6*   MAG  --   --   --   --   --   --  2.2  --   --   --  1.8  --  1.9  --  1.9  --  1.9   PHOS  --   --   --   --  1.5*  --  1.4*  --   --   --  2.8  --  2.5  --  4.4  --  2.5   PROTTOTAL  --   --   --   --   --   --   --   --  6.2*  --   --   --   --   --  7.0  --  7.9   ALBUMIN  --   --   --   --   --   --   --   --   --   --   --   --   --   --  2.5*  --  2.8*   BILITOTAL  --   --   --   --   --   --   --   --   --   --   --   --   --   --  <0.2  --  <0.2   ALKPHOS  --   --   --   --   --   --   --   --   --   --   --   --   --   --  85  --  97   AST  --   --   --   --   --   --   --   --   --   --   --   --   --   --  53*  --  54*   ALT  --   --   --   --   --   --   --   --   --   --   --   --   --   --  12  --  13    < > = values in this interval not displayed.     CBC  Recent Labs   Lab 03/15/23  0427 03/14/23  1644 03/14/23  1604 03/14/23  0449 03/13/23  0506 03/12/23  0527   WBC 9.3  --   --  12.3* 9.4 11.1*   RBC 2.85*  --   --  2.93* 3.00* 3.38*   HGB 7.8* 8.2* 5.8* 8.1* 8.1* 9.2*   HCT 26.0*  --   --  27.1* 27.5* 31.0*   MCV 91  --   --  93 92 92   MCH 27.4  --   --  27.6 27.0 27.2   MCHC 30.0*  --   --  29.9* 29.5* 29.7*   RDW 20.5*  --   --  21.2* 22.1* 22.1*   *  --   --  144* 180 182     INR  Recent Labs   Lab 03/09/23  1356   INR 1.38*     Arterial Blood Gas  Recent Labs   Lab 03/15/23  0426 03/14/23  1604 03/14/23  0449 03/13/23  0506 03/12/23  0017 03/11/23  0346   PH  --   --   --   --   --  7.29*   PCO2  --   --   --   --   --  55*   PO2  --   --   --   --   --  98   HCO3  --   --   --   --   --  27   O2PER 50 50 50 50   < > 45    < > = values in this interval not displayed.

## 2023-03-15 NOTE — PROGRESS NOTES
[facility] ICU PROGRESS NOTE  03/15/2023      Date of Service (when I saw the patient): 03/15/2023    ASSESSMENT: Job Kaiser is an 82 yo Belgian speaking gentleman w/ h/o prior tuberculosis s/p treatment with 3 drug therapy for 6 weeks in [place], asthma, COPD on 2L home 02, bronchiectasis, pulmonary fibrosis, T2DM, HFpEF  (last EF 65%) who presented on 3/9/2023 with progressive fatigue, shortness of breath, productive cough, weight loss, and pain with urination, with labs and imaging concerning for CAP vs. incompletely treated respiratory TB and nephrolithiasis. Pt initiated on cefepime and prophylactic bactrim given longstanding steroid use, with initial improvement in 02 needs to baseline of 2L per NC. On 3/11, pt was found to be confused, tachycardic to 140's and hypoxic with sats in the 50's after pulling off his supplemental 02. Given the abrupt change in mental status and 02 needs, Mr. Kaiser was transferred to the ICU for bipap therapy and potential need for mechanical intubation.     CHANGES and MAJOR THINGS TODAY:     Continue Pulmonary Hygiene    Continue Steroid Burst    Replace NG tube    Restart TF    Added on cytology to pleural sample from thoracentesis     Discussed with pulmonology, no need to treat TB, will provide increase in QoL      PLAN:    Neuro:  # Encephalopathy, likely metabolic  # Acute Pain  # Agitation  # Insomnia  - Precedex for agitation  - Melatonin 5 mg q HS   - olanzapine 5 mg q HS  - PRN haldol available   - PRN hydroxyzine 25-50mg q 6H    - PRN tylenol available  - minimize sedating medications as able   - delirium precautions     Pulmonary:  # Acute on chronic hypoxic hypercapneic respiratory failure, broad differential CAP vs. Aspiration PNA vs. TB vs. PE vs. COPD exacerbation vs. Atypical PNA (at risk for PJP given long term steroid use without PJP prophy)   # COPD on 2L home 02  # Asthma  # History of Pulmonary fibrosis/bronchiectasis  # Mediastinal lymphadenopathy per Chest  CT  # Bilateral pleural effusion concerning for malignancy per Chest CT 3/10/2022  ~ Thoracentesis completed (3/14) with 1130 ml drained and without complication  - Bipap to keep sats > 90%  - hold PTA BREO ellipta (pt unable to take)   - scheduled duonebs, mucomyst, pulmicort  - methylpred 62.5 mg q6h x 3 days  - see ID for infectious work up/plan  - Plan for oncology, pulmonology and PET scan referral at discharge   - Added on Cytology to pleural fluid from thoracentesis     # History of incompletely treated TB Upper lobe predominant disease in pt with likely incompletely treated (6 weeks) TB in the past with characteristic TB symptoms including night sweats, weight loss, hemoptysis history, productive cough,and worsening CT chest findings, with history of multiple episodes of leaving before sputum AFBs could be done and history of not showing up to clinic for completing sputum AFBs  - airborne isolation   - AFB smears were collected, appearing to be negative as of 3/15, will continue to monitor  - Removed 72 hour hold  - Discussed with pulmonology, no need to treat TB, will provide increase in QoL        Cardiovascular:  # Tachycardia, resolved  # Elevated Troponin, improving  # HFpEF  - Goal MAP > 65   - Hydralazine 10mg Q6H PRN SBP >160  - Labetalol 20mg Q6H PRN SBP >160  - Continuous cardiac monitoring          GI/Nutrition:  # Severe Malnutrition in the context of acute on chronic illness   # Poor PO intake  # Failure to thrive  - NPO given tenuous respiratory status   - Nutrition and SLP following  - small bore NG tube replaced 3/15  - Restart TF      # Thrush.   Oral candidiasis observed on physical exam.  Patient has chronic prednisone use as well as uses steroid inhaler at home, likely steroid use is source of thrush.   - Nystatin rinse 4 times daily ordered  - Educate patient and family regarding rinsing mouth after inhalers     Renal/Fluids/Electrolytes:  # AGMA   # History of nephrolithiasis  # Volume  "status   # Hypercalcemia  # Hypernatremia  # Risk for Refeeding syndrome   Previous nephrolithiasis x2, requiring lithotripsy once. Admitted with dysuria and left CVA tenderness. UA with blood/RBC. Not consistent with infection. CT Abdomen 3/9 with stable large left kidney stone with mild dilation of collecting system- unchanged from previous.   - Monitor I/O closely   - Electrolyte replacements per ICU protocol  - Potassium and Phos replaced today  - FWF at 100 mL per hour  - Discontinue IV fluids  - Recheck sodium 1400      Endocrine:  # DM2  # Stress and steroid induced hyperglycemia  - sliding scale insulin coverage   - Goal BG < 180  - Hypoglycemia protocols        ID:  # CAP vs. PJP pneumonia vs. Reactivated TB   - ID following, appreciate recommendations   - Per pulmonary notes \"[facility]"  - History of failure to show up for TB rule out testing at [facility] in 2014  - Upper lobe predominant disease in pt with likely incompletely treated (6 weeks) TB in the past with characteristic TB symptoms including night sweats, weight loss, hemoptysis history, productive cough,and worsening CT chest findings  - All cultures have returned negative to date, will continue to monitor       Cultures:  AFB smear (3/10): Negative  AFB smear (3/11): Negative  AFB smear (3/12): Negative  AFB Culture (3/10): In process ~ 4 weeks     Antibiotics:  ~ Azithromycin (3/9)  ~ cefepime (3/9 - 3/10)  ~ ceftriaxone (3/9)  ~ Vancomycin (3/11 - 3/12)  ~ Zosyn (3/10 - 3/14 )        Hematology:    # Anemia of chronic illness  # Leukocytosis  - Trend CBC daily     Musculoskeletal:  # Weakness/Deconditioning  -OT/PT consults       Skin:  No active issues  - Appreciate diligent nursing cares     General Cares/Prophylaxis:    DVT Prophylaxis: Enoxaparin (Lovenox) SQ and Pneumatic Compression Devices  GI Prophylaxis: PTA PPI  Restraints: Not Indicated  Family Communication: Family updated at bedside  Code " Status: No CPR- Do NOT Intubate    Lines/tubes/drains:  ~ PIV x 2  ~ NJ tube    Disposition:  ~ [facility] ICU    Patient seen and findings/plan discussed with medical ICU staff, Dr. Bess.      I spent 45 minutes providing critical care, the patient was in critical condition due to hypoxic respiratory failure.    FRANCES FaustinACNP  Pager #[phone]  Critical Care  [facility]     ====================================  INTERVAL HISTORY:   Patient removed his feeding tube overnight. Was started on D10 infusion to replace sugar given need for insulin infusion while on steroid burst. Feeding tube replaced this morning with plans to restart. Patient is mildly restless and anxious, which appears to be his baseline while he has been in the hospital, per family, but does not follow commands and continues to be mildly encephalopathic. Patient had thoracentesis yesterday, will monitor results. Patient continues to be vitally stable but requiring significant respiratory support via bipap and HFNC.     OBJECTIVE:   1. VITAL SIGNS:   Temp:  [98  F (36.7  C)-98.2  F (36.8  C)] 98  F (36.7  C)  Pulse:  [] 64  Resp:  [17-43] 29  BP: (113-175)/() 116/53  FiO2 (%):  [50 %] 50 %  SpO2:  [87 %-99 %] 99 %  FiO2 (%): 50 %  Resp: 29    2. INTAKE/ OUTPUT:   I/O last 3 completed shifts:  In: 1636.19 [I.V.:411.19; NG/GT:560]  Out: 2965 [Urine:1815; Drains:1150]    3. Physical Exam  Constitutional:       Appearance: He is ill-appearing.   HENT:      Head: Normocephalic.      Mouth/Throat:      Mouth: Mucous membranes are moist.   Eyes:      Pupils: Pupils are equal, round, and reactive to light.   Cardiovascular:      Rate and Rhythm: Normal rate and regular rhythm.      Pulses: Normal pulses.      Heart sounds: Normal heart sounds.   Pulmonary:      Effort: Tachypnea and accessory muscle usage present.      Breath sounds: Examination of the right-upper field reveals wheezing. Examination of the left-upper  field reveals wheezing and rhonchi. Examination of the left-middle field reveals rhonchi. Examination of the right-lower field reveals decreased breath sounds. Examination of the left-lower field reveals decreased breath sounds. Decreased breath sounds, wheezing and rhonchi present.   Abdominal:      General: Abdomen is flat.   Musculoskeletal:         General: Normal range of motion.   Skin:     General: Skin is warm and moist.   Neurological:      Mental Status: He is disoriented.   Psychiatric:         Mood and Affect: Mood is anxious.         Judgment: Judgment is impulsive.           4. LABS:   Arterial Blood Gases   Recent Labs   Lab 03/11/23  0346   PH 7.29*   PCO2 55*   PO2 98   HCO3 27     Complete Blood Count   Recent Labs   Lab 03/15/23  0427 03/14/23  1644 03/14/23  1604 03/14/23  0449 03/13/23  0506 03/12/23  0527   WBC 9.3  --   --  12.3* 9.4 11.1*   HGB 7.8* 8.2* 5.8* 8.1* 8.1* 9.2*   *  --   --  144* 180 182     Basic Metabolic Panel  Recent Labs   Lab 03/15/23  0736 03/15/23  0612 03/15/23  0520 03/15/23  0427 03/14/23  0651 03/14/23  0449 03/13/23  0704 03/13/23  0506 03/13/23  0126 03/12/23  0554 03/12/23  0527   NA  --   --   --  149*  --  145  --  145 147*   < > 147*   POTASSIUM  --   --   --  3.7  --  3.9  --  3.9  --   --  4.5   CHLORIDE  --   --   --  97*  --  98  --  101  --   --  104   CO2  --   --   --  41*  --  36*  --  31*  --   --  27   BUN  --   --   --  24.1*  --  23.4*  --  20.1  --   --  20.3   CR  --   --   --  0.56*  --  0.95  --  0.95  --   --  1.06   * 184* 230* 191*   < > 219*   < > 156*  --    < > 107*    < > = values in this interval not displayed.     Liver Function Tests  Recent Labs   Lab 03/11/23  0259 03/09/23  1356   AST 53* 54*   ALT 12 13   ALKPHOS 85 97   BILITOTAL <0.2 <0.2   ALBUMIN 2.5* 2.8*   INR  --  1.38*     Coagulation Profile  Recent Labs   Lab 03/09/23  1356   INR 1.38*       5. RADIOLOGY:   Recent Results (from the past 24 hour(s))   IR  Thoracentesis    Narrative    DIAGNOSIS: Acute on chronic hypoxic respiratory failure, pleural  effusion    PROCEDURE: IR THORACENTESIS    Impression    IMPRESSION: Completed ultrasound-guided diagnostic and therapeutic  right thoracentesis. 1150 mL clear pink fluid aspirated from the right  pleural space. A sample of fluid was sent to lab for analysis as  requested. No immediate complication. No effusion remains. Respiratory  rate improved to 26/min from 42/min.    ----------    CLINICAL HISTORY: Acute on chronic hypoxic hypercapnia respiratory  failure, community-acquired pneumonia versus aspiration pneumonia  versus tuberculosis with bilateral pleural effusions right greater  than left. IR consulted for diagnostic and therapeutic right  thoracentesis. Consent with [place]  with family. Bedside in  the ICU. Airborne precautions.    PERFORMED BY: Collin Paris PA-C    CONSENT: Written informed consent was obtained from son with [place]   over the phone and is documented in the patient record.    MEDICATIONS: 4 mL 1% lidocaine for local anesthesia      NURSING: Patient continuously monitored by trained, independent  observer.     DESCRIPTION: Patient maintained his current position recumbent on his  back in his ICU bed on BiPAP. With the right arm slightly abducted  Limited ultrasound showed large right pleural effusion. The skin  overlying the right pleural effusion was prepped and draped in the  usual sterile fashion.    Under continuous ultrasound visualization, the skin and pleura was  anesthetized before a centesis needle/catheter system was advanced  into the pleural space. The catheter was advanced off the needle into  the fluid and the needle was removed. Sample collected before catheter  was connected to vacuum drainage and fluid aspirated. Upon completion  of drainage, the catheter was removed on expiration. Occlusive  dressing applied.     COMPLICATIONS: No immediate concerns, the  patient remained stable  throughout the procedure and tolerated it well.    ESTIMATED BLOOD LOSS: Minimal    SPECIMENS: 30 mL pleural fluid    SAV MUJICA PA-C         SYSTEM ID:  [id number]   XR Abdomen 1 View    Narrative    Exam: XR ABDOMEN 1 VIEW, 3/15/2023 1:36 AM    Indication: NG placement    Comparison: 3/12/1933    Findings:   2 portable radiographs of the chest and abdomen for attempt of enteric  tube placement. The tube is not visualized in the first image, in the  second image it appears to be coiling in the cervical esophagus back  to the mouth, tip is outside the field of view. Suboptimal  visualization of the chest and abdomen. Trach appears to be midline.  Fibrotic appearance of the lungs with small left pleural effusion.  Nonobstructive bowel gas pattern, dense material in the left upper  quadrant of indeterminate nature.      Impression    Impression:   1. Enteric tube coiling in the mouth, tip is outside of the  field-of-view.  2. Stable fibrotic appearance of the lungs. Small left pleural  effusion.  3. Nonobstructive bowel gas pattern.    I have personally reviewed the examination and initial interpretation  and I agree with the findings.    TAYLOR MCCORMACK MD         SYSTEM ID:  [id number]   XR Abdomen Port 1 View    Narrative    Exam: XR ABDOMEN PORT 1 VIEW, 3/15/2023 1:37 AM    Indication: Ng placement    Comparison: Same date radiograph    Findings:   3 views of the lower chest and upper abdomen for purposes of enteric  tube placement. Tube is coiled in the lower esophagus on the first and  second images. Tube is not visualized on the third image. Otherwise  stable appearance of lower chest and abdomen. Left renal staghorn  calculus.      Impression    Impression: Tube is coiled in the first 2 images and not visualized  the third image. As per contact with ordering provider at 4:30 AM,  tube was removed prior to third image. Otherwise stable appearance of  the lower chest and abdomen. Again are  noted the left renal staghorn  calculus.    I have personally reviewed the examination and initial interpretation  and I agree with the findings.    TAYLOR MCCORMACK MD         SYSTEM ID:  [id number]

## 2023-03-16 NOTE — CONSULTS
Successful Right Arm PICC placement. Chest xray obtained due to inability to obtain 3CG monitoring due to A. Fib.

## 2023-03-16 NOTE — PROCEDURES
[facility]    Double Lumen PICC Placement    Date/Time: 3/16/2023 4:02 PM  Performed by: Steffany Sorto RN  Authorized by: Bob Nova NP   Indications: vascular access      UNIVERSAL PROTOCOL   Site Marked: Yes  Prior Images Obtained and Reviewed:  Yes  Required items: Required blood products, implants, devices and special equipment available    Patient identity confirmed:  Verbally with patient, arm band and hospital-assigned identification number  NA - No sedation, light sedation, or local anesthesia  Confirmation Checklist:  Patient's identity using two indicators and relevant allergies  Time out: Immediately prior to the procedure a time out was called    Universal Protocol: the Joint Commission Universal Protocol was followed    Preparation: Patient was prepped and draped in usual sterile fashion    ESBL (mL):  1     ANESTHESIA    Anesthesia: Local infiltration  Local Anesthetic:  Lidocaine 1% without epinephrine  Anesthetic Total (mL):  1      SEDATION    Patient Sedated: No        Preparation: skin prepped with 2% chlorhexidine  Skin prep agent: skin prep agent completely dried prior to procedure  Sterile barriers: maximum sterile barriers were used: cap, mask, sterile gown, sterile gloves, and large sterile sheet  Hand hygiene: hand hygiene performed prior to central venous catheter insertion  Type of line used: PICC  Catheter type: double lumen  Lumen type: power PICC and non-valved  Lumen Identification: Purple and Red  Catheter size: 4 Fr  Brand: Bard  Lot number: [id number]  Placement method: venipuncture, MST, ultrasound, tip navigation system and other (see comment) (chest xray to confirm placement)  Number of attempts: 1  Difficulty threading catheter: no  Successful placement: yes  Orientation: right  Catheter to Vein (%): 34  Location: basilic vein  Tip Location: SVC/RA Junction  Site rationale: proximal location due to CTV ratio  :  12cm.  Extremity circumference: 23  Visible catheter length: 4  Total catheter length: 44  Dressing and securement: adhesive securement device, alcohol impregnated caps, blood cleaned with CHG, chlorhexidine disc applied, occlusive dressing applied, securement device, site cleansed, statlock, sterile dressing applied, subcutaneous anchor securement system and transparent securement dressing  Post procedure assessment: blood return through all ports and placement verified by x-ray  PROCEDURE   Patient Tolerance:  Patient tolerated the procedure well with no immediate complications and patient tolerated the procedure well with no immediate complicationsDescribe Procedure: Successful Right Arm PICC placement. Chest xray obtained due to inability to obtain 3CG monitoring due to A. Fib.   Disposal: sharps and needle count correct at the end of procedure, needles and guidewire disposed in sharps container

## 2023-03-16 NOTE — PROGRESS NOTES
[facility] ICU PROGRESS NOTE  03/16/2023      Date of Service (when I saw the patient): 03/16/2023    ASSESSMENT: Job Kaiser is an 82 yo [place] speaking gentleman w/ h/o prior tuberculosis s/p treatment with 3 drug therapy for 6 weeks in [place], asthma, COPD on 2L home 02, bronchiectasis, pulmonary fibrosis, T2DM, HFpEF  (last EF 65%) who presented on 3/9/2023 with progressive fatigue, shortness of breath, productive cough, weight loss, and pain with urination, with labs and imaging concerning for CAP vs. incompletely treated respiratory TB and nephrolithiasis. Pt initiated on cefepime and prophylactic bactrim given longstanding steroid use, with initial improvement in 02 needs to baseline of 2L per NC. On 3/11, pt was found to be confused, tachycardic to 140's and hypoxic with sats in the 50's after pulling off his supplemental 02. Given the abrupt change in mental status and 02 needs, Mr. Kaiser was transferred to the ICU for bipap therapy. His ICU course has been complicated by worsening hypercarbic hypoxemic respiratory failure secondary to his chronic pulmonary fibrosis vs malignancy vs less likely partially treated TB.       CHANGES and MAJOR THINGS TODAY:     Added Morphine 1 mg IV every 4 hours as needed for air hunger    Monitor Cytology and AFB culture results    Given 5 mg Metoprolol IV for AFib RVR    Started Amiodarone infusion    Temporarily fixed NG tube, will need replacement once he can tolerate being off Bipap     Decrease FWF to 50 mL/hr with resolution of hypernatremia    Lasix 40 mg IV, once    Goal I/O of net negative 1 L    Ongoing goals of care discussion with family      PLAN:    Neuro:  # Encephalopathy, likely metabolic  # Acute Pain  # Agitation  # Insomnia  - Precedex for agitation  - Melatonin 5 mg q HS   - olanzapine 5 mg q HS  - PRN haldol available   - PRN hydroxyzine 25-50mg q 6H    - PRN tylenol available  - minimize sedating medications as able   - delirium  precautions     Pulmonary:  # Acute on chronic hypoxic hypercapneic respiratory failure, broad differential CAP vs. Aspiration PNA vs. TB vs. PE vs. COPD exacerbation vs. Atypical PNA (at risk for PJP given long term steroid use without PJP prophy)   # COPD on 2L home 02  # Asthma  # History of Pulmonary fibrosis/bronchiectasis  # Mediastinal lymphadenopathy per Chest CT  # Bilateral pleural effusion concerning for malignancy per Chest CT 3/10/2022  ~ Thoracentesis completed (3/14) with 1130 ml drained and without complication  - Bipap (settings increased to 18/8 on 3/15) to assist with persistent hypercarbia  - HFNC as tolerated  - hold PTA BREO ellipta (pt unable to take)   - scheduled duonebs, mucomyst, pulmicort  - discontinued CPT with minimal secretion burden and it is more agitating then helpful for the patient  - methylpred 62.5 mg q6h (4 more doses)  - see ID for infectious work up/plan   - Awaiting Cytology results from pleural fluid from thoracentesis  - Morphine 1 mg IV every 4 hours as needed for air hunger     # History of incompletely treated TB Upper lobe predominant disease in pt with likely incompletely treated (6 weeks) TB in the past with characteristic TB symptoms including night sweats, weight loss, hemoptysis history, productive cough,and worsening CT chest findings, with history of multiple episodes of leaving before sputum AFBs could be done and history of not showing up to clinic for completing sputum AFBs  - airborne isolation to continue per infection prevention  - AFB smears were collected, appearing to be negative as of 3/15, will continue to monitor  - Discussed with pulmonology, no need to treat TB, will not provide increase in QoL         Cardiovascular:  # Tachycardia, resolved  # Elevated Troponin, improving  # HFpEF  # New Afib w/ RVR  - Goal MAP > 65   - Hydralazine 10mg Q6H PRN SBP >160  - Labetalol 20mg Q6H PRN SBP >160  - Continuous cardiac monitoring   - Metoprolol 5 mg IV  once with minimal impact on rate  - Initiated amiodarone bolus + infusion x 24 hours  - [id number] score of 4 and a HAS-BLED score of 2  - Will monitor cardiac rhythm and consider starting heparin if afib persists > 48 hrs will treat with therapeutic enoxaparin.          GI/Nutrition:  # Severe Malnutrition in the context of acute on chronic illness    # Poor PO intake  # Failure to thrive  - NPO given tenuous respiratory status   - Nutrition and SLP following  - Small Bore feeding tube broke this morning at the hub connection to the tube feeding bag  - Connection was temporarily corrected, though one port remains broken. Will require replacement as soon as patient is able to tolerate being off Bipap  - Will replace and resume TF as soon as able      # Thrush.   Oral candidiasis observed on physical exam.  Patient has chronic prednisone use as well as uses steroid inhaler at home, likely steroid use is source of thrush.   - Nystatin rinse 4 times daily ordered  - Educate patient and family regarding rinsing mouth after inhalers       Renal/Fluids/Electrolytes:  # AGMA   # History of nephrolithiasis  # Volume status   # Hypercalcemia  # Hypernatremia, resolved  # Risk for Refeeding syndrome   Previous nephrolithiasis x2, requiring lithotripsy once. Admitted with dysuria and left CVA tenderness. UA with blood/RBC. Not consistent with infection. CT Abdomen 3/9 with stable large left kidney stone with mild dilation of collecting system- unchanged from previous.   - Monitor I/O closely   - Electrolyte replacements per ICU protocol  - Decrease FWF at 50 mL per hour  - Patient is up over 3 L the last 36 hours, weight up 1 kg this morning.   - Lasix 40 mg once this morning   - Goal net negative 1 L today       Endocrine:  # DM2  # Stress and steroid induced hyperglycemia  - insulin infusion  - Goal BG < 180  - Hypoglycemia protocols        ID:  # CAP vs. PJP pneumonia vs. Reactivated TB   - ID following, appreciate  "recommendations   - Per pulmonary notes \"prior TB treatment (3 drug x 6 wks in [place] 1996)\"  - History of failure to show up for TB rule out testing at [facility] in 2014  - Upper lobe predominant disease in pt with likely incompletely treated (6 weeks) TB in the past with characteristic TB symptoms including night sweats, weight loss, hemoptysis history, productive cough,and worsening CT chest findings  - All cultures have returned negative to date, will continue to monitor         Cultures:  AFB smear (3/10): Negative  AFB smear (3/11): Negative  AFB smear (3/12): Negative  AFB Culture (3/10): In process ~ 4 weeks     Antibiotics:  ~ Azithromycin (3/9)  ~ cefepime (3/9 - 3/10)  ~ ceftriaxone (3/9)  ~ Vancomycin (3/11 - 3/12)  ~ Zosyn (3/10 - 3/14)        Hematology:    # Anemia of chronic illness   # Leukocytosis  - Trend CBC daily     Musculoskeletal:  # Weakness/Deconditioning  -OT/PT consults       Skin:  No active issues  - Appreciate diligent nursing cares     General Cares/Prophylaxis:    DVT Prophylaxis: Enoxaparin (Lovenox) SQ and Pneumatic Compression Devices  GI Prophylaxis: PTA PPI  Restraints: Not Indicated  Family Communication: Long discussion with son Kelly this morning via interpretor discussing patient lung status and poor prognosis.   Code Status: No CPR- Do NOT Intubate    Lines/tubes/drains:   ~ PIV x 2  ~ NJ Tube    Disposition:  ~ [facility] ICU    Patient seen and findings/plan discussed with medical ICU staff, Dr. Bess.    I spent 70 minutes providing critical care, the patient was in critical condition due to hypercarbic hypoxemic respiratory failure.    Bob Nova, -[facility]  Pager #[phone]  Critical Care  [facility]     ====================================  INTERVAL HISTORY:   Patient continues to be restless and impulsive overnight. Increased bipap settings helped reduce hypercarbia down to 68 from the mid 80s two days ago. Patient appears to have some air hunger, " even with additional support provided by the bipap. Ongoing concerns regarding patients poor status and prognosis have been conveyed with family, palliative team is highly involved and appreciate their support. Difficulty transitioning patient to HFNC today due to hypoxia and agitation. Noted net positive fluid over previous 36 hours likely contributing. Will diurese today and monitor.     OBJECTIVE:   1. VITAL SIGNS:   Temp:  [98  F (36.7  C)-98.4  F (36.9  C)] 98  F (36.7  C)  Pulse:  [48-86] 48  Resp:  [19-43] 21  BP: ()/(50-93) 96/54  FiO2 (%):  [40 %-70 %] 40 %  SpO2:  [95 %-100 %] 97 %  FiO2 (%): 40 %  Resp: 21    2. INTAKE/ OUTPUT:   I/O last 3 completed shifts:  In: 4240.63 [I.V.:450.63; NG/GT:2470]  Out: 600 [Urine:600]    3. Physical Exam  Constitutional:       Appearance: He is ill-appearing.   HENT:      Mouth/Throat:      Mouth: Mucous membranes are dry.   Eyes:      Pupils: Pupils are equal, round, and reactive to light.   Cardiovascular:      Rate and Rhythm: Tachycardia present. Rhythm irregular.      Pulses: Normal pulses.   Pulmonary:      Effort: Tachypnea and accessory muscle usage present.      Breath sounds: Examination of the right-upper field reveals wheezing. Examination of the left-upper field reveals wheezing and rhonchi. Examination of the left-middle field reveals rhonchi. Wheezing and rhonchi present.   Abdominal:      General: Abdomen is flat.   Musculoskeletal:         General: Normal range of motion.      Cervical back: Neck supple.   Skin:     General: Skin is warm and moist.   Neurological:      Mental Status: He is lethargic and disoriented.      Motor: Weakness present.   Psychiatric:         Mood and Affect: Mood is anxious.         Behavior: Behavior is agitated.         Judgment: Judgment is impulsive.       4. LABS:   Arterial Blood Gases   Recent Labs   Lab 03/11/23  0346   PH 7.29*   PCO2 55*   PO2 98   HCO3 27     Complete Blood Count   Recent Labs   Lab  03/16/23  0516 03/15/23  0427 03/14/23  1644 03/14/23  1604 03/14/23  0449 03/13/23  0506   WBC 8.6 9.3  --   --  12.3* 9.4   HGB 7.1* 7.8* 8.2* 5.8* 8.1* 8.1*   PLT 93* 110*  --   --  144* 180     Basic Metabolic Panel  Recent Labs   Lab 03/16/23  0703 03/16/23  0603 03/16/23  0516 03/16/23  0513 03/15/23  2100 03/15/23  1931 03/15/23  1338 03/15/23  1241 03/15/23  0520 03/15/23  0427   NA  --   --  143  --   --  144  --  146*  148*  --  149*   POTASSIUM  --   --  4.1  --   --  4.4  --  4.1  --  3.7   CHLORIDE  --   --  92*  --   --  90*  --  92*  --  97*   CO2  --   --  41*  --   --  41*  --  36*  --  41*   BUN  --   --  23.8*  --   --  24.2*  --  24.7*  --  24.1*   CR  --   --  0.54*  --   --  0.53*  --  0.50*  --  0.56*   * 105* 112* 120*   < > 184*  199*   < > 187*   < > 191*    < > = values in this interval not displayed.     Liver Function Tests  Recent Labs   Lab 03/11/23  0259 03/09/23  1356   AST 53* 54*   ALT 12 13   ALKPHOS 85 97   BILITOTAL <0.2 <0.2   ALBUMIN 2.5* 2.8*   INR  --  1.38*     Coagulation Profile  Recent Labs   Lab 03/09/23  1356   INR 1.38*       5. RADIOLOGY:   Recent Results (from the past 24 hour(s))   XR Chest Port 1 View    Narrative    Portable chest    INDICATION: NG tube insertion verification    COMPARISON: 3/12/2023    FINDINGS: Large bore tube progresses beyond inferior margin of the  image. Patchy densities in the lungs may indicate edema. Heart size  grossly normal.      Impression    IMPRESSION: Feeding tube/NG tube all diaphragmatic hiatus and the  inferior margin of the image into the abdomen.    STEPHANIE DUVAL MD         SYSTEM ID:  [id number]   XR Chest Port 1 View    Narrative    EXAM: XR CHEST PORT 1 VIEW  3/15/2023 2:13 PM     HISTORY:  evaluation of previously drained effusions       COMPARISON:  Radiograph 3/15/23. CT 3/10/2023.    TECHNIQUE: AP semiupright view of the chest    FINDINGS:   Devices, lines, tubes: Feeding tube courses inferior to the  left  hemidiaphragm with tip out of the field of view.    The trachea is midline. The cardiomediastinal silhouette is stable.  Biapical thickening. Compared to radiograph of 3/12/2023 the right  pleural effusion is decreased in size, now small. Similar small left  pleural effusion. Interstitial opacities bilaterally are not  significantly changed. No appreciable pneumothorax. No acute osseous  abnormality.        Impression    IMPRESSION:   1. Compared to radiograph 3/12/2023 there is decreased size of right  pleural effusion, now small.   2. Stable small left pleural effusion.  3. Diffuse bilateral interstitial densities, compatible with fibrotic  changes.    I have personally reviewed the examination and initial interpretation  and I agree with the findings.    STEPHANIE DUVAL MD         SYSTEM ID:  [id number]

## 2023-03-16 NOTE — PROGRESS NOTES
BP (!) 85/53   Pulse (!) 48   Temp 98  F (36.7  C)   Resp 20   Wt 64.6 kg (142 lb 6.7 oz)   SpO2 97%   BMI 19.32 kg/m      VSS; Bradycardic with PAC's. BIPAP overnight. Slept moderately well with no PRNs given for agitation. He is talking and intermittently following commands. ABG in am improved with PCO2 down to 68. Continues to improve. 4 large loose BMs overnight; rectal tube placed in AM.     Mikel Juarez RN

## 2023-03-16 NOTE — PROGRESS NOTES
Major Shift Events: NG tube torn at the hub. Continues to work but keep monitoring for leaking. Not tolerating off of BiPAP. After attempting transition to High Flow, patient desatted to 35-40% and shortly after being put back on BiPAP patient went into A fib RVR, rates in the 170s. BP stable initially. Metoprolol x1, 40 mg lasix x1. 2G Mag sulfate x1. Became hypotensive, peripheral levo started. Amio bolus given and amio drip started. PICC placed. Albumin 25g given x1. Lasix gtt started. Indwelling kwong catheter inserted. Morphine x1 for air hunger. Very rapid shallow breathing. Switched to subcutaneous sliding scale insulin, BG elevated.    Plan: Monitor NG tube for leaking, I/O goal -1L  For vital signs and complete assessments, please see documentation flowsheets.

## 2023-03-17 NOTE — PROGRESS NOTES
[facility] ICU PROGRESS NOTE  03/17/2023      Date of Service (when I saw the patient): 03/17/2023    ASSESSMENT: Job Kaiser is an 84 yo Taiwanese speaking gentleman w/ h/o prior tuberculosis s/p treatment with 3 drug therapy for 6 weeks in [place], asthma, COPD on 2L home 02, bronchiectasis, pulmonary fibrosis, T2DM, HFpEF  (last EF 65%) who presented on 3/9/2023 with progressive fatigue, shortness of breath, productive cough, weight loss, and pain with urination, with labs and imaging concerning for CAP vs. incompletely treated respiratory TB and nephrolithiasis. Pt initiated on cefepime and prophylactic bactrim given longstanding steroid use, with initial improvement in 02 needs to baseline of 2L per NC. On 3/11, pt was found to be confused, tachycardic to 140's and hypoxic with sats in the 50's after pulling off his supplemental 02. Given the abrupt change in mental status and 02 needs, Mr. Kaiser was transferred to the ICU for bipap therapy. His ICU course has been complicated by worsening hypercarbic hypoxemic respiratory failure secondary to his chronic pulmonary fibrosis vs malignancy vs less likely partially treated TB.     CHANGES and MAJOR THINGS TODAY:     Increase Lasix infusion to 15 mg/hr    Acetazolamide, 500 mg IV once     Metolazone, 10 mg, once    FWF decreased to 60 mL every 4 hours    Goal I&O net negative 1 L today    BMP every 8 hours    Transfuse 1 unit PRBCs    PLAN:    Neuro:  # Encephalopathy, likely metabolic   # Acute Pain  # Agitation  # Insomnia  - Precedex for agitation  - Melatonin 5 mg q HS   - olanzapine 5 mg q HS  - PRN haldol available   - PRN hydroxyzine 25-50mg q 6H    - PRN tylenol available  - minimize sedating medications as able   - delirium precautions     Pulmonary:  # Acute on chronic hypoxic hypercapneic respiratory failure, broad differential CAP vs. Aspiration PNA vs. TB vs. PE vs. COPD exacerbation vs. Atypical PNA (at risk for PJP given long term steroid use without  ZHANEP prophy)   # COPD on 2L home 02  # Asthma  # History of Pulmonary fibrosis/bronchiectasis  # Mediastinal lymphadenopathy per Chest CT  # Bilateral pleural effusion concerning for malignancy per Chest CT 3/10/2022  ~ Thoracentesis completed (3/14) with 1130 ml drained and without complication  - Bipap (settings increased to 18/8 on 3/15) to assist with persistent hypercarbia  - HFNC as tolerated  - hold PTA BREO ellipta (pt unable to take)   - scheduled duonebs, mucomyst, pulmicort TID  - methylpred 62.5 mg completes today  - Resume PTA Prednisone 20 mg daily dosing tomorrow  - see ID for infectious work up/plan   - Cytology results from pleural fluid from thoracentesis are Negative  - Morphine 1 mg IV every 4 hours as needed for air hunger     # History of incompletely treated TB Upper lobe predominant disease in pt with likely incompletely treated (6 weeks) TB in the past with characteristic TB symptoms including night sweats, weight loss, hemoptysis history, productive cough,and worsening CT chest findings, with history of multiple episodes of leaving before sputum AFBs could be done and history of not showing up to clinic for completing sputum AFBs  - airborne isolation to continue per infection prevention  - AFB smears were collected, appearing to be negative as of 3/15, will continue to monitor  - Discussed with pulmonology, no need to treat TB, will not provide increase in QoL        Cardiovascular:  # Tachycardia, resolved  # Elevated Troponin, improving  # HFpEF  # New Afib w/ RVR  - Goal MAP > 65   - Hydralazine 10mg Q6H PRN SBP >160  - Labetalol 20mg Q6H PRN SBP >160  - Continuous cardiac monitoring   - Metoprolol 5 mg IV once with minimal impact on rate  - Amiodarone infusion x 24 hours, off at 1800  - [id number] score of 4 and a HAS-BLED score of 2  - Will monitor cardiac rhythm and consider starting heparin if afib persists > 48 hrs will treat with therapeutic enoxaparin.  "         GI/Nutrition:  # Severe Malnutrition in the context of acute on chronic illness    # Poor PO intake  # Failure to thrive  - NPO given tenuous respiratory status   - Nutrition following  - TF infusing, at goal  - Will likely need replacement NG tube once pulmonary status stable and allows      # Thrush.   Oral candidiasis observed on physical exam.  Patient has chronic prednisone use as well as uses steroid inhaler at home, likely steroid use is source of thrush.   - Nystatin rinse 4 times daily ordered  - Educate patient and family regarding rinsing mouth after inhalers     Renal/Fluids/Electrolytes:  # AGMA   # History of nephrolithiasis  # Volume status   # Hypercalcemia  # Hypernatremia, resolved  # Risk for Refeeding syndrome   Previous nephrolithiasis x2, requiring lithotripsy once. Admitted with dysuria and left CVA tenderness. UA with blood/RBC. Not consistent with infection. CT Abdomen 3/9 with stable large left kidney stone with mild dilation of collecting system- unchanged from previous.   - Strict I&Os  - Daily Weight  - Urethral catheter required for strict I&O and titration of lasix infusion  - Electrolyte replacements per ICU protocol  - Decrease FWF to 60 mL every 4 hours  - Patient was net positive 1.5 L yesterday, weight is stable at 65.5 kg  - Lasix infusion increased to 15 mg/hr  - acetazolamide, 500 mg give x 1 for metabolic alkalosis and a bicarb of 41  - Goal net negative 1 L today     Endocrine:  # DM2  # Stress and steroid induced hyperglycemia  - insulin infusion  - Goal BG < 180  - Hypoglycemia protocols        ID:  # CAP vs. PJP pneumonia vs. Reactivated TB   - ID following, appreciate recommendations   - Per pulmonary notes \"prior TB treatment (3 drug x 6 wks in [place] 1996)\"  - History of failure to show up for TB rule out testing at [facility] [place] [facility] in 2014  - Upper lobe predominant disease in pt with likely incompletely treated (6 weeks) TB in the past with characteristic TB symptoms " including night sweats, weight loss, hemoptysis history, productive cough,and worsening CT chest findings  - All cultures have returned negative to date, will continue to monitor      Cultures:  AFB smear (3/10): Negative  AFB smear (3/11): Negative  AFB smear (3/12): Negative  AFB Culture (3/10): In process ~ 4 weeks     Antibiotics:  ~ Azithromycin (3/9)  ~ cefepime (3/9 - 3/10)  ~ ceftriaxone (3/9)  ~ Vancomycin (3/11 - 3/12)  ~ Zosyn (3/10 - 3/14)        Hematology:    # Anemia of chronic illness   # Leukocytosis  - Trend CBC daily  - Hgb 6.8 this morning, likely iatrogenic, no acute signs of bleed  - Replace with 1 unite of PRBCs        Musculoskeletal:  # Weakness/Deconditioning  - OT/PT consults       Skin:  No active issues  - Appreciate diligent nursing cares     General Cares/Prophylaxis:    DVT Prophylaxis: Enoxaparin (Lovenox) SQ and Pneumatic Compression Devices  GI Prophylaxis: PTA PPI  Restraints: Not Indicated  Family Communication: Updated family at bedside  Code Status: No CPR- Do NOT Intubate    Lines/tubes/drains:  ~ PICC  ~ PIV  ~ NG tube  ~ Urethral Catheter    Disposition:  ~ [facility] ICU    Patient seen and findings/plan discussed with medical ICU staff, Dr. Bess.      I spent 65 minutes providing critical care, the patient was in critical condition due to hypoxic respiratory failure.    FRANCES FaustinACNP  Pager #[phone]  Critical Care  [facility]     ====================================  INTERVAL HISTORY:   No acute events overnight. Continues to be restless and anxious. Continues to require bipap and unable to tolerate HFNC. Total positive 1.5 L, initiated lasix infusion, was unable to achieve net negative, but was able to stop the increase of fluid burden. Increased lasix infusion today, added acetazolamide and metolazone one time this morning. Goal today at least - 1 L of fluid. Cytology report from pleural fluid returned back negative for malignancy this  morning. BP stable, continues to be in AFib but rate controled. Tolerating tube feed.     OBJECTIVE:   1. VITAL SIGNS:   Temp:  [97.5  F (36.4  C)-98  F (36.7  C)] 97.6  F (36.4  C)  Pulse:  [] 105  Resp:  [13-58] 30  BP: ()/() 107/72  FiO2 (%):  [40 %-60 %] 40 %  SpO2:  [96 %-100 %] 100 %  FiO2 (%): 40 %  Resp: 30    2. INTAKE/ OUTPUT:   I/O last 3 completed shifts:  In: 3329.68 [I.V.:1314.68; NG/GT:1015]  Out: 2700 [Urine:2350; Stool:350]    3. Physical Exam  Constitutional:       Appearance: He is ill-appearing and diaphoretic.   HENT:      Head: Normocephalic.      Mouth/Throat:      Mouth: Mucous membranes are dry.   Eyes:      Pupils: Pupils are equal, round, and reactive to light.   Cardiovascular:      Rate and Rhythm: Tachycardia present. Rhythm irregular.      Heart sounds: Normal heart sounds.   Pulmonary:      Effort: Tachypnea and accessory muscle usage present.      Breath sounds: Examination of the left-upper field reveals rhonchi. Examination of the left-middle field reveals rhonchi. Wheezing and rhonchi present.   Abdominal:      General: Abdomen is flat.      Palpations: Abdomen is soft.   Musculoskeletal:         General: Normal range of motion.      Cervical back: Neck supple.   Skin:     General: Skin is warm.   Neurological:      Mental Status: He is alert. He is disoriented and confused.   Psychiatric:         Mood and Affect: Mood is anxious.         Behavior: Behavior is agitated.         Cognition and Memory: Cognition is impaired.         4. LABS:   Arterial Blood Gases   Recent Labs   Lab 03/11/23  0346   PH 7.29*   PCO2 55*   PO2 98   HCO3 27     Complete Blood Count   Recent Labs   Lab 03/17/23  0738 03/16/23  1701 03/16/23  0516 03/15/23  0427   WBC 8.9 11.9* 8.6 9.3   HGB 6.8* 7.6* 7.1* 7.8*   * 124* 93* 110*     Basic Metabolic Panel  Recent Labs   Lab 03/17/23  0801 03/17/23  0738 03/17/23  0402 03/17/23  0202 03/16/23  2210 03/16/23  2201 03/16/23  1746  03/16/23  1701 03/16/23  0603 03/16/23  0516 03/15/23  2100 03/15/23  1931   NA  --  143  --   --   --  142  --   --   --  143  --  144   POTASSIUM  --  4.6  --   --   --  3.9  --  4.3  --  4.1  --  4.4   CHLORIDE  --  92*  --   --   --  89*  --   --   --  92*  --  90*   CO2  --  41*  --   --   --  44*  --   --   --  41*  --  41*   BUN  --  25.1*  --   --   --  24.1*  --   --   --  23.8*  --  24.2*   CR  --  0.58*  --   --   --  0.52*  --   --   --  0.54*  --  0.53*   * 364* 266* 241*   < > 288*   < >  --    < > 112*   < > 184*  199*    < > = values in this interval not displayed.     Liver Function Tests  Recent Labs   Lab 03/11/23  0259   AST 53*   ALT 12   ALKPHOS 85   BILITOTAL <0.2   ALBUMIN 2.5*     Coagulation Profile  No lab results found in last 7 days.    5. RADIOLOGY:   Recent Results (from the past 24 hour(s))   XR Chest Port 1 View    Narrative    XR CHEST PORT 1 VIEW  3/16/2023 4:24 PM      HISTORY: RN placed PICC - verify tip placement    COMPARISON: 3/15/2023    FINDINGS: AP view. Right arm PICC tip terminates in the right atrium.  Feeding tube courses below the field of view. No substantial change in  diffuse interstitial predominant opacities. Stable small bilateral  pleural effusions which appear partially loculated. No pneumothorax.      Impression    IMPRESSION:   1. Right arm PICC tip terminates in the right atrium.  2. Stable bilateral partially loculated pleural effusions.  3. Stable diffuse interstitial and airspace opacities.    I have personally reviewed the examination and initial interpretation  and I agree with the findings.    RICK MOTLEY MD         SYSTEM ID:  [id number]

## 2023-03-17 NOTE — PLAN OF CARE
Shift: 03/17/22 [id number]  Neuro: Alert, inconsistent with commands, anxious, restless, agitated at times, tylenol given x2, atarax given x2, precedex infusing 1mcg/kg. PERRL. Afebrile.  CV: tele A-fib RVR, HR 90s - 130s, amiodarone infusing 1mg/min. Soft BP, improved throughout day.  PU: on BiPAP 18/8, 30%% satting high 90s, unable to trial HFNC, tachypneic RR 30-40s, abdominal muscle use. LS coarse, infrequent cough.  GI: rectal tube in place, loose stool. NG to TF goal rate 55ml/hr w/60ml flush q4h. -300s, covered with sliding scale, 6units lantus given.   : kwong patent, good urine output, metolazone given x2, started on diamox, lasix infusing 10mg/hr.   Skin: intact.  Lines: RUE PICC, lumen x2. L PIV.   Other info: phos 1.7, replaced, rechecked at 1815. Hgb 6.8, 1unit RBC given, tolerated well. Family at bedside, updated with plan of care.       Goal Outcome Evaluation: continue with plan of care, update provider with changes.      Plan of Care Reviewed With: patient

## 2023-03-17 NOTE — PROGRESS NOTES
/79   Pulse 96   Temp 98  F (36.7  C) (Axillary)   Resp (!) 36   Wt 65.5 kg (144 lb 6.4 oz)   SpO2 100%   BMI 19.58 kg/m      VSS; Afib with controlled rates; slept well overnight with RR in the 20's on BIPAP 40%. R Picc bleeding in AM and not returning blood; Alteplase to both lines in currently. AM labs delayed. Lasix running with roughly 75cc/hr urine output. Mag and K replaced. Levophed turned off in AM. Amio gtt and precedex gtt continue. Plan for a care conference today.    Mikel Juarez RN

## 2023-03-18 NOTE — PROGRESS NOTES
Assumed care of patient at 16:00. Bipap mask crooked on patient's nose. Suspected pressure injury noted on tip of patient's nose where bipap mask lays. Bipap mask readjusted / repositioned off nose, mepilex in place on bridge of nose. Skin on bridge of nose is blanchable, however tip of nose is not. RT notified.

## 2023-03-18 NOTE — PROGRESS NOTES
ICU END OF SHIFT NOTE:For vital signs and complete assessments, please see documentation flowsheets.   Assessment:  Afebrile,Alert, inconsistent with commands, precedex infusing 1 mcg/kg/hr,  Sinus Rhythm,on BiPAP 18/8, 30%% sating 98%,  tachypneic RR 30-40s, LS coarse and diminished , Rectal tube in place, NG to TF goal rate 55ml/hr with 60ml flush q4h. on insulin infusion,Vu patent, good urine output, no new skin issues  Noted,intact. RUE PICC, lumen x2. Left PIV.     Major Shift Events:  Critically Stable, BP positional, AFB negative x 3, Infection control to be called by TRUDI for discontinuation of airborne precaution.  Plan/ Interventions: held suppository and Bowel med's since patient on rectal tube                                   : CX ray done                                   : Hemodynamic monitoring

## 2023-03-18 NOTE — PROGRESS NOTES
"Shift: 03/17/22 [id number]  Neuro: Alert, inconsistent with commands, precedex infusing 1 mcg/kg/hr. Afebrile.  CV: On tele A-fib RVR, HR 90s - 130s. At 0300 rates noted frequently  in the 130-140, TRUDI updated and ordered to stop Lasix drip and with plan t amiodarone bolus...\"Pt became hypotensive BP 72/46 (51). Amiodarone was held and albumin 5% 250 ml given. At 0411 patient converted to sinus rhythm without receiving amio bolus that was ordered earlier. BP remains low 96/61 (61), so will continue with bolus, but hold further amio boluses.\" BPs Improved last 137/68 at 0604  PU: on BiPAP 18/8, 30%% sating high 90s,  tachypneic RR 30-40s, LS coarse and diminished   GI: Rectal tube in place, loose stool, output ~100. NG to TF goal rate 55ml/hr with 60ml flush q4h. BG 200s-300. Started on insulin drip at 0600.  : Vu patent, good urine output  Skin: intact.  Lines: RUE PICC, lumen x2. Left PIV.     Goal Outcome Evaluation: continue with plan of care, update provider with changes.       Plan of Care Reviewed With: patient and family     "

## 2023-03-18 NOTE — PROGRESS NOTES
Critical Care Cross Cover    Job Kaiser is a 83 y.o. M admitted to ICU with acute on chronic hypoxic and hypercarbic respiratory failure who has had difficult to control Afib.     I was called to evaluate Job Kaiser for recurrent hemodynamically stable Afib RVR with rates frequently rising to 130-140. He was given an amio bolus followed by a drip earlier in the day and the drip rate decreased to 0.5 mg/min around 2200. He has been aggressively diuresed with a furosemide gtt with addition of metolazone and diamox earlier in the day. Furosemide drip rate decreased to 5 mg/hr around 0000 and patient remains net negative 800 ml's at moment since 0000. Furthermore, his FiO2 needs have decreased to 30% and he is saturating 100%. Will stop lasix gtt and bolus again with amiodarone. Will leave gtt at 0.5 mg/min at this time, but will consider increasing back to 1 mg/min if his HR remains difficult to control.    Update 0400:  Pt became hypotensive BP 72/46 (51). Amiodarone was held and albumin 5% 250 ml given. At 0411 patient converted to sinus rhythm without receiving amio bolus that was ordered earlier. BP remains low 96/61 (61), so will continue with bolus, but hold further amio boluses     Assessment/Plan:  #Recurrent uncontrolled Afib with RVR, unstable  #Acute on chronic hypoxic and hypercarbic respiratory failure.    Hold amio bolus --> may need to hold gtt if HR drops < 60     Hold lasix gtt     Albumin 5% bolus 250 ml       Cristina Serrano, CNP

## 2023-03-18 NOTE — PROGRESS NOTES
[facility] Intensive Care Unit Service  Progress Note    Date of Service (when I saw the patient): 03/18/2023     Assessment & Plan   Job Kaiser is an 83 year old Armenian speaking male with a PMH of HFpEF (LV EF 65%), tuberculosis s/p treatment (3 drug therapy x 6 weeks), asthma, COPD on home O2, bronchiectasis, pulmonary fibrosis, DM type 2 who was admitted on 3/9/2023 with progressive fatigue, shortness of breath, productive cough, weight loss, pain with urination, and labs and imaging concerning for CAP vs. incompletely treated respiratory TB and nephrolithiasis.  He was started on antibiotics and prophylactic bactrim given longstanding steroid use.  On 3/11, he decompensated and was admitted to the ICU for BiPAP therapy.  His ICU course has been complicated by hypercarbic hypoxemic respiratory failure secondary to his chronic pulmonary fibrosis vs malignancy vs less likely partially treated TB.     MAIN PLANS FOR TODAY:  - no major changes    PLAN BY SYSTEMS:     HEMODYNAMICS:  # afib with RVR  # HFpEF  # demand ischemia     - new onset afib this admission, uncontrolled afib with RVR overnight, was previously on amiodarone infusion.  Patient converted to SR around 0300.  Amiodarone stopped.  Currently SR with HR in the 60-70 rang.  -130 with no vasopressor support.    PLAN: continue to hold amiodarone, no other changes     NEUROLOGIC:  # toxic metabolic encephalopathy  # delirium    - pain medications: Tylenol PRN, morphine PRN  - delirium management: zyprexa 5 mg PO QHS + PRN, haldol PRN, atarax PRN  - sleep management: melatonin 5 mg PO QHS  - sedation: precedex 1 mcg/kg/hr  - goal RASS 0 to -1 to for tolerance of BiPAP      PULMONARY:  # Acute on chronic hypoxic hypercapneic respiratory failure, broad differential CAP vs. Aspiration PNA vs. TB vs. PE vs. COPD exacerbation vs. Atypical PNA (at risk for PJP given long term steroid use without PJP  prophylaxis)   # COPD on 2L home 02  # Asthma  # Pulmonary fibrosis/bronchiectasis  # Mediastinal lymphadenopathy per Chest CT  # Bilateral pleural effusion concerning for malignancy per Chest CT 3/10/2022  ~ Thoracentesis completed (3/14) with 1130 ml drained and without complication  - Bipap (settings increased to 18/8 on 3/15) to assist with persistent hypercarbia  - HFNC as tolerated  - hold PTA BREO ellipta (pt unable to take)   - scheduled pulmicort BID, atrovent/xopenex scheduled  - completed steroid burst, current back on PTA prednisone 20 mg PO daily   - Cytology results from pleural fluid from thoracentesis are negative  - Morphine 1 mg IV every 4 hours as needed for air hunger     # History of incompletely treated TB Upper lobe predominant disease in pt with likely incompletely treated (6 weeks) TB in the past with characteristic TB symptoms including night sweats, weight loss, hemoptysis history, productive cough,and worsening CT chest findings, with history of multiple episodes of leaving before sputum AFBs could be done and history of not showing up to clinic for completing sputum AFBs  - airborne isolation to continue per infection prevention  - AFB smears were collected and remain negative   - discussed with pulmonology, no need to treat TB, will not provide increase in QoL    PLAN: continue BiPAP, may take breaks as tolerated on HFNC, morphine PRN for air hunger, continue nebs    RENAL:  # contraction alkalosis   # hypernatremia     - I/O: + 2.2 L overall, - 1 L yesterday  - patient was on lasix infusion along with scheduled metolazone; stopped given euvolemic and severe contraction alkalosis, will give diamox 500 mg IV Q6H x 3 doses, will recheck BMP and VBG this afternoon to guide further dosing   - continue free water 60 mL Q4H  PLAN: hold diuresis, recheck BMP and VBG, consider more diamox, continue to follow electrolytes, renal function, and UOP closely    ID:  # CAP vs. PJP pneumonia vs.  "reactivated TB     - ID previously seen, signed off for now  - Per pulmonary notes \"prior TB treatment (3 drug x 6 wks in [place] 1996)\"  - History of failure to show up for TB rule [place] [facility] [place]  - Upper lobe predominant disease in pt with likely incompletely treated (6 weeks) TB in the past with characteristic TB symptoms including night sweats, weight loss, hemoptysis history, productive cough,and worsening CT chest findings  - All cultures have returned negative to date, will continue to monitor      Cultures:  AFB smear (3/10): Negative  AFB smear (3/11): Negative  AFB smear (3/12): Negative  AFB Culture (3/10): In process ~ 4 weeks     Antibiotics:  ~ Azithromycin (3/9)  ~ cefepime (3/9 - 3/10)  ~ ceftriaxone (3/9)  ~ Vancomycin (3/11 - 3/12)  ~ Zosyn (3/10 - 3/14)       HEME:  # anemia of chronic illness  - Hgb: 8.8  - Plts: 135  - no acute concerns for bleeding  PLAN: follow CBC daily, transfuse for Hgb < 7    VASCULAR ACCESS/LINES/DRAINS:  - R PICC  - PIV  - kwong  - feeding tube     GI/NUTRITION:  # severe malnutrition   # failure to thrive   # diarrhea     - RD consulted and following  - continue TF at goal  - decrease bowel regimen given rectal tube in place for diarrhea       # Thrush  Oral candidiasis observed on physical exam.  Patient has chronic prednisone use as well as uses steroid inhaler at home, likely steroid use is source of thrush.   - Nystatin rinse 4 times daily ordered  - Educate patient and family regarding rinsing mouth after inhalers    ENDOCRINE:  # DM type 2  # hyperglycemia, steroid and stress induced    - glucose stable on insulin infusion  - steroids: prednisone 20 mg PO daily   PLAN: continue insulin infusion, goal < 180, continue prednisone       MSK:  - PT/OT consulted  - activity: as tolerated    SKIN:  - no acute issues    PROPHYLAXIS:   - DVT: enoxaparin 30 mg daily  - GI: no indication     CODE STATUS:   - DNR/DNI  - palliative care consulted and following "     DISPOSITION:  - ICU    GENERAL CARES  DVT Prophylaxis: Enoxaparin (Lovenox) SQ and Pneumatic Compression Devices  GI Prophylaxis: Not indicated  Restraints: Restraints for medical healing needed: NO  Family update by me today: No      CEDRIC Houser CNP    Time Spent on this Encounter   Billing:  I spent 30 minutes bedside and on the inpatient unit today managing the critical care of Job A Job in relation to the issues listed in this note.    Interval History   - no acute events overnight  - patient seen and examined, chart reviewed  - discussed with Dr. Peña and ICU team on multidisciplinary rounds  - afib with RVR overnight, hypotension; patient converted to SR       Physical Exam   Temp: 97.8  F (36.6  C) Temp src: Axillary Temp  Min: 97.5  F (36.4  C)  Max: 98.5  F (36.9  C) BP: (!) 150/70 Pulse: 85   Resp: (!) 43 SpO2: 96 % O2 Device: BiPAP/CPAP    Vitals:    03/16/23 0600 03/17/23 0515 03/18/23 0500   Weight: 65.4 kg (144 lb 2.9 oz) 65.5 kg (144 lb 6.4 oz) 60.6 kg (133 lb 9.6 oz)     I/O last 3 completed shifts:  In: 3723.61 [I.V.:1268.61; NG/GT:585]  Out: 5285 [Urine:5085; Stool:200]    GEN: weak, debilitated elderly male in ICU bed in BiPAP  EYES: PERRL, Anicteric sclera    HEENT:  Normocephalic, atraumatic, trachea midline  CV: RRR, no gallops, rubs, or murmurs  PULM/CHEST: tachypnic, use of accessory muscles, wheezing bilateral, course bilateral  GI: normal bowel sounds, soft, non-tender, no rebound tenderness or guarding, no masses  : kwong catheter in place, urine yellow and clear  EXTREMITIES: no peripheral edema, moving all extremities, peripheral pulses intact  NEURO: Cranial nerves II-XII grossly intact, no motor-sensory deficits noted  SKIN: No rashes, sores or ulcerations  PSYCH:  sedated  ECG    Medications     dexmedetomidine 0.8 mcg/kg/hr (03/18/23 1300)     dextrose       dextrose Stopped (03/15/23 1000)     insulin regular 1 Units/hr (03/18/23 1300)       acetaZOLAMIDE  500 mg  Intravenous Q6H ANA     budesonide  0.5 mg Nebulization BID     enoxaparin ANTICOAGULANT  30 mg Subcutaneous Q24H     ipratropium  0.5 mg Nebulization TID    And     levalbuterol  1.25 mg Nebulization TID     melatonin  5 mg Oral QPM     OLANZapine zydis  5 mg Oral At Bedtime     [START ON 3/19/2023] polyethylene glycol  17 g Oral or Feeding Tube Daily     predniSONE  20 mg Oral Daily     sodium chloride (PF)  3 mL Intracatheter Q8H     sodium chloride (PF)  3 mL Intracatheter Q8H     sulfamethoxazole-trimethoprim  1 tablet Oral Once per day on Mon Wed Fri       Data   Recent Labs   Lab 03/18/23  1255 03/18/23  1202 03/18/23  1104 03/18/23  0549 03/18/23  0421 03/17/23  2359 03/17/23  2220 03/17/23  1628 03/17/23  1438 03/17/23  0801 03/17/23  0738 03/16/23  1746 03/16/23  1701 03/14/23  1644 03/14/23  1604   WBC  --   --   --   --  10.9  --   --   --   --   --  8.9  --  11.9*   < >  --    HGB  --   --   --   --  8.8*  --   --   --   --   --  6.8*  --  7.6*   < > 5.8*   MCV  --   --   --   --  91  --   --   --   --   --  90  --  89   < >  --    PLT  --   --   --   --  135*  --   --   --   --   --  110*  --  124*   < >  --    NA  --   --   --   --  146*  --  147*  --  145  --  143   < >  --    < >  --    POTASSIUM  --   --   --   --  4.1  --  4.9  --  4.0  --  4.6   < > 4.3   < >  --    CHLORIDE  --   --   --   --  91*  --  91*  --  88*  --  92*   < >  --    < >  --    CO2  --   --   --   --  51*  46*  --  45*  --  47*  --  41*   < >  --    < >  --    BUN  --   --   --   --  32.5*  --  28.7*  --  25.4*  --  25.1*   < >  --    < >  --    CR  --   --   --   --  0.97  --  0.84  --  0.67  --  0.58*   < >  --    < >  --    ANIONGAP  --   --   --   --  9  --  11  --  10  --  10   < >  --    < >  --    AMTT  --   --   --   --  9.0  --  8.8  --  8.7*  --  9.0   < >  --    < >  --    * 175* 243*   < > 324*   < > 203*   < > 281*   < > 364*   < >  --    < > 201*   PROTTOTAL  --   --   --   --   --   --   --   --   --    --   --   --   --   --  6.2*    < > = values in this interval not displayed.     Recent Results (from the past 24 hour(s))   XR Chest Port 1 View    Narrative    Exam: XR CHEST PORT 1 VIEW, 3/18/2023 9:28 AM    Indication: Infection vs atelectatis vs chronic lung disease  evaluation.    Comparison: 3/16/2023    Findings:   Feeding tube seen coursing through the mediastinum. Tip projects off  the film but is at least to the body of the stomach. Right arm PICC  tip in the right atrium. Unchanged opacity at the right apex.  Increasing opacity along the right lateral chest wall. Unchanged left  apical pleural thickening and left lateral chest wall. Heart within  normal limits. Stable mixed opacities throughout both lungs.      Impression    Impression:  1. Increasing loculated right basilar pleural effusion. Otherwise  unchanged pleural thickening or loculated effusions bilaterally.  2. Stable mixed opacities throughout both lungs.  3. Right arm PICC tip in the midright atrium.    TAYLOR MCCORMACK MD         SYSTEM ID:  [id number]

## 2023-03-19 NOTE — PROGRESS NOTES
Critical care cross cover    Job Kaiser is a 83 y.o. M admitted to ICU with acute on chronic hypoxic and hypercarbic respiratory failure.    I was called to evaluate Gus this AM for fevers and worsening tachypnea. On exam he was sleeping on BiPAP 18/8 30%, tachypnic  with RR 40's and accessory muscle use, wheezing and few scattered crackles R > L. Urine in kwong bag clear, abd benign, no significant wounds. He has been off treatment abx since 3/13 and high dose steroids since 3/17. AM labs notable for new leukocytosis WBC 13.3, otherwise stable. Given fevers CXR, BC x2, sputum, UA, CRP, and pcal ordered. A 1x dose of solumedrol 125 mg is to be given along with a as needed Xopenex neb.  Review of culture data this hospitalization unremarkable for clinically significant pathogens.  MRSA nasal swab negative as of 3/11.  We will start pip-tazo at pseudomonal dosing and hold off on MRSA coverage at this time given lack of nasal colonization.     Assessment/Plan:  # Worsening WOB  # Fevers concerning for recurrent/acute hospital acquired infection  # Wheezing   - Work-up:   - CXR   - Blood, urine, and sputum cx   - CRP and pcal  - Start pip-tazo 4.5g q6h  - Solumedrol 125 mg x1   - Xopenex neb now    Cristina Serrano CNP

## 2023-03-19 NOTE — PLAN OF CARE
Major Shift Events:    Continues to be tachypneic and tachycardic throughout shift    Tmax 102.2; tylenol administered x3 this shift    Blood cultures obtained    K and phos replaced    Precedex stopped at 1052    Transitioned to HFNC at 1130: 30%, 45L    Insulin drip on for entire shift; currently Algorithm 4+3; TRUDI notified.  Plan: Continue to wean O2 as able. Palliative care consulted.  For vital signs and complete assessments, please see documentation flowsheets.

## 2023-03-19 NOTE — PROGRESS NOTES
Neuro: More lerthagic this shift, difficult to arouse, minimal response with turning at repositioning.  CV: On tele NSR, BP normo to hypertensive.  PU: on BiPAP 18/8, 30%% sating high 90s, tachypneic RR 30-40s, LS coarse and diminished. This morning noted to be more tachypneic in the mid 30s with fever of 101.9 and elevated WBC. TRUDI updated. TRUDI ordered chest X ray, UA UC, Solumedrol, blood cultures and started patient on Zosyn.  GI: Rectal tube in place, loose stool, output ~50. NG to TF goal rate 55 ml/hr with 60 ml flush q4h. On insulin drip  : Vu patent, good urine output  Skin: intact.  Lines: RUE PICC, lumen x2     Goal Outcome Evaluation: continue with plan of care, update provider with changes.       Plan of Care Reviewed With: patient and family.

## 2023-03-19 NOTE — PROGRESS NOTES
[facility] Intensive Care Unit Service    Progress Note    Date of Service (when I saw the patient): 03/19/2023     Assessment & Plan   Job Kaiser is an 83 year old [place] speaking male with a PMH of HFpEF (LV EF 65%), tuberculosis s/p treatment (3 drug therapy x 6 weeks), asthma, COPD on home O2, bronchiectasis, pulmonary fibrosis, DM type 2 who was admitted on 3/9/2023 with progressive fatigue, shortness of breath, productive cough, weight loss, pain with urination, and labs and imaging concerning for CAP vs. incompletely treated respiratory TB and nephrolithiasis.  He was started on antibiotics and prophylactic bactrim given longstanding steroid use.  On 3/11, he decompensated and was admitted to the ICU for BiPAP therapy.  His ICU course has been complicated by hypercarbic hypoxemic respiratory failure secondary to his chronic pulmonary fibrosis vs malignancy vs less likely partially treated TB.     MAIN PLANS FOR TODAY:  - replace hypophosphatemia   - breaks off BiPAP as tolerated    PLAN BY SYSTEMS:    HEMODYNAMICS:  # afib with RVR  # HFpEF  # demand ischemia      - new onset afib this admission, was previously on amiodarone infusion.  Patient converted to SR around 0300 3/18.  Amiodarone stopped.  Currently SR with HR in the 60-70 rang.  -130 with no vasopressor support.    PLAN: continue to hold amiodarone, no other changes      NEUROLOGIC:  # toxic metabolic encephalopathy  # delirium     - pain medications: Tylenol PRN, morphine PRN  - delirium management: zyprexa 5 mg PO QHS + PRN, haldol PRN, atarax PRN  - sleep management: melatonin 5 mg PO QHS  - sedation: precedex 0.3 mcg/kg/hr  - goal RASS 0 to -1 to for tolerance of BiPAP  PLAN: wean precedex as able, delirium preventions/precautions      PULMONARY:  # Acute on chronic hypoxic hypercapneic respiratory failure, broad differential CAP vs. Aspiration PNA vs. TB vs. PE vs. COPD  exacerbation vs. Atypical PNA (at risk for PJP given long term steroid use without PJP prophylaxis)   # COPD on 2L home 02  # Asthma  # Pulmonary fibrosis/bronchiectasis  # Mediastinal lymphadenopathy per Chest CT  # Bilateral pleural effusion concerning for malignancy per Chest CT 3/10/2022  - thoracentesis completed (3/14) with 1130 ml drained and without complication  - Bipap (settings increased to 18/8 on 3/15) to assist with persistent hypercarbia  - HFNC as tolerated  - hold PTA BREO ellipta (pt unable to take)   - scheduled pulmicort BID, atrovent/xopenex scheduled  - completed steroid burst, current back on PTA prednisone 20 mg PO daily   - Cytology results from pleural fluid from thoracentesis are negative  - Morphine 1 mg IV every 4 hours as needed for air hunger     # History of incompletely treated TB   Upper lobe predominant disease in pt with likely incompletely treated (6 weeks) TB in the past with characteristic TB symptoms including night sweats, weight loss, hemoptysis history, productive cough,and worsening CT chest findings, with history of multiple episodes of leaving before sputum AFBs could be done and history of not showing up to clinic for completing sputum AFBs  - airborne isolation to continue per infection prevention  - AFB smears were collected and remain negative   - discussed with pulmonology, no need to treat TB, will not provide increase in QoL  PLAN: follow up with infection prevention need for ongoing precautions given negative AFB x 3      PLAN: continue BiPAP, may take breaks as tolerated on HFNC, morphine PRN for air hunger, continue nebs     RENAL:  # contraction alkalosis   # hypernatremia      - contraction alkalosis improved with use of diamox.  Will hold on further diamox and diuresis at this time.   - continue free water 60 mL Q4H  PLAN: hold diuresis, continue to follow electrolytes, renal function, and UOP closely     ID:  # CAP vs. PJP pneumonia vs. reactivated TB  "     - ID previously seen, signed off for now  - Per pulmonary notes \"prior TB treatment (3 drug x 6 wks in [place] 1996)\"  - History of failure to show up for TB rule out testing at [facility] in 2014  - Upper lobe predominant disease in pt with likely incompletely treated (6 weeks) TB in the past with characteristic TB symptoms including night sweats, weight loss, hemoptysis history, productive cough,and worsening CT chest findings  - all cultures have returned negative to date     Cultures:  AFB smear (3/10): Negative  AFB smear (3/11): Negative  AFB smear (3/12): Negative  AFB Culture (3/10): In process ~ 4 weeks     Antibiotics:  ~ Azithromycin (3/9)  ~ cefepime (3/9 - 3/10)  ~ ceftriaxone (3/9)  ~ Vancomycin (3/11 - 3/12)  ~ Zosyn (3/10 - 3/14), 3/19-    - fevers overnight with abnormal CXR, procal 0.3, cultures obtained and restarted on antibiotics     PLAN: follow cultures, continue zosyn         HEME:  # anemia of chronic illness  - Hgb: 9.9  - Plts: 179  - no acute concerns for bleeding  PLAN: follow CBC daily, transfuse for Hgb < 7     VASCULAR ACCESS/LINES/DRAINS:  - R PICC  - PIV  - kwong  - feeding tube      GI/NUTRITION:  # severe malnutrition   # failure to thrive   # diarrhea      - RD consulted and following  - continue TF at goal  - decrease bowel regimen given rectal tube in place for diarrhea      ENDOCRINE:  # DM type 2  # hyperglycemia, steroid and stress induced     - glucose stable on insulin infusion  - steroids: prednisone 20 mg PO daily   PLAN: continue insulin infusion, consider transition to subq insulin tomorrow if glucose controlled and gtt rate fairly stable, goal < 180, continue prednisone      MSK:  - PT/OT consulted  - activity: as tolerated     SKIN:  # nasal bridge DTI  - WOC consult      PROPHYLAXIS:   - DVT: enoxaparin 30 mg daily  - GI: no indication      CODE STATUS:   - DNR/DNI  - palliative care consulted and following      DISPOSITION:  - ICU    GENERAL CARES  DVT Prophylaxis: " Enoxaparin (Lovenox) SQ and Pneumatic Compression Devices  GI Prophylaxis: Not indicated  Restraints: Restraints for medical healing needed: NO  Family update by me today: Yes   Current lines are required for patient management    CEDRIC Houser CNP    Time Spent on this Encounter   Billing:  I spent 35 minutes bedside and on the inpatient unit today managing the critical care of Job A Job in relation to the issues listed in this note.    Interval History   - fevers overnight, cultures obtained and restarted on antibiotics   - worsening tachypnea overnight, slightly improved with steroids and neb   - pressure injury noted on nose, WOC consulted  - patient seen and examined, chart reviewed  - discussed with Dr. Peña and ICU team on multidisciplinary rounds      Physical Exam   Temp: (!) 102.2  F (39  C) Temp src: Axillary Temp  Min: 97.8  F (36.6  C)  Max: 102.2  F (39  C) BP: (!) 141/79 Pulse: 108   Resp: (!) 55 SpO2: 100 % O2 Device: BiPAP/CPAP Oxygen Delivery: 45 LPM  Vitals:    03/17/23 0515 03/18/23 0500 03/19/23 0646   Weight: 65.5 kg (144 lb 6.4 oz) 60.6 kg (133 lb 9.6 oz) 61.6 kg (135 lb 12.9 oz)     I/O last 3 completed shifts:  In: 2009.12 [I.V.:289.12; NG/GT:400]  Out: 2490 [Urine:2065; Stool:425]    GEN: critically ill, lying in bed  EYES: PERRL, Anicteric sclera.    HEENT:  Normocephalic, atraumatic, trachea midline  CV: RRR, no gallops, rubs, or murmurs  PULM/CHEST: course breath sounds bilaterally without rhonchi, crackles or wheeze, symmetric chest rise  GI: normal bowel sounds, soft, non-tender, no rebound tenderness or guarding, no masses  : kwong catheter in place, urine yellow and clear  EXTREMITIES: no peripheral edema, moving all extremities, peripheral pulses intact  NEURO: Cranial nerves II-XII grossly intact, no motor-sensory deficits noted  SKIN: No rashes, sores or ulcerations  PSYCH:  sedated  ECG    Medications     dexmedetomidine 0.3 mcg/kg/hr (03/19/23 0720)     dextrose        dextrose Stopped (03/15/23 1000)     insulin regular 3 Units/hr (03/19/23 0836)       budesonide  0.5 mg Nebulization BID     enoxaparin ANTICOAGULANT  30 mg Subcutaneous Q24H     ipratropium  0.5 mg Nebulization TID    And     levalbuterol  1.25 mg Nebulization TID     melatonin  5 mg Oral QPM     OLANZapine zydis  5 mg Oral At Bedtime     piperacillin-tazobactam  4.5 g Intravenous Q6H     potassium chloride  20 mEq Intravenous Once     predniSONE  20 mg Oral Daily     sodium chloride (PF)  3 mL Intracatheter Q8H     sodium chloride (PF)  3 mL Intracatheter Q8H     sodium phosphate  15 mmol Intravenous Once     sulfamethoxazole-trimethoprim  1 tablet Oral Once per day on Mon Wed Fri       Data   Recent Labs   Lab 03/19/23  0829 03/19/23  0719 03/19/23  0551 03/19/23  0500 03/19/23  0453 03/18/23  1651 03/18/23  1624 03/18/23  0549 03/18/23  0421 03/17/23  0801 03/17/23  0738 03/14/23  1644 03/14/23  1604   WBC  --   --   --   --  13.3*  --   --   --  10.9  --  8.9   < >  --    HGB  --   --   --   --  9.9*  --   --   --  8.8*  --  6.8*   < > 5.8*   MCV  --   --   --   --  93  --   --   --  91  --  90   < >  --    PLT  --   --   --   --  179  --   --   --  135*  --  110*   < >  --    NA  --   --   --   --  144  --  143  --  146*   < > 143   < >  --    POTASSIUM  --   --   --   --  3.8  --  4.3  --  4.1   < > 4.6   < >  --    CHLORIDE  --   --   --   --  93*  --  91*  --  91*   < > 92*   < >  --    CO2  --   --   --   --  36*  --  40*  --  51*  46*   < > 41*   < >  --    BUN  --   --   --   --  41.7*  --  36.6*  --  32.5*   < > 25.1*   < >  --    CR  --   --   --   --  0.93  --  0.91  --  0.97   < > 0.58*   < >  --    ANIONGAP  --   --   --   --  15  --  12  --  9   < > 10   < >  --    MATT  --   --   --   --  9.7  --  9.4  --  9.0   < > 9.0   < >  --    * 138* 117*   < > 148*   < > 239*   < > 324*   < > 364*   < > 201*   PROTTOTAL  --   --   --   --   --   --   --   --   --   --   --   --  6.2*    < > = values  in this interval not displayed.     Recent Results (from the past 24 hour(s))   XR Chest Port 1 View    Narrative    Exam: XR CHEST PORT 1 VIEW, 3/18/2023 9:28 AM    Indication: Infection vs atelectatis vs chronic lung disease  evaluation.    Comparison: 3/16/2023    Findings:   Feeding tube seen coursing through the mediastinum. Tip projects off  the film but is at least to the body of the stomach. Right arm PICC  tip in the right atrium. Unchanged opacity at the right apex.  Increasing opacity along the right lateral chest wall. Unchanged left  apical pleural thickening and left lateral chest wall. Heart within  normal limits. Stable mixed opacities throughout both lungs.      Impression    Impression:  1. Increasing loculated right basilar pleural effusion. Otherwise  unchanged pleural thickening or loculated effusions bilaterally.  2. Stable mixed opacities throughout both lungs.  3. Right arm PICC tip in the midright atrium.    TAYLOR MCCORMACK MD         SYSTEM ID:  [id number]   XR Chest Port 1 View    Impression    RESIDENT PRELIMINARY INTERPRETATION  Impression: Grossly stable appearance of fibrotic lung opacities.  Perhaps mildly increased bilateral loculated pleural effusions.

## 2023-03-20 NOTE — PHARMACY-VANCOMYCIN DOSING SERVICE
"Pharmacy Vancomycin Initial Note  Date of Service 2023  Patient's  1940  83 year old, male    Indication: Sepsis    Current estimated CrCl = Estimated Creatinine Clearance: 42.6 mL/min (based on SCr of 1.13 mg/dL).    Creatinine for last 3 days  3/17/2023:  7:38 AM Creatinine 0.58 mg/dL;  2:38 PM Creatinine 0.67 mg/dL; 10:20 PM Creatinine 0.84 mg/dL  3/18/2023:  4:21 AM Creatinine 0.97 mg/dL;  4:24 PM Creatinine 0.91 mg/dL  3/19/2023:  4:53 AM Creatinine 0.93 mg/dL  3/20/2023:  4:43 AM Creatinine 1.13 mg/dL    Recent Vancomycin Level(s) for last 3 days  No results found for requested labs within last 72 hours.      Vancomycin IV Administrations (past 72 hours)      No vancomycin orders with administrations in past 72 hours.                Nephrotoxins and other renal medications (From now, onward)    Start     Dose/Rate Route Frequency Ordered Stop    23 0600  vancomycin (VANCOCIN) 1000 mg in dextrose 5% 200 mL PREMIX         1,000 mg  200 mL/hr over 1 Hours Intravenous EVERY 24 HOURS 23 0702      23 0730  vancomycin (VANCOCIN) 1,250 mg in 0.9% NaCl 250 mL intermittent infusion         1,250 mg  over 90 Minutes Intravenous ONCE 23 0657      23 0600  piperacillin-tazobactam (ZOSYN) 4.5 g vial to attach to  mL bag        Note to Pharmacy: For [facility], [facility] and [facility]: For Zosyn-naive patients, use the \"Zosyn initial dose + extended infusion\" order panel.    4.5 g  over 30 Minutes Intravenous EVERY 6 HOURS 23 0528            Contrast Orders - past 72 hours (72h ago, onward)    None          [employer] Prediction of Planned Initial Vancomycin Regimen    Loading dose: N/A  Regimen: 1000 mg IV every 24 hours.  Start time: 06:52 on 2023  Exposure target: AUC24 (range)400-600 mg/L.hr   AUC24,ss: 470 mg/L.hr  Probability of AUC24 > 400: 69 %  Ctrough,ss: 14.4 mg/L  Probability of Ctrough,ss > 20: 18 %  Probability of nephrotoxicity (Lodise LONNIE ): 10 %   "      Plan:  1. Start vancomycin  1250 mg IV followed by vancomycin 1000 mg IV q24h.   2. Vancomycin monitoring method: AUC  3. Vancomycin therapeutic monitoring goal: 400-600 mg*h/L  4. Pharmacy will check vancomycin levels as appropriate in 1-3 Days.    5. Serum creatinine levels will be ordered daily for the first week of therapy and at least twice weekly for subsequent weeks.      Stephen Vázquez RPH

## 2023-03-20 NOTE — CONSULTS
[facility]  WO Nurse Inpatient Assessment     Consulted for: tip of nose    Patient History (according to provider note(s):      Job Kaiser is an 83 year old Citizen of the [place] speaking male with a PMH of HFpEF (LV EF 65%), tuberculosis s/p treatment (3 drug therapy x 6 weeks), asthma, COPD on home O2, bronchiectasis, pulmonary fibrosis, DM type 2 who was admitted on 3/9/2023 with progressive fatigue, shortness of breath, productive cough, weight loss, pain with urination, and labs and imaging concerning for CAP vs. incompletely treated respiratory TB and nephrolithiasis.  He was started on antibiotics and prophylactic bactrim given longstanding steroid use.  On 3/11, he decompensated and was admitted to the ICU for BiPAP therapy.  His ICU course has been complicated by hypercarbic hypoxemic respiratory failure secondary to his chronic pulmonary fibrosis vs malignancy vs less likely partially treated TB.     Areas Assessed:      Areas visualized during today's visit: Focused:    Pressure Injury Location: nose        3/20  Last photo: 3/20/23  Wound type: Pressure Injury     Pressure Injury Stage: Deep Tissue Pressure Injury (DTPI), hospital acquired      This is a Medical Device Related Pressure Injury (MDRPI) due to BiPAP mask  Wound history/plan of care:   RN assessing pt at beginning of shift noted BiPAP malpositioned and causing pressure to nose.   Wound base: 100 % maroon,      Palpation of the wound bed: normal      Drainage: none     Description of drainage: none     Measurements (length x width x depth, in cm)   bridge: 0.7 x 1.5 x 0 cm  Tip: 0.7 x 1 x 0 cm  Right ala: 1.5 x 0.9 x 0 cm  Periwound skin: Intact      Color: normal and consistent with surrounding tissue      Temperature: normal   Odor: none  Pain: absent, none  Pain intervention prior to dressing change: no significant pain present   Treatment goal: Protection  STATUS: initial assessment  Supplies ordered:  discussed with RN        Treatment Plan:     Nose deep maroon bruises: Daily    Wash with routine cares and pat dry  Apply aquaphor or vaseline if area opens  Protect areas if using mask of any type for oxygen delivery     Orders: Written    RECOMMEND PRIMARY TEAM ORDER: None, at this time  Education provided: importance of repositioning, plan of care and wound progress  Discussed plan of care with: Nurse  [facility] nurse follow-up plan: twice weekly  Notify WOC if wound(s) deteriorate.  Nursing to notify the Provider(s) and re-consult the [facility] Nurse if new skin concern.    DATA:     Current support surface: Standard  Low air loss (MACARIO pump, Isolibrium, Pulsate, skin guard, etc)  Containment of urine/stool: Incontinence Protocol  BMI: Body mass index is 18.18 kg/m .   Active diet order: Orders Placed This Encounter      Diet      NPO for Medical/Clinical Reasons Except for: No Exceptions     Output: I/O last 3 completed shifts:  In: 2917.07 [I.V.:1047.07; NG/GT:550]  Out: 1830 [Urine:1830]     Labs: Recent Labs   Lab 03/20/23  0443 03/14/23  1644 03/14/23  1604   HGB 9.2*   < > 5.8*   WBC 12.4*   < >  --    A1C  --   --  6.6*    < > = values in this interval not displayed.     Pressure injury risk assessment:   Sensory Perception: 2-->very limited  Moisture: 4-->rarely moist  Activity: 1-->bedfast  Mobility: 2-->very limited  Nutrition: 3-->adequate  Friction and Shear: 2-->potential problem  Owen Score: 14    Promise Rodrigues RN CWOCN  Pager no longer is use, please contact through [employer] group: [facility] Nurse  Dept. Office Number: [phone]

## 2023-03-20 NOTE — PROGRESS NOTES
Neuro: Continues to be more lethargic this shift, difficult to arouse, minimal response.  CV: On tele ST, BP normo to hypertensive.  PU: on BiPAP 18/8, 30%% sating high 90s, tachypneic RR high 30-40s, LS coarse and diminished. Continues to have a fever, Tmax 102.9, given Tylenol X 1. on Zosyn.  GI: Rectal tube in place, loose stool, minimal output ~25 cc. NG to TF goal rate 55 ml/hr with 60 ml flush q4h. On insulin drip  : Vu patent, good urine output  Skin: intact.  Lines: RUE PICC, lumen x2, left upper arm PIV     Goal Outcome Evaluation: continue with plan of care, update provider with changes.       Plan of Care Reviewed With: patient and family.

## 2023-03-20 NOTE — PROGRESS NOTES
[facility] PROGRESS NOTE  03/20/2023      Date of Service (when I saw the patient): 03/20/2023    ASSESSMENT: Job Kaiser is an 83 year old Cuban speaking male with a PMH of HFpEF (LV EF 65%), tuberculosis s/p treatment (3 drug therapy x 6 weeks), asthma, COPD on home O2, bronchiectasis, pulmonary fibrosis, DM type 2 who was admitted on 3/9/2023 with progressive fatigue, SOB, productive cough, weight loss, and pain with urination. adimssion concerning for CAP vs incompletely treated respiratory TB and nephrolithiasis. Mr. Kaiser was started on antibiotics and ppx bactrim for PJP given longstanding steroid use. Patient on 3/11 hypoxemic, AMS, and HR 140s; abx coverage broadened and placed on BiPAP. ICU course c/b acute/chronic hypercarbic hypoxemic respiratory failure secondary to his chronic pulmonary fibrosis, CAP, and afib w/ RVR.     CHANGES and MAJOR THINGS TODAY:   -TMAX 103.3; Vancomycin and micafungin started  -CAP and head CT w/o contrast  -NA+ 151; FWF now 100 Qhour  -Net neg 500-1L today; metolazone given  -Stopped all sedating medications r/t increased letheragy  -CTM brochoscopy labs  -Cystatin C 2.4 w/ GFR 22  -RUQ US evalulate high lipase pending CT findings    PLAN:    Neuro:  # toxic metabolic encephalopathy  # delirium   Encephalopathy/delirium likely source AH/HRF vs ongoing infection  - pain medications: Tylenol PRN  - Anti-delirium precautions  - goal RASS 0 to -1 to for tolerance of BiPAP    Pulmonary:  # Acute/chronic hypoxic and hypercapnic respiratory failure 2nd CAP vs Asp PNA vs COPD exacerbation (chronic steroid use)  # COPD (home O2 2L)  # Hx Asthma  # Hx Pulmonary fibrosis/bronchiectasis  Thoracentesis performed 3/14 1130 mL drained; BAL labs significant for cytology negative malignancy but positive for reactive mesothelioma, cell counts red/cloudy, and cultures pendng. Sputum culture on 3/10 significant for candida. Patient has continued to require BiPaP during ICU stay.  - Bipap (settings  increased to 18/8 on 3/15) to assist with persistent hypercarbia  - HFNC as tolerated  - hold PTA BREO ellipta (pt unable to take)   - Scheduled budesonide/ipratroprium/xopenex  - Prednisone 20 mg daily  - SpO2 > 90%     # History of incompletely treated TB   Upper lobe predominant disease in pt with likely incompletely treated (6 weeks) TB in the past with characteristic TB symptoms including night sweats, weight loss, hemoptysis history, productive cough,and worsening CT chest findings. Patient has history of multiple episodes of leaving before sputum AFBs could be done and history of not showing up to clinic for completing sputum AFBs  - airborne isolation to continue per infection prevention  - AFB smears were collected and remain negative   - discussed with pulmonology, no need to treat TB, will not provide increase in QoL  - Awaiting send out AFB results before removing patient from airborne precautions      Cardiovascular:  # afib with RVR - resolved  # HFpEF  # demand ischemia   - new onset afib this admission, was previously on amiodarone infusion.  Patient converted to SR around 0300 3/18.  Amiodarone stopped.  Currently SR with RBBB HR in the 60-70 rang.  -130 with no vasopressor support.    PLAN: continue to hold amiodarone, no other changes     GI/Nutrition:  # Hypoalbuminemia  # Protein calorie deficit malnutrition   - RD consulted appreciate recommendations   - NG tube in place TF @ goal  -  Qhour  - RT in place  - Bowel regimen ordered     Renal/Fluids/Electrolytes:  # Acute kidney injury   # Elevated HcO3 2nd compensation for respiratory processes vs contraction alkalosis  # Hypernatremia  # Hypermagnesia  Given patient has low muscle mass Cystatin C ordered (2.4 w/ estimated GFR 22). Patient also developed hypernatremia overnight.   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 Antibiotics broadened and patient sent down to CT for CAP non-contrast CT. Lipase also noted to be elevated 509 today. Pending CT results low threshold to order RUQ US.  - ID previously seen, signed off for now     Significant Cultures:  AFB smear (3/10): Negative  AFB smear (3/11): Negative  AFB smear (3/12): Negative  AFB Culture (3/10): In process ~ 4 weeks  3/10 Sputum + candida  3/14 BAL cultures NGTD     Antibiotics:  ~ Azithromycin (3/9)  ~ cefepime (3/9 - 3/10)  ~ ceftriaxone (3/9)  ~ Vancomycin (3/11 - 3/12), 3/20 -   ~ Zosyn (3/10 - 3/14), (3/19-3/20)  ~ Meropenem 3/20 -   ~ Micafungin 3/20    Hematology:    # Anemia of critical illness   # Leukocytosis see ID section above  # Thrombocytopenia - resolved  - Transfuse if hgb <7.0 or signs/symptoms of hypoperfusion. Monitor and trend.  - no acute concerns for bleeding  - Daily CBC      Musculoskeletal:  # Weakness and deconditioning of critical illness   - Physical and occupational therapy consult     Skin:  # nasal bridge DTI  - WOC consult     General Cares/Prophylaxis:    DVT Prophylaxis: Heaprin SQ and PCD  GI Prophylaxis: Not indicated  Restraints: none  Family Communication: Updated at bedside  Code Status: DNR/DNI    Plan of care discussed at patient bedside with family today and Dr. Osuna. Current plan is to continue resuscitative care with DNR/DNI.     Lines/tubes/drains:  - R PICC  - PIV  - kwong  - feeding tube     Disposition:  - [facility]    Patient seen and findings/plan discussed with medical ICU staff,   Enrrique.  Critical care time spent 50 min    CEDRIC Gill CNP        Code Status: No CPR- Do NOT Intubate           ====================================  INTERVAL HISTORY:   Plan of care discussed with family today see above. Patient TMAX 103F overnight; CT CAP, Meropenem, and micafungin added. Patient lethargy increasing overnight and into morning will continue to monitor; CT head, and ammonia pending. Likely source metabolic ongoing infection vs hypercapnia vs ICU hypoactive delirium    OBJECTIVE:   1. VITAL SIGNS:   Temp:  [99.9  F (37.7  C)-102.9  F (39.4  C)] 102.4  F (39.1  C)  Pulse:  [] 120  Resp:  [35-82] 41  BP: (122-178)/() 122/70  FiO2 (%):  [30 %-40 %] 40 %  SpO2:  [95 %-99 %] 99 %  FiO2 (%): 40 %  Resp: (!) 41    2. INTAKE/ OUTPUT:   I/O last 3 completed shifts:  In: 2917.07 [I.V.:1047.07; NG/GT:550]  Out: 1830 [Urine:1830]    3. PHYSICAL EXAMINATION:    GEN: Lethargic, RASS -3  EYES: PERRL, Anicteric sclera.   HEENT:  Normocephalic, atraumatic, trachea midline, Pupils PERRLA  CV: RRR, no gallops, rubs, or murmurs  PULM/CHEST: Clear breath sounds bilaterally upper and coarse bilateral lower, no wheeze, symmetric chest rise  GI: normal bowel sounds, soft,  no masses  : kwong catheter in place, urine yellow and clear  EXTREMITIES: No peripheral edema, moving all extremities to stimulus, peripheral pulses intact  NEURO: Arousing to repeated stimulus and not following commands  SKIN: Non-blanchable ecchymosis to anterior nose         4. LABS:   Arterial Blood Gases   No lab results found in last 7 days.  Complete Blood Count   Recent Labs   Lab 03/20/23  0443 03/19/23  0453 03/18/23  0421 03/17/23  0738   WBC 12.4* 13.3* 10.9 8.9   HGB 9.2* 9.9* 8.8* 6.8*    179 135* 110*     Basic Metabolic Panel  Recent Labs   Lab 03/20/23  0707 03/20/23  0605 03/20/23  0511 03/20/23  0443 03/19/23  0500 03/19/23  0453 03/18/23  1651 03/18/23  1624 03/18/23  0549 03/18/23  0421   NA  --   --    --  149*  --  144  --  143  --  146*   POTASSIUM  --   --   --  3.7  --  3.8  --  4.3  --  4.1   CHLORIDE  --   --   --  100  --  93*  --  91*  --  91*   CO2  --   --   --  35*  --  36*  --  40*  --  51*  46*   BUN  --   --   --  55.4*  --  41.7*  --  36.6*  --  32.5*   CR  --   --   --  1.13  --  0.93  --  0.91  --  0.97   * 136* 124* 136*   < > 148*   < > 239*   < > 324*    < > = values in this interval not displayed.     Liver Function Tests  No lab results found in last 7 days.  Coagulation Profile  No lab results found in last 7 days.    5. RADIOLOGY:   No results found for this or any previous visit (from the past 24 hour(s)).

## 2023-03-20 NOTE — PLAN OF CARE
Major Shift Events: Patient remains on High flow Nasal Cannula. TRUDI requesting for BIPAP use. RT informed and states will attempt to find Total face Mask for BIPAP to protect from current deep tissue injury on nasal bridge. WOC saw patient for wound on nose, see orders for dressing instructions. Ultrasound on lower extremity negative for DVT. CT scan completed on patient, see results. Patient remains lethargic. Not responding verbally or following commands. Patient continues to have tachypnea and be tachycardic. BG stable on insulin drip.   Plan: Titrate O2 as needed.  For vital signs and complete assessments, please see documentation flowsheets.        Goal Outcome Evaluation:      Plan of Care Reviewed With: family        Problem: Plan of Care - These are the overarching goals to be used throughout the patient stay.    Goal: Plan of Care Review  Description: The Plan of Care Review/Shift note should be completed every shift.  The Outcome Evaluation is a brief statement about your assessment that the patient is improving, declining, or no change.  This information will be displayed automatically on your shift note.  Outcome: Progressing  Flowsheets (Taken 3/20/2023 1801)  Plan of Care Reviewed With: family     Problem: Diabetes Comorbidity  Goal: Blood Glucose Level Within Targeted Range  Outcome: Progressing

## 2023-03-20 NOTE — PROGRESS NOTES
CLINICAL NUTRITION SERVICES - REASSESSMENT NOTE     Nutrition Prescription    RECOMMENDATIONS FOR MDs/PROVIDERS TO ORDER:  None today     Malnutrition Status:    Unable to fully determine due to no NFPA completed today    Recommendations already ordered by Registered Dietitian (RD):  None today - continue TF at goal as ordered, flushes are per Providers     Future/Additional Recommendations:  Continue to monitor tolerance to TF, wt/lab trends  Continue to monitor for changes in plan of care     EVALUATION OF THE PROGRESS TOWARD GOALS   Diet: NPO    Nutrition Support: Continuous NG TF with Osmolite 1.5 at 55 ml/hr to provide 1980 kcal, 84 gm protein, 267 gm CHO, no fiber, 1005 ml water/day   Flushes: Per Providers, increased to 100 ml q 1 hr due to hypernatremia, total w/TF =  3405 ml/day     TF tolerance: Phos trends improving, Na trends notes and Providers adjusting free water. Pt with rectal tube, stool output seems to be improving.        NEW FINDINGS   MAR reviewed. Labs reviewed, trends noted above. Pt reviewed during ICU team rounds, Providers addressing free water flushes, RD has no other suggestions and will plan to continue TF as ordered, Providers continue to address goals of care w/family, noted pt now much more lethargic compared to last week.     03/20/23 0600 60.8 kg (134 lb 0.6 oz) Bed scale   03/19/23 0646 61.6 kg (135 lb 12.9 oz) --   03/18/23 0500 60.6 kg (133 lb 9.6 oz) Bed scale   03/17/23 0515 65.5 kg (144 lb 6.4 oz) Bed scale   03/16/23 0600 65.4 kg (144 lb 2.9 oz) Bed scale   03/14/23 0600 64.6 kg (142 lb 6.7 oz) Bed scale   03/12/23 0500 57 kg (125 lb 10.6 oz) Bed scale   03/11/23 0300 57.2 kg (126 lb 1.7 oz) Bed scale   03/09/23 2300 58.4 kg (128 lb 12 oz) --     03/12/23 57 kg (125 lb 10.6 oz)   11/23/22 67.9 kg (149 lb 11.1 oz)      59 kg (130 lb) 02/17/2023 - Per CE     Weight assessment: Significantly varying weights noted between 3/12 and 3/14, so difficult to fully assess trends with  varying fluid status. Possible 9 lb wt gain vs 8 lb wt loss in 1 week, 4 lb wt gain in 1 month, significant 10% weight loss in >3 months.     MALNUTRITION  % Intake: Intake does not meet criteria as pt is supported by EN   % Weight Loss: > 7.5% in 3 months (severe)  Subcutaneous Fat Loss: Unable to assess  Muscle Loss: Unable to assess  Fluid Accumulation/Edema: None noted  Malnutrition Diagnosis: Unable to fully determine due to no NFPA completed today     Previous Goals   Total avg nutritional intake to meet a minimum of 30 kcal/kg and 1.2 g PRO/kg daily (per dosing wt 57 kg)  Evaluation: Met    Previous Nutrition Diagnosis  Inadequate oral intake related to medical status/respiratory status/neurological status as evidenced by NPO order with pt requiring enteral nutrition support to meet estimated nutritional needs   Evaluation: No change    CURRENT NUTRITION DIAGNOSIS  Inadequate oral intake related to medical status/respiratory status/neurological status as evidenced by NPO order with pt requiring enteral nutrition support to meet estimated nutritional needs     INTERVENTIONS  Implementation  Collaboration with Providers - as noted above   Enteral Nutrition - continue as ordered   Feeding tube flush - Per Providers    Goals  Total avg nutritional intake to meet a minimum of 30 kcal/kg and 1.2 g PRO/kg daily (per dosing wt 57 kg)    Monitoring/Evaluation  Progress toward goals will be monitored and evaluated per protocol.    Radha Samuels RD, CNSC, LD  [place] [id number] [address] pager: [phone]  Weekend/holiday pager: [phone]

## 2023-03-21 NOTE — PROGRESS NOTES
[facility] PROGRESS NOTE  03/21/2023      Date of Service (when I saw the patient): 03/21/2023    ASSESSMENT: Job Kaiser is an 83 year old [place] speaking male with a PMH of HFpEF (LV EF 65%), tuberculosis s/p treatment (3 drug therapy x 6 weeks), asthma, COPD on home O2, bronchiectasis, pulmonary fibrosis, DM type 2 who was admitted on 3/9/2023 with progressive fatigue, SOB, productive cough, weight loss, and pain with urination. adimssion concerning for CAP vs incompletely treated respiratory TB and nephrolithiasis. Mr. Kaiser was started on antibiotics and ppx bactrim for PJP given longstanding steroid use. Patient on 3/11 hypoxemic, AMS, and HR 140s; abx coverage broadened and placed on BiPAP. ICU course c/b acute/chronic hypercarbic hypoxemic respiratory failure secondary to his chronic pulmonary fibrosis, CAP, and afib w/ RVR.      CHANGES and MAJOR THINGS TODAY:   - ID discussed medication plan see ID section below  - Levophed and vasopressin started today  - 1 uPRBC given for Hgb 7.3 in setting of pressor escalation  - Cystatin C (GFR 11)  - Arterial line placed  - BiPAP on 2 hrs and off 2 hrs during day and continuous at bedtime (scubba mask)  - CTM bronchoscopy labs     PLAN:     Neuro:  # toxic metabolic encephalopathy  # delirium   Encephalopathy/delirium likely source AH/HRF vs ongoing infection  - pain medications: Tylenol PRN  - Anti-delirium precautions  - goal RASS 0 to -1 to for tolerance of BiPAP     Pulmonary:  # Acute/chronic hypoxic and hypercapnic respiratory failure 2nd CAP vs Asp PNA vs COPD exacerbation (chronic steroid use)  # COPD (home O2 2L)  # Hx Asthma  # Hx Pulmonary fibrosis/bronchiectasis  Thoracentesis performed 3/14 1130 mL drained; BAL labs significant for cytology negative malignancy but positive for reactive mesothelioma, cell counts red/cloudy, and cultures pendng. Sputum culture on 3/10 significant for candida. Patient has continued to require BiPaP during ICU stay.  -  Bipap (settings increased to 18/8 on 3/15) to assist with persistent hypercarbia  - Bipap regiment during day on 2 hrs and off 2 hrs, and continuous overnight  - HFNC as tolerated  - hold PTA BREO ellipta (pt unable to take)   - Scheduled budesonide/ipratroprium/xopenex  - Stopped PTA Prednisone 20 daily and started SDS.  - SpO2 > 90%      # History of incompletely treated TB   Upper lobe predominant disease in pt with likely incompletely treated (6 weeks) TB in the past with characteristic TB symptoms including night sweats, weight loss, hemoptysis history, productive cough,and worsening CT chest findings. Patient has history of multiple episodes of leaving before sputum AFBs could be done and history of not showing up to clinic for completing sputum AFBs  - airborne isolation to continue per infection prevention  - AFB smears were collected and remain negative   - discussed with pulmonology, no need to treat TB, will not provide increase in QoL  - Awaiting send out AFB results before removing patient from airborne precautions        Cardiovascular:  # Hypotensive 2nd distributive shock  # afib with RVR - resolved  # HFpEF  - Levophed and vasopressin started; MAP > 65  - 3/21 SDS initiated Solu-cortef 50 mg Q6H  - new onset afib this admission, was previously on amiodarone infusion.  Patient converted to SR around 0300 3/18.  Amiodarone stopped.  Currently SR with RBBB..        GI/Nutrition:  # Hypoalbuminemia  # Protein calorie deficit malnutrition   - RD consulted appreciate recommendations   - NG tube in place TF @ goal  -  Qhour  - RT in place  - Bowel regimen ordered     Renal/Fluids/Electrolytes:  # Acute kidney injury 2nd distributive shock  # Elevated HcO3 2nd respiratory compensation   # Hypernatremia  # Hypermagnesia  # Lactic acidosis  Given patient has low muscle mass Cystatin C GFR now 11. Kidney function is declining in the setting of nonoliguric acute kidney injury. 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 Antibiotics broadened and patient sent down to CT for CAP non-contrast CT. Lipase also noted to be elevated 509 today. Abdominal US ordered in setting of Lipase 509.  - Discussed with ID physician Dr. Lazaro regarding ID plan today. Plan to stop vancomycin/micagungin and continue meropenem. Bactrim also changed to Atovaquone in setting of BOBBY and need for PJP prophylaxis. Dr. Osuna and Dr. Lazaro to discuss CT image findings from 3/20 in AM.      Significant Cultures:  AFB smear (3/10): Negative  AFB smear (3/11): Negative  AFB smear (3/12): Negative  AFB Culture (3/10): In process ~ 4 weeks  3/10 Sputum + candida  3/14 BAL cultures pending  3/20 Sputum + Klebsiella PNA     Antibiotics:  ~ Azithromycin (3/9)  ~ cefepime (3/9 - 3/10)  ~ ceftriaxone (3/9)  ~ Vancomycin (3/11 - 3/12), (3/20 - 3/21)   ~ Zosyn (3/10 - 3/14), (3/19-3/20)  ~ Meropenem 3/20 -   ~ Micafungin (3/20 - 3/21)  ~ Bactrim stopped 3/21  ~ Atovaquone 3/21 - (PJP ppx)       Hematology:    # Anemia of critical illness   # Leukocytosis see ID section above  # Thrombocytopenia - resolved  - Transfuse if hgb <7.0 or signs/symptoms of hypoperfusion. Monitor and trend.  - no acute concerns for bleeding  - 1 uPRBC given today for Hgb 7.3 in the setting of increasing pressor requirements  -  Daily CBC        Musculoskeletal:  # Weakness and deconditioning of critical illness   - Physical and occupational therapy consult      Skin:  # nasal bridge DTI  - BiPAP scuba mask  - WOC consult      General Cares/Prophylaxis:    DVT Prophylaxis: Heaprin SQ and PCD  GI Prophylaxis: Not indicated  Restraints: none  Family Communication: Updated at bedside  Code Status: DNR/DNI     3/21/23 Plan of care discussed at patient bedside with family today and Dr. Osuna;  present. Family wanting to continue with resuscitative care and DNR/DNI. Dr. Osuna expressed concerns regarding kidney function and clinical decompensation overnight/throughout today. Patient family has not made decision on whether patient would want dialysis added to current treatment plan. Patient does not have indication as of yet for dialysis need. Current plan is to continue resuscitative care with DNR/DNI.      Lines/tubes/drains:  - R PICC  - PIV  - kwong  - feeding tube   - arterial line     Disposition:  - [facility]     Patient seen and findings/plan discussed with medical ICU staff, Dr. Osuna.  Critical care time spent 60 min    CEDRIC Gill CNP        Code Status: No CPR- Do NOT Intubate           ====================================  INTERVAL HISTORY:   Patient started on levophed, vasopressin, and SDS. Patient given 1 uPRBC in setting increasing pressor requirements. LA, and hgb 7.3. Patient has significant non-oliguric BOBBY with Cystatin C GFR 11. ID curbsided regarding infection plan see above. Dr. Osuna and ID to discuss CT findings and abdominal US currently pending. Dr. Osuna had bedside discussion with family today regarding goals of care with  present.     OBJECTIVE:   1. VITAL SIGNS:   Temp:  [98.2  F (36.8  C)-103.1  F (39.5  C)] 99.5  F (37.5  C)  Pulse:  [105-120] 111  Resp:  [] 28  BP: ()/(35-80) 135/66  FiO2 (%):  [45 %] 45 %  SpO2:  [95 %-100 %] 98 %  FiO2 (%): 45 %  Resp: 28    2.  INTAKE/ OUTPUT:   I/O last 3 completed shifts:  In: 4100.69 [I.V.:600.69; NG/GT:2180]  Out: 2100 [Urine:1950; Stool:150]    3. PHYSICAL EXAMINATION:       GEN: Lethargic, RASS -4  EYES: PERRL, Anicteric sclera.   HEENT:  Normocephalic, atraumatic, trachea midline, Pupils PERRLA  CV: RRR, no gallops, rubs, or murmurs  PULM/CHEST: Clear breath sounds bilaterally upper and coarse bilateral lower, no wheeze, symmetric chest rise  GI: normal bowel sounds, soft,  no masses  : kwong catheter in place, urine yellow and clear  EXTREMITIES: +1 peripheral edema present, moving all extremities to deep stimulus, peripheral pulses intact  NEURO: Minimal arousal to repeated deep stimulus and not following commands  SKIN: Non-blanchable ecchymosis/ulcer to anterior nose       4. LABS:   Arterial Blood Gases   No lab results found in last 7 days.  Complete Blood Count   Recent Labs   Lab 03/21/23  0545 03/20/23  0443 03/19/23  0453 03/18/23  0421   WBC 8.0 12.4* 13.3* 10.9   HGB 8.0* 9.2* 9.9* 8.8*    206 179 135*     Basic Metabolic Panel  Recent Labs   Lab 03/21/23  0917 03/21/23  0804 03/21/23  0702 03/21/23  0555 03/21/23  0545 03/21/23  0006 03/20/23  2351 03/20/23  2107 03/20/23  1940/23  1501 03/20/23  1428 03/20/23  0511 03/20/23  0443 03/19/23  0500 03/19/23  0453   NA  --   --   --   --  148*  --  147*  --  153*  --  150*   < > 149*  --  144   POTASSIUM  --   --   --   --  4.0  --   --   --   --   --  4.2  --  3.7  --  3.8   CHLORIDE  --   --   --   --  97*  --   --   --   --   --  102  --  100  --  93*   CO2  --   --   --   --  32*  --   --   --   --   --  32*  --  35*  --  36*   BUN  --   --   --   --  81.3*  --   --   --   --   --  55.1*  --  55.4*  --  41.7*   CR  --   --   --   --  1.79*  --   --   --   --   --  1.08  --  1.13  --  0.93   * 406* 323* 121* 120*   < >  --    < >  --    < > 140*   < > 136*   < > 148*    < > = values in this interval not displayed.     Liver Function Tests  Recent  Labs   Lab 03/21/23  0545 03/20/23  0443   * 80*   ALT 48 23   ALKPHOS 49 51   BILITOTAL 0.4 0.4   ALBUMIN 2.8* 3.1*     Coagulation Profile  No lab results found in last 7 days.    5. RADIOLOGY:   Recent Results (from the past 24 hour(s))   CT Chest Abdomen Pelvis w/o Contrast    Narrative    EXAMINATION: CT CHEST ABDOMEN PELVIS W/O CONTRAST, 3/20/2023 4:42 PM    TECHNIQUE:  Helical CT images from the thoracic inlet through the  symphysis pubis were obtained  without IV contrast.     COMPARISON: CT chest abdomen and pelvis 3/10/2023    HISTORY: Fevers of unknown origin, assess progression of pneumonia,  potential abscess    FINDINGS:    Chest: Right picc with the tip in the right atrium. Enteric tube with  the tip in the pylorus. Unremarkable thyroid. No lymphadenopathy in  the base of the neck or axillae. Normal caliber of the major thoracic  vessels. Conventional branching pattern of the aortic arch. Normal  cardiac size. No pericardial effusion. Mediastinal and perihilar  lymphadenopathy such as 2.7 x 1.7 cm lower paratracheal lymph node  (series 1 image 6), decreased in size from prior when it measured 3.6  x 2.8 cm. Small bilateral loculated pleural effusions with associated  atelectasis. Grossly stable fibrotic appearance of the peripheral and  basilar lungs with emphysematous changes, predominantly in the apical  lungs. There are parenchymal consolidations in the bilateral lungs  which are stable to mildly increased from prior. Basilar predominant  areas of septal thickening and faint ground glass opacities. Patulous  upper thoracic esophagus.    Abdomen and pelvis: Limited noncontrast evaluation of the abdomen.  Rectal tube and Vu in place. Unremarkable noncontrast evaluation of  the liver, gallbladder, spleen, and pancreas. 1 cm nodule in the  anterior limb of the left adrenal, new from prior, there is associated  small 7 mm nodule along the lateral limb of the left adrenal. There  are multiple  soft tissue nodularities in the retroperitoneum, such as  posterior to the right adrenal measuring 7 mm (series 1 image 249),  inferior to the left adrenal measuring 3 mm (series 1 image 250),  adjacent to the left kidney (series 1 images 227, 304, and 319).  Stable appearance of the large left nonobstructive nephrolithiasis and  right punctiform nonobstructive nephrolithiasis. Additionally, there  is a 1.3 x 1.0 mesenteric mass with trace surrounding fat stranding  (series 1 image 366). Lobular appearance of the left kidney with  possible nodularities along the left renal capsule. There is new trace  fluid and fat stranding in the left lower quadrant, superolateral to  the bladder. In the vicinity, there is thickening of the left pelvic  wall soft tissues, likely within our deep to the left obturator  internus. No distended loops of bowel. Normal appendix. No free air in  the abdomen. There are no substantial lymphadenopathy in the abdomen,  aside from the previous mentioned mesenteric mass which could  represent a lymph node. No large fluid collections in the abdomen.    Bones and soft tissues: Small bilateral fat-containing inguinal  hernias. Small air collection in the soft tissues of the right lower  quadrant (series 1 image 426), likely iatrogenic. No acute osseous  abnormalities or suspicious bone lesions.      Impression    IMPRESSION:   1. Interval worsening of loculated bilateral pleural effusions and  pulmonary consolidations concerning for infection (TB included).  Malignancy cannot be excluded.  2. Stable fibrotic changes of the lungs from prior 3/10/2023.  3. Soft tissue nodularities in the retroperitoneum as well as  mesenteric soft tissue nodularity. This is concerning for malignancy  versus intra-abdominal infection such as intra-abdominal tuberculosis.  4. New soft tissue thickening in the left pelvic wall likely within  the left obturator internus with associated fat stranding and trace  fluid.  Differential include phlegmon and hematoma.  5. Air in the right abdominal wall, likely iatrogenic. Correlate with  prior injection and recommend direct visualization.  6. Stable appearance of bilateral nephrolithiasis without substantial  collecting system dilation.    I have personally reviewed the examination and initial interpretation  and I agree with the findings.    TAYLOR MCCORMACK MD         SYSTEM ID:  [id number]   CT Head w/o Contrast    Narrative    EXAM: CT HEAD W/O CONTRAST  3/20/2023 4:42 PM     HISTORY:  eval ongoing encephalopathy       COMPARISON:  None.    TECHNIQUE: Using multidetector thin collimation helical acquisition  technique, axial, coronal and sagittal CT images from the skull base  to the vertex were obtained without intravenous contrast.   (topogram) image(s) also obtained and reviewed.    FINDINGS:  Mildly motion degraded examination. No acute intracranial hemorrhage,  mass effect, or midline shift. No acute loss of gray-white matter  differentiation in the cerebral hemispheres. Ventricles are  proportionate to the cerebral sulci. Clear basal cisterns.  Mild to  moderate diffuse cerebellar and cerebral volume loss.    The bony calvaria and the bones of the skull base are normal. The  visualized portions of the paranasal sinuses and mastoid air cells are  clear. Grossly normal orbits. Nasal tubing. Symmetric anterior  subluxation of the temporomandibular joints.      Impression    IMPRESSION:   1.  No acute intracranial pathology.  2.  Anterior subluxation of the temporomandibular joints presumably  related to mouth opening.    I have personally reviewed the examination and initial interpretation  and I agree with the findings.    JUSTINO SALTER MD         SYSTEM ID:  [id number]

## 2023-03-21 NOTE — PROGRESS NOTES
/54   Pulse 109   Temp 98.2  F (36.8  C) (Axillary)   Resp 19   Wt 60.8 kg (134 lb 0.6 oz)   SpO2 98%   BMI 18.18 kg/m      VSS currently stable except for hypotension in AM with fever of 103.1 and hypoglycemia (45); Began Levophed and gave 2 amps of D50 which corrected the blood sugar. Ice packs initiated and tylenol given which brought temp to 98.2. Patient is very lethargic and unresponsive all night except to painful stimuli. 250cc bolus of D5W given for hypernatremia (153) despite Q1h 100cc FW flushes; Urine output has slowed to around 25-30cc/hr. Bipap overnight, still tachypneic with RR in 30-40's. Will continue to monitor and treat as per patients family's wishes.     Mikel Juarez RN

## 2023-03-21 NOTE — PLAN OF CARE
Major Shift Events:  Patient's mentation remains unchanged at this time. Currently getting Vasopressin and Norepinephrine for pressors at this time. BP sustaining MAP above 65 with pressors at this time. Art line placed to monitor BP closer. HR between 90s and low 100s. RT alternating between BIPAP and High flow nasal cannula for patient at this time. O2 saturation stable, but patient continues to have tachypnea. Patient received 1 unit of blood. Patient initially hyperglycemic at start of shift, BG currently controlled at this time.    Plan: Complete blood administration. Recheck Hgb and lactic acid.  For vital signs and complete assessments, please see documentation flowsheets.

## 2023-03-21 NOTE — PROGRESS NOTES
Had to switch patient back to over the nose full face mask due to the under the nose mask fit being so poor and provided inefficient seal. Pressure reducing foam placed on bridge of nose where skin irritation is and bedside RN made aware.

## 2023-03-22 PROBLEM — N17.0 ACUTE KIDNEY FAILURE WITH TUBULAR NECROSIS (H): Status: ACTIVE | Noted: 2023-01-01

## 2023-03-22 NOTE — PLAN OF CARE
PT/OT: therapies have been HOLDING evaluation for ~2 weeks given pt not appropriate or medically stable for mobility and OOB given declining mentation, respiratory needs, and overall medical status. He has not been responsive to commands either. Given DNR/DNI status the risks of mobility at this time outweigh the benefits. Therapies will complete orders for now, if change in status occurs please feel free to re-consult.

## 2023-03-22 NOTE — PROGRESS NOTES
[facility] ICU PROGRESS NOTE  03/22/2023      Date of Service (when I saw the patient): 03/22/2023    ASSESSMENT: Job Kaiser is an 83 year old Marshallese speaking male with a PMH of HFpEF (LV EF 65%), tuberculosis s/p treatment (3 drug therapy x 6 weeks), asthma, COPD on home O2, bronchiectasis, pulmonary fibrosis, DM type 2 who was admitted on 3/9/2023 with progressive fatigue, SOB, productive cough, weight loss, and pain with urination. adimssion concerning for CAP vs incompletely treated respiratory TB and nephrolithiasis. Mr. Kaiser was started on antibiotics and ppx bactrim for PJP given longstanding steroid use. Patient on 3/11 hypoxemic, AMS, and HR 140s; abx coverage broadened and placed on BiPAP. ICU course c/b acute/chronic hypercarbic hypoxemic respiratory failure secondary to his chronic pulmonary fibrosis, CAP, and afib w/ RVR.      CHANGES and MAJOR THINGS TODAY:   - Neuro changes overnight, STAT head CT negative for acute intracranial processes  - Updated family concern re: worsening state. Will continue to follow and educate as needed.     PLAN:     Neuro:  # Toxic Metabolic Encephalopathy  # Delirium   Encephalopathy/delirium likely source AH/HRF vs ongoing infection  - Pain medications: Tylenol PRN  - Anti-delirium precautions  - 3/22: Developed a upward left gaze overnight. STAT Head CT negative for acute intracranial processes. Pupils are small, sluggish but responsive. No response to deep pain stimulus. Discussed with family concerns regarding change.      Pulmonary:  # Acute/chronic hypoxic and hypercapnic respiratory failure 2nd CAP vs Asp PNA vs COPD exacerbation (chronic steroid use)  # COPD (home O2 2L)  # Hx Asthma  # Hx Pulmonary fibrosis/bronchiectasis  Thoracentesis performed 3/14 1130 mL drained; BAL labs significant for cytology negative malignancy but positive for reactive mesothelioma, cell counts red/cloudy, and cultures pendng. Sputum culture on 3/10 significant for candida. Patient has  continued to require BiPaP during ICU stay.  - Bipap to assist with persistent hypercarbia   - Settings high, 18/8  - Bipap regiment during day on 2 hrs and off 2 hrs, and continuous overnight  - HFNC as tolerated  - Hold PTA BREO ellipta (pt unable to take)   - Scheduled budesonide/ipratroprium/xopenex  - Stopped PTA Prednisone 20 daily in favor of SDS.  - SpO2 > 90%      # History of incompletely treated TB   Upper lobe predominant disease in pt with likely incompletely treated (6 weeks) TB in the past with characteristic TB symptoms including night sweats, weight loss, hemoptysis history, productive cough,and worsening CT chest findings. Patient has history of multiple episodes of leaving before sputum AFBs could be done and history of not showing up to clinic for completing sputum AFBs  - Airborne isolation to continue per infection prevention  - AFB smears were collected and remain negative   - Discussed with pulmonology, no need to treat TB, will not provide increase in QoL  - Awaiting send out AFB results before removing patient from airborne precautions     Cardiovascular:  # Hypotensive 2nd distributive shock  # Afib with RVR - resolved  # HFpEF  - Levophed and vasopressin infusions; MAP > 65  - Continue SDS initiated Solu-cortef 50 mg Q6H  - New onset afib this admission, was previously on amiodarone infusion. Patient converted to SR, thus amiodarone stopped. Currently SR with RBBB..      GI/Nutrition:  # Hypoalbuminemia  # Protein calorie deficit malnutrition   - RD consulted appreciate recommendations   - NG tube in place TF @ goal  - Rectal tube in place -- will leave in place for comfort measures at this time.  - Bowel regimen ordered     Renal/Fluids/Electrolytes:  # Acute kidney injury 2nd distributive shock  # Elevated HcO3 2nd respiratory compensation   # Hypernatremia, improving  # Hypermagnesia  # Lactic acidosis, resolved  - FWF decrease to 100 Q 2 hour  - Daily CMP, and cystatin C  - Given  ongoing pressor requirements unable to diurese today  - Elevated lactate 3/21, normalized after blood transfusion and holding on diuresis.      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   - Abd US from 3/21 without evidence of cholecystitis.  - Bactrim also changed to Atovaquone in setting of BOBBY and need for PJP prophylaxis.     Significant Cultures:  AFB smear (3/10): Negative  AFB smear (3/11): Negative  AFB smear (3/12): Negative  AFB Culture (3/10): In process ~ 4 weeks  3/10 Sputum + candida  3/14 BAL cultures pending  3/20 Sputum + Klebsiella PNA     Antibiotics:  ~ Azithromycin (3/9)  ~ cefepime (3/9 - 3/10)  ~ ceftriaxone (3/9)  ~ Vancomycin (3/11 - 3/12), (3/20 - 3/21)   ~ Zosyn (3/10 - 3/14), (3/19-3/20)  ~ Meropenem 3/20 - CURRRENT  ~ Micafungin (3/20 - 3/21)  ~ Bactrim stopped 3/21  ~ Atovaquone 3/21 - (PJP ppx) CURRENT       Hematology:    # Anemia of critical illness   # Leukocytosis see ID section above  # Thrombocytopenia - resolved  - Transfuse if hgb <7.0 or signs/symptoms of hypoperfusion. Monitor and trend.  - No acute concerns for bleeding  - Transfused with 1 RBC yesterday for Hgb 7.3, 8.8 today.  - Daily CBC        Musculoskeletal:  # Weakness and deconditioning of critical illness   - Physical and occupational therapy consult      Skin:  # Nasal bridge DTI  - BiPAP scuba mask  - WOC consult      General Cares/Prophylaxis:    DVT Prophylaxis: Heaprin SQ and PCD  GI Prophylaxis: Not indicated  Restraints: none  Family Communication: Updated at bedside  Code Status:  DNR/DNI        Lines/tubes/drains:  - R PICC  - PIV  - kwong  - feeding tube   - arterial line     Disposition:  - [facility] ICU     Patient seen and findings/plan discussed with medical ICU staff, Dr. Osuna.  Critical care time spent 60 min    CEDRIC Whitaker CNP        Code Status: No CPR- Do NOT Intubate           ====================================  INTERVAL HISTORY:   Overnight events reviewed. Patient on BiPAP, unresponsive to deep pain stimuli. Pupils equal, small, sluggishly responsive to light. Unable to complete ROS given obtunded state.    OBJECTIVE:   1. VITAL SIGNS:   Temp:  [97.9  F (36.6  C)-99.7  F (37.6  C)] 99.4  F (37.4  C)  Pulse:  [] 86  Resp:  [0-104] 47  BP: ()/(50-79) 133/75  MAP:  [60 mmHg-255 mmHg] 74 mmHg  Arterial Line BP: (106-198)/(-13-63) 134/48  FiO2 (%):  [30 %-40 %] 40 %  SpO2:  [90 %-100 %] 98 %  FiO2 (%): 40 %  Resp: (!) 47    2. INTAKE/ OUTPUT:   I/O last 3 completed shifts:  In: 3140.3 [I.V.:266.97; NG/GT:1345]  Out: 2800 [Urine:2400; Stool:400]    3. PHYSICAL EXAMINATION:       GEN: Lethargic, RASS -4  EYES: PERRL, Anicteric sclera.   HEENT:  Normocephalic, atraumatic, trachea midline, Pupils PERRLA  CV: RRR, no gallops, rubs, or murmurs  PULM/CHEST: Clear breath sounds bilaterally upper and coarse bilateral lower, no wheeze, symmetric chest rise  GI: normal bowel sounds, soft,  no masses  : kwong catheter in place, urine yellow and clear  EXTREMITIES: +1 peripheral edema present, not moving extremities to deep stimulus, peripheral pulses intact  NEURO: Minimal arousal to repeated deep stimulus and not following commands  SKIN: Non-blanchable ecchymosis/ulcer to anterior nose       4. LABS:   Arterial Blood Gases   Recent Labs   Lab 03/22/23  0404   PH 7.35   PCO2 63*   PO2 75*   HCO3 35*     Complete Blood Count   Recent Labs   Lab 03/22/23  0400 03/21/23  1150 03/21/23  0545 03/20/23  0443   WBC 7.0 8.0 8.0 12.4*   HGB 8.8* 7.3* 8.0* 9.2*   PLT 96* 176 164  206     Basic Metabolic Panel  Recent Labs   Lab 03/22/23  0816 03/22/23  0647 03/22/23  0544 03/22/23  0503 03/22/23  0407 03/22/23  0400 03/21/23  1701 03/21/23  1642 03/21/23  0555 03/21/23  0545 03/21/23  0006 03/20/23  2351 03/20/23  1501 03/20/23  1428   NA  --   --   --   --   --  142  --  144  --  148*  --  147*   < > 150*   POTASSIUM  --   --   --   --   --  3.4  --  3.5  --  4.0  --   --   --  4.2   CHLORIDE  --   --   --   --   --  95*  --  98  --  97*  --   --   --  102   CO2  --   --   --   --   --  32*  --  31*  --  32*  --   --   --  32*   BUN  --   --   --   --   --  83.0*  --  88.4*  --  81.3*  --   --   --  55.1*   CR  --   --   --   --   --  1.24*  --  1.76*  --  1.79*  --   --   --  1.08   * 215* 194* 197*   < > 241*   < > 221*   < > 120*   < >  --    < > 140*    < > = values in this interval not displayed.     Liver Function Tests  Recent Labs   Lab 03/22/23  0400 03/21/23  0545 03/20/23  0443   * 124* 80*   ALT 92* 48 23   ALKPHOS 39* 49 51   BILITOTAL 0.5 0.4 0.4   ALBUMIN 2.7* 2.8* 3.1*     Coagulation Profile  No lab results found in last 7 days.    5. RADIOLOGY:   Recent Results (from the past 24 hour(s))   US Abdomen Limited Portable    Narrative    EXAMINATION: US ABDOMEN LIMITED PORTABLE  3/21/2023 4:24 PM      CLINICAL HISTORY: Lipase 500s, escalating septic shock, concerns for  acute cholecystitis vs acute pancreatitis vs common bile duct issues,  Lipase 500s, escalating septic shock, concerns for acute cholecystitis  vs acute pancreatitis vs common bile duct issues    COMPARISON: CT 3/20/2023        FINDINGS:  The liver is normal in contour and echogenicity. There is no  intrahepatic or extrahepatic biliary ductal dilatation. The common  bile duct measures 4 mm. The gallbladder contains gallbladder sludge,  without gallstones, wall thickening, or pericholecystic fluid. No  decubitus imaging could be obtained as patient is intubated.    The spleen measures maximally 13.4  cm and is normal in appearance. The  visualized portions of the pancreas are normal in echogenicity.    The visualized upper abdominal aorta and inferior vena cava are  normal.      The kidneys are normal in position and echogenicity. The right kidney  measures 10.2 cm and the left kidney measures 10.2 cm. There is no  significant urinary tract dilation.    Small right pleural effusion.      Impression    IMPRESSION:     1. Gallbladder sludge without sonographic evidence of cholecystitis.  2. Small right pleural effusion.    I have personally reviewed the examination and initial interpretation  and I agree with the findings.    NEFTALI PALMER MD         SYSTEM ID:  [id number]   CT Head w/o Contrast    Narrative    CT HEAD W/O CONTRAST 3/22/2023 6:32 AM    History: New upward gaze     Comparison: 3/20/2023    Technique: Using multidetector thin collimation helical acquisition  technique, axial, coronal and sagittal CT images from the skull base  to the vertex were obtained without intravenous contrast.    Findings: Mild-to-moderate diffuse generalized cerebral parenchymal  volume loss. Mild periventricular white matter hypodensities, likely  sequela of chronic small vessel ischemic disease. There is no  intracranial hemorrhage, mass effect, or midline shift. Gray/white  matter differentiation in both cerebral hemispheres is preserved.  Ventricles are proportionate to the cerebral sulci. The basal cisterns  are clear. Bilateral pseudophakia.    The bony calvaria and the bones of the skull base are normal. The  visualized portions of the paranasal sinuses and mastoid air cells are  clear. Unchanged symmetric anterior subluxation of the temporal  mandibular joints.      Impression    Impression:  No acute intracranial pathology. Stable findings compared to prior on  3/20/2023.    I have personally reviewed the examination and initial interpretation  and I agree with the findings.    AUBREE SOFIA MD         SYSTEM  ID:  [id number]

## 2023-03-22 NOTE — PROGRESS NOTES
/75   Pulse 86   Temp 99.4  F (37.4  C) (Axillary)   Resp 23   Wt 63.4 kg (139 lb 12.4 oz)   SpO2 99%   BMI 18.96 kg/m      VSS on Vasopressin and Levophed. Somnolent. 0445 noticed patient had eyes open and had an upward left gaze. Notified TRUDI and patient was sent for a stat head CT. Awaiting results.    Mikel Juarez RN

## 2023-03-22 NOTE — PLAN OF CARE
Major Shift Events:    RAAS -4 since start of shift, no response to sternal rub    Pupils reactive, sluggish at times    BiPAP for whole shift due to lack of spontaneous breathing    TF flush decreased to 100ml q2h    Weaned off Levophed & Vasopressin    Rectal tube changed     Insulin drip, now in Algorithm 4    Head CT result unremarkable     Nose pressure injury is scabbing and ONDINA    Family aware of patient's status change     Plan:    Titrate Levo & Vasopressin as needed    Continue diligent I&O's      For vital signs and complete assessments, please see documentation flowsheets.

## 2023-03-22 NOTE — PROGRESS NOTES
Brief ICU Crosscover Note    I was called to [facility]'s bedside approximately 0515 due to bedside RN's concern regarding upward/left sided gaze. In assessment, it was difficult to evaluate pupillary response as patient was on bipap mask but response was sluggish, gaze was direct left slightly upwards regardless of attempts to track. Patient was unresponsive to stimuli, which has been his status. Will obtain STAT Head CT no contrast out of an abundance of caution. Situation was discussed and explained to family at bedside.     CLARIBEL Faustin-ACNP  Pager #[phone]  Critical Care  [facility]

## 2023-03-22 NOTE — PROGRESS NOTES
Transported pt to CT on Bipap 18/8 R 20 30% Fio2.. Pt vitals stable.       Jean Carlos SELBY RRT-NPS.

## 2023-03-23 NOTE — PROGRESS NOTES
Attempt to replace scuba mask with under nose mask failed. Pt is open wide mouth breather, and unable to keep under nose mask (size large) due to big mask leak (over 100). Pt placed back to scuba mask (sixe XL). Mepilex border in place. Will continue to follow.

## 2023-03-23 NOTE — PROGRESS NOTES
Major Shift Events: Patient continues to have pressure ulcer on bridge of nose. RT trialed a different mask type to try and reduce pressure on the nose, but was unsuccessful given the anatomy of the patient and position of the mouth. Thus, nursing has been diligent in cleaning the area and replacing mepilex borders every 2 hours on the site to try and reduce pressure until a different mask solution can be found. Patient is 10/5 on BiPaP at 25 FiO2. Tube feed flush increased to 100Q1 hours. Rectal tube pulled d/t no output over 24 hours. Suppository given this shift, no bowel movement as of yet. Art line has been inconsistent since this AM - providers directed to follow Q15 arm blood pressures unless urine output goes below 0.5mL/kg body weight.     Plan: Continue diligent care of bridge of nose. Continue plan of care.    Refer to flowsheet for detailed information.    Oc Willoughby RN on 3/23/2023 at 6:50 PM

## 2023-03-23 NOTE — PROGRESS NOTES
Palliative spoke with primary team about Job. Increasingly hypernatremic, hypoxic, and hypercapnic on despite BiPAP. He is also on pressor support. His mentation is worsening by the hour. DNR/DNI status was confirmed by primary team. Appreciate their continued attempts and support for Job and his family.    There is a question of whether dialysis ought to be offered. Given his current prognosis and medical trajectory, dialysis would at the very least be non-beneficial if not harmful. The act of catheter insertion and chaining Job to a dialysis machine for a life-sustaining intervention which would not address the underlying cause of his medical condition should not be done. Dialysis would only delay the inevitable: that Job is likely to pass in the near future.    Per discussion with ICU team, family seems at peace. They are requested continued restorative measures with the exception of DNR/DNI.     I would recommend non-escalation of care and again, not offering any non-beneficial or potentially harmful intervention.     Palliative will follow peripherally. Please page if needed.    This is a non-billable note.    Mario Cavanaugh DO / Palliative Medicine / Text me via [employer].     Team Consult Pager [phone] (answered 8am-430pm M-F) - ok to text page via [employer] / After-Hours Answering Service [phone] / Palliative Clinic in the [facility] at the [facility] - [phone] (scheduling); [phone] (triage).

## 2023-03-23 NOTE — INTERIM SUMMARY
I have spent approximately 60 minutes each day on Monday, Tuesday, Wednesday, and today (Thursday), talking with various members of the family regarding Mr. Kaiser's prognosis, current status, treatment, and goals of care.    Monday I talked with son Ally and a nephew on the phone. Tuesday I talked with niece Charis and son Ally on the phone. Wednesday I talked with a nephew, also Ally. Today I talked with a different son (Lilliana?) in person.     Overall I think the family has good understanding about how critically ill Mr. Kaiser is. I have discussed how goals of care can change his treatment plan. They are firm about there decision for DNR/DNI, however they do not want to pursue any treatments that may harm his chance at quantity of life to aid in quality of life. Hence, given his tenuous hypercapneac respiratory status on BiPAP with pulmonary fibrosis and a klebsiella pneumonia we are electing to give no opiates or sedation. We also placed an jourdan this week as his pressor requirement increased. We discussed dialysis as earlier in the week I thought his kidneys may progress to renal failure. However, his kidney's and overall degree of vasoplegia are doing better. He is still critically ill and in my experience he has days to weeks to live. The family does not yet have a decision regarding dialysis if it were to come to that.    They are concerned about his RASS 4 mental status. They are relieved that we are giving no sedation medications to make it worse and do not want opiates despite some moments he appears to be subjectively in discomfort. We have talked about hypercapnea, hypernatremia, his infection, and his baseline mild dimentia, and negative head imaging findings. They understand how this compilation of things describes his mental status.     We will continue to provide care focused on quantity of life with the exception of his DNR/DNI wishes.

## 2023-03-23 NOTE — PROGRESS NOTES
[facility] ICU PROGRESS NOTE  03/23/2023      Date of Service (when I saw the patient): 03/23/2023    ASSESSMENT: Job Kaiser is an 83 year old Moroccan speaking male with a PMH of HFpEF (LV EF 65%), tuberculosis s/p treatment (3 drug therapy x 6 weeks), asthma, COPD on home O2, bronchiectasis, pulmonary fibrosis, DM type 2 who was admitted on 3/9/2023 with progressive fatigue, SOB, productive cough, weight loss, and pain with urination. adimssion concerning for CAP vs incompletely treated respiratory TB and nephrolithiasis. Mr. Kaiser was started on antibiotics and ppx bactrim for PJP given longstanding steroid use. Patient on 3/11 hypoxemic, AMS, and HR 140s; abx coverage broadened and placed on BiPAP. ICU course c/b acute/chronic hypercarbic hypoxemic respiratory failure secondary to his chronic pulmonary fibrosis, CAP, and afib w/ RVR.      CHANGES and MAJOR THINGS TODAY:   - Replete electrolytes  - Initiating 60-70% of breaths on BiPAP this Am. Will see if RT can adjust mask type to decrease r/f skin breakdown on bridge of nose.   - Further conversations with family regarding goals of care.      PLAN:     Neuro:  # Toxic Metabolic Encephalopathy  # Delirium   Encephalopathy/delirium likely source AH/HRF vs ongoing infection  - Pain medications: Tylenol  - Anti-delirium precautions  - 3/22: Developed a upward left gaze overnight. STAT Head CT negative for acute intracranial processes. Pupils are small, sluggish but responsive. No response to deep pain stimulus. Discussed with family concerns regarding change.   - Remains somnolent, minimally responsive despite not received narcotics or sedatives. ABG stable.     Pulmonary:  # Acute/chronic hypoxic and hypercapnic respiratory failure 2nd CAP vs Asp PNA vs COPD exacerbation (chronic steroid use)  # COPD (home O2 2L)  # Hx Asthma  # Hx Pulmonary fibrosis/bronchiectasis  Thoracentesis performed 3/14 1130 mL drained; BAL labs significant for cytology negative malignancy but  positive for reactive mesothelioma, cell counts red/cloudy, and cultures pendng. Sputum culture on 3/10 significant for candida. Patient has continued to require BiPaP during ICU stay.  - Bipap to assist with persistent hypercarbia   - Settings high, 18/8  - Bipap regiment during day on 2 hrs and off 2 hrs, and continuous overnight  - Hold PTA BREO ellipta (pt unable to take)   - Scheduled budesonide/ipratroprium/xopenex  - Stopped PTA Prednisone 20 daily in favor of SDS.  - SpO2 > 90%   - Bedside POCUS 3/22 did not show large enough pleural effusion requiring thoracentesis. Will continue to intermittently assess for pleural.  - Intermittent CXR to follow lung fields.      # History of incompletely treated TB   Upper lobe predominant disease in pt with likely incompletely treated (6 weeks) TB in the past with characteristic TB symptoms including night sweats, weight loss, hemoptysis history, productive cough,and worsening CT chest findings. Patient has history of multiple episodes of leaving before sputum AFBs could be done and history of not showing up to clinic for completing sputum AFBs  - Airborne isolation to continue per infection prevention  - AFB smears were collected and remain negative   - Discussed with pulmonology, no need to treat TB, will not provide increase in QoL  - Awaiting send out AFB results before removing patient from airborne precautions     Cardiovascular:  # Hypotensive 2nd distributive shock  # Afib with RVR - resolved  # HFpEF  - Levophed and vasopressin infusions; MAP > 65 -- off for most of night.  - Arterial line with DBP ten points less than NIBP with similar SBP. MAPs lower on a-line by 10 points. Will titrate vasopressor infusions to NIBP for MAP >65.  - Continue SDS initiated Solu-cortef 50 mg Q6H  - New onset afib this admission, was previously on amiodarone infusion. Patient converted to SR, thus amiodarone stopped. Currently SR with RBBB..      GI/Nutrition:  #  Hypoalbuminemia  # Protein calorie deficit malnutrition   - RD consulted appreciate recommendations   - NG tube in place TF @ goal  - Rectal tube in place -- will remove today given no output x24H.  - Bowel regimen ordered     Renal/Fluids/Electrolytes:  # Acute kidney injury 2nd distributive shock  # Elevated HcO3 2nd respiratory compensation   # Hypernatremia, improving  # Hypermagnesia  # Lactic acidosis, resolved  - FWF increase to 100 Q 1 hour  - Potassium and Phosphorus replete this AM.   - Daily CMP, and intermittent cystatin C  - Lactate 1.2 this AM.      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   - Abd US from 3/21 without evidence of cholecystitis.  - Bactrim also changed to Atovaquone in setting of BOBBY and need for PJP prophylaxis.     Significant Cultures:  AFB smear (3/10): Negative  AFB smear (3/11): Negative  AFB smear (3/12): Negative  AFB Culture (3/10): In process ~ 4 weeks  3/10 Sputum + candida  3/14 BAL cultures pending  3/20 Sputum + Klebsiella PNA     Antibiotics:  ~ Azithromycin (3/9)  ~ cefepime (3/9 - 3/10)  ~ ceftriaxone (3/9)  ~ Vancomycin (3/11 - 3/12), (3/20 - 3/21)   ~ Zosyn (3/10 - 3/14), (3/19-3/20)  ~ Meropenem 3/20 - CURRRENT  ~ Micafungin (3/20 - 3/21)  ~ Bactrim stopped 3/21  ~ Atovaquone 3/21 - (PJP ppx) CURRENT       Hematology:    # Anemia of critical illness   # Leukocytosis see ID section above  # Thrombocytopenia - resolved  - Transfuse if hgb <7.0 or signs/symptoms of hypoperfusion. Monitor and trend.  - No acute concerns for bleeding  - Daily  CBC        Musculoskeletal:  # Weakness and deconditioning of critical illness   - Physical and occupational therapy consult      Skin:  # Nasal bridge DTI  - BiPAP scuba mask  - WOC consult      General Cares/Prophylaxis:    DVT Prophylaxis: Heaprin SQ and PCD  GI Prophylaxis: Not indicated  Restraints: none  Family Communication: Updated at bedside  Code Status: DNR/DNI        Lines/tubes/drains:  - R PICC  - PIV  - Kwong  - Feeding tube   - Arterial line     Disposition:  - [facility] ICU     Patient seen and findings/plan discussed with medical ICU staff, Dr. Osuna.  Critical care time spent 60 min    CEDRIC Whitaker CNP        Code Status: No CPR- Do NOT Intubate           ====================================  INTERVAL HISTORY:   Overnight events reviewed. Patient on BiPAP, unresponsive to deep pain stimuli. Pupils equal, small, sluggishly responsive to light. Unable to complete ROS given obtunded state.    OBJECTIVE:   1. VITAL SIGNS:   Temp:  [98.1  F (36.7  C)-100.3  F (37.9  C)] 100.3  F (37.9  C)  Pulse:  [] 82  Resp:  [0-84] 23  BP: (132)/(60) 132/60  MAP:  [66 mmHg-93 mmHg] 78 mmHg  Arterial Line BP: (116-162)/(44-64) 137/53  FiO2 (%):  [30 %] 30 %  SpO2:  [94 %-100 %] 98 %  FiO2 (%): 30 %  Resp: 23    2. INTAKE/ OUTPUT:   I/O last 3 completed shifts:  In: 3401.03 [I.V.:491.03; NG/GT:1590]  Out: 1495 [Urine:1495]    3. PHYSICAL EXAMINATION:       GEN: Lethargic, RASS -4  EYES: PERRL, Anicteric sclera.   HEENT:  Normocephalic, atraumatic, trachea midline, Pupils PERRLA  CV: RRR, no gallops, rubs, or murmurs  PULM/CHEST: Clear breath sounds bilaterally upper and coarse bilateral lower, no wheeze, symmetric chest rise  GI: normal bowel sounds, soft,  no masses  : kwong catheter in place, urine yellow and clear  EXTREMITIES: +1 peripheral edema present, not moving extremities to deep stimulus, peripheral pulses intact  NEURO: Minimal arousal to repeated deep stimulus and not following  commands  SKIN: Non-blanchable ecchymosis/ulcer to anterior nose       4. LABS:   Arterial Blood Gases   Recent Labs   Lab 03/23/23  0535 03/22/23  0404   PH 7.43 7.35   PCO2 63* 63*   PO2 86 75*   HCO3 41* 35*     Complete Blood Count   Recent Labs   Lab 03/23/23  0533 03/22/23  0400 03/21/23  1150 03/21/23  0545   WBC 11.1* 7.0 8.0 8.0   HGB 8.3* 8.8* 7.3* 8.0*   * 96* 176 164     Basic Metabolic Panel  Recent Labs   Lab 03/23/23  0905 03/23/23  0756 03/23/23  0700 03/23/23  0600 03/23/23  0533 03/22/23  0407 03/22/23  0400 03/21/23  1701 03/21/23  1642 03/21/23  0555 03/21/23  0545   NA  --   --   --   --  150*  --  142  --  144  --  148*   POTASSIUM  --   --   --   --  3.1*  --  3.4  --  3.5  --  4.0   CHLORIDE  --   --   --   --  100  --  95*  --  98  --  97*   CO2  --   --   --   --  37*  --  32*  --  31*  --  32*   BUN  --   --   --   --  60.8*  --  83.0*  --  88.4*  --  81.3*   CR  --   --   --   --  0.91  --  1.24*  --  1.76*  --  1.79*   * 76 113* 163* 181*   < > 241*   < > 221*   < > 120*    < > = values in this interval not displayed.     Liver Function Tests  Recent Labs   Lab 03/23/23  0533 03/22/23  0400 03/21/23  0545 03/20/23  0443   * 175* 124* 80*   * 92* 48 23   ALKPHOS 44 39* 49 51   BILITOTAL 0.4 0.5 0.4 0.4   ALBUMIN 2.8* 2.7* 2.8* 3.1*     Coagulation Profile  No lab results found in last 7 days.    5. RADIOLOGY:   No results found for this or any previous visit (from the past 24 hour(s)).

## 2023-03-23 NOTE — PROGRESS NOTES
Major Shift Events:    RAAS -3/-4, not responsing to stimuli, grossly non verbal    Pupils reactive, sluggish     Continues BiPAP 18/8 30%.    Weaned off Levophed & Vasopressin yesterday and BP remains acceptable.NSR.    Rectal tube in place, no output this shift.    Vu patent and draining.    Insulin drip, now in Algorithm 4    Nose pressure injury scabbing over and remains ONDINA. Wearing full face mask for BiPAP.    Plan:  Continue the current plan of care    For vital signs and complete assessments, please see documentation flowsheets.

## 2023-03-23 NOTE — PROGRESS NOTES
[facility]  WO Nurse Inpatient Assessment     Consulted for: tip of nose    Patient History (according to provider note(s):      Job Kaiser is an 83 year old [place] speaking male with a PMH of HFpEF (LV EF 65%), tuberculosis s/p treatment (3 drug therapy x 6 weeks), asthma, COPD on home O2, bronchiectasis, pulmonary fibrosis, DM type 2 who was admitted on 3/9/2023 with progressive fatigue, shortness of breath, productive cough, weight loss, pain with urination, and labs and imaging concerning for CAP vs. incompletely treated respiratory TB and nephrolithiasis.  He was started on antibiotics and prophylactic bactrim given longstanding steroid use.  On 3/11, he decompensated and was admitted to the ICU for BiPAP therapy.  His ICU course has been complicated by hypercarbic hypoxemic respiratory failure secondary to his chronic pulmonary fibrosis vs malignancy vs less likely partially treated TB.     Areas Assessed:      Areas visualized during today's visit: Focused:    Pressure Injury Location: nose            3/20  Last photo: 3/23/23  Wound type: Pressure Injury     Pressure Injury Stage: Deep Tissue Pressure Injury (DTPI), hospital acquired      This is a Medical Device Related Pressure Injury (MDRPI) due to BiPAP mask  Wound history/plan of care:   RN assessing pt at beginning of shift noted BiPAP malpositioned and causing pressure to nose.   Wound base: 100 % maroon,      Palpation of the wound bed: normal      Drainage: none     Description of drainage: none     Measurements (length x width x depth, in cm) unable to re measure 3/23: pt de satting off BiPAP   bridge: 0.7 x 1.5 x 0 cm  Tip: 0.7 x 1 x 0 cm  Right ala: 1.5 x 0.9 x 0 cm  Periwound skin: Intact      Color: normal and consistent with surrounding tissue      Temperature: normal   Odor: none  Pain: absent, none  Pain intervention prior to dressing change: no significant pain present   Treatment goal:  Protection  STATUS: evolving  Supplies ordered: discussed with RN     3/23: BiPAP full face mask with some pressure on bridge of nose  Talked to RT, this is the largest stocked mask.   Talked to 10 ICU manager, he will reach out to RT manager and look into possibly special ordering a larger mask.           Treatment Plan:     Nose deep maroon bruises: Daily    Wash with routine cares and pat dry  Apply aquaphor or vaseline if area opens  Protect areas if using mask of any type for oxygen delivery     Orders: Reviewed    RECOMMEND PRIMARY TEAM ORDER: None, at this time  Education provided: importance of repositioning, plan of care and wound progress  Discussed plan of care with: Nurse  [facility] nurse follow-up plan: twice weekly  Notify WOC if wound(s) deteriorate.  Nursing to notify the Provider(s) and re-consult the [facility] Nurse if new skin concern.    DATA:     Current support surface: Standard  Low air loss (MACARIO pump, Isolibrium, Pulsate, skin guard, etc)  Containment of urine/stool: Incontinence Protocol  BMI: Body mass index is 18.78 kg/m .   Active diet order: Orders Placed This Encounter      Diet      NPO for Medical/Clinical Reasons Except for: No Exceptions     Output: I/O last 3 completed shifts:  In: 3401.03 [I.V.:491.03; NG/GT:1590]  Out: 1495 [Urine:1495]     Labs:   Recent Labs   Lab 03/23/23  0533   ALBUMIN 2.8*   HGB 8.3*   WBC 11.1*     Pressure injury risk assessment:   Sensory Perception: 1-->completely limited  Moisture: 4-->rarely moist  Activity: 1-->bedfast  Mobility: 2-->very limited  Nutrition: 3-->adequate  Friction and Shear: 2-->potential problem  Owen Score: 13    Nathalie Aponte RN CWOCN  Pager no longer is use, please contact through [facility] group: [facility] Nurse  Dept. Office Number: *[phone]

## 2023-03-24 NOTE — PROGRESS NOTES
Care Management Follow Up    Length of Stay (days): 15    Expected Discharge Date: TBD     Concerns to be Addressed: Progression of care, care coordination   Patient plan of care discussed at interdisciplinary rounds: Yes    Anticipated Discharge Disposition: TBD    Additional Information:  Reviewed chart. Patient is continuing to require Bipap and remains unresponsive. ICU providers are having continued conversations with family regarding goals of care. Family is currently wanting to continue restorative care with the exception of DNR/DNI. Palliative Care is following peripherally. Care Management will continue to follow for any care coordination/discharge planning needs.     AYANA Gaspar RNCC  RN Care Coordinator   Office: [phone]   Pager: [phone]

## 2023-03-24 NOTE — PROGRESS NOTES
Major Shift Events:    RAAS -3/-4, grossly non verbal, minimal response to stimuli.    Pupils reactive, sluggish     Continues BiPAP 10/5 25%.    Recently weaned off Levophed & Vasopressin and BP remains  Normotensive to hypertensive.    Given supp yesterday, no output this shift.    Vu patent and draining.    Insulin drip, now in Algorithm 4 +1    Nose pressure injury persists, foam dressing in place. Wearing a full face mask for BiPAP.     Plan:  Continue the current plan of care  For vital signs and complete assessments, please see documentation flowsheets

## 2023-03-24 NOTE — PROGRESS NOTES
[facility] PROGRESS NOTE  03/24/2023      Date of Service (when I saw the patient): 03/24/2023    ASSESSMENT: Job Kaiser is an 83 year old Romanian speaking male with a PMH of HFpEF (LV EF 65%), tuberculosis s/p treatment (3 drug therapy x 6 weeks), asthma, COPD on home O2, bronchiectasis, pulmonary fibrosis, DM type 2 who was admitted on 3/9/2023 with progressive fatigue, SOB, productive cough, weight loss, and pain with urination. adimssion concerning for CAP vs incompletely treated respiratory TB and nephrolithiasis. Mr. Kaiser was started on antibiotics and ppx bactrim for PJP given longstanding steroid use. Patient on 3/11 hypoxemic, AMS, and HR 140s; abx coverage broadened and placed on BiPAP. ICU course c/b acute/chronic hypercarbic hypoxemic respiratory failure secondary to his chronic pulmonary fibrosis, CAP, and afib w/ RVR.      CHANGES and MAJOR THINGS TODAY:   - BiPap weaned from 18/5 to 10/5 over course of last 24 hours.  - Remains unresponsive despite not receiving sedatives/narcotic.  - Electrolyte derangement worsening, increased FWF, replete potassium and phosphorus  - Febrile overnight to 101.3 with tachypnea. CRP 4.48, LA 2.4, blood cultures drawn.  - Continued conversation with family regarding goals of care     PLAN:     Neuro:  # Toxic Metabolic Encephalopathy  # Delirium   Encephalopathy/delirium likely source AH/HRF vs ongoing infection  - Pain medications: Tylenol  - Anti-delirium precautions  - 3/22: Developed a upward left gaze overnight. STAT Head CT negative for acute intracranial processes. Pupils are small, sluggish but responsive. No response to deep pain stimulus.   - 3/24: Remains somnolent, minimally responsive despite not received narcotics or sedatives. ABG stable.     Pulmonary:  # Acute/chronic hypoxic and hypercapnic respiratory failure 2nd CAP vs Asp PNA vs COPD exacerbation (chronic steroid use)  # COPD (home O2 2L)  # Hx Asthma  # Hx Pulmonary  fibrosis/bronchiectasis  Thoracentesis performed 3/14 1130 mL drained; BAL labs significant for cytology negative malignancy but positive for reactive mesothelioma, cell counts red/cloudy, and cultures pendng. Sputum culture on 3/10 significant for candida. Patient has continued to require BiPAP during ICU stay.  - Bipap to assist with persistent hypercarbia   - Settings: 10/5  - Bipap regiment during day on 2 hrs and off 2 hrs, and continuous overnight  - Hold PTA BREO ellipta (pt unable to take)   - Scheduled budesonide/ipratroprium/xopenex  - Stopped PTA Prednisone 20 daily in favor of SDS.  - SpO2 > 90%   - Intermittent CXR to follow lung fields.      # History of incompletely treated TB   Upper lobe predominant disease in pt with likely incompletely treated (6 weeks) TB in the past with characteristic TB symptoms including night sweats, weight loss, hemoptysis history, productive cough,and worsening CT chest findings. Patient has history of multiple episodes of leaving before sputum AFBs could be done and history of not showing up to clinic for completing sputum AFBs  - Airborne isolation to continue per infection prevention  - AFB smears were collected and remain negative   - Discussed with pulmonology, no need to treat TB, will not provide increase in QoL  - Awaiting send out AFB results before removing patient from airborne precautions     Cardiovascular:  # Hypotensive 2nd distributive shock  # Afib with RVR - resolved  # HFpEF  - Levophed and vasopressin infusions; MAP > 65 -- off x 24H -- will discontinue.   - Continue SDS initiated Solu-cortef 50 mg Q6H  - New onset afib this admission, was previously on amiodarone infusion. Patient converted to SR, thus amiodarone stopped. Currently SR with RBBB..  - Would use hydralazine PRN over betablocker if remains persistently hypertensive. Also, would consider if diuresis warranted at that time as well.     GI/Nutrition:  # Hypoalbuminemia  # Protein calorie  deficit malnutrition   - RD consulted appreciate recommendations   - NG tube in place TF @ goal  - Bowel regimen ordered - last documented BM 3/23     Renal/Fluids/Electrolytes:  # Acute kidney injury 2nd distributive shock  # Elevated HcO3 2nd respiratory compensation   # Hypernatremia, improving  # Hypermagnesia  # Lactic acidosis  - FWF increase to 150 Q 1 hour  - Potassium and Phosphorus replete this AM -- re check at 1400.   - Daily CMP, and intermittent cystatin C  - Lactate 2.4 this AM -- adjustments made to FWF and electrolytes replete. Ice packing for fever. Repeat ordered  - 3/24: Overall, fluid positive 2L in last 24 hours/  Made ~1500 mL of urine yesterday, ~800-900 since midnight. Potentially auto diuresing with increased FWF. Does not appear fluid overloaded, no edema, low FiO2 requirements on BiPAP -- lends itself to holding on diuretic therapy today given metabolic derangement.      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   - Abd US from 3/21 without evidence of cholecystitis.  - Bactrim also changed to Atovaquone in setting of BOBBY and need for PJP prophylaxis.  - 3/24: febrile overnight to 101.3, blood cultures sent. CRP 4.48. LA 2.4.      Significant Cultures:  AFB smear (3/10): Negative  AFB smear (3/11): Negative  AFB smear (3/12): Negative  AFB Culture (3/10): In process ~ 4 weeks  3/10 Sputum + candida  3/14 BAL cultures pending  3/20 Sputum + Klebsiella PNA     Antibiotics:  ~ Azithromycin (3/9)  ~ cefepime (3/9 - 3/10)  ~ ceftriaxone (3/9)  ~ Vancomycin (3/11 - 3/12),  (3/20 - 3/21)   ~ Zosyn (3/10 - 3/14), (3/19-3/20)  ~ Meropenem 3/20 - CURRRENT  ~ Micafungin (3/20 - 3/21)  ~ Bactrim stopped 3/21  ~ Atovaquone 3/21 - (PJP ppx) CURRENT       Hematology:    # Anemia of critical illness   # Leukocytosis see ID section above  # Thrombocytopenia - resolved  - Transfuse if hgb <7.0 or signs/symptoms of hypoperfusion. Monitor and trend.  - No acute concerns for bleeding  - Daily CBC        Musculoskeletal:  # Weakness and deconditioning of critical illness   - Physical and occupational therapy consult signed off     Skin:  # Nasal bridge DTI  - BiPAP scuba mask will still rub on DTI; however no other BiPAP is suitable to patient's face.   - WOC consult      General Cares/Prophylaxis:    DVT Prophylaxis: Heaprin SQ and PCD  GI Prophylaxis: Not indicated  Restraints: none  Family Communication: Updated at bedside  Code Status: DNR/DNI        Lines/tubes/drains:  - R PICC  - PIV  - Vu  - Feeding tube   - Arterial line     Disposition:  - [facility]     Patient seen and findings/plan discussed with medical ICU staff, Dr. Osuna.  Critical care time spent 60 min    CEDRIC Whitaker CNP        Code Status: No CPR- Do NOT Intubate           ====================================  INTERVAL HISTORY:   Overnight events reviewed. Patient on BiPAP, unresponsive to deep pain stimuli. Pupils equal, small, sluggishly responsive to light. Unable to complete ROS given obtunded state.    OBJECTIVE:   1. VITAL SIGNS:   Temp:  [99.1  F (37.3  C)-101.3  F (38.5  C)] 101.3  F (38.5  C)  Pulse:  [] 105  Resp:  [23-48] 48  BP: (121-151)/(55-78) 151/68  MAP:  [53 mmHg-184 mmHg] 94 mmHg  Arterial Line BP: (111-189)/() 140/62  FiO2 (%):  [25 %-35 %] 35 %  SpO2:  [93 %-98 %] 93 %  FiO2 (%): 35 %  Resp: (!) 48    2. INTAKE/ OUTPUT:   I/O last 3 completed shifts:  In: 3929.97 [I.V.:269.97; NG/GT:2340]  Out: 1855 [Urine:1855]    3. PHYSICAL EXAMINATION:       GEN: Lethargic, RASS -4  EYES: PERRL,  Anicteric sclera.   HEENT:  Normocephalic, atraumatic, trachea midline, Pupils PERRLA  CV: RRR, no gallops, rubs, or murmurs  PULM/CHEST: Clear breath sounds bilaterally upper and coarse bilateral lower, no wheeze, symmetric chest rise  GI: normal bowel sounds, soft,  no masses  : kwong catheter in place, urine yellow and clear  EXTREMITIES: +1 peripheral edema present, not moving extremities to deep stimulus, peripheral pulses intact  NEURO: Minimal arousal to repeated deep stimulus and not following commands  SKIN: Non-blanchable ecchymosis/ulcer to anterior nose       4. LABS:   Arterial Blood Gases   Recent Labs   Lab 03/24/23  0552 03/23/23  0535 03/22/23  0404   PH 7.49* 7.43 7.35   PCO2 55* 63* 63*   PO2 52* 86 75*   HCO3 41* 41* 35*     Complete Blood Count   Recent Labs   Lab 03/24/23  0552 03/23/23  0533 03/22/23  0400 03/21/23  1150   WBC 14.9* 11.1* 7.0 8.0   HGB 8.1* 8.3* 8.8* 7.3*   * 112* 96* 176     Basic Metabolic Panel  Recent Labs   Lab 03/24/23  0813 03/24/23  0657 03/24/23  0559 03/24/23  0552 03/23/23  1604 03/23/23  1548 03/23/23  0600 03/23/23  0533 03/22/23  0407 03/22/23  0400   NA  --   --   --  150*  --  149*  --  150*  --  142   POTASSIUM  --   --   --  3.1*  --  3.2*  --  3.1*  --  3.4   CHLORIDE  --   --   --  100  --  101  --  100  --  95*   CO2  --   --   --  39*  --  37*  --  37*  --  32*   BUN  --   --   --  50.5*  --  59.6*  --  60.8*  --  83.0*   CR  --   --   --  0.71  --  0.79  --  0.91  --  1.24*   * 162* 179* 193*   < > 171*   < > 181*   < > 241*    < > = values in this interval not displayed.     Liver Function Tests  Recent Labs   Lab 03/24/23  0552 03/23/23  0533 03/22/23  0400 03/21/23  0545   * 177* 175* 124*   * 151* 92* 48   ALKPHOS 58 44 39* 49   BILITOTAL 0.4 0.4 0.5 0.4   ALBUMIN 2.9* 2.8* 2.7* 2.8*     Coagulation Profile  No lab results found in last 7 days.    5. RADIOLOGY:   Recent Results (from the past 24 hour(s))   XR Chest Port  1 View    Narrative    Exam: XR CHEST PORT 1 VIEW, 3/23/2023 12:56 PM    Comparison: CT 3/20/2023, radiograph 3/19/2023, 3/18/2023    History: f/u pleural effusion, SOB with BiPAP.    Findings:  Portable AP view of the chest. The patient is slightly rotated. Right  upper extremity PICC line tip objects over the right atrium. Feeding  tube tip projects beyond the field-of-view. No significant change in  appearance of the cardiomediastinal silhouette. Slightly decreased  bilateral loculated pleural effusions. No appreciable pneumothorax.  Slightly increased mixed pulmonary opacities throughout both lungs.      Impression    Impression:   1. Slightly decreased small bilateral loculated pleural effusions.  2. Slightly increased mixed pulmonary opacities throughout both lungs.    I have personally reviewed the examination and initial interpretation  and I agree with the findings.    STEPHANIE DUVAL MD         SYSTEM ID:  [id number]

## 2023-03-25 NOTE — PROVIDER NOTIFICATION
Patients blood pressure, heart rate, and spo2 dropped suddenly at 1245. Went into patients room and notified TRUDI. Increased FiO2 on BiPAP. HR, BP and Spo2 came back up. 250 ml NS bolus given and norepi gtt started. Notified bedside RN.

## 2023-03-25 NOTE — PROGRESS NOTES
[facility] [place]  03/25/2023      Date of Service (when I saw the patient): 03/25/2023    ASSESSMENT: Job Kaiser is an 83 year old Salvadorean speaking male with a PMH of HFpEF (LV EF 65%), tuberculosis s/p treatment (3 drug therapy x 6 weeks), asthma, COPD on home O2, bronchiectasis, pulmonary fibrosis, DM type 2 who was admitted on 3/9/2023 with progressive fatigue, SOB, productive cough, weight loss, and pain with urination. adimssion concerning for CAP vs incompletely treated respiratory TB and nephrolithiasis. Mr. Kaiser was started on antibiotics and ppx bactrim for PJP given longstanding steroid use. Patient on 3/11 hypoxemic, AMS, and HR 140s; abx coverage broadened and placed on BiPAP. ICU course c/b acute/chronic hypercarbic hypoxemic respiratory failure secondary to his chronic pulmonary fibrosis, CAP, and afib w/ RVR.     CHANGES and MAJOR THINGS TODAY:     Increased Bipap settings due to hypoxia events    Increased Bowel Regimen    Added RN managed electrolyte replacement given resolution of BOBBY    Changed Atovaquone back to bactrim given resolution of BOBBY    Discuss with family ongoing concerns regarding bipap mask, skin breakdown, discomfort, and decreasing effectiveness. Consider comfort cares.       PLAN:    Neuro:  # Toxic Metabolic Encephalopathy  # Delirium   Encephalopathy/delirium likely source AH/HRF vs ongoing infection  - Pain medications: Tylenol  - Anti-delirium precautions  - 3/22: Developed a upward left gaze overnight. STAT Head CT negative for acute intracranial processes. Pupils are small, sluggish but responsive. No response to deep pain stimulus. Discussed with family concerns regarding change.   - Remains somnolent, minimally responsive despite not received narcotics or sedatives. ABG stable.       Pulmonary:  # Acute/chronic hypoxic and hypercapnic respiratory failure 2nd CAP vs Asp PNA vs COPD exacerbation (chronic steroid use)  # COPD (home O2 2L)  # Hx Asthma  # Hx  Pulmonary fibrosis/bronchiectasis  Thoracentesis performed 3/14 1130 mL drained; BAL labs significant for cytology negative malignancy but positive for reactive mesothelioma, cell counts red/cloudy, and cultures pendng. Sputum culture on 3/10 significant for candida. Patient has continued to require BiPaP during ICU stay.  - Bipap to assist with persistent hypercarbia              - Increased settings to 14/8 due to multiple hypoxic episodes requiring 80% FiO2  - Bipap regiment during day on 2 hrs and off 2 hrs, and continuous overnight  - Hold PTA BREO ellipta (pt unable to take)   - Scheduled budesonide/ipratroprium/xopenex  - Stopped PTA Prednisone 20 daily in favor of SDS.  - SpO2 > 90%   - Bedside POCUS 3/22 did not show large enough pleural effusion requiring thoracentesis. Will continue to intermittently assess for pleural.  - Intermittent CXR to follow lung fields.      # History of incompletely treated TB   Upper lobe predominant disease in pt with likely incompletely treated (6 weeks) TB in the past with characteristic TB symptoms including night sweats, weight loss, hemoptysis history, productive cough,and worsening CT chest findings. Patient has history of multiple episodes of leaving before sputum AFBs could be done and history of not showing up to clinic for completing sputum AFBs  - Airborne isolation to continue per infection prevention  - AFB smears were collected and remain negative   - Discussed with pulmonology, no need to treat TB, will not provide increase in QoL  - Awaiting send out AFB results before removing patient from airborne precautions       Cardiovascular:  # Hypotensive 2nd distributive shock  # Afib with RVR - resolved  # HFpEF  - Levophed and vasopressin infusions; MAP > 65 -- off for most of night.  - Arterial line with DBP ten points less than NIBP with similar SBP. MAPs lower on a-line by 10 points. Will titrate vasopressor infusions to NIBP for MAP >65.  - Continue SDS  initiated Solu-cortef 50 mg Q6H  - New onset afib this admission, was previously on amiodarone infusion. Patient converted to SR, thus amiodarone stopped. Currently SR with RBBB..       GI/Nutrition:  # Hypoalbuminemia  # Protein calorie deficit malnutrition   - RD consulted appreciate recommendations   - NG tube in place TF @ goal  - Bowel regimen increased, no documented stool yesterday.        Renal/Fluids/Electrolytes:  # Acute kidney injury 2nd distributive shock (Resolved)  # Elevated HcO3 2nd respiratory compensation   # Hypernatremia, improving  # Hypermagnesia, resolved  # Lactic acidosis, resolved  - FWF decreased to 50 Q 2 hour  - RN managed electrolyte replacement reordered  - Daily CMP, and intermittent cystatin C       Endocrine:  # DM type 2  # Hyperglycemia, steroid and stress induced  - Glucose stable on insulin infusion  - Steroids: PTA Prednisone held and started SDS hydrocortisone 50 mg Q6H on 3/21  - Follow insulin infusion order set        ID:  # Community acquired PNA  # 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   - Abd US from 3/21 without evidence of cholecystitis.  - Bactrim restarted given resolution of BOBBY     Significant Cultures:  AFB smear (3/10): Negative  AFB smear (3/11): Negative  AFB smear (3/12): Negative  AFB Culture (3/10): In process ~ 4 weeks  3/10 Sputum + candida  3/14 BAL cultures pending  3/20 Sputum + Klebsiella PNA     Antibiotics:  ~ Azithromycin (3/9)  ~ cefepime (3/9 - 3/10)  ~ ceftriaxone (3/9)  ~ Vancomycin (3/11 - 3/12), (3/20 - 3/21)   ~ Zosyn (3/10 - 3/14), (3/19-3/20)  ~ Meropenem 3/20 - CURRRENT  ~ Micafungin (3/20 - 3/21)  ~ Bactrim stopped 3/21 restarted 3/25 -  CURRENT  ~ Atovaquone (3/21 -  3/25)       Hematology:    # Anemia of critical illness   # Leukocytosis see ID section above  #  Thrombocytopenia - resolved  - Transfuse if hgb <7.0 or signs/symptoms of hypoperfusion. Monitor and trend.  - No acute concerns for bleeding  - Daily CBC       Musculoskeletal:  # Weakness and deconditioning of critical illness   - Physical and occupational therapy signed off due to inability to participate     Skin:  # Nasal bridge DTI  - BiPAP scuba mask  - WOC consult, appreciate recs      General Cares/Prophylaxis:    DVT Prophylaxis: Heparin SQ and Pneumatic Compression Devices  GI Prophylaxis: Not indicated  Restraints: Not Indicated  Family Communication:   Code Status: No CPR- Do NOT Intubate    Lines/tubes/drains:  ~ PICC  ~ PIV  ~ Urethral Catheter  ~ NG Tube (Small Bore)  ~ Arterial Line    [facility]    Patient seen and findings/plan discussed with medical ICU staff, Dr. Osuna.      I spent 60 minutes providing critical care, the patient was in critical condition due to acute on chronic respiratory failure.    FRANCES FaustinRiverview Regional [facility]  Pager #[phone]  Critical Care  [facility]     ====================================  INTERVAL HISTORY:   Labs and chart reviewed. No significant events overnight. Patient removes obtunded and unresponsive. Patient is making urine. Ongoing conversations with family regarding poor prognosis and goals of care.     OBJECTIVE:   1. VITAL SIGNS:   Temp:  [98.1  F (36.7  C)-101.6  F (38.7  C)] 98.1  F (36.7  C)  Pulse:  [] 87  Resp:  [19-58] 31  BP: (154-167)/(49-69) 155/69  MAP:  [72 mmHg-94 mmHg] 94 mmHg  Arterial Line BP: (136-192)/(46-61) 192/61  FiO2 (%):  [35 %-45 %] 40 %  SpO2:  [82 %-100 %] 82 %  FiO2 (%): 40 %  Resp: (!) 31    2. INTAKE/ OUTPUT:   I/O last 3 completed shifts:  In: 5331.92 [I.V.:271.92; NG/GT:3740]  Out: 2200 [Urine:2200]    3. Physical Exam  Constitutional:       Appearance: He is ill-appearing.   HENT:      Head: Normocephalic.      Mouth/Throat:      Mouth: Mucous membranes are dry.   Eyes:      Comments:  Unable to eval as patient requires continuous Bipap    Cardiovascular:      Rate and Rhythm: Normal rate and regular rhythm.      Pulses: Normal pulses.   Pulmonary:      Effort: Tachypnea and accessory muscle usage present.      Breath sounds: Decreased air movement present. Examination of the right-upper field reveals rhonchi. Examination of the left-upper field reveals rhonchi. Examination of the right-middle field reveals rhonchi. Examination of the left-middle field reveals rhonchi. Decreased breath sounds and rhonchi present.   Abdominal:      General: Abdomen is flat. Bowel sounds are normal.   Musculoskeletal:      Cervical back: Neck supple.      Comments: Passive ROM intact   Skin:     General: Skin is warm and dry.   Neurological:      Mental Status: He is unresponsive.           4. LABS:   Arterial Blood Gases   Recent Labs   Lab 03/25/23  0449 03/24/23  0942 03/24/23  0552 03/23/23  0535   PH 7.43 7.46* 7.49* 7.43   PCO2 71* 58* 55* 63*   PO2 70* 72* 52* 86   HCO3 47* 41* 41* 41*     Complete Blood Count   Recent Labs   Lab 03/25/23  0448 03/24/23  0552 03/23/23  0533 03/22/23  0400   WBC 13.5* 14.9* 11.1* 7.0   HGB 7.2* 8.1* 8.3* 8.8*   * 140* 112* 96*     Basic Metabolic Panel  Recent Labs   Lab 03/25/23  0657 03/25/23  0555 03/25/23  0448 03/25/23  0446 03/24/23  1455 03/24/23  1451 03/24/23  0559 03/24/23  0552 03/23/23  1604 03/23/23  1548   NA  --   --  144  --   --  151*  --  150*  --  149*   POTASSIUM  --   --  4.0  --   --  4.6  --  3.1*  --  3.2*   CHLORIDE  --   --  95*  --   --  99  --  100  --  101   CO2  --   --  43*  --   --  43*  --  39*  --  37*   BUN  --   --  32.7*  --   --  45.3*  --  50.5*  --  59.6*   CR  --   --  0.59*  --   --  0.71  --  0.71  --  0.79   * 121* 176* 169*   < > 124*   < > 193*   < > 171*    < > = values in this interval not displayed.     Liver Function Tests  Recent Labs   Lab 03/25/23  0448 03/24/23  0552 03/23/23  0533 03/22/23  0400   *  154* 177* 175*   * 160* 151* 92*   ALKPHOS 66 58 44 39*   BILITOTAL 0.5 0.4 0.4 0.5   ALBUMIN 2.8* 2.9* 2.8* 2.7*     Coagulation Profile  No lab results found in last 7 days.    5. RADIOLOGY:   No results found for this or any previous visit (from the past 24 hour(s)).

## 2023-03-25 NOTE — PROGRESS NOTES
Brief ICU Update      I was called to the bedside of Job Job approximately 1250 after patient demonstrated a bradycardic rhythm into the 30's blood pressures of 70s/30's. Fluid bolus was started and norepinephrine was initiated. Patient's HR back into the 60s, BPs remain with MAPs in the 50's. Patient's oxygen saturation dropped into the 60%s. Increased BIPAP support and was able to increase oxygen saturation back into the 90's. Patient's condition continues to deteriorate, bradycardia concerning for ongoing stress from hypoxia and hypercarbia. Updating Sidiq at bedside, left a message for Terrence via telephone and .      Plan:  ~ Continue attempts to reach family for update  ~ 250 mL NS bolus  ~ Start Norepinephrine for blood pressure support with a MAP goal > 65  ~ ABG + Lactic Acid    Discussed with Dr. Enrrique Nova, -[facility]  Pager #[phone]  Critical Care  [facility]

## 2023-03-25 NOTE — PROGRESS NOTES
ICU END OF  SHIFT NOTE:For vital signs and complete assessments, please see documentation flowsheets.   Assessment:    Afebrile,not following commands, GCS 7/15 M4 V1 E2-3   Continues on BiPap 10/5 at 55% FiO2. Sitter at bedside for safety.no BM LBM was on 3/23/23. Vu in place with good output.NPO. TF running at goal of 55 mL/hr with Q2h 100 mL flushes.Turns in bed with assist of two.no nonverbal signs of pain noted.no new skin concerns,R DL PICC infusing TKO between abx and other infusing insulin gtt, currently alg 4     Major Shift Events:  FIO2 increased to 55%, decreasing responsiveness now GCS 7/15, increasing o2 requirement, Bicarb 47, PCo2 71                   -09:20 am - After C-Xray was done patient desaturated to high 60 s increased FIO2 to 60%, saturation went up to 80's however started to continuously desaturates, patient is not responding to pain, GCS4 M1V1 E1-2, TRUDI Bob was informed increased FIO2 to 80%, BIPAP rate to be changed to 14/8, RT on duty was called and notified.               1250 pm     - Hypotension 67/52, bradycardia, desaturation   As reported by charge RN , TRUDI informed started on norepinephrine               1500 hrs    - PH 7.02, PCO2  More than 130  Plan/ Intervention: NO Diurese today                                  : BP support medications started Levo and vasopressin                                 : ICU MD Osuna spoke to the family member at bedside about  The interventions we are doing to keep the patient alive, (with the  on phone) MD Stated  That supportive measures are being done ( BP medications, BIPAP) however patient is critically sick and had been critically declining each day, patient deterioration is observed which can potentially  lead to  Death.                                   : MD discus plans of care to the family member with the presence of RN

## 2023-03-25 NOTE — DEATH PRONOUNCEMENT
PROVIDER DEATH PRONOUNCEMENT    Called to pronounce [employer] dead.    Physical Exam: Unresponsive to noxious stimuli, Spontaneous respirations absent, Breath sounds absent, Carotid pulse absent, Heart sounds absent, Pupillary light reflex absent and Corneal blink reflex absent.    Patient was pronounced dead at 03:35 PM, 2023.    Preliminary Cause of Death: Pulmonary fibrosis with contaminate klebsiella pneumonia.     Principal Problem:    Acute on chronic respiratory failure with hypoxia and hypercapnia (H)  Active Problems:    History of tuberculosis    Bronchiectasis (H)    Chronic airway obstruction (H)    Pulmonary infiltrates    Type 2 diabetes mellitus without complication, without long-term current use of insulin (H)    Acute kidney failure with tubular necrosis (H)      Infectious disease present?: Klebsiella pneumonia, currently being ruled out for TB, AFB culture is pending.    Communicable disease present? (examples: HIV, chicken pox, TB, Ebola, CJD): Currently being ruled out for TB, AFB culture is pending.    Multi-drug resistant organism present? (example: MRSA): NO    Please consider an autopsy if any of the following exist:  NO Unexpected or unexplained death during or following any dental, medical, or surgical diagnostic treatment procedures.   NO Death of mother at or up to seven days after delivery.     NO All  and pediatric deaths.     NO Death where the cause is sufficiently obscure to delay completion of the death certificate.   NO Deaths in which autopsy would confirm a suspected illness/condition that would affect surviving family members or recipients of transplanted organs.     The following deaths must be reported to the 's Office:  NO A death that may be due entirely or in part to any factors other than natural disease (recent surgery, recent trauma, suspected abuse/neglect).   NO A death that may be an accident, suicide, or homicide.     NO Any sudden,  unexpected death in which there is no prior history of significant heart disease or any other condition associated with sudden death.   NO A death under suspicious, unusual, or unexpected circumstances.    NO Any death which is apparently due to natural causes but in which the  does not have a personal physician familiar with the patient s medical history, social, or environmental situation or the circumstances of the terminal event.   NO Any death apparently due to Sudden Infant Death Syndrome.     NO Deaths that occur during, in association with, or as consequences of a diagnostic, therapeutic, or anesthetic procedure.   NO Any death in which a fracture of a major bone has occurred within the past (6) six months.   NO A death of persons note seen by their physician within 120 days of demise.     NO Any death in which the  was an inmate of a public institution or was in the custody of Law Enforcement personnel.   NO  All unexpected deaths of children   NO Solid organ donors   NO Unidentified bodies   NO Deaths of persons whose bodies are to be cremated or otherwise disposed of so that the bodies will later be unavailable for examination;   NO Deaths unattended by a physician outside of a licensed healthcare facility or licensed residential hospice program   NO Deaths occurring within 24 hours of arrival to a health care facility if death is unexpected.    NO Deaths associated with the decedent s employment.   NO Deaths attributed to acts of terrorism.   NO Any death in which there is uncertainty as to whether it is a medical examiner s care should be discussed with the medical investigator.        Body disposition: Autopsy was discussed with family member:  Son in person.  Permission for autopsy was declined.  Body released to the morgue.

## 2023-03-25 NOTE — DISCHARGE SUMMARY
Discharge Summary  [facility] Intensive Care  Critical Care Service    Date of Admission:  3/9/2023  Date of Discharge:  3/25/2023  Discharging Provider: Bob Nova NP  Date of Service (when I saw the patient): 03/25/23    Discharge Diagnoses   Pulmonary Fibrosis  Klebsiella pnuemonia    History of Present Illness   Job Kaiser is an 83 year old [place] speaking male with a PMH of HFpEF (LV EF 65%), tuberculosis s/p treatment (3 drug therapy x 6 weeks), asthma, COPD on home O2, bronchiectasis, pulmonary fibrosis, DM type 2 who was admitted on 3/9/2023 with progressive fatigue, SOB, productive cough, weight loss, and pain with urination. adimssion concerning for CAP vs incompletely treated respiratory TB and nephrolithiasis. Mr. Kaiser was started on antibiotics and ppx bactrim for PJP given longstanding steroid use. Patient on 3/11 hypoxemic, AMS, and HR 140s; abx coverage broadened and placed on BiPAP. ICU course c/b acute/chronic hypercarbic hypoxemic respiratory failure secondary to his chronic pulmonary fibrosis, CAP, and afib w/ RVR.     Hospital Course   Job Kaiser was admitted on 3/9/2023.  The following problems were addressed during his hospitalization:    Neuro:  # Toxic Metabolic Encephalopathy  # Delirium   Encephalopathy/delirium likely source AH/HRF vs ongoing infection  - Pain medications: Tylenol  - Anti-delirium precautions  - 3/22: Developed a upward left gaze overnight. STAT Head CT negative for acute intracranial processes. Pupils are small, sluggish but responsive. No response to deep pain stimulus. Discussed with family concerns regarding change.   - Remains somnolent, minimally responsive despite not received narcotics or sedatives. ABG stable.        Pulmonary:  # Acute/chronic hypoxic and hypercapnic respiratory failure 2nd CAP vs Asp PNA vs COPD exacerbation (chronic steroid use)  # COPD (home O2 2L)  # Hx Asthma  # Hx Pulmonary fibrosis/bronchiectasis  Thoracentesis performed 3/14  1130 mL drained; BAL labs significant for cytology negative malignancy but positive for reactive mesothelioma, cell counts red/cloudy, and cultures pendng. Sputum culture on 3/10 significant for candida. Patient has continued to require BiPaP during ICU stay.  - Bipap to assist with persistent hypercarbia              - Increased settings to 14/8 due to multiple hypoxic episodes requiring 80% FiO2  - Bipap regiment during day on 2 hrs and off 2 hrs, and continuous overnight  - Hold PTA BREO ellipta (pt unable to take)   - Scheduled budesonide/ipratroprium/xopenex  - Stopped PTA Prednisone 20 daily in favor of SDS.  - SpO2 > 90%   - Bedside POCUS 3/22 did not show large enough pleural effusion requiring thoracentesis. Will continue to intermittently assess for pleural.  - Intermittent CXR to follow lung fields.      # History of incompletely treated TB   Upper lobe predominant disease in pt with likely incompletely treated (6 weeks) TB in the past with characteristic TB symptoms including night sweats, weight loss, hemoptysis history, productive cough,and worsening CT chest findings. Patient has history of multiple episodes of leaving before sputum AFBs could be done and history of not showing up to clinic for completing sputum AFBs  - Airborne isolation to continue per infection prevention  - AFB smears were collected and remain negative   - Discussed with pulmonology, no need to treat TB, will not provide increase in QoL  - Awaiting send out AFB results before removing patient from airborne precautions        Cardiovascular:  # Hypotensive 2nd distributive shock  # Afib with RVR - resolved  # HFpEF  - Levophed and vasopressin infusions; MAP > 65 -- off for most of night.  - Arterial line with DBP ten points less than NIBP with similar SBP. MAPs lower on a-line by 10 points. Will titrate vasopressor infusions to NIBP for MAP >65.  - Continue SDS initiated Solu-cortef 50 mg Q6H  - New onset afib this admission, was  previously on amiodarone infusion. Patient converted to SR, thus amiodarone stopped. Currently SR with RBBB..         GI/Nutrition:  # Hypoalbuminemia  # Protein calorie deficit malnutrition   - RD consulted appreciate recommendations   - NG tube in place TF @ goal  - Bowel regimen increased, no documented stool yesterday.         Renal/Fluids/Electrolytes:  # Acute kidney injury 2nd distributive shock (Resolved)  # Elevated HcO3 2nd respiratory compensation   # Hypernatremia, improving  # Hypermagnesia, resolved  # Lactic acidosis, resolved  - FWF decreased to 50 Q 2 hour  - RN managed electrolyte replacement reordered  - Daily CMP, and intermittent cystatin C        Endocrine:  # DM type 2  # Hyperglycemia, steroid and stress induced  - Glucose stable on insulin infusion  - Steroids: PTA Prednisone held and started SDS hydrocortisone 50 mg Q6H on 3/21  - Follow insulin infusion order set         ID:  # Community acquired PNA  # 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   - Abd US from 3/21 without evidence of cholecystitis.  - Bactrim restarted given resolution of BOBBY     Significant Cultures:  AFB smear (3/10): Negative  AFB smear (3/11): Negative  AFB smear (3/12): Negative  AFB Culture (3/10): In process ~ 4 weeks  3/10 Sputum + candida  3/14 BAL cultures pending  3/20 Sputum + Klebsiella PNA     Antibiotics:  ~ Azithromycin (3/9)  ~ cefepime (3/9 - 3/10)  ~ ceftriaxone (3/9)  ~ Vancomycin (3/11 - 3/12), (3/20 - 3/21)   ~ Zosyn (3/10 - 3/14), (3/19-3/20)  ~ Meropenem 3/20 - CURRRENT  ~ Micafungin (3/20 - 3/21)  ~ Bactrim stopped 3/21 restarted 3/25 -  CURRENT  ~ Atovaquone (3/21 -  3/25)        Hematology:    # Anemia of critical illness   # Leukocytosis see ID section above  # Thrombocytopenia - resolved  - Transfuse if hgb <7.0 or signs/symptoms of  hypoperfusion. Monitor and trend.  - No acute concerns for bleeding  - Daily CBC        Musculoskeletal:  # Weakness and deconditioning of critical illness   - Physical and occupational therapy signed off due to inability to participate     Skin:  # Nasal bridge DTI  - BiPAP scuba mask  - WOC consult, appreciate [id number]    CLARIBEL [facility]ustin-[facility]  Pager #[phone]  Critical Care  [facility]    Consultations This Hospital Stay   Pulmonology  Infection Prevention  Palliative  Wound Care Nursing      Pending Results     Unresulted Labs Ordered in the Past 30 Days of this Admission     Date and Time Order Name Status Description    3/24/2023 12:04 AM Blood Culture Hand, Right Preliminary     3/24/2023 12:04 AM Blood Culture Hand, Left Preliminary     3/20/2023 12:32 PM Fungal or Yeast Culture Routine Preliminary     3/20/2023 12:32 PM Blood Culture Hand, Right Preliminary     3/20/2023 12:32 PM Blood Culture Hand, Right Preliminary     3/14/2023  1:37 PM Fungal or Yeast Culture Routine Preliminary     3/14/2023  1:37 PM Acid-Fast Bacilli Culture and Stain In process     3/12/2023  7:20 AM Acid-Fast Bacilli Culture and Stain In process     3/11/2023  9:00 AM Acid-Fast Bacilli Culture and Stain In process     3/11/2023 12:01 AM Acid-Fast Bacilli Culture and Stain In process     3/10/2023  5:43 PM Acid-Fast Bacilli Culture and Stain In process     3/10/2023  4:51 PM Acid-Fast Bacilli Culture and Stain In process     3/10/2023  2:20 PM Fungal or Yeast Culture Routine Preliminary           Code Status   DNR / DNI    Primary Care Physician   [facility]    Time Spent on this Encounter   I, Bob Nova NP, personally saw the patient today and spent greater than 30 minutes discharging this patient.    Discharge Disposition   Patient  during this admission  Condition at discharge:     Consultations This Hospital Stay   NUTRITION SERVICES ADULT IP CONSULT  SPEECH LANGUAGE  PATH ADULT IP CONSULT  PHYSICAL THERAPY ADULT IP CONSULT  INFECTIOUS DISEASE [facility] ADULT IP CONSULT  PHARMACY IP CONSULT  OCCUPATIONAL THERAPY ADULT IP CONSULT  PULMONARY GENERAL ADULT IP CONSULT  PHYSICAL THERAPY ADULT IP CONSULT  OCCUPATIONAL THERAPY ADULT IP CONSULT  PHARMACY TO DOSE VANCO  CARE MANAGEMENT / SOCIAL WORK IP CONSULT  INTERVENTIONAL RADIOLOGY ADULT/PEDS IP CONSULT  NUTRITION SERVICES ADULT IP CONSULT  INTERVENTIONAL RADIOLOGY ADULT/PEDS IP CONSULT  NUTRITION SERVICES ADULT IP CONSULT  PALLIATIVE CARE ADULT IP CONSULT  SPIRITUAL HEALTH SERVICES IP CONSULT  PHARMACY IP CONSULT  PHARMACY TO DOSE MEDICATION  VASCULAR ACCESS ADULT IP CONSULT  VASCULAR ACCESS ADULT IP CONSULT  WOUND OSTOMY CONTINENCE NURSE  IP CONSULT  PHARMACY TO DOSE VANCO    Discharge Orders      PET Oncology Whole Body     Adult Pulmonary Medicine Referral      Adult Oncology/Hematology  Referral      Reason for your hospital stay    You were seen for shortness of breath and weakness. The following issues were addressed during your hospital stay:    1.) Lung infection: We think you have a lung infection, or pneumonia. Please continue your antibiotics after discharge. Take Azithromycin one tablet daily for 4 days. Take Ciprofloxacin 500 mg for 5 days- this medication may cause diarrhea.     2.) Concern for cancer: Your lung imaging was concerning for possible cancer. We need to do more testing to know the diagnosis and what treatment would be best. You will need to follow up with the Cancer doctors (Oncology) and Lung doctors (Pulmnology). They should call you to schedule.     Activity    Your activity upon discharge: activity as tolerated     Adult [facility] Follow-up and recommended labs and tests    1.) Oncology: These are cancer doctors that will help figure out if the imaging is showing cancer. They should call you to schedule.     2.) Pulmonology: These are lung doctors that will continue working with you regarding  your inhalers and home oxygen.     3.) Primary care: Please see your primary doctor in 5-7 days for routine hospital follow up. They can review changes made and help schedule follow up appointments.     Appointments on [place] and/or [facility] (with [facility] or [facility] provider or service). Call [phone] if you haven't heard regarding these appointments within 7 days of discharge.     Diet    Follow this diet upon discharge: Regular diet     Discharge Medications   Current Discharge Medication List      START taking these medications    Details   aluminum & magnesium hydroxide (MAG-AL) 200-200 MG/5ML supsension Take 15 mLs by mouth every 6 hours as needed for indigestion  Qty: 100 mL, Refills: 0    Associated Diagnoses: Gastroesophageal reflux disease with esophagitis, unspecified whether hemorrhage      nystatin (MYCOSTATIN) 681329 UNIT/ML suspension Take 5 mLs (500,000 Units) by mouth 4 times daily for 7 days  Qty: 140 mL, Refills: 0    Associated Diagnoses: Thrush      simethicone (MYLICON) 40 MG/0.6ML suspension Take 0.3 mLs (20 mg) by mouth 4 times daily as needed for cramping  Qty: 45 mL, Refills: 0    Associated Diagnoses: Gastroesophageal reflux disease with esophagitis, unspecified whether hemorrhage         CONTINUE these medications which have CHANGED    Details   sulfamethoxazole-trimethoprim (BACTRIM DS) 800-160 MG tablet Take 1 tablet by mouth Every Mon, Wed, Fri Morning  Qty: 60 tablet, Refills: 0    Associated Diagnoses: Immunosuppression due to chronic steroid use (H)         CONTINUE these medications which have NOT CHANGED    Details   albuterol (PROAIR HFA/PROVENTIL HFA/VENTOLIN HFA) 108 (90 Base) MCG/ACT inhaler Inhale 2 puffs into the lungs every 4 hours as needed for shortness of breath / dyspnea or wheezing  Qty: 18 g, Refills: 0    Comments: Pharmacy may dispense brand covered by insurance (Proair, or proventil or ventolin or generic albuterol inhaler)  Associated Diagnoses: Chronic  obstructive pulmonary disease, unspecified COPD type (H); Bronchiectasis with acute exacerbation (H)      furosemide (LASIX) 20 MG tablet Take 1 tablet (20 mg) by mouth daily  Qty: 30 tablet, Refills: 0    Associated Diagnoses: Bronchiectasis with acute exacerbation (H); Pulmonary infiltrates      metFORMIN (GLUCOPHAGE) 1000 MG tablet Take 1,000 mg by mouth 2 times daily (with meals)      mometasone-formoterol (DULERA) 200-5 MCG/ACT inhaler Inhale 2 puffs into the lungs 2 times daily  Qty: 13 g, Refills: 0    Associated Diagnoses: Chronic obstructive pulmonary disease, unspecified COPD type (H); Bronchiectasis with acute exacerbation (H)      polyvinyl alcohol (LIQUIFILM TEARS) 1.4 % ophthalmic solution Place 1-2 drops into both eyes as needed      predniSONE (DELTASONE) 20 MG tablet Take 1 tablet (20 mg) by mouth daily  Qty: 30 tablet, Refills: 0    Associated Diagnoses: Bronchiectasis with acute exacerbation (H); Pulmonary infiltrates      aspirin 81 MG EC tablet Take 81 mg by mouth daily      budesonide (PULMICORT) 0.5 MG/2ML neb solution Take 2 mLs (0.5 mg) by nebulization daily for 360 days  Qty: 180 mL, Refills: 3    Associated Diagnoses: Adult bronchiectasis (H)      diclofenac (VOLTAREN) 1 % topical gel Apply topically 4 times daily      ferrous sulfate (FEROSUL) 325 (65 Fe) MG tablet Take 1 tablet (325 mg) by mouth Every Mon, Wed, Fri Morning  Qty: 30 tablet, Refills: 0    Associated Diagnoses: Iron deficiency anemia, unspecified iron deficiency anemia type      fluticasone (FLONASE) 50 MCG/ACT nasal spray Spray 1 spray in nostril daily         STOP taking these medications       azithromycin (ZITHROMAX) 250 MG tablet Comments:   Reason for Stopping:         azithromycin (ZITHROMAX) 250 MG tablet Comments:   Reason for Stopping:         ciprofloxacin (CIPRO) 500 MG tablet Comments:   Reason for Stopping:             Allergies   No Active Allergies  Data   Most Recent 3 CBC's:Recent Labs   Lab Test  03/25/23  0448 03/24/23  0552 03/23/23  0533   WBC 13.5* 14.9* 11.1*   HGB 7.2* 8.1* 8.3*   MCV 95 91 89   * 140* 112*      Most Recent 3 BMP's:  Recent Labs   Lab Test 03/25/23  1448 03/25/23  1350 03/25/23  1255 03/25/23  0555 03/25/23  0448 03/24/23  1455 03/24/23  1451 03/24/23  0559 03/24/23  0552   NA  --   --   --   --  144  --  151*  --  150*   POTASSIUM  --   --   --   --  4.0  --  4.6  --  3.1*   CHLORIDE  --   --   --   --  95*  --  99  --  100   CO2  --   --   --   --  43*  --  43*  --  39*   BUN  --   --   --   --  32.7*  --  45.3*  --  50.5*   CR  --   --   --   --  0.59*  --  0.71  --  0.71   ANIONGAP  --   --   --   --  6*  --  9  --  11   MATT  --   --   --   --  8.7*  --  8.7*  --  8.7*   * 182* 192*   < > 176*   < > 124*   < > 193*    < > = values in this interval not displayed.     Most Recent 2 LFT's:  Recent Labs   Lab Test 03/25/23 0448 03/24/23  0552   * 154*   * 160*   ALKPHOS 66 58   BILITOTAL 0.5 0.4     Most Recent INR's and Anticoagulation Dosing History:  Anticoagulation Dose History     Recent Dosing and Labs Latest Ref Rng & Units 7/8/2008 3/9/2023    INR 0.85 - 1.15 1.00 1.38(H)        Most Recent 3 Troponin's:  Recent Labs   Lab Test 02/06/20  2339 03/01/15  0349   TROPI <0.015 <0.015  The 99th percentile for upper reference range is 0.045 ug/L.  Troponin values in   the range of 0.045 - 0.120 ug/L may be associated with risks of adverse   clinical events.   Effective 7/30/2014, the reference range for this assay has changed to reflect   new instrumentation/methodology.       Most Recent Cholesterol Panel:No lab results found.  Most Recent 6 Bacteria Isolates From Any Culture (See EPIC Reports for Culture Details):  Recent Labs   Lab Test 02/07/20  0028 02/06/20  2339 03/01/15  1308 03/01/15  0349   CULT No growth No growth Culture negative for acid fast bacilli  Assayed at [employer] [address]    Normal charles No growth     Most  Recent TSH, T4 and A1c Labs:  Recent Labs   Lab Test 03/14/23  1604   A1C 6.6*     Results for orders placed or performed during the hospital encounter of 03/09/23   XR Chest Port 1 View    Narrative    XR CHEST PORT 1 VIEW   3/9/2023 2:32 PM     HISTORY: Shortness of breath    COMPARISON: Chest radiograph 11/20/2022      Impression    IMPRESSION: Grossly stable size of cardiomediastinal silhouette given  portable technique. Persistent changes of pulmonary fibrosis with  new/enlarging right pleural effusion. There may be some scattered  superimposed alveolar opacities as well, developing pneumonia is not  excluded. No pneumothorax. Bones are unchanged.    PUJA NAIR MD         SYSTEM ID:  [id number]   CT Abdomen Pelvis w/o Contrast    Narrative    EXAMINATION: CT ABDOMEN PELVIS W/O CONTRAST, 3/10/2023 9:51 AM    TECHNIQUE:  Helical CT images from the lung bases through the  symphysis pubis were obtained without contrast.  Coronal reformatted  images were generated at a workstation for further assessment.    COMPARISON: CT abdomen pelvis 6/27/2022, CT chest same day    HISTORY: evaluate for nephrolithiasis    FINDINGS:  Examination is partially limited due to motion artifact and  non-contrast technique.    Abdomen and pelvis:   Liver: No suspicious liver lesions.   Gallbladder: No gallstones. No evidence of acute cholecystitis.  Spleen: Normal size.  Pancreas: No suspicious pancreatic lesions. The pancreatic duct is not  dilated.  Adrenal glands: No adrenal nodules.  Urinary system: Stable 2.5 x 1.5 x 1.0 cm stone in the left renal  pelvis. Similar very mild left intrarenal dilatation. Unchanged renal  stone burden within the right kidney with a few scattered  subcentimeter nonobstructing stone, the largest measuring up to 4 mm.  No right hydronephrosis. No hydroureter. No stones identified along  the course of the ureters. No stones present in the urinary bladder.  Reproductive organs: Prostatic enlargement.    Bowel: No abnormally dilated loops of large or small bowel. No  abnormal bowel wall thickening. Moderate colonic stool burden.  Scattered colonic diverticulosis without evidence of acute  diverticulitis. The appendix is unremarkable.  Lymph nodes: No retroperitoneal, mesenteric, or pelvic  lymphadenopathy.  Fluid: No free fluid within the abdomen.  Vessels: No infrarenal aortic aneurysm. Calcifications of the  abdominal aorta and iliac arteries.    Lung bases: Right greater than left pleural effusions. The visualized  lung bases demonstrate fibrotic and cystic changes in the lower lobes.    Bones and soft tissues: No suspicious osseous lesions. Mild  degenerative changes throughout the visualized spine. Large right and  small left fat-containing inguinal hernias.      Impression    IMPRESSION:   1.  Stable large renal stone at the left renal pelvis measuring up to  2.5 cm. No significant change in the very mild dilatation of the left  intrarenal collecting system.  2.  Several stable nonobstructing stones in the right kidney.  3.  Moderate colonic stool burden.   4.  Right greater than left pleural effusions. Fibrotic changes in the  visualized lung bases. Please see same day chest CT for full dictation  of the chest.    I have personally reviewed the examination and initial interpretation  and I agree with the findings.    NEFTALI PALMER MD         SYSTEM ID:  [id number]   CT Chest Hi-Resolution wo Contrast    Narrative    CT chest high resolution without contrast    INDICATION: Chronic lung disease with pulmonary fibrosis    COMPARISON: 7/30/2018    FINDINGS: No contrast. Inspiratory next to respond imaging obtained.  The included thyroid appears unremarkable. The mid ascending aorta is  normal in size. Pulmonary artery is upper normal in size at 3.0 cm.  Multiple rounded densities in the pretracheal and right lower  paratracheal stations are consistent with lymphadenopathy which are  clearly new from prior  examination. Subcarinal prominent soft tissue  also effaces the esophagus with apparent lymph node measuring  approximately 2.9 x 2.8 cm. There are new bilateral pleural effusions  which could be malignant. There is no pericardial effusion.  Bone detail shows lower cervical degenerative disc disease. No  suspicious sclerotic or lytic/destructive bone lesion.  Detail of the lungs shows prominent subpleural and other fibrotic  changes in the lungs with lower lobe predominance with cystic  bronchiectasis in the lower lobes. The pleural-based fibrotic changes  certainly could mask superimposed pulmonary nodules.    Expiratory imaging shows minimal air trapping. Major airways shows  some posterior membrane concavity of the trachea without severe  narrowing. Therefore, this would not meet dynamic expiration criteria  for tracheobronchomalacia neck protocol was not performed regardless.      Impression    IMPRESSION: Continued fibrotic lung disease with extensive mediastinal  lymphadenopathy which is new and new bilateral pleural effusions which  could be malignant. No obvious adrenal nodule although the adrenals  are not completely imaged. Recommend PET/CT an/or consideration for  mediastinal lymph node sampling and/or diagnostic thoracentesis to  exclude malignancy.    STEPHANIE DUVAL MD         SYSTEM ID:  [id number]   XR Chest Port 1 View    Narrative    EXAM: Chest radiograph 3/11/2023 3:32 AM    HISTORY: Hypoxia.     COMPARISON: Chest CT without contrast 3/10/2023. Chest radiograph  3/9/2023.     TECHNIQUE: Portable AP view of the chest.    FINDINGS:   The patient is rotated to the right. Stable cardiac silhouette.  Improved right-sided costophrenic angle blunting, possibly positional.  Unchanged small left-sided pleural effusion. No pneumothorax. Low lung  volumes. Multifocal extensive airspace and interstitial opacities are  relatively unchanged. No acute osseous abnormality. Osteopenia. Soft  tissues and upper  abdomen unremarkable.      Impression    IMPRESSION:   1.  Unchanged patchy opacities suggesting chronic fibrotic changes on  the right..  2.  Decreased right-sided pleural effusion.  3.  Unchanged small left-sided pleural effusion.    I have personally reviewed the examination and initial interpretation  and I agree with the findings.    TAYLOR MCCORMACK MD         SYSTEM ID:  [id number]   XR Chest Port 1 View    Narrative    EXAM: XR CHEST PORT 1 VIEW  3/12/2023 8:23 AM     HISTORY:  hypoxia       COMPARISON:  3/11/2023    FINDINGS:   The patient is slightly rotated to the right. The cardiac silhouette  is unchanged. No pneumothorax. Slightly increased bilateral pleural  effusions. Unchanged bilateral reticular and patchy airspace  opacities. Low lung volumes.      Impression    IMPRESSION:     1. Increased bilateral pleural effusions, greater on the right.  2. No significant change in diffuse interstitial and patchy airspace  opacities.    I have personally reviewed the examination and initial interpretation  and I agree with the findings.    LEXIE COLVIN MD         SYSTEM ID:  [id number]   XR Abdomen Port 1 View    Narrative    EXAMINATION:  XR ABDOMEN PORT 1 VIEW 3/12/2023     COMPARISON: CT abdomen and pelvis 3/10/2023    HISTORY: Verify small bowel feeding tube bedside placement.    TECHNIQUE: One frontal supine view of the abdomen.    FINDINGS: Feeding tube tip projects over the area of the stomach. No  abnormally dilated air-filled loops of bowel. Mild-to-moderate colonic  stool burden. The lung bases are not well-visualized. Large  calcification which appeared within the extrarenal pelvis of the left  kidney on recent CT. Calcifications in the right kidney also seen on  recent CT.      Impression    IMPRESSION:   1. Feeding tube tip projects over the stomach.  2. Nonobstructive bowel gas pattern.  3. Bilateral renal collecting system stones including a larger stone  in the left extrarenal pelvis.    I have  personally reviewed the examination and initial interpretation  and I agree with the findings.    STEPHANIE DUVAL MD         SYSTEM ID:  [id number]   IR Thoracentesis    Narrative    DIAGNOSIS: Acute on chronic hypoxic respiratory failure, pleural  effusion    PROCEDURE: IR THORACENTESIS    Impression    IMPRESSION: Completed ultrasound-guided diagnostic and therapeutic  right thoracentesis. 1150 mL clear pink fluid aspirated from the right  pleural space. A sample of fluid was sent to lab for analysis as  requested. No immediate complication. No effusion remains. Respiratory  rate improved to 26/min from 42/min.    ----------    CLINICAL HISTORY: Acute on chronic hypoxic hypercapnia respiratory  failure, community-acquired pneumonia versus aspiration pneumonia  versus tuberculosis with bilateral pleural effusions right greater  than left. IR consulted for diagnostic and therapeutic right  thoracentesis. Consent with [place]  with family. Bedside in  the ICU. Airborne precautions.    PERFORMED BY: Collin Paris PA-C    CONSENT: Written informed consent was obtained from son with [place]   over the phone and is documented in the patient record.    MEDICATIONS: 4 mL 1% lidocaine for local anesthesia      NURSING: Patient continuously monitored by trained, independent  observer.     DESCRIPTION: Patient maintained his current position recumbent on his  back in his ICU bed on BiPAP. With the right arm slightly abducted  Limited ultrasound showed large right pleural effusion. The skin  overlying the right pleural effusion was prepped and draped in the  usual sterile fashion.    Under continuous ultrasound visualization, the skin and pleura was  anesthetized before a centesis needle/catheter system was advanced  into the pleural space. The catheter was advanced off the needle into  the fluid and the needle was removed. Sample collected before catheter  was connected to vacuum drainage and fluid  aspirated. Upon completion  of drainage, the catheter was removed on expiration. Occlusive  dressing applied.     COMPLICATIONS: No immediate concerns, the patient remained stable  throughout the procedure and tolerated it well.    ESTIMATED BLOOD LOSS: Minimal    SPECIMENS: 30 mL pleural fluid    SAV MUJICA PA-C         SYSTEM ID:  [id number]   XR Abdomen 1 View    Narrative    Exam: XR ABDOMEN 1 VIEW, 3/15/2023 1:36 AM    Indication: NG placement    Comparison: 3/12/1933    Findings:   2 portable radiographs of the chest and abdomen for attempt of enteric  tube placement. The tube is not visualized in the first image, in the  second image it appears to be coiling in the cervical esophagus back  to the mouth, tip is outside the field of view. Suboptimal  visualization of the chest and abdomen. Trach appears to be midline.  Fibrotic appearance of the lungs with small left pleural effusion.  Nonobstructive bowel gas pattern, dense material in the left upper  quadrant of indeterminate nature.      Impression    Impression:   1. Enteric tube coiling in the mouth, tip is outside of the  field-of-view.  2. Stable fibrotic appearance of the lungs. Small left pleural  effusion.  3. Nonobstructive bowel gas pattern.    I have personally reviewed the examination and initial interpretation  and I agree with the findings.    TAYLOR MCCORMACK MD         SYSTEM ID:  [id number]   XR Abdomen Port 1 View    Narrative    Exam: XR ABDOMEN PORT 1 VIEW, 3/15/2023 1:37 AM    Indication: Ng placement    Comparison: Same date radiograph    Findings:   3 views of the lower chest and upper abdomen for purposes of enteric  tube placement. Tube is coiled in the lower esophagus on the first and  second images. Tube is not visualized on the third image. Otherwise  stable appearance of lower chest and abdomen. Left renal staghorn  calculus.      Impression    Impression: Tube is coiled in the first 2 images and not visualized  the third image. As  per contact with ordering provider at 4:30 AM,  tube was removed prior to third image. Otherwise stable appearance of  the lower chest and abdomen. Again are noted the left renal staghorn  calculus.    I have personally reviewed the examination and initial interpretation  and I agree with the findings.    TAYLOR MCCORMACK MD         SYSTEM ID:  [id number]   XR Chest Port 1 View    Narrative    Portable chest    INDICATION: NG tube insertion verification    COMPARISON: 3/12/2023    FINDINGS: Large bore tube progresses beyond inferior margin of the  image. Patchy densities in the lungs may indicate edema. Heart size  grossly normal.      Impression    IMPRESSION: Feeding tube/NG tube all diaphragmatic hiatus and the  inferior margin of the image into the abdomen.    STEPHANIE DUVAL MD         SYSTEM ID:  [id number]   XR Chest Port 1 View    Narrative    EXAM: XR CHEST PORT 1 VIEW  3/15/2023 2:13 PM     HISTORY:  evaluation of previously drained effusions       COMPARISON:  Radiograph 3/15/23. CT 3/10/2023.    TECHNIQUE: AP semiupright view of the chest    FINDINGS:   Devices, lines, tubes: Feeding tube courses inferior to the left  hemidiaphragm with tip out of the field of view.    The trachea is midline. The cardiomediastinal silhouette is stable.  Biapical thickening. Compared to radiograph of 3/12/2023 the right  pleural effusion is decreased in size, now small. Similar small left  pleural effusion. Interstitial opacities bilaterally are not  significantly changed. No appreciable pneumothorax. No acute osseous  abnormality.        Impression    IMPRESSION:   1. Compared to radiograph 3/12/2023 there is decreased size of right  pleural effusion, now small.   2. Stable small left pleural effusion.  3. Diffuse bilateral interstitial densities, compatible with fibrotic  changes.    I have personally reviewed the examination and initial interpretation  and I agree with the findings.    STEPHANIE DUVAL MD          SYSTEM ID:  [id number]   XR Chest Port 1 View    Narrative    XR CHEST PORT 1 VIEW  3/16/2023 4:24 PM      HISTORY: RN placed PICC - verify tip placement    COMPARISON: 3/15/2023    FINDINGS: AP view. Right arm PICC tip terminates in the right atrium.  Feeding tube courses below the field of view. No substantial change in  diffuse interstitial predominant opacities. Stable small bilateral  pleural effusions which appear partially loculated. No pneumothorax.      Impression    IMPRESSION:   1. Right arm PICC tip terminates in the right atrium.  2. Stable bilateral partially loculated pleural effusions.  3. Stable diffuse interstitial and airspace opacities.    I have personally reviewed the examination and initial interpretation  and I agree with the findings.    RICK MOTLEY MD         SYSTEM ID:  [id number]   XR Chest Port 1 View    Narrative    Exam: XR CHEST PORT 1 VIEW, 3/18/2023 9:28 AM    Indication: Infection vs atelectatis vs chronic lung disease  evaluation.    Comparison: 3/16/2023    Findings:   Feeding tube seen coursing through the mediastinum. Tip projects off  the film but is at least to the body of the stomach. Right arm PICC  tip in the right atrium. Unchanged opacity at the right apex.  Increasing opacity along the right lateral chest wall. Unchanged left  apical pleural thickening and left lateral chest wall. Heart within  normal limits. Stable mixed opacities throughout both lungs.      Impression    Impression:  1. Increasing loculated right basilar pleural effusion. Otherwise  unchanged pleural thickening or loculated effusions bilaterally.  2. Stable mixed opacities throughout both lungs.  3. Right arm PICC tip in the midright atrium.    TAYLOR MCCORMACK MD         SYSTEM ID:  [id number]   XR Chest Port 1 View    Narrative    Exam: XR CHEST PORT 1 VIEW, 3/19/2023 5:52 AM    Indication: fever    Comparison: Radiograph 3/18/2023, CT 3/10/2023    Findings:     Single portable AP view of the chest.  "Partially visualized enteric  tube. Trachea is midline. No definite pneumothorax. Similar-appearing  loculated pleural effusions and pulmonary opacities. Right upper  extremity PICC tip projects over the right atrium.. Stable mediastinum  and cardiac silhouette. Left renal staghorn calculus.      Impression    Impression: Similar-appearing loculated pleural effusions and  pulmonary opacities.    I have personally reviewed the examination and initial interpretation  and I agree with the findings.    LEXIE COLVIN MD         SYSTEM ID:  [id number]   US Lower Extremity Venous Duplex Bilateral    Narrative    Exam: Ultrasound of the deep venous system of bilateral lower  extremities dated 3/20/2023 8:14 AM    Clinical information: Unexplained fevers, rule out DVT    Comparison: None    Ordering provider: Cristina Masters M.D.    Technique: Gray-scale evaluation with compression and Doppler  assessment of deep venous system for spontaneous and phasic flow, as  well as the presence of distal augmentation. Color flow images  obtained as needed. Gray-scale images with compression of the great  saphenous vein obtained as needed.    Findings:    Right leg:    CFV: Thrombus: No, Phasic: Yes  Femoral vein, proximal: Thrombus: No, Phasic: Yes  Femoral vein, mid: Thrombus: No, Phasic: Yes  Femoral vein, distal: Thrombus: No, Phasic: Yes  Popliteal vein: Thrombus: No, Phasic: Yes  PTV: Thrombus: No  Peroneal vein: Thrombus: No    Left leg:    CFV: Thrombus: No, Phasic: Yes  Femoral vein, proximal: Thrombus: No, Phasic: Yes  Femoral vein, mid: Thrombus: No, Phasic: Yes  Femoral vein, distal: Thrombus: No, Phasic: Yes  Popliteal vein: Thrombus: No, Phasic: Yes  PTV: Thrombus: Thrombus: No  Peroneal vein: Thrombus: No      Impression    Impression:    Right leg: No deep venous thrombosis.     Left leg: No deep venous thrombosis.     Reference: \"Duplex Ultrasound in the Diagnosis of Lower-Extremity Deep  Venous Thrombosis\"- [place] " JESICA Crespo MD, S; Jared Altamirano MD  (Circulation. 2014;129:917-921. http://circ.ahajournals.org )    ZEV MATA MD         SYSTEM ID:  [id number]   CT Chest Abdomen Pelvis w/o Contrast    Narrative    EXAMINATION: CT CHEST ABDOMEN PELVIS W/O CONTRAST, 3/20/2023 4:42 PM    TECHNIQUE:  Helical CT images from the thoracic inlet through the  symphysis pubis were obtained  without IV contrast.     COMPARISON: CT chest abdomen and pelvis 3/10/2023    HISTORY: Fevers of unknown origin, assess progression of pneumonia,  potential abscess    FINDINGS:    Chest: Right picc with the tip in the right atrium. Enteric tube with  the tip in the pylorus. Unremarkable thyroid. No lymphadenopathy in  the base of the neck or axillae. Normal caliber of the major thoracic  vessels. Conventional branching pattern of the aortic arch. Normal  cardiac size. No pericardial effusion. Mediastinal and perihilar  lymphadenopathy such as 2.7 x 1.7 cm lower paratracheal lymph node  (series 1 image 6), decreased in size from prior when it measured 3.6  x 2.8 cm. Small bilateral loculated pleural effusions with associated  atelectasis. Grossly stable fibrotic appearance of the peripheral and  basilar lungs with emphysematous changes, predominantly in the apical  lungs. There are parenchymal consolidations in the bilateral lungs  which are stable to mildly increased from prior. Basilar predominant  areas of septal thickening and faint ground glass opacities. Patulous  upper thoracic esophagus.    Abdomen and pelvis: Limited noncontrast evaluation of the abdomen.  Rectal tube and Vu in place. Unremarkable noncontrast evaluation of  the liver, gallbladder, spleen, and pancreas. 1 cm nodule in the  anterior limb of the left adrenal, new from prior, there is associated  small 7 mm nodule along the lateral limb of the left adrenal. There  are multiple soft tissue nodularities in the retroperitoneum, such as  posterior to the right adrenal  measuring 7 mm (series 1 image 249),  inferior to the left adrenal measuring 3 mm (series 1 image 250),  adjacent to the left kidney (series 1 images 227, 304, and 319).  Stable appearance of the large left nonobstructive nephrolithiasis and  right punctiform nonobstructive nephrolithiasis. Additionally, there  is a 1.3 x 1.0 mesenteric mass with trace surrounding fat stranding  (series 1 image 366). Lobular appearance of the left kidney with  possible nodularities along the left renal capsule. There is new trace  fluid and fat stranding in the left lower quadrant, superolateral to  the bladder. In the vicinity, there is thickening of the left pelvic  wall soft tissues, likely within our deep to the left obturator  internus. No distended loops of bowel. Normal appendix. No free air in  the abdomen. There are no substantial lymphadenopathy in the abdomen,  aside from the previous mentioned mesenteric mass which could  represent a lymph node. No large fluid collections in the abdomen.    Bones and soft tissues: Small bilateral fat-containing inguinal  hernias. Small air collection in the soft tissues of the right lower  quadrant (series 1 image 426), likely iatrogenic. No acute osseous  abnormalities or suspicious bone lesions.      Impression    IMPRESSION:   1. Interval worsening of loculated bilateral pleural effusions and  pulmonary consolidations concerning for infection (TB included).  Malignancy cannot be excluded.  2. Stable fibrotic changes of the lungs from prior 3/10/2023.  3. Soft tissue nodularities in the retroperitoneum as well as  mesenteric soft tissue nodularity. This is concerning for malignancy  versus intra-abdominal infection such as intra-abdominal tuberculosis.  4. New soft tissue thickening in the left pelvic wall likely within  the left obturator internus with associated fat stranding and trace  fluid. Differential include phlegmon and hematoma.  5. Air in the right abdominal wall, likely  iatrogenic. Correlate with  prior injection and recommend direct visualization.  6. Stable appearance of bilateral nephrolithiasis without substantial  collecting system dilation.    I have personally reviewed the examination and initial interpretation  and I agree with the findings.    TAYLOR MCCORMACK MD         SYSTEM ID:  [id number]   CT Head w/o Contrast    Narrative    EXAM: CT HEAD W/O CONTRAST  3/20/2023 4:42 PM     HISTORY:  eval ongoing encephalopathy       COMPARISON:  None.    TECHNIQUE: Using multidetector thin collimation helical acquisition  technique, axial, coronal and sagittal CT images from the skull base  to the vertex were obtained without intravenous contrast.   (topogram) image(s) also obtained and reviewed.    FINDINGS:  Mildly motion degraded examination. No acute intracranial hemorrhage,  mass effect, or midline shift. No acute loss of gray-white matter  differentiation in the cerebral hemispheres. Ventricles are  proportionate to the cerebral sulci. Clear basal cisterns.  Mild to  moderate diffuse cerebellar and cerebral volume loss.    The bony calvaria and the bones of the skull base are normal. The  visualized portions of the paranasal sinuses and mastoid air cells are  clear. Grossly normal orbits. Nasal tubing. Symmetric anterior  subluxation of the temporomandibular joints.      Impression    IMPRESSION:   1.  No acute intracranial pathology.  2.  Anterior subluxation of the temporomandibular joints presumably  related to mouth opening.    I have personally reviewed the examination and initial interpretation  and I agree with the findings.    JUSTINO SALTER MD         SYSTEM ID:  [id number]   US Abdomen Limited Portable    Narrative    EXAMINATION: US ABDOMEN LIMITED PORTABLE  3/21/2023 4:24 PM      CLINICAL HISTORY: Lipase 500s, escalating septic shock, concerns for  acute cholecystitis vs acute pancreatitis vs common bile duct issues,  Lipase 500s, escalating septic shock, concerns  for acute cholecystitis  vs acute pancreatitis vs common bile duct issues    COMPARISON: CT 3/20/2023        FINDINGS:  The liver is normal in contour and echogenicity. There is no  intrahepatic or extrahepatic biliary ductal dilatation. The common  bile duct measures 4 mm. The gallbladder contains gallbladder sludge,  without gallstones, wall thickening, or pericholecystic fluid. No  decubitus imaging could be obtained as patient is intubated.    The spleen measures maximally 13.4 cm and is normal in appearance. The  visualized portions of the pancreas are normal in echogenicity.    The visualized upper abdominal aorta and inferior vena cava are  normal.      The kidneys are normal in position and echogenicity. The right kidney  measures 10.2 cm and the left kidney measures 10.2 cm. There is no  significant urinary tract dilation.    Small right pleural effusion.      Impression    IMPRESSION:     1. Gallbladder sludge without sonographic evidence of cholecystitis.  2. Small right pleural effusion.    I have personally reviewed the examination and initial interpretation  and I agree with the findings.    NEFTALI PALMER MD         SYSTEM ID:  [id number]   CT Head w/o Contrast    Narrative    CT HEAD W/O CONTRAST 3/22/2023 6:32 AM    History: New upward gaze     Comparison: 3/20/2023    Technique: Using multidetector thin collimation helical acquisition  technique, axial, coronal and sagittal CT images from the skull base  to the vertex were obtained without intravenous contrast.    Findings: Mild-to-moderate diffuse generalized cerebral parenchymal  volume loss. Mild periventricular white matter hypodensities, likely  sequela of chronic small vessel ischemic disease. There is no  intracranial hemorrhage, mass effect, or midline shift. Gray/white  matter differentiation in both cerebral hemispheres is preserved.  Ventricles are proportionate to the cerebral sulci. The basal cisterns  are clear. Bilateral  pseudophakia.    The bony calvaria and the bones of the skull base are normal. The  visualized portions of the paranasal sinuses and mastoid air cells are  clear. Unchanged symmetric anterior subluxation of the temporal  mandibular joints.      Impression    Impression:  No acute intracranial pathology. Stable findings compared to prior on  3/20/2023.    I have personally reviewed the examination and initial interpretation  and I agree with the findings.    AUBREE SOFIA MD         SYSTEM ID:  [id number]   XR Chest Port 1 View    Narrative    Exam: XR CHEST PORT 1 VIEW, 3/23/2023 12:56 PM    Comparison: CT 3/20/2023, radiograph 3/19/2023, 3/18/2023    History: f/u pleural effusion, SOB with BiPAP.    Findings:  Portable AP view of the chest. The patient is slightly rotated. Right  upper extremity PICC line tip objects over the right atrium. Feeding  tube tip projects beyond the field-of-view. No significant change in  appearance of the cardiomediastinal silhouette. Slightly decreased  bilateral loculated pleural effusions. No appreciable pneumothorax.  Slightly increased mixed pulmonary opacities throughout both lungs.      Impression    Impression:   1. Slightly decreased small bilateral loculated pleural effusions.  2. Slightly increased mixed pulmonary opacities throughout both lungs.    I have personally reviewed the examination and initial interpretation  and I agree with the findings.    STEPHANIE DUVAL MD         SYSTEM ID:  [id number]   XR Chest Port 1 View    Narrative    Exam: XR CHEST PORT 1 VIEW, 3/25/2023 9:03 AM    Indication: follow up imaging to eval for pleural effusions    Comparison: 3/23/2023 chest x-ray and 3/20/2023 CT CAP    Findings:     Semiupright AP view of the chest. Feeding tube terminates within the  stomach . There is stable appearing volume loss throughout the lungs  with unchanged apical and basilar predominant fibrotic changes. Small  bilateral pleural effusions, unchanged. Upper  abdomen unremarkable.      Impression    Impression: Similar appearing pulmonary opacities and small bilateral  pleural effusions.    I have personally reviewed the examination and initial interpretation  and I agree with the findings.    LEXIE COLVIN MD         SYSTEM ID:  [id number]

## 2023-03-25 NOTE — PLAN OF CARE
ICU End of Shift Summary. See flowsheets for vital signs and detailed assessment.     Changes this shift: FIO2 increased to 40%.  Neuro: not following commands, opening eyes spontaneously.    Respiratory:  Continues on BiPap 10/5 at 40% FiO2. Sitter at bedside for safety.  GI/: no BM overnight. Vu in place with good output.  Diet/appetite: NPO. TF running at goal of 55 mL/hr with Q2h 100 mL flushes.  Activity:  Turns in bed with assist of two.  Pain: no nonverbal signs of pain noted.  Skin: no new concerns.  LDA's:R DL PICC infusing TKO between abx and other infusing insulin gtt, currently alg 4  Labs: Bicarb critical at 47, Na 144.     Plan: decrease free water flushes, continue sitter at bedside for safety.

## 2023-03-25 NOTE — PROGRESS NOTES
1520- noted sudden bradycardia from rate of  90 s to 56 and 30 s  bpm and hypotension, and desaturation ( see flow sheets).  Increased norepinephrine and vasopressin to max dose ( see MAR) TRUDI Bob was called to bedside,   - despite all above interventions  Job appeared to be no longer breathing, the monitor showed Idioventricular rhythm with  HR in the 20's,  Then followed by idioventricular beats ,a flat aterial line waveform was noted and an unreadable pulse oxymetry noted.  -Family at bedside was already notified and updated by  Providers.    1535 hrs-No pulse , 0 RR , no BP , 2 TRUDI  At bedside auscultated  0 heart sounds   pronounced patient DEAD by TRUDI at bedside at 1535.                 - family members declined autopsy.                - Mourning time allowed to family members  Prior to postmortem care.                 - Currently waiting for more family members to see Job and then to be taken by  homes of family choice.

## 2023-03-25 NOTE — PROGRESS NOTES
Death Note      After first episode of bradycardia, hypotension and hypoxia, Arterial Blood Gas and Lactic acid was checked demonstrating significant hypercarbic respiratory failure with a pH of 7.02 and CO2 > 130. Settings were adjusted on his Bipap with the assistance of RT to increase his minute ventilation in an attempt to help lower his CO2 levels. Discussion at bedside with Kelly and niece regarding seriousness of Job's current situation. Informed family that if we cannot correct his current acidotic state due to his respiratory failure, his death will be imminent. Informed them of our attempts to support Job with the bipap setting adjustments, however, given the critical condition of his chronic lung disease, there was nothing more we could do. Suggested calling in family and offered spiritual support, which was declined.     I was called to the bedside of Job Kaiser approximately 1525 due to bradycardia, hypotension and hypoxia, the second such episode in the previous 4 hours. Job appeared to be no longer breathing, the monitor showed significant bradycardia with HR in the 20's, a flat aterial line waveform and an unreadable pulse oxymetry. Attempted to illicit a response from [facility] was unsuccessful. No pulse was palpable, no breath sounds or heart sounds were heard during auscultation. Patient had dilated and unreactive pupils. Death was pronounced at 1535 on March 25th, 2023. Family was notified. Myself and nursing staff are present for support.     FRANCES [facility]in[facility]  Pager #[phone]  Critical Care  [facility]

## 2023-03-29 LAB
BACTERIA BLD CULT: NO GROWTH
BACTERIA BLD CULT: NO GROWTH

## 2023-04-07 LAB — BACTERIA SPT CULT: ABNORMAL

## 2023-04-11 LAB — BACTERIA PLR CULT: NO GROWTH

## 2023-04-17 LAB — BACTERIA BLD CULT: NO GROWTH

## 2023-05-07 LAB
ACID FAST STAIN (ARUP): NORMAL

## 2023-05-09 LAB
ACID FAST STAIN (ARUP): NORMAL
